# Patient Record
Sex: MALE | Race: WHITE | NOT HISPANIC OR LATINO | Employment: OTHER | URBAN - METROPOLITAN AREA
[De-identification: names, ages, dates, MRNs, and addresses within clinical notes are randomized per-mention and may not be internally consistent; named-entity substitution may affect disease eponyms.]

---

## 2017-01-17 ENCOUNTER — PATIENT MESSAGE (OUTPATIENT)
Dept: INTERNAL MEDICINE | Facility: CLINIC | Age: 77
End: 2017-01-17

## 2017-01-20 ENCOUNTER — OFFICE VISIT (OUTPATIENT)
Dept: OPHTHALMOLOGY | Facility: CLINIC | Age: 77
End: 2017-01-20
Attending: OPHTHALMOLOGY
Payer: MEDICARE

## 2017-01-20 DIAGNOSIS — H40.1231 LOW-TENSION GLAUCOMA, BILATERAL, MILD STAGE: Primary | ICD-10-CM

## 2017-01-20 DIAGNOSIS — H52.4 ASTIGMATISM WITH PRESBYOPIA, BILATERAL: ICD-10-CM

## 2017-01-20 DIAGNOSIS — H52.203 ASTIGMATISM WITH PRESBYOPIA, BILATERAL: ICD-10-CM

## 2017-01-20 DIAGNOSIS — H40.1231 LOW-TENSION GLAUCOMA, BILATERAL, MILD STAGE: ICD-10-CM

## 2017-01-20 DIAGNOSIS — H25.13 NUCLEAR SCLEROSIS, BILATERAL: ICD-10-CM

## 2017-01-20 DIAGNOSIS — H04.123 DRY EYE SYNDROME, BILATERAL: ICD-10-CM

## 2017-01-20 DIAGNOSIS — H47.392 OPTIC DISC HEMORRHAGE, LEFT: ICD-10-CM

## 2017-01-20 DIAGNOSIS — H02.403 PTOSIS, BILATERAL: ICD-10-CM

## 2017-01-20 PROCEDURE — 99999 PR PBB SHADOW E&M-EST. PATIENT-LVL III: CPT | Mod: PBBFAC,,, | Performed by: OPHTHALMOLOGY

## 2017-01-20 PROCEDURE — 92133 CPTRZD OPH DX IMG PST SGM ON: CPT | Mod: S$GLB,,, | Performed by: OPHTHALMOLOGY

## 2017-01-20 PROCEDURE — 92014 COMPRE OPH EXAM EST PT 1/>: CPT | Mod: S$GLB,,, | Performed by: OPHTHALMOLOGY

## 2017-01-20 PROCEDURE — 99499 UNLISTED E&M SERVICE: CPT | Mod: S$GLB,,, | Performed by: OPHTHALMOLOGY

## 2017-01-20 PROCEDURE — 92083 EXTENDED VISUAL FIELD XM: CPT | Mod: S$GLB,,, | Performed by: OPHTHALMOLOGY

## 2017-01-20 NOTE — PROGRESS NOTES
HPI     Glaucoma    Additional comments: HVF and OCT review today           Blurred Vision    Additional comments: Pt feels he is not seeing as well, even with new   glasses           Comments   DLS: 9/30/16    1. LTG  2. NS OU  3. Ptosis Sx  (Dr. Mendoza)  4. PIERCE  5. Disc Heme OS  6. Astigmatism/Presbyopia    MEDS:  AT's PRN OU       Last edited by Betsy Barker MA on 1/20/2017  9:16 AM. (History)            Assessment /Plan     For exam results, see Encounter Report.    Low-tension glaucoma, bilateral, mild stage    Nuclear sclerosis, bilateral    Optic disc hemorrhage, left    Dry eye syndrome, bilateral    Ptosis, bilateral    Astigmatism with presbyopia, bilateral      Pts wife is Carole Schwab - a glaucoma patient of Dr. Snyder    1.   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 Snyder)        Glaucoma meds    none        H/O adverse rxn to glaucoma drops    none        LASERS    none        GLAUCOMA SURGERIES    none        OTHER EYE SURGERIES    none        CDR    0.75/0.75        Tbase    10-19 / 12-17          Tmax    19/17            Ttarget    ?             HVF    12 test 2000 to 2017 - full od / full os        Gonio    +3 ou        CCT    587/583        OCT    7 test 2005 to 2017 - RNFL  - dec inf od// dec inf os        HRT    6 test 2006 to 2016 - MR -  Bord I/ T od // dec I os /// CDR 0.717 od // 0.664 os        Disc photos    2003, 2005 - slides // 2010, 2015 - + disc heme ST os  - OIS    - Ttoday    14/14  - Test done today HVF/OCT/DFE  optom      2.   NS ou - mild - monitor   Pt noting decreased vision at near- more hazy than last visit     3.   Ptosis / Dermatochalasis        S/P sx with Dr. Mendoza 4/5/2001    4.   PIERCE - AT's prn   (Needed steroid gtts from Dr. Urena for PIERCE) and AT's    5. Disc Heme OS   See photos 11/4/2015     6.  Astigmatism / presbyopia        Glasses - Florian - given RX today says the glasses are old - no real change    7.   Skin CA face - S/P sx    Plan  LTG - suspect vis mild disease   Nl HVF ou  OCT w/ inf thinning ou - first time 1/2017  -no disc heme today   -normal HVF ou today, dec inf ou on OCT    Cont close monitoring off gtts  Consider adding a PG in near future     F/U with HRT ou // gonio // AR/MR/BAT - 3-4 months - of HRT also with inf thinning begin gtts     I have seen and personally examined the patient.  I agree with the findings, assessment and plan of the resident and/or fellow.     Latonia Snyder MD

## 2017-02-04 DIAGNOSIS — R77.0 ABNORMAL ALBUMIN: Primary | ICD-10-CM

## 2017-02-07 ENCOUNTER — TELEPHONE (OUTPATIENT)
Dept: INTERNAL MEDICINE | Facility: CLINIC | Age: 77
End: 2017-02-07

## 2017-02-07 NOTE — TELEPHONE ENCOUNTER
----- Message from Marco Antonio Vaughan MD sent at 2/4/2017  8:42 PM CST -----  Please contact and arrange for a CMP. Orders are in.

## 2017-02-08 ENCOUNTER — LAB VISIT (OUTPATIENT)
Dept: LAB | Facility: HOSPITAL | Age: 77
End: 2017-02-08
Attending: INTERNAL MEDICINE
Payer: MEDICARE

## 2017-02-08 DIAGNOSIS — R77.0 ABNORMAL ALBUMIN: ICD-10-CM

## 2017-02-08 LAB
ALBUMIN SERPL BCP-MCNC: 3.6 G/DL
ALP SERPL-CCNC: 57 U/L
ALT SERPL W/O P-5'-P-CCNC: 13 U/L
ANION GAP SERPL CALC-SCNC: 10 MMOL/L
AST SERPL-CCNC: 17 U/L
BILIRUB SERPL-MCNC: 1 MG/DL
BUN SERPL-MCNC: 15 MG/DL
CALCIUM SERPL-MCNC: 9.1 MG/DL
CHLORIDE SERPL-SCNC: 104 MMOL/L
CO2 SERPL-SCNC: 27 MMOL/L
CREAT SERPL-MCNC: 1.3 MG/DL
EST. GFR  (AFRICAN AMERICAN): >60 ML/MIN/1.73 M^2
EST. GFR  (NON AFRICAN AMERICAN): 53 ML/MIN/1.73 M^2
GLUCOSE SERPL-MCNC: 128 MG/DL
POTASSIUM SERPL-SCNC: 3.9 MMOL/L
PROT SERPL-MCNC: 6.7 G/DL
SODIUM SERPL-SCNC: 141 MMOL/L

## 2017-02-08 PROCEDURE — 36415 COLL VENOUS BLD VENIPUNCTURE: CPT

## 2017-02-08 PROCEDURE — 80053 COMPREHEN METABOLIC PANEL: CPT

## 2017-02-09 ENCOUNTER — PATIENT MESSAGE (OUTPATIENT)
Dept: DERMATOLOGY | Facility: CLINIC | Age: 77
End: 2017-02-09

## 2017-02-16 ENCOUNTER — PATIENT MESSAGE (OUTPATIENT)
Dept: DERMATOLOGY | Facility: CLINIC | Age: 77
End: 2017-02-16

## 2017-02-20 ENCOUNTER — OFFICE VISIT (OUTPATIENT)
Dept: DERMATOLOGY | Facility: CLINIC | Age: 77
End: 2017-02-20
Payer: MEDICARE

## 2017-02-20 VITALS — BODY MASS INDEX: 25.09 KG/M2 | WEIGHT: 185 LBS

## 2017-02-20 DIAGNOSIS — Z85.828 PERSONAL HISTORY OF SKIN CANCER: ICD-10-CM

## 2017-02-20 DIAGNOSIS — L82.0 INFLAMED SEBORRHEIC KERATOSIS: Primary | ICD-10-CM

## 2017-02-20 DIAGNOSIS — L81.4 LENTIGO: ICD-10-CM

## 2017-02-20 DIAGNOSIS — L82.1 SK (SEBORRHEIC KERATOSIS): ICD-10-CM

## 2017-02-20 DIAGNOSIS — L90.5 SCAR: ICD-10-CM

## 2017-02-20 DIAGNOSIS — D18.00 ANGIOMA: ICD-10-CM

## 2017-02-20 DIAGNOSIS — D22.9 NEVUS: ICD-10-CM

## 2017-02-20 DIAGNOSIS — L30.9 DERMATITIS: ICD-10-CM

## 2017-02-20 PROCEDURE — 17110 DESTRUCTION B9 LES UP TO 14: CPT | Mod: S$GLB,,, | Performed by: DERMATOLOGY

## 2017-02-20 PROCEDURE — 1157F ADVNC CARE PLAN IN RCRD: CPT | Mod: S$GLB,,, | Performed by: DERMATOLOGY

## 2017-02-20 PROCEDURE — 1159F MED LIST DOCD IN RCRD: CPT | Mod: S$GLB,,, | Performed by: DERMATOLOGY

## 2017-02-20 PROCEDURE — 99499 UNLISTED E&M SERVICE: CPT | Mod: S$GLB,,, | Performed by: DERMATOLOGY

## 2017-02-20 PROCEDURE — 99999 PR PBB SHADOW E&M-EST. PATIENT-LVL II: CPT | Mod: PBBFAC,,, | Performed by: DERMATOLOGY

## 2017-02-20 PROCEDURE — 99213 OFFICE O/P EST LOW 20 MIN: CPT | Mod: 25,S$GLB,, | Performed by: DERMATOLOGY

## 2017-02-20 RX ORDER — BETAMETHASONE DIPROPIONATE 0.5 MG/G
CREAM TOPICAL
Qty: 45 G | Refills: 1 | Status: SHIPPED | OUTPATIENT
Start: 2017-02-20 | End: 2019-01-24 | Stop reason: SDUPTHER

## 2017-02-20 NOTE — PROGRESS NOTES
Subjective:       Patient ID:  Fred Schwab Jr. is a 76 y.o. male who presents for   Chief Complaint   Patient presents with    Spot     right arm     Rash     HPI Comments: This is a high risk patient here to check for the development of new lesions.  C/o lesion on right forearm. Itching.  Getting larger and more red.  Pt feels it is better in past 2 days.  No tx  C/o rash on right arm. Was itching.  Used diprolene and this helped.     Spot     Rash         Review of Systems   Constitutional: Negative for fever and chills.   Skin: Positive for itching and rash.   Hematologic/Lymphatic: Does not bruise/bleed easily.        Objective:    Physical Exam   Constitutional: He appears well-developed and well-nourished. No distress.   Neurological: He is alert and oriented to person, place, and time. He is not disoriented.   Psychiatric: He has a normal mood and affect.   Skin:   Areas Examined (abnormalities noted in diagram):   Scalp / Hair Palpated and Inspected  Head / Face Inspection Performed  Neck Inspection Performed  Chest / Axilla Inspection Performed  Abdomen Inspection Performed  Back Inspection Performed  RUE Inspected  LUE Inspection Performed  Nails and Digits Inspection Performed                       Diagram Legend     Erythematous scaling macule/papule c/w actinic keratosis       Vascular papule c/w angioma      Pigmented verrucoid papule/plaque c/w seborrheic keratosis      Yellow umbilicated papule c/w sebaceous hyperplasia      Irregularly shaped tan macule c/w lentigo     1-2 mm smooth white papules consistent with Milia      Movable subcutaneous cyst with punctum c/w epidermal inclusion cyst      Subcutaneous movable cyst c/w pilar cyst      Firm pink to brown papule c/w dermatofibroma      Pedunculated fleshy papule(s) c/w skin tag(s)      Evenly pigmented macule c/w junctional nevus     Mildly variegated pigmented, slightly irregular-bordered macule c/w mildly atypical nevus      Flesh colored  to evenly pigmented papule c/w intradermal nevus       Pink pearly papule/plaque c/w basal cell carcinoma      Erythematous hyperkeratotic cursted plaque c/w SCC      Surgical scar with no sign of skin cancer recurrence      Open and closed comedones      Inflammatory papules and pustules      Verrucoid papule consistent consistent with wart     Erythematous eczematous patches and plaques     Dystrophic onycholytic nail with subungual debris c/w onychomycosis     Umbilicated papule    Erythematous-base heme-crusted tan verrucoid plaque consistent with inflamed seborrheic keratosis     Erythematous Silvery Scaling Plaque c/w Psoriasis     See annotation      Assessment / Plan:        Inflamed seborrheic keratosis  Cryosurgery procedure note:    Verbal consent from the patient is obtained. Liquid nitrogen cryosurgery is applied to 1 lesions to produce a freeze injury. The patient is aware that blisters may form and is instructed on wound care with gentle cleansing and use of vaseline ointment to keep moist until healed. The patient is supplied a handout on cryosurgery and is instructed to call if lesions do not completely resolve.  F/u if does not resolve    SK (seborrheic keratosis)  These are benign inherited growths without a malignant potential. Reassurance given to patient. No treatment is necessary.     Angioma  These are benign vascular lesions that are inherited.  Treatment is not necessary.    Lentigo  This is a benign hyperpigmented sun induced lesion. Daily sun protection will reduce the number of new lesions. Treatment of these benign lesions are considered cosmetic.  The nature of sun-induced photo-aging and skin cancers is discussed.  Sun avoidance, protective clothing, and the use of 30-SPF sunscreens is advised. Observe closely for skin damage/changes, and call if such occurs.    Nevus  Discussed ABCDE's of nevi.  Monitor for new mole or moles that are becoming bigger, darker, irritated, or developing  irregular borders. Brochure provided.    Dermatitis  -     betamethasone dipropionate (DIPROLENE) 0.05 % cream; aaa qd prn rash. Not more than 2 weeks straight in same location. Avoid use on face and groin  Dispense: 45 g; Refill: 1    Personal history of skin cancer  Scar  Area(s) of previous NMSC evaluated with no signs of recurrence.    Upper body skin examination performed today including at least 6 points as noted in physical examination. No lesions suspicious for malignancy noted.           Return in about 1 year (around 2/20/2018).

## 2017-03-13 RX ORDER — PROPRANOLOL HYDROCHLORIDE 10 MG/1
TABLET ORAL
Qty: 60 TABLET | Refills: 7 | Status: SHIPPED | OUTPATIENT
Start: 2017-03-13 | End: 2017-11-23 | Stop reason: SDUPTHER

## 2017-03-13 RX ORDER — DOXAZOSIN 2 MG/1
TABLET ORAL
Qty: 30 TABLET | Refills: 6 | Status: SHIPPED | OUTPATIENT
Start: 2017-03-13 | End: 2017-10-18 | Stop reason: SDUPTHER

## 2017-04-25 RX ORDER — SILDENAFIL CITRATE 100 MG/1
TABLET, FILM COATED ORAL
Qty: 10 TABLET | Refills: 3 | Status: SHIPPED | OUTPATIENT
Start: 2017-04-25 | End: 2018-10-19 | Stop reason: SDUPTHER

## 2017-05-22 ENCOUNTER — OFFICE VISIT (OUTPATIENT)
Dept: OPHTHALMOLOGY | Facility: CLINIC | Age: 77
End: 2017-05-22
Payer: MEDICARE

## 2017-05-22 ENCOUNTER — CLINICAL SUPPORT (OUTPATIENT)
Dept: OPHTHALMOLOGY | Facility: CLINIC | Age: 77
End: 2017-05-22
Payer: MEDICARE

## 2017-05-22 DIAGNOSIS — H52.203 ASTIGMATISM WITH PRESBYOPIA, BILATERAL: ICD-10-CM

## 2017-05-22 DIAGNOSIS — H02.403 PTOSIS, BILATERAL: ICD-10-CM

## 2017-05-22 DIAGNOSIS — H40.1231 LOW-TENSION GLAUCOMA, BILATERAL, MILD STAGE: Primary | ICD-10-CM

## 2017-05-22 DIAGNOSIS — H40.1231 LOW-TENSION GLAUCOMA, BILATERAL, MILD STAGE: ICD-10-CM

## 2017-05-22 DIAGNOSIS — H52.4 ASTIGMATISM WITH PRESBYOPIA, BILATERAL: ICD-10-CM

## 2017-05-22 DIAGNOSIS — H04.123 DRY EYE SYNDROME, BILATERAL: ICD-10-CM

## 2017-05-22 DIAGNOSIS — H47.392 OPTIC DISC HEMORRHAGE, LEFT: ICD-10-CM

## 2017-05-22 DIAGNOSIS — H25.13 NUCLEAR SCLEROSIS, BILATERAL: ICD-10-CM

## 2017-05-22 PROCEDURE — 92012 INTRM OPH EXAM EST PATIENT: CPT | Mod: S$GLB,,, | Performed by: OPHTHALMOLOGY

## 2017-05-22 PROCEDURE — 92020 GONIOSCOPY: CPT | Mod: S$GLB,,, | Performed by: OPHTHALMOLOGY

## 2017-05-22 PROCEDURE — 92134 CPTRZ OPH DX IMG PST SGM RTA: CPT | Mod: S$GLB,,, | Performed by: OPHTHALMOLOGY

## 2017-05-22 PROCEDURE — 99999 PR PBB SHADOW E&M-EST. PATIENT-LVL III: CPT | Mod: PBBFAC,,, | Performed by: OPHTHALMOLOGY

## 2017-05-22 PROCEDURE — 99499 UNLISTED E&M SERVICE: CPT | Mod: S$GLB,,, | Performed by: OPHTHALMOLOGY

## 2017-05-22 NOTE — PROGRESS NOTES
HPI     Glaucoma    Additional comments: HRT review today           Comments   DLS: 1/20/17    1. LTG  2. NS OU  3. Ptosis Sx  (Dr. Mendoza)  4. PIERCE  5. Disc Heme OS  6. Astigmatism/Presbyopia    MEDS:  AT's PRN OU       Last edited by Betsy Barker MA on 5/22/2017  9:58 AM. (History)            Assessment /Plan     For exam results, see Encounter Report.    Low-tension glaucoma, bilateral, mild stage  -     [facility] (HRT) - OU - Both Eyes    Nuclear sclerosis, bilateral    Optic disc hemorrhage, left    Dry eye syndrome, bilateral    Ptosis, bilateral    Astigmatism with presbyopia, bilateral        Pts wife is Carole Schwab - a glaucoma patient of Dr. Snyder    1.   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 Snyder)        Glaucoma meds    none        H/O adverse rxn to glaucoma drops    none        LASERS    none        GLAUCOMA SURGERIES    none        OTHER EYE SURGERIES    none        CDR    0.75/0.75        Tbase    10-19 / 12-17          Tmax    19/17            Ttarget    ?             HVF    12 test 2000 to 2017 - full od / full os        Gonio    +3 ou        CCT    587/583        OCT    7 test 2005 to 2017 - RNFL  - dec inf od// dec inf os        HRT    7 test 2006 to 2017 - MR -  Dec. I, bord T od // [place] off os /// CDR 0.73 od // [place] off  os        Disc photos    2003, 2005 - slides // 2010, 2015 - + disc heme ST os  - OIS    - Ttoday    14/14  - Test done today HRT / gonio         2.   NS ou - mild - monitor   Pt noting decreased vision at near- more hazy than last visit     3.   Ptosis / Dermatochalasis        S/P sx with Dr. Mendoza 4/5/2001    4.   PIERCE - AT's prn   (Needed steroid gtts from Dr. Urena for PIERCE) and AT's    5. Disc Heme OS   See photos 11/4/2015     6.  Astigmatism / presbyopia       Glasses - Florian - given RX today says  the glasses are old - no real change    7.   Skin CA face - S/P sx    Plan  LTG - suspect vis mild disease   Nl HVF ou  OCT w/ inf thinning ou - first time 1/2017  -no disc heme today   -normal HVF ou today, dec inf ou on OCT    Cont close monitoring off gtts  Consider adding a PG in near future     F/U 4-6 with HVF / DFE / OCT - consider starting gtts - ON's very suspicious and ?? OCT changes - also H/O disc heme    I have seen and personally examined the patient.  I agree with the findings, assessment and plan of the resident and/or fellow.     Latonia Snyder MD

## 2017-10-18 RX ORDER — DOXAZOSIN 2 MG/1
TABLET ORAL
Qty: 30 TABLET | Refills: 4 | Status: SHIPPED | OUTPATIENT
Start: 2017-10-18 | End: 2018-03-09 | Stop reason: SDUPTHER

## 2017-11-17 ENCOUNTER — OFFICE VISIT (OUTPATIENT)
Dept: INTERNAL MEDICINE | Facility: CLINIC | Age: 77
End: 2017-11-17
Payer: MEDICARE

## 2017-11-17 VITALS
WEIGHT: 184.31 LBS | BODY MASS INDEX: 24.96 KG/M2 | DIASTOLIC BLOOD PRESSURE: 80 MMHG | SYSTOLIC BLOOD PRESSURE: 118 MMHG | HEIGHT: 72 IN

## 2017-11-17 DIAGNOSIS — N40.1 BPH ASSOCIATED WITH NOCTURIA: ICD-10-CM

## 2017-11-17 DIAGNOSIS — R35.1 BPH ASSOCIATED WITH NOCTURIA: ICD-10-CM

## 2017-11-17 DIAGNOSIS — Z00.00 ANNUAL PHYSICAL EXAM: Primary | ICD-10-CM

## 2017-11-17 DIAGNOSIS — Z12.5 SCREENING FOR PROSTATE CANCER: ICD-10-CM

## 2017-11-17 DIAGNOSIS — Z86.010 HX OF COLONIC POLYP: ICD-10-CM

## 2017-11-17 DIAGNOSIS — E04.1 RIGHT THYROID NODULE: ICD-10-CM

## 2017-11-17 LAB
BILIRUB UR QL STRIP: NEGATIVE
CLARITY UR REFRACT.AUTO: ABNORMAL
COLOR UR AUTO: ABNORMAL
GLUCOSE UR QL STRIP: NEGATIVE
HGB UR QL STRIP: NEGATIVE
KETONES UR QL STRIP: NEGATIVE
LEUKOCYTE ESTERASE UR QL STRIP: NEGATIVE
MICROSCOPIC COMMENT: NORMAL
NITRITE UR QL STRIP: NEGATIVE
PH UR STRIP: 5 [PH] (ref 5–8)
PROT UR QL STRIP: NEGATIVE
RBC #/AREA URNS AUTO: 1 /HPF (ref 0–4)
SP GR UR STRIP: 1.02 (ref 1–1.03)
URN SPEC COLLECT METH UR: ABNORMAL
UROBILINOGEN UR STRIP-ACNC: NEGATIVE EU/DL
WBC #/AREA URNS AUTO: 1 /HPF (ref 0–5)

## 2017-11-17 PROCEDURE — 99397 PER PM REEVAL EST PAT 65+ YR: CPT | Mod: S$GLB,,, | Performed by: INTERNAL MEDICINE

## 2017-11-17 PROCEDURE — 99499 UNLISTED E&M SERVICE: CPT | Mod: S$GLB,,, | Performed by: INTERNAL MEDICINE

## 2017-11-17 PROCEDURE — 99999 PR PBB SHADOW E&M-EST. PATIENT-LVL III: CPT | Mod: PBBFAC,,, | Performed by: INTERNAL MEDICINE

## 2017-11-17 PROCEDURE — 81001 URINALYSIS AUTO W/SCOPE: CPT

## 2017-11-17 NOTE — PROGRESS NOTES
CHIEF COMPLAINT:  Physical exam.    HISTORY OF PRESENT ILLNESS:  The patient is a 77-year-old gentleman who comes in   today for annual physical exam.  The patient has no new complaints.    REVIEW OF SYSTEMS:  Please see the patient entered review of systems.  In   addition, the patient reports he does exercise three times a week.  He does   weights as well as rides a bike.  He also plays ping pong at home.  He has a   three-po house.  He is always going up and down the stairs.  In regards to   his urinary frequency, this has not gotten any better or worse.  He still has   urgency.  He gets up at least once at night, but not every night.  The patient   gets his skin checked by Dermatology here at [facility], was last seen in February   with Dr. Cassidy.  The patient also reports neuropathy in his feet, which has not   changed any, it is no better, no worse.  In regards to the joint pain, his most   of the fingers are stiff in the morning.  He is taking glucosamine and   chondroitin sulfate.  The patient had a colonoscopy in 2015.  He did have colon   polyps, needs a repeat next year.    PHYSICAL EXAMINATION:  GENERAL APPEARANCE:  No acute distress.  HEENT:  Conjunctivae clear.  He does have bilateral cataracts.  TMs are clear.    The left is partially obscured by wax.  Nasal septum is midline without   discharge.  Oropharynx is without erythema.  NECK:  Trachea was midline without JVD.  The patient's thyroid on the right side   felt like it had a nodule.  PULMONARY:  Good inspiratory, expiratory breath sounds are heard.  Lungs are   clear to auscultation.  CARDIOVASCULAR:  S1, S2.  2+ carotid pulses without bruits.  EXTREMITIES:  With trace edema.  ABDOMEN:  Nontender, nondistended, without hepatosplenomegaly.  RECTAL:  A digital rectal exam was performed.  The rectum was normal.  Stool was   brown and heme negative.  Scrotum and penis were normal.  Prostate was walnut   in size.    ASSESSMENT:  1.  Physical  exam.  2.  Right thyroid nodule.  3.  Benign prostatic hypertrophy with nocturia.  4.  History of colon polyps.    PLAN:  We will put the patient in for CBC, CMP, PSA, TSH, UA, and ultrasound of   the thyroid.  He is to follow up pending results.      JDS/HN  dd: 11/17/2017 08:28:13 (CST)  td: 11/18/2017 06:41:46 (CST)  Doc ID   #[id number]  Job ID #[id number]    CC:     Answers for HPI/ROS submitted by the patient on 11/15/2017   activity change: No  unexpected weight change: No  neck pain: No  hearing loss: No  rhinorrhea: No  trouble swallowing: No  eye discharge: No  visual disturbance: No  chest tightness: No  wheezing: No  chest pain: No  palpitations: No  blood in stool: No  constipation: No  vomiting: No  diarrhea: No  polydipsia: No  polyuria: No  difficulty urinating: Yes  urgency: Yes  hematuria: No  joint swelling: No  arthralgias: Yes  headaches: No  weakness: No  confusion: No  dysphoric mood: No

## 2017-11-19 ENCOUNTER — PATIENT MESSAGE (OUTPATIENT)
Dept: INTERNAL MEDICINE | Facility: CLINIC | Age: 77
End: 2017-11-19

## 2017-11-19 DIAGNOSIS — R97.20 ELEVATED PSA: Primary | ICD-10-CM

## 2017-11-21 ENCOUNTER — HOSPITAL ENCOUNTER (OUTPATIENT)
Dept: RADIOLOGY | Facility: HOSPITAL | Age: 77
Discharge: HOME | End: 2017-11-21
Attending: INTERNAL MEDICINE
Payer: MEDICARE

## 2017-11-21 DIAGNOSIS — E04.1 RIGHT THYROID NODULE: ICD-10-CM

## 2017-11-21 PROCEDURE — 76536 US EXAM OF HEAD AND NECK: CPT | Mod: TC

## 2017-11-21 PROCEDURE — 76536 US EXAM OF HEAD AND NECK: CPT | Mod: 26,,, | Performed by: RADIOLOGY

## 2017-11-23 ENCOUNTER — PATIENT MESSAGE (OUTPATIENT)
Dept: INTERNAL MEDICINE | Facility: CLINIC | Age: 77
End: 2017-11-23

## 2017-11-23 DIAGNOSIS — E04.2 MULTIPLE THYROID NODULES: ICD-10-CM

## 2017-11-23 RX ORDER — PROPRANOLOL HYDROCHLORIDE 10 MG/1
TABLET ORAL
Qty: 60 TABLET | Refills: 3 | Status: SHIPPED | OUTPATIENT
Start: 2017-11-23 | End: 2018-03-26 | Stop reason: SDUPTHER

## 2018-03-10 RX ORDER — DOXAZOSIN 2 MG/1
TABLET ORAL
Qty: 90 TABLET | Refills: 3 | Status: SHIPPED | OUTPATIENT
Start: 2018-03-10 | End: 2019-02-28 | Stop reason: SDUPTHER

## 2018-03-26 RX ORDER — PROPRANOLOL HYDROCHLORIDE 10 MG/1
TABLET ORAL
Qty: 180 TABLET | Refills: 1 | Status: SHIPPED | OUTPATIENT
Start: 2018-03-26 | End: 2018-10-13 | Stop reason: SDUPTHER

## 2018-04-16 ENCOUNTER — OFFICE VISIT (OUTPATIENT)
Dept: DERMATOLOGY | Facility: CLINIC | Age: 78
End: 2018-04-16
Payer: MEDICARE

## 2018-04-16 DIAGNOSIS — L23.7 ALLERGIC CONTACT DERMATITIS DUE TO PLANTS, EXCEPT FOOD: Primary | ICD-10-CM

## 2018-04-16 DIAGNOSIS — B35.2 TINEA MANUS: ICD-10-CM

## 2018-04-16 PROCEDURE — 99999 PR PBB SHADOW E&M-EST. PATIENT-LVL I: CPT | Mod: PBBFAC,,, | Performed by: DERMATOLOGY

## 2018-04-16 PROCEDURE — 99213 OFFICE O/P EST LOW 20 MIN: CPT | Mod: S$GLB,,, | Performed by: DERMATOLOGY

## 2018-04-16 RX ORDER — KETOCONAZOLE 20 MG/G
CREAM TOPICAL
Qty: 60 G | Refills: 3 | Status: SHIPPED | OUTPATIENT
Start: 2018-04-16 | End: 2019-01-24

## 2018-04-16 RX ORDER — MOMETASONE FUROATE 1 MG/G
OINTMENT TOPICAL 2 TIMES DAILY
Qty: 60 G | Refills: 1 | Status: SHIPPED | OUTPATIENT
Start: 2018-04-16 | End: 2019-01-24

## 2018-04-16 NOTE — PROGRESS NOTES
Subjective:       Patient ID:  Fred Schwab Jr. is a 77 y.o. male who presents for   Chief Complaint   Patient presents with    Rash     Pt c/o itchy red rash on right legs x a few weeks . tx with Lamisil cream, Voltaren gel, oral benadryl, oatmeal bath and betamethasone cream. Resolved but then recurred a week later.     Also has patch on post right calf    Now has rash that started last Thursday on face, chin neck scalp. Very itchy. Also on fingers.  No new oral medications. Pt did minimal yard work last week, picking branches.       Rash         Review of Systems   Skin: Positive for itching and rash.        Objective:    Physical Exam   Skin:                          Diagram Legend     Erythematous scaling macule/papule c/w actinic keratosis       Vascular papule c/w angioma      Pigmented verrucoid papule/plaque c/w seborrheic keratosis      Yellow umbilicated papule c/w sebaceous hyperplasia      Irregularly shaped tan macule c/w lentigo     1-2 mm smooth white papules consistent with Milia      Movable subcutaneous cyst with punctum c/w epidermal inclusion cyst      Subcutaneous movable cyst c/w pilar cyst      Firm pink to brown papule c/w dermatofibroma      Pedunculated fleshy papule(s) c/w skin tag(s)      Evenly pigmented macule c/w junctional nevus     Mildly variegated pigmented, slightly irregular-bordered macule c/w mildly atypical nevus      Flesh colored to evenly pigmented papule c/w intradermal nevus       Pink pearly papule/plaque c/w basal cell carcinoma      Erythematous hyperkeratotic cursted plaque c/w SCC      Surgical scar with no sign of skin cancer recurrence      Open and closed comedones      Inflammatory papules and pustules      Verrucoid papule consistent consistent with wart     Erythematous eczematous patches and plaques     Dystrophic onycholytic nail with subungual debris c/w onychomycosis     Umbilicated papule    Erythematous-base heme-crusted tan verrucoid plaque consistent  with inflamed seborrheic keratosis     Erythematous Silvery Scaling Plaque c/w Psoriasis     See annotation      Assessment / Plan:        Allergic contact dermatitis due to plants, except food  -     mometasone (ELOCON) 0.1 % ointment; Apply topically 2 (two) times daily. aaa qd- bid for rash.  Dispense: 60 g; Refill: 1  Or topicort spray for scalp as well    Tinea manus  Discussed steroids can flare tinea on hand  Pt with nail disease on left hand as well   -     ketoconazole (NIZORAL) 2 % cream; AAA bid to left hand and feet x 3 weeks for fungus  Dispense: 60 g; Refill: 3             Follow-up if symptoms worsen or fail to improve.

## 2018-05-08 DIAGNOSIS — R41.3 MEMORY LOSS OR IMPAIRMENT: Primary | ICD-10-CM

## 2018-10-05 ENCOUNTER — TELEPHONE (OUTPATIENT)
Dept: INTERNAL MEDICINE | Facility: CLINIC | Age: 78
End: 2018-10-05

## 2018-10-05 NOTE — TELEPHONE ENCOUNTER
----- Message from Pina Lott sent at 10/5/2018  7:11 AM CDT -----  Contact: [employer]  Message from [facility], System Message sent at 10/4/2018  7:12 PM CDT -----    Appointment Request From: Fred Schwab Jr.    With Provider: Marco Antonio Vaughan MD [[facility] - Internal Medicine]    Preferred Date Range: 11/19/2018 - 11/30/2018    Preferred Times: Any time    Reason for visit: Existing Patient    Comments:  Mondays or Fridays preferred. Every year I am pushed back further. I had been coming in the spring. I've been Dr Vaughan's patient for 20 yrs.

## 2018-10-13 RX ORDER — PROPRANOLOL HYDROCHLORIDE 10 MG/1
TABLET ORAL
Qty: 180 TABLET | Refills: 1 | Status: SHIPPED | OUTPATIENT
Start: 2018-10-13 | End: 2019-04-07 | Stop reason: SDUPTHER

## 2018-10-19 RX ORDER — SILDENAFIL 100 MG/1
TABLET, FILM COATED ORAL
Qty: 10 TABLET | Refills: 3 | Status: SHIPPED | OUTPATIENT
Start: 2018-10-19 | End: 2020-01-03

## 2018-11-12 ENCOUNTER — TELEPHONE (OUTPATIENT)
Dept: ADMINISTRATIVE | Facility: HOSPITAL | Age: 78
End: 2018-11-12

## 2018-11-12 DIAGNOSIS — Z00.00 ANNUAL PHYSICAL EXAM: Primary | ICD-10-CM

## 2018-11-12 DIAGNOSIS — Z12.5 PROSTATE CANCER SCREENING: ICD-10-CM

## 2018-11-12 DIAGNOSIS — E78.5 HYPERLIPIDEMIA, UNSPECIFIED HYPERLIPIDEMIA TYPE: ICD-10-CM

## 2018-11-15 ENCOUNTER — TELEPHONE (OUTPATIENT)
Dept: INTERNAL MEDICINE | Facility: CLINIC | Age: 78
End: 2018-11-15

## 2018-11-15 NOTE — TELEPHONE ENCOUNTER
----- Message from Audrey Augustin sent at 11/15/2018  8:20 AM CST -----  Contact: Patient [phone]  Patient is returning a phone call.  Who left a message for the patient: Reyna ALFORD  Does patient know what this is regarding:  Not sure  Comments:     Please call and advise  Thank you

## 2018-11-16 ENCOUNTER — LAB VISIT (OUTPATIENT)
Dept: LAB | Facility: HOSPITAL | Age: 78
End: 2018-11-16
Attending: INTERNAL MEDICINE
Payer: MEDICARE

## 2018-11-16 DIAGNOSIS — Z12.5 PROSTATE CANCER SCREENING: ICD-10-CM

## 2018-11-16 DIAGNOSIS — E78.5 HYPERLIPIDEMIA, UNSPECIFIED HYPERLIPIDEMIA TYPE: ICD-10-CM

## 2018-11-16 DIAGNOSIS — Z00.00 ANNUAL PHYSICAL EXAM: ICD-10-CM

## 2018-11-16 LAB
ALBUMIN SERPL BCP-MCNC: 3.6 G/DL
ALP SERPL-CCNC: 66 U/L
ALT SERPL W/O P-5'-P-CCNC: 14 U/L
ANION GAP SERPL CALC-SCNC: 4 MMOL/L
AST SERPL-CCNC: 16 U/L
BASOPHILS # BLD AUTO: 0.02 K/UL
BASOPHILS NFR BLD: 0.3 %
BILIRUB SERPL-MCNC: 1.1 MG/DL
BUN SERPL-MCNC: 13 MG/DL
CALCIUM SERPL-MCNC: 9.1 MG/DL
CHLORIDE SERPL-SCNC: 105 MMOL/L
CHOLEST SERPL-MCNC: 206 MG/DL
CHOLEST/HDLC SERPL: 4.6 {RATIO}
CO2 SERPL-SCNC: 31 MMOL/L
COMPLEXED PSA SERPL-MCNC: 4.3 NG/ML
CREAT SERPL-MCNC: 1.2 MG/DL
DIFFERENTIAL METHOD: ABNORMAL
EOSINOPHIL # BLD AUTO: 0.2 K/UL
EOSINOPHIL NFR BLD: 3.1 %
ERYTHROCYTE [DISTWIDTH] IN BLOOD BY AUTOMATED COUNT: 13.7 %
EST. GFR  (AFRICAN AMERICAN): >60 ML/MIN/1.73 M^2
EST. GFR  (NON AFRICAN AMERICAN): 58 ML/MIN/1.73 M^2
GLUCOSE SERPL-MCNC: 99 MG/DL
HCT VFR BLD AUTO: 44.4 %
HDLC SERPL-MCNC: 45 MG/DL
HDLC SERPL: 21.8 %
HGB BLD-MCNC: 13.9 G/DL
LDLC SERPL CALC-MCNC: 146.4 MG/DL
LYMPHOCYTES # BLD AUTO: 1.7 K/UL
LYMPHOCYTES NFR BLD: 29.7 %
MCH RBC QN AUTO: 29 PG
MCHC RBC AUTO-ENTMCNC: 31.3 G/DL
MCV RBC AUTO: 93 FL
MONOCYTES # BLD AUTO: 0.5 K/UL
MONOCYTES NFR BLD: 9.4 %
NEUTROPHILS # BLD AUTO: 3.3 K/UL
NEUTROPHILS NFR BLD: 57.2 %
NONHDLC SERPL-MCNC: 161 MG/DL
PLATELET # BLD AUTO: 175 K/UL
PMV BLD AUTO: 9.9 FL
POTASSIUM SERPL-SCNC: 3.8 MMOL/L
PROT SERPL-MCNC: 6.9 G/DL
RBC # BLD AUTO: 4.8 M/UL
SODIUM SERPL-SCNC: 140 MMOL/L
TRIGL SERPL-MCNC: 73 MG/DL
WBC # BLD AUTO: 5.75 K/UL

## 2018-11-16 PROCEDURE — 80061 LIPID PANEL: CPT

## 2018-11-16 PROCEDURE — 80053 COMPREHEN METABOLIC PANEL: CPT

## 2018-11-16 PROCEDURE — 85025 COMPLETE CBC W/AUTO DIFF WBC: CPT

## 2018-11-16 PROCEDURE — 36415 COLL VENOUS BLD VENIPUNCTURE: CPT

## 2018-11-16 PROCEDURE — 84153 ASSAY OF PSA TOTAL: CPT

## 2018-11-19 ENCOUNTER — OFFICE VISIT (OUTPATIENT)
Dept: INTERNAL MEDICINE | Facility: CLINIC | Age: 78
End: 2018-11-19
Payer: MEDICARE

## 2018-11-19 VITALS
DIASTOLIC BLOOD PRESSURE: 80 MMHG | SYSTOLIC BLOOD PRESSURE: 116 MMHG | OXYGEN SATURATION: 98 % | BODY MASS INDEX: 24.93 KG/M2 | TEMPERATURE: 97 F | WEIGHT: 184.06 LBS | HEART RATE: 58 BPM | HEIGHT: 72 IN

## 2018-11-19 DIAGNOSIS — I65.29 CAROTID ATHEROSCLEROSIS, UNSPECIFIED LATERALITY: ICD-10-CM

## 2018-11-19 DIAGNOSIS — Z91.89 AT RISK FOR CORONARY ARTERY DISEASE: ICD-10-CM

## 2018-11-19 DIAGNOSIS — L98.9 SKIN LESION OF RIGHT LEG: ICD-10-CM

## 2018-11-19 DIAGNOSIS — E78.00 ELEVATED CHOLESTEROL: ICD-10-CM

## 2018-11-19 DIAGNOSIS — E04.2 MULTIPLE THYROID NODULES: ICD-10-CM

## 2018-11-19 DIAGNOSIS — Z00.00 ANNUAL PHYSICAL EXAM: Primary | ICD-10-CM

## 2018-11-19 DIAGNOSIS — Z13.6 ENCOUNTER FOR ABDOMINAL AORTIC ANEURYSM SCREENING: ICD-10-CM

## 2018-11-19 DIAGNOSIS — G60.9 IDIOPATHIC PERIPHERAL NEUROPATHY: ICD-10-CM

## 2018-11-19 DIAGNOSIS — Z01.00 ROUTINE EYE EXAM: ICD-10-CM

## 2018-11-19 PROCEDURE — 99999 PR PBB SHADOW E&M-EST. PATIENT-LVL V: CPT | Mod: PBBFAC,,, | Performed by: INTERNAL MEDICINE

## 2018-11-19 PROCEDURE — 3074F SYST BP LT 130 MM HG: CPT | Mod: CPTII,S$GLB,, | Performed by: INTERNAL MEDICINE

## 2018-11-19 PROCEDURE — 99214 OFFICE O/P EST MOD 30 MIN: CPT | Mod: S$GLB,,, | Performed by: INTERNAL MEDICINE

## 2018-11-19 PROCEDURE — 3079F DIAST BP 80-89 MM HG: CPT | Mod: CPTII,S$GLB,, | Performed by: INTERNAL MEDICINE

## 2018-11-19 NOTE — PROGRESS NOTES
CHIEF COMPLAINT:  Physical exam.    HISTORY OF PRESENT ILLNESS:  The patient is a 78-year-old gentleman who we see   for BPH with elevated PSA, hypertension, multiple thyroid nodules as well as   peripheral neuropathy, who comes in today for annual physical exam.  The patient   has no new complaints.  He does state since the last time we saw him, he   entered into [employer] for table tennis.  Evidently, he was the only   person who signed up; so, he won a gold metal by default.    REVIEW OF SYSTEMS:  Please see the patient's entered review of systems.  The   patient does report having hearing loss.  He did have his hearing checked.  He   found that hearing aids make it worse.  So, he does not use them.  The patient   reports he goes to the gym at least three times a week for 40 minutes where he   does weights and machines.  He calls playing table tennis his cardio exercise.    The patient reports that if he holds his urine for a long period of time when he   goes to the bathroom, then the stream will be slow, otherwise, it is normal.    He does report over the past two to three months, he has been having this   soreness underneath the rib cage.  It has gotten better over the past two weeks.    When he wakes up in the morning, he has a little soreness; then it goes away.    He states that if he goes to the gym and does stomach crunches or exercise that   involves twisting, this will aggravate the symptoms; again it is resolving.  He   does complain of a sore on his right ankle, which comes and goes.  He has seen   Dr. Cassidy for this in the past and was told that it would need surgery to have   it removed.  He does have a history of mild peripheral neuropathy, has seen Dr. Holt back in 2013.  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  No treatment was indicated   because it was so mild according to Dr. Holt's note.  He states that these   symptoms  have actually gotten better.    PHYSICAL EXAMINATION:  GENERAL APPEARANCE:  No acute distress.  HEENT:  Conjunctivae clear.  Pupils are equal and reactive.  He does have   bilateral cataracts.  TMs look clear.  There were partially obscured by wax.    Nasal septum is midline without discharge.  Oropharynx is without erythema.  NECK:  Trachea is midline without JVD.  I did not appreciate any thyroid nodules   on today's examination,  PULMONARY:  Good inspiratory and expiratory breath sounds are heard.  Lungs are   clear to auscultation.  CARDIOVASCULAR:  S1, S2.  2+ carotid pulses without bruits.  EXTREMITIES:  Without edema.  ABDOMEN:  Nontender, nondistended, without hepatosplenomegaly.  RECTAL:  A digital rectal exam was performed.  The rectum was normal.  Stool was   brown and heme negative.  Prostate was walnut in size.  The patient's feet were inspected, 2+ dorsal pedal pulses.  He had a normal   monofilament exam.  His toes do show nail fungus involving all toes.    ASSESSMENT:  1.  Physical exam.  2.  Multiple thyroid nodules.  3.  Peripheral neuropathy.  4.  Skin lesion on the right leg.  5.  Elevated cholesterol, on review of labs.  6.  History of mild homogenous plaque in the carotids.    PLAN:  We will put the patient in for a coronary calcium scale, a thyroid   ultrasound and ultrasound of the abdominal aorta.  We will refer the patient to   Dermatology as well as Ophthalmology.  The patient is to follow up with us   pending results.      JDS/HN  dd: 11/19/2018 10:31:13 (CST)  td: 11/20/2018 04:16:09 (CST)  Doc ID   #[id number]  Job ID #[id number]    CC:     Answers for HPI/ROS submitted by the patient on 11/16/2018   activity change: No  unexpected weight change: No  neck pain: No  hearing loss: Yes  rhinorrhea: Yes  trouble swallowing: No  eye discharge: No  visual disturbance: No  chest tightness: No  wheezing: No  chest pain: No  palpitations: No  blood in stool: No  constipation: No  vomiting:  No  diarrhea: No  polydipsia: No  polyuria: No  difficulty urinating: No  urgency: Yes  hematuria: No  joint swelling: No  arthralgias: Yes  headaches: No  weakness: No  confusion: No  dysphoric mood: No

## 2018-11-29 ENCOUNTER — HOSPITAL ENCOUNTER (OUTPATIENT)
Dept: RADIOLOGY | Facility: HOSPITAL | Age: 78
Discharge: HOME | End: 2018-11-29
Attending: INTERNAL MEDICINE
Payer: MEDICARE

## 2018-11-29 DIAGNOSIS — Z13.6 ENCOUNTER FOR ABDOMINAL AORTIC ANEURYSM SCREENING: ICD-10-CM

## 2018-11-29 DIAGNOSIS — E04.2 MULTIPLE THYROID NODULES: ICD-10-CM

## 2018-11-29 PROCEDURE — 76536 US EXAM OF HEAD AND NECK: CPT | Mod: 26,,, | Performed by: RADIOLOGY

## 2018-11-29 PROCEDURE — 76536 US EXAM OF HEAD AND NECK: CPT | Mod: TC

## 2018-11-29 PROCEDURE — 76775 US EXAM ABDO BACK WALL LIM: CPT | Mod: TC

## 2018-11-29 PROCEDURE — 76775 US EXAM ABDO BACK WALL LIM: CPT | Mod: 26,,, | Performed by: RADIOLOGY

## 2018-11-30 ENCOUNTER — TELEPHONE (OUTPATIENT)
Dept: INTERNAL MEDICINE | Facility: CLINIC | Age: 78
End: 2018-11-30

## 2018-11-30 ENCOUNTER — PATIENT MESSAGE (OUTPATIENT)
Dept: INTERNAL MEDICINE | Facility: CLINIC | Age: 78
End: 2018-11-30

## 2018-11-30 ENCOUNTER — HOSPITAL ENCOUNTER (OUTPATIENT)
Dept: RADIOLOGY | Facility: HOSPITAL | Age: 78
Discharge: HOME | End: 2018-11-30
Attending: INTERNAL MEDICINE
Payer: MEDICARE

## 2018-11-30 DIAGNOSIS — Z91.89 AT RISK FOR CORONARY ARTERY DISEASE: ICD-10-CM

## 2018-11-30 DIAGNOSIS — E78.00 ELEVATED CHOLESTEROL: ICD-10-CM

## 2018-11-30 PROCEDURE — 75571 CT HRT W/O DYE W/CA TEST: CPT | Mod: TC

## 2018-11-30 PROCEDURE — 75571 CT HRT W/O DYE W/CA TEST: CPT | Mod: 26,,, | Performed by: RADIOLOGY

## 2018-11-30 NOTE — TELEPHONE ENCOUNTER
Pt returned call and was informed. He would like to start on a cholesterol lowering medication. He will also follow a low cholesterol diet.

## 2018-11-30 NOTE — TELEPHONE ENCOUNTER
----- Message from Marco Antonio Vaughan MD sent at 11/29/2018  5:48 PM CST -----  Please call the patient regarding his abnormal result. His ultrasound showed no aneurysms. It did show atherosclerotic plaque The treatment for this is to get his total cholesterol under 200 and LDL under 100. Please see if he would like to start a cholesterol lowering mediation.

## 2018-12-01 ENCOUNTER — PATIENT MESSAGE (OUTPATIENT)
Dept: INTERNAL MEDICINE | Facility: CLINIC | Age: 78
End: 2018-12-01

## 2018-12-05 ENCOUNTER — TELEPHONE (OUTPATIENT)
Dept: INTERNAL MEDICINE | Facility: CLINIC | Age: 78
End: 2018-12-05

## 2018-12-05 NOTE — TELEPHONE ENCOUNTER
----- Message from Florinda Corbin sent at 12/5/2018  3:16 PM CST -----  Contact: self   Pt is returning a call he missed from Dr. Vaughan.     Pt can be reached at  [phone].    Thank you        .

## 2018-12-06 ENCOUNTER — PATIENT MESSAGE (OUTPATIENT)
Dept: INTERNAL MEDICINE | Facility: CLINIC | Age: 78
End: 2018-12-06

## 2018-12-07 RX ORDER — ROSUVASTATIN CALCIUM 10 MG/1
10 TABLET, COATED ORAL DAILY
Qty: 90 TABLET | Refills: 3 | Status: SHIPPED | OUTPATIENT
Start: 2018-12-07 | End: 2019-03-22 | Stop reason: ALTCHOICE

## 2018-12-10 ENCOUNTER — TELEPHONE (OUTPATIENT)
Dept: INTERNAL MEDICINE | Facility: CLINIC | Age: 78
End: 2018-12-10

## 2018-12-10 DIAGNOSIS — I25.10 CORONARY ARTERY DISEASE INVOLVING NATIVE CORONARY ARTERY OF NATIVE HEART WITHOUT ANGINA PECTORIS: Primary | ICD-10-CM

## 2018-12-10 DIAGNOSIS — R93.89 ABNORMAL CAT SCAN: ICD-10-CM

## 2018-12-10 DIAGNOSIS — E78.5 HYPERLIPIDEMIA, UNSPECIFIED HYPERLIPIDEMIA TYPE: ICD-10-CM

## 2018-12-10 NOTE — TELEPHONE ENCOUNTER
----- Message from Marco Antonio Vaughan MD sent at 12/10/2018  7:05 AM CST -----  Please contact and schedule a chest x-ray as well as a stress echo. Orders are in.

## 2018-12-20 ENCOUNTER — HOSPITAL ENCOUNTER (OUTPATIENT)
Dept: CARDIOLOGY | Facility: CLINIC | Age: 78
Discharge: HOME | End: 2018-12-20
Attending: INTERNAL MEDICINE
Payer: MEDICARE

## 2018-12-20 ENCOUNTER — HOSPITAL ENCOUNTER (OUTPATIENT)
Dept: RADIOLOGY | Facility: HOSPITAL | Age: 78
Discharge: HOME | End: 2018-12-20
Attending: INTERNAL MEDICINE
Payer: MEDICARE

## 2018-12-20 VITALS — BODY MASS INDEX: 24.38 KG/M2 | WEIGHT: 180 LBS | HEIGHT: 72 IN

## 2018-12-20 DIAGNOSIS — I25.10 CORONARY ARTERY DISEASE INVOLVING NATIVE CORONARY ARTERY OF NATIVE HEART WITHOUT ANGINA PECTORIS: ICD-10-CM

## 2018-12-20 DIAGNOSIS — R93.89 ABNORMAL CAT SCAN: ICD-10-CM

## 2018-12-20 DIAGNOSIS — E78.5 HYPERLIPIDEMIA, UNSPECIFIED HYPERLIPIDEMIA TYPE: ICD-10-CM

## 2018-12-20 LAB
ASCENDING AORTA: 3.42 CM
AV INDEX (PROSTH): 0.77
AV MEAN GRADIENT: 3.61 MMHG
AV PEAK GRADIENT: 6.66 MMHG
AV VALVE AREA: 2.73 CM2
BSA FOR ECHO PROCEDURE: 2.04 M2
CV ECHO LV RWT: 0.27 CM
CV STRESS BASE HR: 59
DIASTOLIC BLOOD PRESSURE: 66
DOP CALC AO PEAK VEL: 1.29 M/S
DOP CALC AO VTI: 27.82 CM
DOP CALC LVOT AREA: 3.56 CM2
DOP CALC LVOT DIAMETER: 2.13 CM
DOP CALC LVOT STROKE VOLUME: 76 CM3
DOP CALCLVOT PEAK VEL VTI: 21.34 CM
E WAVE DECELERATION TIME: 262.53 MSEC
E/A RATIO: 1.26
E/E' RATIO: 9.79
ECHO LV POSTERIOR WALL: 0.7 CM (ref 0.6–1.1)
FRACTIONAL SHORTENING: 35 % (ref 28–44)
INTERVENTRICULAR SEPTUM: 0.6 CM (ref 0.6–1.1)
IVRT: 0.09 MSEC
LA MAJOR: 5.92 CM
LA MINOR: 6.17 CM
LA WIDTH: 4.22 CM
LEFT ATRIUM SIZE: 3.45 CM
LEFT ATRIUM VOLUME INDEX: 36.7 ML/M2
LEFT ATRIUM VOLUME: 74.78 CM3
LEFT INTERNAL DIMENSION IN SYSTOLE: 3.33 CM (ref 2.1–4)
LEFT VENTRICLE DIASTOLIC VOLUME INDEX: 61.18 ML/M2
LEFT VENTRICLE DIASTOLIC VOLUME: 124.63 ML
LEFT VENTRICLE MASS INDEX: 53.4 G/M2
LEFT VENTRICLE SYSTOLIC VOLUME INDEX: 22.1 ML/M2
LEFT VENTRICLE SYSTOLIC VOLUME: 45.1 ML
LEFT VENTRICULAR INTERNAL DIMENSION IN DIASTOLE: 5.11 CM (ref 3.5–6)
LEFT VENTRICULAR MASS: 108.71 G
LV LATERAL E/E' RATIO: 8.45
LV SEPTAL E/E' RATIO: 11.63
MV PEAK A VEL: 0.74 M/S
MV PEAK E VEL: 0.93 M/S
OHS CV CPX 1 MINUTE RECOVERY HEART RATE: 100 BPM
OHS CV CPX 85 PERCENT MAX PREDICTED HEART RATE MALE: 122
OHS CV CPX ESTIMATED METS: 12
OHS CV CPX MAX PREDICTED HEART RATE: 143
OHS CV CPX PATIENT IS FEMALE: 0
OHS CV CPX PATIENT IS MALE: 1
OHS CV CPX PEAK DIASTOLIC BLOOD PRESSURE: 77 MMHG
OHS CV CPX PEAK HEAR RATE: 125
OHS CV CPX PEAK RATE PRESSURE PRODUCT: ABNORMAL
OHS CV CPX PEAK SYSTOLIC BLOOD PRESSURE: 159
OHS CV CPX PERCENT MAX PREDICTED HEART RATE ACHIEVED: 87
OHS CV CPX PERCENT TARGET HEART RATE ACHIEVED: 103.31
OHS CV CPX RATE PRESSURE PRODUCT PRESENTING: 7375
OHS CV CPX TARGET HEART RATE: 121
PISA TR MAX VEL: 2.57 M/S
PULM VEIN S/D RATIO: 1.48
PV PEAK D VEL: 0.44 M/S
PV PEAK S VEL: 0.65 M/S
RA MAJOR: 5.73 CM
RA WIDTH: 4.38 CM
RIGHT VENTRICULAR END-DIASTOLIC DIMENSION: 3.19 CM
RV TISSUE DOPPLER FREE WALL SYSTOLIC VELOCITY 1 (APICAL 4 CHAMBER VIEW): 14.04 M/S
SINUS: 3.39 CM
STJ: 2.91 CM
STRESS ECHO POST EXERCISE DUR MIN: 7 MIN
STRESS ECHO POST EXERCISE DUR SEC: 22
STRESS ST DEPRESSION: 0.5 MM
SYSTOLIC BLOOD PRESSURE: 125
TDI LATERAL: 0.11
TDI SEPTAL: 0.08
TDI: 0.1
TR MAX PG: 26.42 MMHG
TRICUSPID ANNULAR PLANE SYSTOLIC EXCURSION: 3.15 CM

## 2018-12-20 PROCEDURE — 71046 X-RAY EXAM CHEST 2 VIEWS: CPT | Mod: TC

## 2018-12-20 PROCEDURE — 93351 STRESS TTE COMPLETE: CPT | Mod: S$GLB,,, | Performed by: INTERNAL MEDICINE

## 2018-12-20 PROCEDURE — 71046 X-RAY EXAM CHEST 2 VIEWS: CPT | Mod: 26,,, | Performed by: RADIOLOGY

## 2018-12-26 ENCOUNTER — TELEPHONE (OUTPATIENT)
Dept: INTERNAL MEDICINE | Facility: CLINIC | Age: 78
End: 2018-12-26

## 2018-12-26 DIAGNOSIS — I71.21 THORACIC ASCENDING AORTIC ANEURYSM: Primary | ICD-10-CM

## 2018-12-26 NOTE — TELEPHONE ENCOUNTER
----- Message from Marco Antonio Vaughan MD sent at 12/26/2018  7:53 AM CST -----  Please call the patient regarding his abnormal result. The EKG portion of his stress test was not diagnostic. What is more important is the echo portion. This showed no wall motion abnormalities. I would like for him to see Vascular Cardiology for a second opinion concerning  his aorta. A referral is in.

## 2018-12-26 NOTE — TELEPHONE ENCOUNTER
Spoke with pt, informed of results. Pt verbalized understanding and had no questions. Transferred for scheduling.

## 2018-12-31 ENCOUNTER — OFFICE VISIT (OUTPATIENT)
Dept: CARDIOLOGY | Facility: CLINIC | Age: 78
End: 2018-12-31
Payer: MEDICARE

## 2018-12-31 ENCOUNTER — PATIENT MESSAGE (OUTPATIENT)
Dept: CARDIOLOGY | Facility: CLINIC | Age: 78
End: 2018-12-31

## 2018-12-31 VITALS
BODY MASS INDEX: 25.02 KG/M2 | SYSTOLIC BLOOD PRESSURE: 133 MMHG | DIASTOLIC BLOOD PRESSURE: 67 MMHG | HEART RATE: 61 BPM | HEIGHT: 72 IN | WEIGHT: 184.75 LBS

## 2018-12-31 DIAGNOSIS — R93.1 ELEVATED CORONARY ARTERY CALCIUM SCORE: ICD-10-CM

## 2018-12-31 DIAGNOSIS — I71.20 THORACIC AORTIC ANEURYSM WITHOUT RUPTURE: Primary | ICD-10-CM

## 2018-12-31 PROCEDURE — 3078F DIAST BP <80 MM HG: CPT | Mod: CPTII,S$GLB,, | Performed by: INTERNAL MEDICINE

## 2018-12-31 PROCEDURE — 99499 UNLISTED E&M SERVICE: CPT | Mod: S$GLB,,, | Performed by: INTERNAL MEDICINE

## 2018-12-31 PROCEDURE — 3075F SYST BP GE 130 - 139MM HG: CPT | Mod: CPTII,S$GLB,, | Performed by: INTERNAL MEDICINE

## 2018-12-31 PROCEDURE — 1101F PT FALLS ASSESS-DOCD LE1/YR: CPT | Mod: CPTII,S$GLB,, | Performed by: INTERNAL MEDICINE

## 2018-12-31 PROCEDURE — 99204 OFFICE O/P NEW MOD 45 MIN: CPT | Mod: S$GLB,,, | Performed by: INTERNAL MEDICINE

## 2018-12-31 PROCEDURE — 99999 PR PBB SHADOW E&M-EST. PATIENT-LVL IV: CPT | Mod: PBBFAC,,, | Performed by: INTERNAL MEDICINE

## 2018-12-31 RX ORDER — NAPROXEN SODIUM 220 MG/1
81 TABLET, FILM COATED ORAL DAILY
Refills: 0 | COMMUNITY
Start: 2018-12-31 | End: 2019-12-06

## 2018-12-31 NOTE — LETTER
December 31, 2018      Marco Antonio Vaughan MD  [address]  [place] [address]           [facility] - Cardiology  [address]  [place] [address]  Phone: [phone]          Patient: Fred Schwab Jr.   MR Number: [id number]   YOB: 1940   Date of Visit: 12/31/2018       Dear Dr. Marco Antonio Vaughan:    Thank you for referring Fred Schwab to me for evaluation. Attached you will find relevant portions of my assessment and plan of care.    If you have questions, please do not hesitate to call me. I look forward to following Fred Schwab along with you.    Sincerely,    Peter Garcia MD    Enclosure  CC:  No Recipients    If you would like to receive this communication electronically, please contact [email or web address] or [phone] to request more information on [facility] Link access.    For providers and/or their staff who would like to refer a patient to [facility], please contact us through our one-stop-shop provider referral line, [facility] , at [phone].    If you feel you have received this communication in error or would no longer like to receive these types of communications, please e-mail [email or web address]

## 2019-01-02 ENCOUNTER — TELEPHONE (OUTPATIENT)
Dept: CARDIOLOGY | Facility: CLINIC | Age: 79
End: 2019-01-02

## 2019-01-02 DIAGNOSIS — I71.20 THORACIC AORTIC ANEURYSM WITHOUT RUPTURE: Primary | ICD-10-CM

## 2019-01-02 RX ORDER — PREDNISONE 50 MG/1
TABLET ORAL
Qty: 3 TABLET | Refills: 1 | Status: SHIPPED | OUTPATIENT
Start: 2019-01-02 | End: 2019-07-05

## 2019-01-02 RX ORDER — DIPHENHYDRAMINE HCL 50 MG
50 CAPSULE ORAL ONCE
Qty: 1 CAPSULE | Refills: 1 | Status: SHIPPED | OUTPATIENT
Start: 2019-01-02 | End: 2019-01-02

## 2019-01-02 NOTE — TELEPHONE ENCOUNTER
Discussed with Mr. Schwab and informed him to take prednisone as well as Benadryl before CT scan next year, called prescription in to pharmacy

## 2019-01-04 ENCOUNTER — PATIENT MESSAGE (OUTPATIENT)
Dept: INTERNAL MEDICINE | Facility: CLINIC | Age: 79
End: 2019-01-04

## 2019-01-09 ENCOUNTER — PATIENT MESSAGE (OUTPATIENT)
Dept: INTERNAL MEDICINE | Facility: CLINIC | Age: 79
End: 2019-01-09

## 2019-01-24 ENCOUNTER — OFFICE VISIT (OUTPATIENT)
Dept: DERMATOLOGY | Facility: CLINIC | Age: 79
End: 2019-01-24
Payer: MEDICARE

## 2019-01-24 DIAGNOSIS — L90.5 SCAR: ICD-10-CM

## 2019-01-24 DIAGNOSIS — D22.9 NEVUS: ICD-10-CM

## 2019-01-24 DIAGNOSIS — D48.5 NEOPLASM OF UNCERTAIN BEHAVIOR OF SKIN: Primary | ICD-10-CM

## 2019-01-24 DIAGNOSIS — L82.1 SK (SEBORRHEIC KERATOSIS): ICD-10-CM

## 2019-01-24 DIAGNOSIS — Z85.828 PERSONAL HISTORY OF SKIN CANCER: ICD-10-CM

## 2019-01-24 DIAGNOSIS — L30.9 DERMATITIS: ICD-10-CM

## 2019-01-24 DIAGNOSIS — D18.00 ANGIOMA: ICD-10-CM

## 2019-01-24 PROCEDURE — 99999 PR PBB SHADOW E&M-EST. PATIENT-LVL III: CPT | Mod: PBBFAC,,, | Performed by: DERMATOLOGY

## 2019-01-24 PROCEDURE — 99214 OFFICE O/P EST MOD 30 MIN: CPT | Mod: 25,S$GLB,, | Performed by: DERMATOLOGY

## 2019-01-24 PROCEDURE — 1101F PR PT FALLS ASSESS DOC 0-1 FALLS W/OUT INJ PAST YR: ICD-10-PCS | Mod: CPTII,S$GLB,, | Performed by: DERMATOLOGY

## 2019-01-24 PROCEDURE — 99214 PR OFFICE/OUTPT VISIT, EST, LEVL IV, 30-39 MIN: ICD-10-PCS | Mod: 25,S$GLB,, | Performed by: DERMATOLOGY

## 2019-01-24 PROCEDURE — 11102 PR TANGENTIAL BIOPSY, SKIN, SINGLE LESION: ICD-10-PCS | Mod: S$GLB,,, | Performed by: DERMATOLOGY

## 2019-01-24 PROCEDURE — 11102 TANGNTL BX SKIN SINGLE LES: CPT | Mod: S$GLB,,, | Performed by: DERMATOLOGY

## 2019-01-24 PROCEDURE — 99999 PR PBB SHADOW E&M-EST. PATIENT-LVL III: ICD-10-PCS | Mod: PBBFAC,,, | Performed by: DERMATOLOGY

## 2019-01-24 PROCEDURE — 88305 TISSUE EXAM BY PATHOLOGIST: CPT | Performed by: PATHOLOGY

## 2019-01-24 PROCEDURE — 1101F PT FALLS ASSESS-DOCD LE1/YR: CPT | Mod: CPTII,S$GLB,, | Performed by: DERMATOLOGY

## 2019-01-24 PROCEDURE — 88305 TISSUE SPECIMEN TO PATHOLOGY, DERMATOLOGY: ICD-10-PCS | Mod: 26,,, | Performed by: PATHOLOGY

## 2019-01-24 RX ORDER — BETAMETHASONE DIPROPIONATE 0.5 MG/G
CREAM TOPICAL
Qty: 45 G | Refills: 1 | Status: SHIPPED | OUTPATIENT
Start: 2019-01-24 | End: 2023-03-13 | Stop reason: SDUPTHER

## 2019-01-24 NOTE — PROGRESS NOTES
Subjective:       Patient ID:  Fred Schwab Jr. is a 78 y.o. male who presents for   Chief Complaint   Patient presents with    Skin Check     TBSE    Lesion     right lower leg and left cheek    Rash     bilateral arms     Patient presents today for a TBSE.  Reports lesion to right lower leg with a duration of 5 years, area has become tender to touch, no treatment to area.  Lesion to left cheek with a duration of 2 weeks with no symptoms or treatment.  Has a history of a rash to bilateral arms that comes and goes that itches, he is treating it with voltaren gel.      Lesion     Rash         Review of Systems   Constitutional: Negative for fever, chills, weight loss, weight gain, fatigue, night sweats and malaise.   Skin: Positive for itching and rash. Negative for daily sunscreen use, recent sunburn and wears hat.   Hematologic/Lymphatic: Does not bruise/bleed easily.        Objective:    Physical Exam   Constitutional: He appears well-developed and well-nourished. No distress.   Neurological: He is alert and oriented to person, place, and time. He is not disoriented.   Psychiatric: He has a normal mood and affect.   Skin:   Areas Examined (abnormalities noted in diagram):   Scalp / Hair Palpated and Inspected  Head / Face Inspection Performed  Neck Inspection Performed  Chest / Axilla Inspection Performed  Abdomen Inspection Performed  Back Inspection Performed  RUE Inspected  LUE Inspection Performed  Nails and Digits Inspection Performed                       Diagram Legend     Erythematous scaling macule/papule c/w actinic keratosis       Vascular papule c/w angioma      Pigmented verrucoid papule/plaque c/w seborrheic keratosis      Yellow umbilicated papule c/w sebaceous hyperplasia      Irregularly shaped tan macule c/w lentigo     1-2 mm smooth white papules consistent with Milia      Movable subcutaneous cyst with punctum c/w epidermal inclusion cyst      Subcutaneous movable cyst c/w pilar cyst       Firm pink to brown papule c/w dermatofibroma      Pedunculated fleshy papule(s) c/w skin tag(s)      Evenly pigmented macule c/w junctional nevus     Mildly variegated pigmented, slightly irregular-bordered macule c/w mildly atypical nevus      Flesh colored to evenly pigmented papule c/w intradermal nevus       Pink pearly papule/plaque c/w basal cell carcinoma      Erythematous hyperkeratotic cursted plaque c/w SCC      Surgical scar with no sign of skin cancer recurrence      Open and closed comedones      Inflammatory papules and pustules      Verrucoid papule consistent consistent with wart     Erythematous eczematous patches and plaques     Dystrophic onycholytic nail with subungual debris c/w onychomycosis     Umbilicated papule    Erythematous-base heme-crusted tan verrucoid plaque consistent with inflamed seborrheic keratosis     Erythematous Silvery Scaling Plaque c/w Psoriasis     See annotation          Assessment / Plan:      Pathology Orders:     Normal Orders This Visit    Tissue Specimen To Pathology, Dermatology     Questions:    Directional Terms:  Other(comment)    Clinical information:  r/o dermatofibroma v other    Specific Site:  right lower lateral shin        Neoplasm of uncertain behavior of skin  Shave biopsy procedure note:    Shave biopsy performed after verbal consent including risk of infection, scar, recurrence, need for additional treatment of site. Area prepped with alcohol, anesthetized with approximately 1.0cc of 1% lidocaine with epinephrine. Lesional tissue shaved with razor blade. Hemostasis achieved with application of aluminum chloride followed by hyfrecation. No complications. Dressing applied. Wound care explained.      -     Tissue Specimen To Pathology, Dermatology    Angioma  These are benign vascular lesions that are inherited.  Treatment is not necessary.    Nevus  Discussed ABCDE's of nevi.  Monitor for new mole or moles that are becoming bigger, darker, irritated, or  developing irregular borders. Brochure provided.    SK (seborrheic keratosis)  These are benign inherited growths without a malignant potential. Reassurance given to patient. No treatment is necessary.     Dermatitis  Discussed with patient good skin care regimen including avoiding fragranced products and very hot showers.  Recommended dove sensitive skin bar soap or cerave hydrating cleanser or bar.  Recommend Cerave cream  For moisturization daily -2x daily.     -     betamethasone dipropionate (DIPROLENE) 0.05 % cream; aaa qd prn rash. Not more than 2 weeks straight in same location. Avoid use on face and groin  Dispense: 45 g; Refill: 1    Personal history of skin cancer  Scar    Pt with history of non melanoma skin cancer  Total body skin examination performed today including at least 12 points as noted in physical examination. No suspicious lesions noted.           Follow-up for prn bx report.

## 2019-01-24 NOTE — LETTER
January 24, 2019      Marco Antonio Vaughan MD  [address]  [place] [address]           [place] [facility]  [address] [facility]  [place] [address]  Phone: [phone]  Fax: [phone]          Patient: Fred Schwab Jr.   MR Number: [id number]   YOB: 1940   Date of Visit: 1/24/2019       Dear Dr. Marco Antonio Vaughan:    Thank you for referring Fred Schwab to me for evaluation. Attached you will find relevant portions of my assessment and plan of care.    If you have questions, please do not hesitate to call me. I look forward to following Fred Schwab along with you.    Sincerely,    Nieves Cassidy MD    Enclosure  CC:  No Recipients    If you would like to receive this communication electronically, please contact [email or web address] or [phone] to request more information on [facility] access.    For providers and/or their staff who would like to refer a patient to [facility], please contact us through our one-stop-shop provider referral line, [facility], at [phone].    If you feel you have received this communication in error or would no longer like to receive these types of communications, please e-mail [email or web address]

## 2019-01-31 ENCOUNTER — TELEPHONE (OUTPATIENT)
Dept: DERMATOLOGY | Facility: CLINIC | Age: 79
End: 2019-01-31

## 2019-01-31 NOTE — TELEPHONE ENCOUNTER
----- Message from Jocelyne Mao LPN sent at 1/31/2019 12:36 PM CST -----  Contact: pt at [phone] call after 0330  Call after [phone]  ----- Message -----  From: Jody Lagunas  Sent: 1/31/2019  12:27 PM  To: Ange [facility]OLLEY Staff    Patient Returning Call from [facility]    Who Left Message for Patient:Dr. Cassidy  Communication Preference:call  Additional Information:Call after 0330.  Missed call pramod

## 2019-02-04 ENCOUNTER — PATIENT MESSAGE (OUTPATIENT)
Dept: DERMATOLOGY | Facility: CLINIC | Age: 79
End: 2019-02-04

## 2019-02-04 ENCOUNTER — TELEPHONE (OUTPATIENT)
Dept: DERMATOLOGY | Facility: CLINIC | Age: 79
End: 2019-02-04

## 2019-02-04 NOTE — TELEPHONE ENCOUNTER
----- Message from Maty Fitzgerald sent at 2/4/2019  8:41 AM CST -----  Contact: pt   GABBY - pt Needs Advice    Reason for call: pt is calling to speak with the nurse pt had a procedure done a couple of weeks ago pt had a growth removed pt said its still bothering him and pt said its red all around pt is asking if he should be putting something different on his site.         Communication Preference: can you please call pt at [phone]    Additional Information: none    VALERY

## 2019-02-28 RX ORDER — DOXAZOSIN 2 MG/1
2 TABLET ORAL NIGHTLY
Qty: 90 TABLET | Refills: 3 | Status: SHIPPED | OUTPATIENT
Start: 2019-02-28 | End: 2020-04-11

## 2019-02-28 NOTE — TELEPHONE ENCOUNTER
"----- Message from Sasha Wilcox sent at 2/28/2019  1:07 PM CST -----  Contact: Osei/[phone]/ [phone]  RX request - refill or new RX.  Is this a refill or new RX:  New RX  RX name and strength:doxazosin (CARDURA) 2 MG tablet   Directions: TAKE 1 TABLET BY MOUTH AT BEDTIME  Is this a 30 day or 90 day RX:    Local pharmacy or mail order pharmacy: Local pharmacy   Pharmacy name and phone # (DON'T enter "on file" or "in chart"): [facility] [address] [phone] (Phone)  [phone] (Fax)  Comments:          "

## 2019-03-20 ENCOUNTER — PATIENT MESSAGE (OUTPATIENT)
Dept: INTERNAL MEDICINE | Facility: CLINIC | Age: 79
End: 2019-03-20

## 2019-03-22 RX ORDER — PRAVASTATIN SODIUM 10 MG/1
10 TABLET ORAL DAILY
Qty: 30 TABLET | Refills: 3 | Status: SHIPPED | OUTPATIENT
Start: 2019-03-22 | End: 2019-05-13 | Stop reason: SDUPTHER

## 2019-04-08 ENCOUNTER — PES CALL (OUTPATIENT)
Dept: ADMINISTRATIVE | Facility: CLINIC | Age: 79
End: 2019-04-08

## 2019-04-08 RX ORDER — PROPRANOLOL HYDROCHLORIDE 10 MG/1
TABLET ORAL
Qty: 90 TABLET | Refills: 0 | Status: SHIPPED | OUTPATIENT
Start: 2019-04-08 | End: 2019-05-13 | Stop reason: SDUPTHER

## 2019-05-13 ENCOUNTER — PATIENT MESSAGE (OUTPATIENT)
Dept: INTERNAL MEDICINE | Facility: CLINIC | Age: 79
End: 2019-05-13

## 2019-05-13 RX ORDER — PRAVASTATIN SODIUM 10 MG/1
10 TABLET ORAL DAILY
Qty: 90 TABLET | Refills: 3 | Status: SHIPPED | OUTPATIENT
Start: 2019-05-13 | End: 2020-01-06

## 2019-05-13 RX ORDER — PROPRANOLOL HYDROCHLORIDE 10 MG/1
10 TABLET ORAL 2 TIMES DAILY
Qty: 90 TABLET | Refills: 3 | Status: SHIPPED | OUTPATIENT
Start: 2019-05-13 | End: 2019-12-02 | Stop reason: SDUPTHER

## 2019-07-05 ENCOUNTER — TELEPHONE (OUTPATIENT)
Dept: OTOLARYNGOLOGY | Facility: CLINIC | Age: 79
End: 2019-07-05

## 2019-07-05 ENCOUNTER — OFFICE VISIT (OUTPATIENT)
Dept: INTERNAL MEDICINE | Facility: CLINIC | Age: 79
End: 2019-07-05
Payer: MEDICARE

## 2019-07-05 ENCOUNTER — TELEPHONE (OUTPATIENT)
Dept: INTERNAL MEDICINE | Facility: CLINIC | Age: 79
End: 2019-07-05

## 2019-07-05 VITALS
TEMPERATURE: 98 F | BODY MASS INDEX: 24.85 KG/M2 | SYSTOLIC BLOOD PRESSURE: 110 MMHG | DIASTOLIC BLOOD PRESSURE: 60 MMHG | WEIGHT: 183.44 LBS | HEIGHT: 72 IN | HEART RATE: 58 BPM | OXYGEN SATURATION: 93 %

## 2019-07-05 DIAGNOSIS — H61.23 BILATERAL IMPACTED CERUMEN: Primary | ICD-10-CM

## 2019-07-05 PROCEDURE — 3078F PR MOST RECENT DIASTOLIC BLOOD PRESSURE < 80 MM HG: ICD-10-PCS | Mod: CPTII,S$GLB,, | Performed by: INTERNAL MEDICINE

## 2019-07-05 PROCEDURE — 99213 PR OFFICE/OUTPT VISIT, EST, LEVL III, 20-29 MIN: ICD-10-PCS | Mod: S$GLB,,, | Performed by: INTERNAL MEDICINE

## 2019-07-05 PROCEDURE — 3074F PR MOST RECENT SYSTOLIC BLOOD PRESSURE < 130 MM HG: ICD-10-PCS | Mod: CPTII,S$GLB,, | Performed by: INTERNAL MEDICINE

## 2019-07-05 PROCEDURE — 1101F PT FALLS ASSESS-DOCD LE1/YR: CPT | Mod: CPTII,S$GLB,, | Performed by: INTERNAL MEDICINE

## 2019-07-05 PROCEDURE — 99999 PR PBB SHADOW E&M-EST. PATIENT-LVL III: ICD-10-PCS | Mod: PBBFAC,,, | Performed by: INTERNAL MEDICINE

## 2019-07-05 PROCEDURE — 3074F SYST BP LT 130 MM HG: CPT | Mod: CPTII,S$GLB,, | Performed by: INTERNAL MEDICINE

## 2019-07-05 PROCEDURE — 99999 PR PBB SHADOW E&M-EST. PATIENT-LVL III: CPT | Mod: PBBFAC,,, | Performed by: INTERNAL MEDICINE

## 2019-07-05 PROCEDURE — 1101F PR PT FALLS ASSESS DOC 0-1 FALLS W/OUT INJ PAST YR: ICD-10-PCS | Mod: CPTII,S$GLB,, | Performed by: INTERNAL MEDICINE

## 2019-07-05 PROCEDURE — 99213 OFFICE O/P EST LOW 20 MIN: CPT | Mod: S$GLB,,, | Performed by: INTERNAL MEDICINE

## 2019-07-05 PROCEDURE — 3078F DIAST BP <80 MM HG: CPT | Mod: CPTII,S$GLB,, | Performed by: INTERNAL MEDICINE

## 2019-07-05 NOTE — PROGRESS NOTES
Subjective:       Patient ID: Fred Schwab Jr. is a 78 y.o. male.    Chief Complaint: Ear Fullness (right ear)    78 year old man complains of right ear discomfort.  Has history of wax build up.  No fever or associated sore throat    Review of Systems   Constitutional: Negative for activity change, appetite change and fever.   HENT: Negative for congestion, postnasal drip and sore throat.    Respiratory: Negative for cough, shortness of breath and wheezing.    Cardiovascular: Negative for chest pain and palpitations.   Gastrointestinal: Negative for abdominal pain, blood in stool, constipation, diarrhea, nausea and vomiting.   Genitourinary: Negative for decreased urine volume, difficulty urinating, flank pain and frequency.   Musculoskeletal: Negative for arthralgias.   Neurological: Negative for dizziness, weakness and headaches.       Objective:      Physical Exam   Constitutional: He is oriented to person, place, and time. He appears well-developed and well-nourished. No distress.   HENT:   Head: Normocephalic and atraumatic.   Right Ear: External ear normal.   Left Ear: External ear normal.   Ears:    Eyes: Pupils are equal, round, and reactive to light. Conjunctivae and EOM are normal.   Neck: Normal range of motion. Neck supple. No thyromegaly present.   Cardiovascular: Normal rate and regular rhythm.   Pulmonary/Chest: Effort normal and breath sounds normal.   Abdominal: Soft. Bowel sounds are normal. He exhibits no mass. There is no tenderness. There is no rebound and no guarding.   Musculoskeletal: Normal range of motion.   Lymphadenopathy:     He has no cervical adenopathy.   Neurological: He is alert and oriented to person, place, and time. He has normal reflexes. He displays normal reflexes. No cranial nerve deficit. He exhibits normal muscle tone. Coordination normal.   Skin: Skin is warm and dry.       Assessment:       1. Bilateral impacted cerumen        Plan:   Tavares was seen today for ear  fullness.    Diagnoses and all orders for this visit:    Bilateral impacted cerumen    Neither ear cleared with irrigation.  Will ask patient to use Debrox all weekend and return Monday for lavage

## 2019-07-24 ENCOUNTER — TELEPHONE (OUTPATIENT)
Dept: DERMATOLOGY | Facility: CLINIC | Age: 79
End: 2019-07-24

## 2019-07-24 NOTE — TELEPHONE ENCOUNTER
Called patient to schedule an appointment with Dermatology, per message. No answer. Message was left asking patient to call the office back for an appointment.    RD      ----- Message from Jody Lagunas sent at 7/24/2019  4:21 PM CDT -----  Contact: pt at [phone]  Needs Advice    Reason for call:  Pt has had a rash on both legs for a month and itches.  Wants to be seen asap.  Used betamethasone cream didn't help.  Tried lamisil  Too but didn't help.  Offered [facility] urgent care clinic but only wants to see Derm asap.  Spoke to Ange nurse last week but she is not here this week.    Pt will  See anyone.  Communication Preference:call asap    Additional Information:

## 2019-07-25 ENCOUNTER — TELEPHONE (OUTPATIENT)
Dept: DERMATOLOGY | Facility: CLINIC | Age: 79
End: 2019-07-25

## 2019-07-25 NOTE — TELEPHONE ENCOUNTER
----- Message from Alisha Nathan sent at 7/25/2019  8:33 AM CDT -----  Contact: pt  Patient Returning Call from [facility]    Who Left Message for Patient:Pajaylene    Communication Preference:[phone]    Additional Information:Returning Call

## 2019-07-31 ENCOUNTER — PATIENT MESSAGE (OUTPATIENT)
Dept: CARDIOLOGY | Facility: CLINIC | Age: 79
End: 2019-07-31

## 2019-12-04 RX ORDER — PROPRANOLOL HYDROCHLORIDE 10 MG/1
TABLET ORAL
Qty: 180 TABLET | Refills: 2 | Status: SHIPPED | OUTPATIENT
Start: 2019-12-04 | End: 2020-09-04

## 2019-12-04 NOTE — PROGRESS NOTES
Refill Authorization Note    Mr.Schwab is requesting a refill authorization.    Brief assessment and rationale for refill: APPROVE: prr  Name and strength of medication: PROPRANOLOL HCL 10 MG TABLET                                      Comments:     Requested Prescriptions   Signed Prescriptions Disp Refills    propranolol (INDERAL) 10 MG tablet 180 tablet 2     Sig: TAKE ONE TABLET BY MOUTH TWICE DAILY       Cardiovascular:  Beta Blockers Passed - 12/2/2019  7:56 AM        Passed - Patient is at least 18 years old        Passed - Last BP in normal range within 360 days     BP Readings from Last 3 Encounters:   07/05/19 110/60   12/31/18 133/67   11/19/18 116/80              Passed - Last Heart Rate in normal range within 360 days     Pulse Readings from Last 3 Encounters:   07/05/19 58   12/31/18 61   11/19/18 58             Passed - Office visit in past 12 months or future 90 days     Recent Outpatient Visits            5 months ago Bilateral impacted cerumen    [facility] Alycia Richardson MD    10 months ago Neoplasm of uncertain behavior of skin    [place] - D[facility]ology Nieves Cassidy MD    11 months ago Thoracic aortic aneurysm without rupture    [facility] Peter Garcia MD    1 year ago Annual physical exam    [facility] Marco Antonio Vaughan MD    1 year ago Allergic contact dermatitis due to plants, except food    [place] - D[facility]atology Nieves Cassidy MD          Future Appointments              In 1 month MD [facility], [facility]

## 2019-12-05 ENCOUNTER — PATIENT OUTREACH (OUTPATIENT)
Dept: ADMINISTRATIVE | Facility: OTHER | Age: 79
End: 2019-12-05

## 2019-12-06 ENCOUNTER — OFFICE VISIT (OUTPATIENT)
Dept: OTOLARYNGOLOGY | Facility: CLINIC | Age: 79
End: 2019-12-06
Payer: MEDICARE

## 2019-12-06 VITALS
SYSTOLIC BLOOD PRESSURE: 107 MMHG | HEIGHT: 72 IN | WEIGHT: 186.69 LBS | DIASTOLIC BLOOD PRESSURE: 65 MMHG | HEART RATE: 60 BPM | TEMPERATURE: 98 F | BODY MASS INDEX: 25.29 KG/M2

## 2019-12-06 DIAGNOSIS — H61.23 BILATERAL IMPACTED CERUMEN: Primary | ICD-10-CM

## 2019-12-06 DIAGNOSIS — J30.9 ALLERGIC RHINITIS, UNSPECIFIED SEASONALITY, UNSPECIFIED TRIGGER: ICD-10-CM

## 2019-12-06 DIAGNOSIS — J34.2 NASAL SEPTAL DEVIATION: ICD-10-CM

## 2019-12-06 DIAGNOSIS — H93.293 ABNORMAL AUDITORY PERCEPTION OF BOTH EARS: ICD-10-CM

## 2019-12-06 PROCEDURE — 69210 REMOVE IMPACTED EAR WAX UNI: CPT | Mod: S$GLB,,, | Performed by: SPECIALIST

## 2019-12-06 PROCEDURE — 69210 EAR CERUMEN REMOVAL: ICD-10-PCS | Mod: S$GLB,,, | Performed by: SPECIALIST

## 2019-12-06 PROCEDURE — 1159F PR MEDICATION LIST DOCUMENTED IN MEDICAL RECORD: ICD-10-PCS | Mod: S$GLB,,, | Performed by: SPECIALIST

## 2019-12-06 PROCEDURE — 99214 PR OFFICE/OUTPT VISIT, EST, LEVL IV, 30-39 MIN: ICD-10-PCS | Mod: 25,S$GLB,, | Performed by: SPECIALIST

## 2019-12-06 PROCEDURE — 3078F PR MOST RECENT DIASTOLIC BLOOD PRESSURE < 80 MM HG: ICD-10-PCS | Mod: CPTII,S$GLB,, | Performed by: SPECIALIST

## 2019-12-06 PROCEDURE — 99214 OFFICE O/P EST MOD 30 MIN: CPT | Mod: 25,S$GLB,, | Performed by: SPECIALIST

## 2019-12-06 PROCEDURE — 1101F PR PT FALLS ASSESS DOC 0-1 FALLS W/OUT INJ PAST YR: ICD-10-PCS | Mod: CPTII,S$GLB,, | Performed by: SPECIALIST

## 2019-12-06 PROCEDURE — 3074F PR MOST RECENT SYSTOLIC BLOOD PRESSURE < 130 MM HG: ICD-10-PCS | Mod: CPTII,S$GLB,, | Performed by: SPECIALIST

## 2019-12-06 PROCEDURE — 1126F AMNT PAIN NOTED NONE PRSNT: CPT | Mod: S$GLB,,, | Performed by: SPECIALIST

## 2019-12-06 PROCEDURE — 1101F PT FALLS ASSESS-DOCD LE1/YR: CPT | Mod: CPTII,S$GLB,, | Performed by: SPECIALIST

## 2019-12-06 PROCEDURE — 1126F PR PAIN SEVERITY QUANTIFIED, NO PAIN PRESENT: ICD-10-PCS | Mod: S$GLB,,, | Performed by: SPECIALIST

## 2019-12-06 PROCEDURE — 3074F SYST BP LT 130 MM HG: CPT | Mod: CPTII,S$GLB,, | Performed by: SPECIALIST

## 2019-12-06 PROCEDURE — 1159F MED LIST DOCD IN RCRD: CPT | Mod: S$GLB,,, | Performed by: SPECIALIST

## 2019-12-06 PROCEDURE — 3078F DIAST BP <80 MM HG: CPT | Mod: CPTII,S$GLB,, | Performed by: SPECIALIST

## 2019-12-06 NOTE — PROGRESS NOTES
Subjective:       Patient ID: Fred Schwab Jr. is a 79 y.o. male.    Chief Complaint: ear wax build up in both ears\ problem with left ear as well      The patient is referred here for evaluation of blockage in both ears.  His primary care physician noted wax.  He does have some nasal congestion and postnasal drip as well.  Nasal secretions are clear.  He does not have fever.    Review of Systems   Constitutional: Positive for fatigue. Negative for activity change, appetite change, chills, fever and unexpected weight change.   HENT: Positive for congestion, hearing loss, postnasal drip, rhinorrhea and sinus pressure. Negative for ear discharge, ear pain, facial swelling, mouth sores, sinus pain, sneezing, sore throat, tinnitus, trouble swallowing and voice change.    Eyes: Negative for photophobia, pain, discharge, redness, itching and visual disturbance.   Respiratory: Positive for cough. Negative for apnea, choking, shortness of breath and wheezing.    Cardiovascular: Negative for chest pain and palpitations.   Gastrointestinal: Negative for abdominal distention, abdominal pain, nausea and vomiting.   Musculoskeletal: Positive for arthralgias. Negative for myalgias, neck pain and neck stiffness.   Skin: Negative.  Negative for color change, pallor and rash.   Allergic/Immunologic: Positive for environmental allergies. Negative for food allergies and immunocompromised state.   Neurological: Positive for headaches. Negative for dizziness, facial asymmetry, speech difficulty, weakness, light-headedness and numbness.   Hematological: Negative for adenopathy. Does not bruise/bleed easily.   Psychiatric/Behavioral: Negative for agitation, confusion, decreased concentration and sleep disturbance.       Objective:      Physical Exam   Constitutional: He is oriented to person, place, and time. He appears well-developed and well-nourished. He is cooperative.   HENT:   Head: Normocephalic.   Right Ear: External ear and ear  canal normal. No drainage (  Cerumen blocks ear canal). Tympanic membrane is retracted.   Left Ear: External ear and ear canal normal. No drainage ( cerumen blocks ear canal). Tympanic membrane is retracted.   Nose: Mucosal edema (cyanotic, boggy inferior turbinates bilaterally), rhinorrhea (clear mucus bilaterally) and septal deviation ( to the left) present.   Mouth/Throat: Uvula is midline, oropharynx is clear and moist and mucous membranes are normal. No oral lesions.   Eyes: Pupils are equal, round, and reactive to light. EOM and lids are normal. Right eye exhibits no discharge and no exudate. Left eye exhibits no discharge and no exudate. Right conjunctiva is injected. Left conjunctiva is injected.   Neck: Trachea normal and normal range of motion. No muscular tenderness present. No tracheal deviation present. No thyroid mass and no thyromegaly present.   Cardiovascular: Normal rate, regular rhythm, normal heart sounds and normal pulses.   Pulmonary/Chest: Effort normal and breath sounds normal. No stridor. He has no decreased breath sounds. He has no wheezes. He has no rhonchi. He has no rales.   Abdominal: Soft. Bowel sounds are normal. There is no tenderness.   Musculoskeletal: Normal range of motion.   Lymphadenopathy:        Head (right side): No submental, no submandibular, no preauricular, no posterior auricular and no occipital adenopathy present.        Head (left side): No submental, no submandibular, no preauricular, no posterior auricular and no occipital adenopathy present.     He has no cervical adenopathy.   Neurological: He is alert and oriented to person, place, and time. He has normal strength. No cranial nerve deficit or sensory deficit. Gait normal.   Skin: Skin is warm and dry. No petechiae and no rash noted. No cyanosis. Nails show no clubbing.   Psychiatric: He has a normal mood and affect. His speech is normal and behavior is normal. Judgment and thought content normal. Cognition and  memory are normal.       Procedure-wax was removed from both ear canals using wire loop.  The patient tolerated procedure well was discharged post procedure    Assessment:       1. Bilateral impacted cerumen    2. Abnormal auditory perception of both ears    3. Allergic rhinitis, unspecified seasonality, unspecified trigger    4. Nasal septal deviation        Plan:       I will have the patient use Ranjan Ranjan Baby oil in both ears 2 nights a week before going to bed followed by placing a cotton ball in the ear. I will recheck him in 6 months, or sooner on an as-needed basis.  With regard to his allergies he should supplement with a nasal steroid, such as Flonase, when the Claritin is not   controlling symptoms.

## 2019-12-06 NOTE — PROCEDURES
"Ear Cerumen Removal  Date/Time: 12/6/2019 3:00 PM  Performed by: PARK Zambrano MD  Authorized by: PARK Zambrano MD     Time out: Immediately prior to procedure a "time out" was called to verify the correct patient, procedure, equipment, support staff and site/side marked as required.    Consent Done?:  Yes (Verbal)    Local anesthetic:  None  Location details:  Both ears  Procedure type comment:  Wire loop  Cerumen  Removal Results:  Cerumen completely removed  Patient tolerance:  Patient tolerated the procedure well with no immediate complications      "

## 2019-12-16 ENCOUNTER — PATIENT OUTREACH (OUTPATIENT)
Dept: ADMINISTRATIVE | Facility: HOSPITAL | Age: 79
End: 2019-12-16

## 2020-01-03 ENCOUNTER — OFFICE VISIT (OUTPATIENT)
Dept: INTERNAL MEDICINE | Facility: CLINIC | Age: 80
End: 2020-01-03
Payer: MEDICARE

## 2020-01-03 ENCOUNTER — IMMUNIZATION (OUTPATIENT)
Dept: PHARMACY | Facility: CLINIC | Age: 80
End: 2020-01-03
Payer: MEDICARE

## 2020-01-03 ENCOUNTER — LAB VISIT (OUTPATIENT)
Dept: LAB | Facility: HOSPITAL | Age: 80
End: 2020-01-03
Attending: INTERNAL MEDICINE
Payer: MEDICARE

## 2020-01-03 VITALS
HEART RATE: 57 BPM | DIASTOLIC BLOOD PRESSURE: 74 MMHG | WEIGHT: 183.63 LBS | SYSTOLIC BLOOD PRESSURE: 126 MMHG | HEIGHT: 72 IN | OXYGEN SATURATION: 99 % | BODY MASS INDEX: 24.87 KG/M2 | TEMPERATURE: 98 F

## 2020-01-03 DIAGNOSIS — Z12.11 ENCOUNTER FOR SCREENING COLONOSCOPY: ICD-10-CM

## 2020-01-03 DIAGNOSIS — Z12.5 PROSTATE CANCER SCREENING: ICD-10-CM

## 2020-01-03 DIAGNOSIS — E78.5 HYPERLIPIDEMIA, UNSPECIFIED HYPERLIPIDEMIA TYPE: ICD-10-CM

## 2020-01-03 DIAGNOSIS — Z00.00 ANNUAL PHYSICAL EXAM: ICD-10-CM

## 2020-01-03 DIAGNOSIS — Z00.00 ANNUAL PHYSICAL EXAM: Primary | ICD-10-CM

## 2020-01-03 DIAGNOSIS — K63.5 POLYP OF COLON, UNSPECIFIED PART OF COLON, UNSPECIFIED TYPE: ICD-10-CM

## 2020-01-03 DIAGNOSIS — E04.2 MULTIPLE THYROID NODULES: ICD-10-CM

## 2020-01-03 LAB
ALBUMIN SERPL BCP-MCNC: 3.9 G/DL (ref 3.5–5.2)
ALP SERPL-CCNC: 74 U/L (ref 55–135)
ALT SERPL W/O P-5'-P-CCNC: 15 U/L (ref 10–44)
ANION GAP SERPL CALC-SCNC: 8 MMOL/L (ref 8–16)
AST SERPL-CCNC: 19 U/L (ref 10–40)
BACTERIA #/AREA URNS AUTO: NORMAL /HPF
BASOPHILS # BLD AUTO: 0.04 K/UL (ref 0–0.2)
BASOPHILS NFR BLD: 0.6 % (ref 0–1.9)
BILIRUB SERPL-MCNC: 0.7 MG/DL (ref 0.1–1)
BILIRUB UR QL STRIP: NEGATIVE
BUN SERPL-MCNC: 13 MG/DL (ref 8–23)
CALCIUM SERPL-MCNC: 9.4 MG/DL (ref 8.7–10.5)
CHLORIDE SERPL-SCNC: 103 MMOL/L (ref 95–110)
CHOLEST SERPL-MCNC: 191 MG/DL (ref 120–199)
CHOLEST/HDLC SERPL: 3.6 {RATIO} (ref 2–5)
CLARITY UR REFRACT.AUTO: ABNORMAL
CO2 SERPL-SCNC: 30 MMOL/L (ref 23–29)
COLOR UR AUTO: ABNORMAL
COMPLEXED PSA SERPL-MCNC: 4.9 NG/ML (ref 0–4)
CREAT SERPL-MCNC: 1.3 MG/DL (ref 0.5–1.4)
DIFFERENTIAL METHOD: ABNORMAL
EOSINOPHIL # BLD AUTO: 0.1 K/UL (ref 0–0.5)
EOSINOPHIL NFR BLD: 2.1 % (ref 0–8)
ERYTHROCYTE [DISTWIDTH] IN BLOOD BY AUTOMATED COUNT: 13.6 % (ref 11.5–14.5)
EST. GFR  (AFRICAN AMERICAN): >60 ML/MIN/1.73 M^2
EST. GFR  (NON AFRICAN AMERICAN): 52.3 ML/MIN/1.73 M^2
GLUCOSE SERPL-MCNC: 95 MG/DL (ref 70–110)
GLUCOSE UR QL STRIP: NEGATIVE
HCT VFR BLD AUTO: 47.9 % (ref 40–54)
HDLC SERPL-MCNC: 53 MG/DL (ref 40–75)
HDLC SERPL: 27.7 % (ref 20–50)
HGB BLD-MCNC: 14.7 G/DL (ref 14–18)
HGB UR QL STRIP: NEGATIVE
KETONES UR QL STRIP: NEGATIVE
LDLC SERPL CALC-MCNC: 121 MG/DL (ref 63–159)
LEUKOCYTE ESTERASE UR QL STRIP: NEGATIVE
LYMPHOCYTES # BLD AUTO: 2.2 K/UL (ref 1–4.8)
LYMPHOCYTES NFR BLD: 34.2 % (ref 18–48)
MCH RBC QN AUTO: 29.5 PG (ref 27–31)
MCHC RBC AUTO-ENTMCNC: 30.7 G/DL (ref 32–36)
MCV RBC AUTO: 96 FL (ref 82–98)
MICROSCOPIC COMMENT: NORMAL
MONOCYTES # BLD AUTO: 0.5 K/UL (ref 0.3–1)
MONOCYTES NFR BLD: 8.1 % (ref 4–15)
NEUTROPHILS # BLD AUTO: 3.6 K/UL (ref 1.8–7.7)
NEUTROPHILS NFR BLD: 54.5 % (ref 38–73)
NITRITE UR QL STRIP: NEGATIVE
NONHDLC SERPL-MCNC: 138 MG/DL
NRBC BLD-RTO: 0 /100 WBC
PH UR STRIP: 5 [PH] (ref 5–8)
PLATELET # BLD AUTO: 172 K/UL (ref 150–350)
PMV BLD AUTO: 10.3 FL (ref 9.2–12.9)
POTASSIUM SERPL-SCNC: 4 MMOL/L (ref 3.5–5.1)
PROT SERPL-MCNC: 7.5 G/DL (ref 6–8.4)
PROT UR QL STRIP: NEGATIVE
RBC # BLD AUTO: 4.99 M/UL (ref 4.6–6.2)
RBC #/AREA URNS AUTO: 0 /HPF (ref 0–4)
SODIUM SERPL-SCNC: 141 MMOL/L (ref 136–145)
SP GR UR STRIP: 1.02 (ref 1–1.03)
T4 SERPL-MCNC: 6.3 UG/DL (ref 4.5–11.5)
TRIGL SERPL-MCNC: 85 MG/DL (ref 30–150)
TSH SERPL DL<=0.005 MIU/L-ACNC: 2.98 UIU/ML (ref 0.4–4)
URN SPEC COLLECT METH UR: ABNORMAL
WBC # BLD AUTO: 6.52 K/UL (ref 3.9–12.7)
WBC #/AREA URNS AUTO: 0 /HPF (ref 0–5)

## 2020-01-03 PROCEDURE — 3078F PR MOST RECENT DIASTOLIC BLOOD PRESSURE < 80 MM HG: ICD-10-PCS | Mod: CPTII,S$GLB,, | Performed by: INTERNAL MEDICINE

## 2020-01-03 PROCEDURE — 99999 PR PBB SHADOW E&M-EST. PATIENT-LVL V: ICD-10-PCS | Mod: PBBFAC,,, | Performed by: INTERNAL MEDICINE

## 2020-01-03 PROCEDURE — 84443 ASSAY THYROID STIM HORMONE: CPT

## 2020-01-03 PROCEDURE — 3074F PR MOST RECENT SYSTOLIC BLOOD PRESSURE < 130 MM HG: ICD-10-PCS | Mod: CPTII,S$GLB,, | Performed by: INTERNAL MEDICINE

## 2020-01-03 PROCEDURE — 84436 ASSAY OF TOTAL THYROXINE: CPT

## 2020-01-03 PROCEDURE — 99397 PER PM REEVAL EST PAT 65+ YR: CPT | Mod: S$GLB,,, | Performed by: INTERNAL MEDICINE

## 2020-01-03 PROCEDURE — 3078F DIAST BP <80 MM HG: CPT | Mod: CPTII,S$GLB,, | Performed by: INTERNAL MEDICINE

## 2020-01-03 PROCEDURE — 85025 COMPLETE CBC W/AUTO DIFF WBC: CPT

## 2020-01-03 PROCEDURE — 36415 COLL VENOUS BLD VENIPUNCTURE: CPT

## 2020-01-03 PROCEDURE — 3074F SYST BP LT 130 MM HG: CPT | Mod: CPTII,S$GLB,, | Performed by: INTERNAL MEDICINE

## 2020-01-03 PROCEDURE — 80053 COMPREHEN METABOLIC PANEL: CPT

## 2020-01-03 PROCEDURE — 84153 ASSAY OF PSA TOTAL: CPT

## 2020-01-03 PROCEDURE — 99397 PR PREVENTIVE VISIT,EST,65 & OVER: ICD-10-PCS | Mod: S$GLB,,, | Performed by: INTERNAL MEDICINE

## 2020-01-03 PROCEDURE — 99999 PR PBB SHADOW E&M-EST. PATIENT-LVL V: CPT | Mod: PBBFAC,,, | Performed by: INTERNAL MEDICINE

## 2020-01-03 PROCEDURE — 81001 URINALYSIS AUTO W/SCOPE: CPT

## 2020-01-03 PROCEDURE — 80061 LIPID PANEL: CPT

## 2020-01-03 RX ORDER — SILDENAFIL 100 MG/1
TABLET, FILM COATED ORAL
Qty: 30 TABLET | Refills: 11 | Status: SHIPPED | OUTPATIENT
Start: 2020-01-03

## 2020-01-03 NOTE — PROGRESS NOTES
CC:  Annual exam.    HPI:  The patient is a 71-year-old gentleman who we see for colon polyps, hyperlipidemia, BPH and elevated PSA presents today for annual checkup.    ROS:  Please see patient and review of [employer] for HPI/ROS submitted by the patient on 1/2/2020   activity change: No  unexpected weight change: No  neck pain: No  hearing loss: Yes  rhinorrhea: No  trouble swallowing: No  eye discharge: No  visual disturbance: No  chest tightness: No  wheezing: No  chest pain: No  palpitations: No  blood in stool: No  constipation: No  vomiting: No  diarrhea: No  polydipsia: No  polyuria: No  difficulty urinating: Yes  urgency: Yes  hematuria: No  joint swelling: No  arthralgias: Yes  headaches: No  weakness: No  confusion: No  dysphoric mood: No  In addition, the patient reports he is getting hearing aids.  He does go to the gym 3 times a week.  He does report that his urinary stream sometimes slow.  He does get up sometimes at night to urinate but not every night.    Physical exam:  General appearance:  No acute distress.  HEENT:  Conjunctiva is clear.  Patient has bilateral cataracts.  TMs are clear.  Nasal septum is midline without discharge. Oropharynx is without erythema.  Trachea is midline. Did not appreciate any thyromegaly today.  Pulmonary:  Good inspiratory, expiratory breath sounds are heard.  Lungs are clear to auscultation.  Cardiovascular:  S1-S2, rhythm is normal.  2+ carotid pulses without bruits.  Extremities without edema.  GI:  Nontender, nondistended without hepatosplenomegaly.  :  A digital rectal exam was performed.  The patient did have several resolving hemorrhoids.  The stool was brown and heme-negative.  Prostate was little bit larger no discrete nodules are felt.  Penis and testes with normal.  Lymphatics:  No cervical, axillary or inguinal adenopathy.  Comments:  The patient was enquiring about tetanus as well as pneumonia vaccine.  The patient did have a Prevnar 13 without  Pneumovax.    Assessment:  1.  Annual exam  2.  History of thyroid nodules  3.  Hyperlipidemia  4.  Elevated PSA  5.  Colon polyps.    Plan:  1.  We will refer the patient for colonoscopy  2.  Will schedule a CBC, CMP, lipid panel, PSA and UA  3.  Will put the patient for tetanus shot as well as a Pneumovax.  It was also discussed with have  the shingles vaccine but not today.

## 2020-01-03 NOTE — PROGRESS NOTES
Refill Authorization Note    Mr.Schwab is requesting a refill authorization.    Brief assessment and rationale for refill: APPROVE: prr                                         Comments:     Requested Prescriptions   Signed Prescriptions Disp Refills    sildenafil (VIAGRA) 100 MG tablet 30 tablet 11     Sig: TAKE ONE TABLET BY MOUTH AS DIRECTED       Urology: Erectile Dysfunction Agents Passed - 1/3/2020  1:16 PM        Passed - Patient is at least 18 years old        Passed - Nitrates are not on the active medication list        Passed - Last BP in normal range within 360 days     BP Readings from Last 3 Encounters:   01/03/20 126/74   12/06/19 107/65   07/05/19 110/60              Passed - Office visit in past 12 months or future 90 days     Recent Outpatient Visits            Today Annual physical exam    [facility] Marco Antonio Vaughan MD    4 weeks ago Bilateral impacted cerumen    [facility] [place] [address] Kiran Zambrano MD    6 months ago Bilateral impacted cerumen    [facility] Alycia Richardson MD    11 months ago Neoplasm of uncertain behavior of skin    [place] - Dermatology Nieves Cassidy MD    1 year ago Thoracic aortic aneurysm without rupture    [facility] Peter Garcia MD          Future Appointments              In 3 days [facility]I-US[facility]

## 2020-01-04 ENCOUNTER — PATIENT MESSAGE (OUTPATIENT)
Dept: INTERNAL MEDICINE | Facility: CLINIC | Age: 80
End: 2020-01-04

## 2020-01-06 ENCOUNTER — HOSPITAL ENCOUNTER (OUTPATIENT)
Dept: RADIOLOGY | Facility: HOSPITAL | Age: 80
Discharge: HOME | End: 2020-01-06
Attending: INTERNAL MEDICINE
Payer: MEDICARE

## 2020-01-06 DIAGNOSIS — E04.2 MULTIPLE THYROID NODULES: ICD-10-CM

## 2020-01-06 DIAGNOSIS — E78.5 HYPERLIPIDEMIA, UNSPECIFIED HYPERLIPIDEMIA TYPE: Primary | ICD-10-CM

## 2020-01-06 PROCEDURE — 76536 US EXAM OF HEAD AND NECK: CPT | Mod: 26,,, | Performed by: INTERNAL MEDICINE

## 2020-01-06 PROCEDURE — 76536 US SOFT TISSUE HEAD NECK THYROID: ICD-10-PCS | Mod: 26,,, | Performed by: INTERNAL MEDICINE

## 2020-01-06 PROCEDURE — 76536 US EXAM OF HEAD AND NECK: CPT | Mod: TC

## 2020-01-06 RX ORDER — PRAVASTATIN SODIUM 20 MG/1
20 TABLET ORAL DAILY
Qty: 90 TABLET | Refills: 3 | Status: SHIPPED | OUTPATIENT
Start: 2020-01-06 | End: 2021-05-31

## 2020-01-07 ENCOUNTER — TELEPHONE (OUTPATIENT)
Dept: INTERNAL MEDICINE | Facility: CLINIC | Age: 80
End: 2020-01-07

## 2020-01-07 NOTE — TELEPHONE ENCOUNTER
----- Message from Marco Antonio Vaughan MD sent at 1/6/2020  7:05 PM CST -----  Please call the patient regarding his abnormal result. His PSA for his prostate was a little elevated; but, stable. His LDL cholesterol was 121. I want to get it under 100. I increased his Pravastatin from 10 to 20 mg. I will want to repeat a LFT and lipid in 3 months. Orders are in.

## 2020-01-08 DIAGNOSIS — Z12.11 SPECIAL SCREENING FOR MALIGNANT NEOPLASMS, COLON: Primary | ICD-10-CM

## 2020-01-08 RX ORDER — POLYETHYLENE GLYCOL 3350, SODIUM SULFATE ANHYDROUS, SODIUM BICARBONATE, SODIUM CHLORIDE, POTASSIUM CHLORIDE 236; 22.74; 6.74; 5.86; 2.97 G/4L; G/4L; G/4L; G/4L; G/4L
4 POWDER, FOR SOLUTION ORAL ONCE
Qty: 4000 ML | Refills: 0 | Status: SHIPPED | OUTPATIENT
Start: 2020-01-08 | End: 2020-01-08

## 2020-01-16 ENCOUNTER — PATIENT MESSAGE (OUTPATIENT)
Dept: INTERNAL MEDICINE | Facility: CLINIC | Age: 80
End: 2020-01-16

## 2020-02-10 ENCOUNTER — ANESTHESIA (OUTPATIENT)
Dept: ENDOSCOPY | Facility: HOSPITAL | Age: 80
End: 2020-02-10
Payer: MEDICARE

## 2020-02-10 ENCOUNTER — HOSPITAL ENCOUNTER (OUTPATIENT)
Facility: HOSPITAL | Age: 80
Discharge: HOME | End: 2020-02-10
Attending: COLON & RECTAL SURGERY | Admitting: INTERNAL MEDICINE
Payer: MEDICARE

## 2020-02-10 ENCOUNTER — ANESTHESIA EVENT (OUTPATIENT)
Dept: ENDOSCOPY | Facility: HOSPITAL | Age: 80
End: 2020-02-10
Payer: MEDICARE

## 2020-02-10 VITALS
DIASTOLIC BLOOD PRESSURE: 72 MMHG | WEIGHT: 182 LBS | RESPIRATION RATE: 16 BRPM | OXYGEN SATURATION: 97 % | HEART RATE: 65 BPM | BODY MASS INDEX: 24.65 KG/M2 | SYSTOLIC BLOOD PRESSURE: 114 MMHG | TEMPERATURE: 99 F | HEIGHT: 72 IN

## 2020-02-10 DIAGNOSIS — K63.5 COLON POLYP: ICD-10-CM

## 2020-02-10 PROCEDURE — 37000008 HC ANESTHESIA 1ST 15 MINUTES: Performed by: INTERNAL MEDICINE

## 2020-02-10 PROCEDURE — G0105 COLORECTAL SCRN; HI RISK IND: HCPCS | Performed by: INTERNAL MEDICINE

## 2020-02-10 PROCEDURE — E9220 PRA ENDO ANESTHESIA: HCPCS | Mod: ,,, | Performed by: REGISTERED NURSE

## 2020-02-10 PROCEDURE — 63600175 PHARM REV CODE 636 W HCPCS: Performed by: REGISTERED NURSE

## 2020-02-10 PROCEDURE — G0105 COLORECTAL SCRN; HI RISK IND: ICD-10-PCS | Mod: GC,,, | Performed by: INTERNAL MEDICINE

## 2020-02-10 PROCEDURE — 63600175 PHARM REV CODE 636 W HCPCS: Performed by: COLON & RECTAL SURGERY

## 2020-02-10 PROCEDURE — G0105 COLORECTAL SCRN; HI RISK IND: HCPCS | Mod: GC,,, | Performed by: INTERNAL MEDICINE

## 2020-02-10 PROCEDURE — E9220 PRA ENDO ANESTHESIA: ICD-10-PCS | Mod: ,,, | Performed by: REGISTERED NURSE

## 2020-02-10 PROCEDURE — 37000009 HC ANESTHESIA EA ADD 15 MINS: Performed by: INTERNAL MEDICINE

## 2020-02-10 RX ORDER — PROPOFOL 10 MG/ML
VIAL (ML) INTRAVENOUS CONTINUOUS PRN
Status: DISCONTINUED | OUTPATIENT
Start: 2020-02-10 | End: 2020-02-10

## 2020-02-10 RX ORDER — PROPOFOL 10 MG/ML
VIAL (ML) INTRAVENOUS
Status: DISCONTINUED | OUTPATIENT
Start: 2020-02-10 | End: 2020-02-10

## 2020-02-10 RX ORDER — SODIUM CHLORIDE 9 MG/ML
INJECTION, SOLUTION INTRAVENOUS CONTINUOUS
Status: DISCONTINUED | OUTPATIENT
Start: 2020-02-10 | End: 2020-02-10 | Stop reason: HOSPADM

## 2020-02-10 RX ORDER — LIDOCAINE HCL/PF 100 MG/5ML
SYRINGE (ML) INTRAVENOUS
Status: DISCONTINUED | OUTPATIENT
Start: 2020-02-10 | End: 2020-02-10

## 2020-02-10 RX ADMIN — Medication 100 MG: at 09:02

## 2020-02-10 RX ADMIN — PROPOFOL 175 MCG/KG/MIN: 10 INJECTION, EMULSION INTRAVENOUS at 09:02

## 2020-02-10 RX ADMIN — SODIUM CHLORIDE: 0.9 INJECTION, SOLUTION INTRAVENOUS at 09:02

## 2020-02-10 RX ADMIN — PROPOFOL 60 MG: 10 INJECTION, EMULSION INTRAVENOUS at 09:02

## 2020-02-10 NOTE — ANESTHESIA POSTPROCEDURE EVALUATION
Anesthesia Post Evaluation    Patient: Fred Schwab Jr.    Procedure(s) Performed: Procedure(s) (LRB):  COLONOSCOPY (N/A)    Final Anesthesia Type: general    Patient location during evaluation: GI PACU  Patient participation: Yes- Able to Participate  Level of consciousness: awake and alert  Post-procedure vital signs: reviewed and stable  Pain management: adequate  Airway patency: patent    PONV status at discharge: No PONV  Anesthetic complications: no      Cardiovascular status: blood pressure returned to baseline  Respiratory status: unassisted  Hydration status: euvolemic  Follow-up not needed.          Vitals Value Taken Time   /72 2/10/2020 10:37 AM   Temp 37 °C (98.6 °F) 2/10/2020 10:06 AM   Pulse 65 2/10/2020 10:37 AM   Resp 16 2/10/2020 10:37 AM   SpO2 97 % 2/10/2020 10:37 AM         Event Time     Out of Recovery 10:38:47          Pain/Paulino Score: Paulino Score: 9 (2/10/2020 10:38 AM)

## 2020-02-10 NOTE — H&P
Short Stay Endoscopy History and Physical    PCP - Marco Antonio Vaughan MD    Procedure - Colonoscopy  ASA - per anesthesia  Mallampati - per anesthesia  History of Anesthesia problems - no  Family history Anesthesia problems - no   Plan of anesthesia - General    HPI:  This is a 79 y.o. male here for evaluation of :personal history of colon polyps    Last colonoscopy 2015 with 3mm polyp at 80cm.   Denies complaints on review. No overt signs of bleeding. No NSAIDs or AC. Denies family history of CRC.    ROS:  Constitutional: No fevers, chills, No weight loss  CV: No chest pain  Pulm: No cough, No shortness of breath  GI: see HPI  Derm: No rash    Medical History:  has a past medical history of Actinic keratosis, Allergy, Arthritis, Basal cell carcinoma, BPH (benign prostatic hyperplasia), Cataract, Colon polyp, Dermatochalasis, Elevated PSA, Headache(784.0), HEARING LOSS, Hypertension, Low tension glaucoma, Multiple thyroid nodules (11-27-17), Neuropathy, Otitis media, Squamous cell carcinoma (12/10/16), and Urinary tract infection.    Surgical History:  has a past surgical history that includes Colonoscopy; MOHS Surgery; Blepharoptosis repair; Colonoscopy (N/A, 10/26/2015); Colon surgery; and Prostate biopsy.    Family History: family history includes Cancer in his father; Diabetes in his mother and sister; Glaucoma in his mother; Heart disease in his father; Hypertension in his mother; Stroke in his father.. Otherwise no colon cancer, inflammatory bowel disease, or GI malignancies.    Social History:  reports that he has never smoked. He has never used smokeless tobacco. He reports that he drinks alcohol. He reports that he does not use drugs.    Review of patient's allergies indicates:   Allergen Reactions    Clindamycin Rash    Doxycycline hyclate Rash    Ciprofloxacin Other (See Comments)     Tendon tear.    Penicillins     Iodine and iodide containing products Rash     Got red but no itching        Medications:   Medications Prior to Admission   Medication Sig Dispense Refill Last Dose    ascorbic acid (VITAMIN C) 500 MG tablet Take by mouth. 1 Tablet Oral Every day   2/9/2020 at Unknown time    betamethasone dipropionate (DIPROLENE) 0.05 % cream aaa qd prn rash. Not more than 2 weeks straight in same location. Avoid use on face and groin 45 g 1 2/9/2020 at Unknown time    calcium carbonate (OS-DIAMOND) 600 mg (1,500 mg) Tab Take by mouth. 1 Tablet Oral Every day   2/9/2020 at Unknown time    co-enzyme Q-10 30 mg capsule Take 30 mg by mouth 3 (three) times daily.   2/9/2020 at Unknown time    doxazosin (CARDURA) 2 MG tablet Take 1 tablet (2 mg total) by mouth every evening. 90 tablet 3 2/9/2020 at Unknown time    loratadine (CLARITIN) 10 mg tablet Take 10 mg by mouth daily as needed. Every day   2/9/2020 at Unknown time    OMEGA-3 FATTY ACIDS/FISH OIL (OMEGA 3 FISH OIL ORAL) Take by mouth. 1 Capsule Oral Every day   2/9/2020 at Unknown time    pravastatin (PRAVACHOL) 20 MG tablet Take 1 tablet (20 mg total) by mouth once daily. 90 tablet 3 2/9/2020 at Unknown time    propranolol (INDERAL) 10 MG tablet TAKE ONE TABLET BY MOUTH TWICE DAILY 180 tablet 2 2/9/2020 at Unknown time    sildenafil (VIAGRA) 100 MG tablet TAKE ONE TABLET BY MOUTH AS DIRECTED 30 tablet 11 Past Week at Unknown time    vit A palm,D3 in cod liver oil (COD LIVER OIL PLUS THOMAS AND D3) 1,250 unit-130 unit-530 mg Cap    2/9/2020 at Unknown time    FLUZONE HIGH-DOSE 2019-20, PF, 180 mcg/0.5 mL Syrg ADM 0.5ML IM UTD  0 Unknown at Unknown time         Physical Exam:    Vital Signs:   Vitals:    02/10/20 0923   BP: (!) 151/74   Pulse: (!) 59   Resp: 14   Temp: 98 °F (36.7 °C)       General Appearance: Well appearing in no acute distress  Eyes:    No scleral icterus  ENT: Neck supple, Lips, mucosa, and tongue normal; teeth and gums normal  Lungs: CTA bilaterally  Heart:  S1, S2 normal, no murmurs heard  Abdomen: Soft, non tender, non  distended with positive bowel sounds. No hepatosplenomegaly, ascites, or mass.  Extremities: 2+ pulses, no clubbing, cyanosis or edema  Skin: No rash      Labs:  Lab Results   Component Value Date    WBC 6.52 01/03/2020    HGB 14.7 01/03/2020    HCT 47.9 01/03/2020     01/03/2020    CHOL 191 01/03/2020    TRIG 85 01/03/2020    HDL 53 01/03/2020    ALT 15 01/03/2020    AST 19 01/03/2020     01/03/2020    K 4.0 01/03/2020     01/03/2020    CREATININE 1.3 01/03/2020    BUN 13 01/03/2020    CO2 30 (H) 01/03/2020    TSH 2.976 01/03/2020    PSA 4.9 (H) 01/03/2020       I have explained the risks and benefits of endoscopy procedures to the patient including but not limited to bleeding, perforation, infection, and death.  The patient was asked if they understand and allowed to ask any further questions to their satisfaction.    Kiran Muro MD

## 2020-02-10 NOTE — DISCHARGE INSTRUCTIONS
Colonoscopy     A camera attached to a flexible tube with a viewing lens is used to take video pictures.     Colonoscopy is a test to view the inside of your lower digestive tract (colon and rectum). Sometimes it can show the last part of the small intestine (ileum). During the test, small pieces of tissue may be removed for testing. This is called a biopsy. Small growths, such as polyps, may also be removed.   Why is colonoscopy done?  The test is done to help look for colon cancer. And it can help find the source of abdominal pain, bleeding, and changes in bowel habits. It may be needed once a year, depending on factors such as your:  · Age  · Health history  · Family health history  · Symptoms  · Results from any prior colonoscopy  Risks and possible complications  These include:  · Bleeding               · A puncture or tear in the colon   · Risks of anesthesia  · A cancer lesion not being seen  Getting ready   To prepare for the test:  · Talk with your healthcare provider about the risks of the test (see below). Also ask your healthcare provider about alternatives to the test.  · Tell your healthcare provider about any medicines you take. Also tell him or her about any health conditions you may have.  · Make sure your rectum and colon are empty for the test. Follow the diet and bowel prep instructions exactly. If you dont, the test may need to be rescheduled.  · Plan for a friend or family member to drive you home after the test.     Colonoscopy provides an inside view of the entire colon.     You may discuss the results with your doctor right away or at a future visit.  During the test   The test is usually done in the hospital on an outpatient basis. This means you go home the same day. The procedure takes about 30 minutes. During that time:  · You are given relaxing (sedating) medicine through an IV line. You may be drowsy, or fully asleep.  · The healthcare provider will first give you a physical exam to  check for anal and rectal problems.  · Then the anus is lubricated and the scope inserted.  · If you are awake, you may have a feeling similar to needing to have a bowel movement. You may also feel pressure as air is pumped into the colon. Its OK to pass gas during the procedure.  · Biopsy, polyp removal, or other treatments may be done during the test.  After the test   You may have gas right after the test. It can help to try to pass it to help prevent later bloating. Your healthcare provider may discuss the results with you right away. Or you may need to schedule a follow-up visit to talk about the results. After the test, you can go back to your normal eating and other activities. You may be tired from the sedation and need to rest for a few hours.  Date Last Reviewed: 11/1/2016 © 2000-2017 [employer]. [address] [place] [address], [place] [address]. All rights reserved. This information is not intended as a substitute for professional medical care. Always follow your healthcare professional's instructions.

## 2020-02-10 NOTE — TRANSFER OF CARE
Anesthesia Transfer of Care Note    Patient: Fred Schwab Jr.    Procedure(s) Performed: Procedure(s) (LRB):  COLONOSCOPY (N/A)    Patient location: GI    Anesthesia Type: general    Transport from OR: Transported from OR on room air with adequate spontaneous ventilation    Post pain: adequate analgesia    Post assessment: tolerated procedure well and no apparent anesthetic complications    Post vital signs: stable    Level of consciousness: sedated    Nausea/Vomiting: no nausea/vomiting    Complications: none    Transfer of care protocol was followed      Last vitals:   Visit Vitals  BP (!) 151/74 (BP Location: Left arm, Patient Position: Lying)   Pulse (!) 59   Temp 36.7 °C (98 °F) (Temporal)   Resp 14   Ht 6' (1.829 m)   Wt 82.6 kg (182 lb)   SpO2 96%   BMI 24.68 kg/m²

## 2020-02-10 NOTE — ANESTHESIA PREPROCEDURE EVALUATION
02/10/2020  Fred Schwab Jr. is a 79 y.o., male.    Patient Active Problem List   Diagnosis    Low tension glaucoma, mild stage - Both Eyes    Nuclear sclerosis - Both Eyes    Ptosis - Both Eyes    Dermatochalasia - Both Eyes    Dry eye syndrome - Both Eyes    Astigmatism with presbyopia - Both Eyes    Skin cancer of face    KCS (keratoconjunctivitis sicca) - Both Eyes    Low-tension glaucoma, bilateral    Osteoarthritis, hand    Hand pain    Rheumatoid factor positive    Osteoporosis prophylaxis    Elevated PSA    Multiple thyroid nodules    Elevated coronary artery calcium score    Allergic rhinitis    Nasal septal deviation     Past Surgical History:   Procedure Laterality Date    BELPHAROPTOSIS REPAIR      COLON SURGERY      COLONOSCOPY      COLONOSCOPY N/A 10/26/2015    Procedure: COLONOSCOPY;  Surgeon: Diony Kennedy MD;  Location: [facility] ([address] [place] [address]);  Service: Endoscopy;  Laterality: N/A;    MOHS Surgery      PROSTATE BIOPSY         Past Medical History:   Diagnosis Date    Actinic keratosis     Allergy     Arthritis     Basal cell carcinoma     left nasofacial sulcus    BPH (benign prostatic hyperplasia)     Cataract     Colon polyp     benign    Dermatochalasis     Elevated PSA     Headache(784.0)     HEARING LOSS     Hypertension     Low tension glaucoma     Multiple thyroid nodules 11-27-17    Need repeat thyroid ultrasound in November 2018.    Neuropathy     Otitis media     Squamous cell carcinoma 12/10/16    left upper neck- in situ    Urinary tract infection          Anesthesia Evaluation    I have reviewed the Patient Summary Reports.     I have reviewed the Medications.     Review of Systems      Physical Exam  General:  Well nourished    Airway/Jaw/Neck:  Airway Findings: Mouth Opening: Normal Tongue: Normal  General Airway Assessment: Adult   Mallampati: II  TM Distance: Normal, at least 6 cm      Dental:  Dental Findings: In tact             Anesthesia Plan  Type of Anesthesia, risks & benefits discussed:  Anesthesia Type:  general  Patient's Preference:   Intra-op Monitoring Plan: standard ASA monitors  Intra-op Monitoring Plan Comments:   Post Op Pain Control Plan:   Post Op Pain Control Plan Comments:   Induction:   IV  Beta Blocker:  Patient is on a Beta-Blocker and has received one dose within the past 24 hours (No further documentation required).       Informed Consent: Patient understands risks and agrees with Anesthesia plan.  Questions answered. Anesthesia consent signed with patient.  ASA Score: 2     Day of Surgery Review of History & Physical:    H&P update referred to the provider.         Ready For Surgery From Anesthesia Perspective.

## 2020-02-10 NOTE — [employer] PATIENT INSTRUCTIONS
Discharge Summary/Instructions after an Endoscopic Procedure  Patient Name: Fred Schwab  Patient MRN: [id number]  Patient YOB: 1940  Monday, February 10, 2020  Gunnar Avalos MD  RESTRICTIONS:  During your procedure today, you received medications for sedation.  These   medications may affect your judgment, balance and coordination.  Therefore,   for 24 hours, you have the following restrictions:   - DO NOT drive a car, operate machinery, make legal/financial decisions,   sign important papers or drink alcohol.    ACTIVITY:  Today: no heavy lifting, straining or running due to procedural   sedation/anesthesia.  The following day: return to full activity including work.  DIET:  Eat and drink normally unless instructed otherwise.     TREATMENT FOR COMMON SIDE EFFECTS:  - Mild abdominal pain, nausea, belching, bloating or excessive gas:  rest,   eat lightly and use a heating pad.  - Sore Throat: treat with throat lozenges and/or gargle with warm salt   water.  - Because air was used during the procedure, expelling large amounts of air   from your rectum or belching is normal.  - If a bowel prep was taken, you may not have a bowel movement for 1-3 days.    This is normal.  SYMPTOMS TO WATCH FOR AND REPORT TO YOUR PHYSICIAN:  1. Abdominal pain or bloating, other than gas cramps.  2. Chest pain.  3. Back pain.  4. Signs of infection such as: chills or fever occurring within 24 hours   after the procedure.  5. Rectal bleeding, which would show as bright red, maroon, or black stools.   (A tablespoon of blood from the rectum is not serious, especially if   hemorrhoids are present.)  6. Vomiting.  7. Weakness or dizziness.  GO DIRECTLY TO THE NEAREST EMERGENCY ROOM IF YOU HAVE ANY OF THE FOLLOWING:      Difficulty breathing              Chills and/or fever over 101 F   Persistent vomiting and/or vomiting blood   Severe abdominal pain   Severe chest pain   Black, tarry stools   Bleeding- more than one  tablespoon   Any other symptom or condition that you feel may need urgent attention  Your doctor recommends these additional instructions:  If any biopsies were taken, your doctors clinic will contact you in 1 to 2   weeks with any results.  - Discharge patient to home.   - Resume previous diet.   - Continue present medications.   - Patient has a contact number available for emergencies.  The signs and   symptoms of potential delayed complications were discussed with the   patient.  Return to normal activities tomorrow.  Written discharge   instructions were provided to the patient.   - No repeat colonoscopy due to age.   For questions, problems or results please call your physician - Gunnar Avalos MD at Work:  [phone].  [facility], EMERGENCY ROOM PHONE NUMBER: [phone]  IF A COMPLICATION OR EMERGENCY SITUATION ARISES AND YOU ARE UNABLE TO REACH   YOUR PHYSICIAN - GO DIRECTLY TO THE EMERGENCY ROOM.  Gunnar Avalos MD  2/10/2020 10:04:28 AM  This report has been verified and signed electronically.  [employer]

## 2020-02-17 ENCOUNTER — TELEPHONE (OUTPATIENT)
Dept: ENDOSCOPY | Facility: HOSPITAL | Age: 80
End: 2020-02-17

## 2020-03-02 ENCOUNTER — OFFICE VISIT (OUTPATIENT)
Dept: DERMATOLOGY | Facility: CLINIC | Age: 80
End: 2020-03-02
Payer: MEDICARE

## 2020-03-02 ENCOUNTER — PATIENT OUTREACH (OUTPATIENT)
Dept: ADMINISTRATIVE | Facility: OTHER | Age: 80
End: 2020-03-02

## 2020-03-02 DIAGNOSIS — L90.5 SCAR: Primary | ICD-10-CM

## 2020-03-02 DIAGNOSIS — L82.0 INFLAMED SEBORRHEIC KERATOSIS: ICD-10-CM

## 2020-03-02 DIAGNOSIS — L81.4 LENTIGO: ICD-10-CM

## 2020-03-02 DIAGNOSIS — D22.9 NEVUS: ICD-10-CM

## 2020-03-02 DIAGNOSIS — D18.01 CHERRY ANGIOMA: ICD-10-CM

## 2020-03-02 DIAGNOSIS — L57.0 AK (ACTINIC KERATOSIS): ICD-10-CM

## 2020-03-02 DIAGNOSIS — Z85.828 PERSONAL HISTORY OF SKIN CANCER: ICD-10-CM

## 2020-03-02 DIAGNOSIS — L82.1 SK (SEBORRHEIC KERATOSIS): ICD-10-CM

## 2020-03-02 DIAGNOSIS — D23.9 DERMATOFIBROMA: ICD-10-CM

## 2020-03-02 PROCEDURE — 17110 PR DESTRUCTION BENIGN LESIONS UP TO 14: ICD-10-PCS | Mod: S$GLB,,, | Performed by: DERMATOLOGY

## 2020-03-02 PROCEDURE — 99214 PR OFFICE/OUTPT VISIT, EST, LEVL IV, 30-39 MIN: ICD-10-PCS | Mod: 25,S$GLB,, | Performed by: DERMATOLOGY

## 2020-03-02 PROCEDURE — 17000 PR DESTRUCTION(LASER SURGERY,CRYOSURGERY,CHEMOSURGERY),PREMALIGNANT LESIONS,FIRST LESION: ICD-10-PCS | Mod: 59,S$GLB,, | Performed by: DERMATOLOGY

## 2020-03-02 PROCEDURE — 99999 PR PBB SHADOW E&M-EST. PATIENT-LVL II: ICD-10-PCS | Mod: PBBFAC,,, | Performed by: DERMATOLOGY

## 2020-03-02 PROCEDURE — 17003 DESTRUCT PREMALG LES 2-14: CPT | Mod: 59,S$GLB,, | Performed by: DERMATOLOGY

## 2020-03-02 PROCEDURE — 99214 OFFICE O/P EST MOD 30 MIN: CPT | Mod: 25,S$GLB,, | Performed by: DERMATOLOGY

## 2020-03-02 PROCEDURE — 1159F PR MEDICATION LIST DOCUMENTED IN MEDICAL RECORD: ICD-10-PCS | Mod: S$GLB,,, | Performed by: DERMATOLOGY

## 2020-03-02 PROCEDURE — 1101F PT FALLS ASSESS-DOCD LE1/YR: CPT | Mod: CPTII,S$GLB,, | Performed by: DERMATOLOGY

## 2020-03-02 PROCEDURE — 17110 DESTRUCTION B9 LES UP TO 14: CPT | Mod: S$GLB,,, | Performed by: DERMATOLOGY

## 2020-03-02 PROCEDURE — 1159F MED LIST DOCD IN RCRD: CPT | Mod: S$GLB,,, | Performed by: DERMATOLOGY

## 2020-03-02 PROCEDURE — 17003 DESTRUCTION, PREMALIGNANT LESIONS; SECOND THROUGH 14 LESIONS: ICD-10-PCS | Mod: 59,S$GLB,, | Performed by: DERMATOLOGY

## 2020-03-02 PROCEDURE — 1101F PR PT FALLS ASSESS DOC 0-1 FALLS W/OUT INJ PAST YR: ICD-10-PCS | Mod: CPTII,S$GLB,, | Performed by: DERMATOLOGY

## 2020-03-02 PROCEDURE — 99999 PR PBB SHADOW E&M-EST. PATIENT-LVL II: CPT | Mod: PBBFAC,,, | Performed by: DERMATOLOGY

## 2020-03-02 PROCEDURE — 17000 DESTRUCT PREMALG LESION: CPT | Mod: 59,S$GLB,, | Performed by: DERMATOLOGY

## 2020-03-02 NOTE — PROGRESS NOTES
Subjective:       Patient ID:  Fred Schwab Jr. is a 79 y.o. male who presents for   Chief Complaint   Patient presents with    Lesion    Skin Check     Pt here today for a TBSE. Pt c/o red colored lesion on right cheek x a few months. No bleeding, pain or prev tx.   This is a high risk patient here to check for the development of new lesions.  Also has intermittent rashes. Uses betamethasone which helps.  Uses dove sensitive skin soap in shower.  Moisturizes with cerave cream.       Review of Systems   Constitutional: Negative for fever and chills.   Skin: Positive for itching, rash, dry skin and activity-related sunscreen use. Negative for daily sunscreen use, tendency to form keloidal scars and recent sunburn.   Hematologic/Lymphatic: Does not bruise/bleed easily.        Objective:    Physical Exam   Constitutional: He appears well-developed and well-nourished. No distress.   Neurological: He is alert and oriented to person, place, and time. He is not disoriented.   Psychiatric: He has a normal mood and affect.   Skin:   Areas Examined (abnormalities noted in diagram):   Scalp / Hair Palpated and Inspected  Head / Face Inspection Performed  Neck Inspection Performed  Chest / Axilla Inspection Performed  Abdomen Inspection Performed  Back Inspection Performed  RUE Inspected  LUE Inspection Performed  Nails and Digits Inspection Performed                       Diagram Legend     Erythematous scaling macule/papule c/w actinic keratosis       Vascular papule c/w angioma      Pigmented verrucoid papule/plaque c/w seborrheic keratosis      Yellow umbilicated papule c/w sebaceous hyperplasia      Irregularly shaped tan macule c/w lentigo     1-2 mm smooth white papules consistent with Milia      Movable subcutaneous cyst with punctum c/w epidermal inclusion cyst      Subcutaneous movable cyst c/w pilar cyst      Firm pink to brown papule c/w dermatofibroma      Pedunculated fleshy papule(s) c/w skin tag(s)       Evenly pigmented macule c/w junctional nevus     Mildly variegated pigmented, slightly irregular-bordered macule c/w mildly atypical nevus      Flesh colored to evenly pigmented papule c/w intradermal nevus       Pink pearly papule/plaque c/w basal cell carcinoma      Erythematous hyperkeratotic cursted plaque c/w SCC      Surgical scar with no sign of skin cancer recurrence      Open and closed comedones      Inflammatory papules and pustules      Verrucoid papule consistent consistent with wart     Erythematous eczematous patches and plaques     Dystrophic onycholytic nail with subungual debris c/w onychomycosis     Umbilicated papule    Erythematous-base heme-crusted tan verrucoid plaque consistent with inflamed seborrheic keratosis     Erythematous Silvery Scaling Plaque c/w Psoriasis     See annotation      Assessment / Plan:        Scar  Personal history of skin cancer  Pt with history of non melanoma skin cancer  Total body skin examination performed today including at least 12 points as noted in physical examination. No suspicious lesions noted.    Inflamed seborrheic keratosis  Cryosurgery procedure note:    Verbal consent from the patient is obtained including, but not limited to, risk of hypopigmentation/hyperpigmentation, scar, recurrence of lesion. Liquid nitrogen cryosurgery is applied to 1 lesions to produce a freeze injury. The patient is aware that blisters may form and is instructed on wound care with gentle cleansing and use of vaseline ointment to keep moist until healed. The patient is supplied a handout on cryosurgery and is instructed to call if lesions do not completely resolve.    AK (actinic keratosis)  Cryosurgery Procedure Note    Verbal consent from the patient is obtained including, but not limited to, risk of hypopigmentation/hyperpigmentation, scar, recurrence of lesion. The patient is aware of the precancerous quality and need for treatment of these lesions. Liquid nitrogen  cryosurgery is applied to the 4 actinic keratoses, as detailed in the physical exam, to produce a freeze injury. The patient is aware that blisters may form and is instructed on wound care with gentle cleansing and use of vaseline ointment to keep moist until healed. The patient is supplied a handout on cryosurgery and is instructed to call if lesions do not completely resolve.    Lentigo  This is a benign hyperpigmented sun induced lesion. Daily sun protection will reduce the number of new lesions. Treatment of these benign lesions are considered cosmetic.  The nature of sun-induced photo-aging and skin cancers is discussed.  Sun avoidance, protective clothing, and the use of 30-SPF sunscreens is advised. Observe closely for skin damage/changes, and call if such occurs.    SK (seborrheic keratosis)  These are benign inherited growths without a malignant potential. Reassurance given to patient. No treatment is necessary.     Nevus  Discussed ABCDE's of nevi.  Monitor for new mole or moles that are becoming bigger, darker, irritated, or developing irregular borders. Brochure provided.    Cherry angioma  These are benign vascular lesions that are inherited.  Treatment is not necessary.    Dermatofibroma  Reassurance given to patient. No treatment is necessary.   Treatment of benign, asymptomatic lesions may be considered cosmetic.             Follow up in about 1 year (around 3/2/2021).

## 2020-03-26 ENCOUNTER — PATIENT MESSAGE (OUTPATIENT)
Dept: OTOLARYNGOLOGY | Facility: CLINIC | Age: 80
End: 2020-03-26

## 2020-03-26 RX ORDER — FLUOCINOLONE ACETONIDE 0.11 MG/ML
OIL TOPICAL 2 TIMES DAILY
Qty: 1 BOTTLE | Refills: 2 | Status: SHIPPED | OUTPATIENT
Start: 2020-03-26 | End: 2021-05-31

## 2020-03-27 ENCOUNTER — PATIENT MESSAGE (OUTPATIENT)
Dept: INTERNAL MEDICINE | Facility: CLINIC | Age: 80
End: 2020-03-27

## 2020-03-31 ENCOUNTER — PATIENT MESSAGE (OUTPATIENT)
Dept: OTOLARYNGOLOGY | Facility: CLINIC | Age: 80
End: 2020-03-31

## 2020-03-31 RX ORDER — FLUOCINOLONE ACETONIDE 0.11 MG/ML
4 OIL AURICULAR (OTIC) 2 TIMES DAILY
Qty: 20 ML | Refills: 5 | Status: SHIPPED | OUTPATIENT
Start: 2020-03-31

## 2020-04-10 NOTE — PROGRESS NOTES
Refill Routing Note     Medication(s) are not appropriate for processing by [facility]:    Medication Outside of Protocol    Appointments  past 12m or future 3m with PCP    Date Provider   Last Visit   1/3/2020 Marco Antonio Vaughan MD   Next Visit   Visit date not found Marco Antonio Vaughan MD           Automatic Epic Protocol Generated Data:    Requested Prescriptions   Pending Prescriptions Disp Refills    doxazosin (CARDURA) 2 MG tablet [Pharmacy Med Name: doxazosin 2 mg tablet] 90 tablet 3     Sig: TAKE ONE TABLET BY MOUTH EVERY EVENING       Cardiovascular:  Alpha Blockers Passed - 4/7/2020  7:50 AM        Passed - Patient is at least 18 years old        Passed - Last BP in normal range within 360 days.     BP Readings from Last 3 Encounters:   02/10/20 114/72   01/03/20 126/74   12/06/19 107/65              Passed - Office visit in past 12 months or future 90 days.     Recent Outpatient Visits            1 month ago Scar    [place] - Dermatology Nieves Cassidy MD    3 months ago Annual physical exam    [facility] Marco Antonio Vaughan MD    4 months ago Bilateral impacted cerumen    [facility] [address] Kiran Zambrano MD    9 months ago Bilateral impacted cerumen    [facility] Alycia Richardson MD    1 year ago Neoplasm of uncertain behavior of skin    [place] - Dermatology Nieves Cassidy MD                       Note composed:3:48 PM 04/10/2020

## 2020-04-11 ENCOUNTER — PATIENT MESSAGE (OUTPATIENT)
Dept: INTERNAL MEDICINE | Facility: CLINIC | Age: 80
End: 2020-04-11

## 2020-04-11 RX ORDER — DOXAZOSIN 2 MG/1
TABLET ORAL
Qty: 90 TABLET | Refills: 3 | Status: SHIPPED | OUTPATIENT
Start: 2020-04-11 | End: 2021-05-19 | Stop reason: SDUPTHER

## 2020-04-19 ENCOUNTER — PATIENT MESSAGE (OUTPATIENT)
Dept: INTERNAL MEDICINE | Facility: CLINIC | Age: 80
End: 2020-04-19

## 2020-04-20 ENCOUNTER — PATIENT MESSAGE (OUTPATIENT)
Dept: INTERNAL MEDICINE | Facility: CLINIC | Age: 80
End: 2020-04-20

## 2020-04-21 ENCOUNTER — PATIENT MESSAGE (OUTPATIENT)
Dept: INTERNAL MEDICINE | Facility: CLINIC | Age: 80
End: 2020-04-21

## 2020-04-22 ENCOUNTER — OFFICE VISIT (OUTPATIENT)
Dept: INTERNAL MEDICINE | Facility: CLINIC | Age: 80
End: 2020-04-22
Payer: MEDICARE

## 2020-04-22 ENCOUNTER — LAB VISIT (OUTPATIENT)
Dept: LAB | Facility: HOSPITAL | Age: 80
End: 2020-04-22
Attending: INTERNAL MEDICINE
Payer: MEDICARE

## 2020-04-22 DIAGNOSIS — R10.33 PERIUMBILICAL ABDOMINAL PAIN: Primary | ICD-10-CM

## 2020-04-22 DIAGNOSIS — I10 HYPERTENSION, UNSPECIFIED TYPE: ICD-10-CM

## 2020-04-22 DIAGNOSIS — R10.33 PERIUMBILICAL ABDOMINAL PAIN: ICD-10-CM

## 2020-04-22 LAB
BACTERIA #/AREA URNS AUTO: NORMAL /HPF
BILIRUB UR QL STRIP: NEGATIVE
CLARITY UR REFRACT.AUTO: ABNORMAL
COLOR UR AUTO: ABNORMAL
GLUCOSE UR QL STRIP: NEGATIVE
HGB UR QL STRIP: NEGATIVE
KETONES UR QL STRIP: NEGATIVE
LEUKOCYTE ESTERASE UR QL STRIP: NEGATIVE
MICROSCOPIC COMMENT: NORMAL
NITRITE UR QL STRIP: NEGATIVE
PH UR STRIP: 5 [PH] (ref 5–8)
PROT UR QL STRIP: NEGATIVE
RBC #/AREA URNS AUTO: 0 /HPF (ref 0–4)
SP GR UR STRIP: 1.02 (ref 1–1.03)
SQUAMOUS #/AREA URNS AUTO: 0 /HPF
URN SPEC COLLECT METH UR: ABNORMAL
WBC #/AREA URNS AUTO: 0 /HPF (ref 0–5)

## 2020-04-22 PROCEDURE — 99442 PR PHYSICIAN TELEPHONE EVALUATION 11-20 MIN: CPT | Mod: 95,,, | Performed by: INTERNAL MEDICINE

## 2020-04-22 PROCEDURE — 99442 PR PHYSICIAN TELEPHONE EVALUATION 11-20 MIN: ICD-10-PCS | Mod: 95,,, | Performed by: INTERNAL MEDICINE

## 2020-04-22 PROCEDURE — 81001 URINALYSIS AUTO W/SCOPE: CPT

## 2020-04-22 NOTE — PROGRESS NOTES
Established Patient - Audio Only Telehealth Visit     The patient location is: Home  The chief complaint leading to consultation is: Periumbilical abd pain.  Visit type: Virtual visit with audio only (telephone)     The reason for the audio only service rather than synchronous audio and video virtual visit was related to technical difficulties or patient preference/necessity.     Each patient to whom I provide medical services by telemedicine is:  (1) informed of the relationship between the physician and patient and the respective role of any other health care provider with respect to management of the patient; and (2) notified that they may decline to receive medical services by telemedicine and may withdraw from such care at any time. Patient verbally consented to receive this service via voice-only telephone call.       HPI:  The patient is a 79-year-old male with multinodular thyroid, hypertension, hyperlipidemia, elevated PSA, and BPH who presents today by audio visit with complaints of abdominal discomfort for the past 4 days.  He reports having nausea, bloating and gas which is fairly constant.  Will also have episodes of pain at the level of the umbilicus which he describes a dull ache.  This can happen multiple times throughout the day.  This pain can last a few hours.  He reports having a similar episode 3-4 weeks ago which lasted 24 hr.  He had an episode 1 week ago which lasted 12 hr.  Patient had a sister who  from pancreatic cancer approximately 2 years ago.    Physical exam:  No exam was performed since the patient was contacted by phone.     Assessment:  1.  Periumbilical abdominal pain  2.  Nausea and bloating  3.  Hypertension  4.  Hyperlipidemia    Plan:  1.  Will schedule a CBC, CMP, UA, H pylori antibody, amylase and lipase  2.  Further workup will depend on test results.                       This service was not originating from a related E/M service provided within the previous 7 days  nor will  to an E/M service or procedure within the next 24 hours or my soonest available appointment.  Prevailing standard of care was able to be met in this audio-only visit.

## 2020-04-23 DIAGNOSIS — R10.33 PERIUMBILICAL ABDOMINAL PAIN: Primary | ICD-10-CM

## 2020-04-24 ENCOUNTER — TELEPHONE (OUTPATIENT)
Dept: INTERNAL MEDICINE | Facility: CLINIC | Age: 80
End: 2020-04-24

## 2020-04-24 ENCOUNTER — HOSPITAL ENCOUNTER (OUTPATIENT)
Dept: RADIOLOGY | Facility: HOSPITAL | Age: 80
Discharge: HOME | End: 2020-04-24
Attending: INTERNAL MEDICINE
Payer: MEDICARE

## 2020-04-24 DIAGNOSIS — R14.1 GAS PAIN: ICD-10-CM

## 2020-04-24 DIAGNOSIS — R10.33 PERIUMBILICAL ABDOMINAL PAIN: Primary | ICD-10-CM

## 2020-04-24 DIAGNOSIS — R14.0 BLOATING: ICD-10-CM

## 2020-04-24 DIAGNOSIS — R10.33 PERIUMBILICAL ABDOMINAL PAIN: ICD-10-CM

## 2020-04-24 DIAGNOSIS — R11.0 NAUSEA: ICD-10-CM

## 2020-04-24 PROCEDURE — 76700 US ABDOMEN COMPLETE: ICD-10-PCS | Mod: 26,,, | Performed by: RADIOLOGY

## 2020-04-24 PROCEDURE — 76700 US EXAM ABDOM COMPLETE: CPT | Mod: TC

## 2020-04-24 PROCEDURE — 76700 US EXAM ABDOM COMPLETE: CPT | Mod: 26,,, | Performed by: RADIOLOGY

## 2020-04-24 NOTE — TELEPHONE ENCOUNTER
----- Message from Marco Antonio Vaughan MD sent at 4/24/2020  3:48 PM CDT -----  Please contact patient. His ultrasound was normal. It did show some atherosclerotic plaque. This is why he is on Pravastatin. He had a benign kidney cyst. If he is still having problems, I would be happy to refer him to a Gastroenterologist.

## 2020-04-24 NOTE — TELEPHONE ENCOUNTER
Spoke with pt, discussed your message.  Pt is still nauseated but the pain has stopped. Has a lot of gas and uncomfortable, bad feeling in lower part of bad, whole lower abdomen, right and left side.     He would like a gi consult please.     Is there anything he can have for nausea?  Confirmed pharmacy, [facility]

## 2020-04-27 ENCOUNTER — PATIENT MESSAGE (OUTPATIENT)
Dept: GASTROENTEROLOGY | Facility: CLINIC | Age: 80
End: 2020-04-27

## 2020-04-27 ENCOUNTER — TELEPHONE (OUTPATIENT)
Dept: GASTROENTEROLOGY | Facility: CLINIC | Age: 80
End: 2020-04-27

## 2020-04-27 ENCOUNTER — OFFICE VISIT (OUTPATIENT)
Dept: GASTROENTEROLOGY | Facility: CLINIC | Age: 80
End: 2020-04-27
Payer: MEDICARE

## 2020-04-27 VITALS — BODY MASS INDEX: 24.38 KG/M2 | HEIGHT: 72 IN | WEIGHT: 180 LBS

## 2020-04-27 DIAGNOSIS — R14.0 BLOATING: ICD-10-CM

## 2020-04-27 DIAGNOSIS — R10.33 PERIUMBILICAL ABDOMINAL PAIN: ICD-10-CM

## 2020-04-27 DIAGNOSIS — R14.1 GAS PAIN: ICD-10-CM

## 2020-04-27 DIAGNOSIS — R11.0 NAUSEA: ICD-10-CM

## 2020-04-27 PROCEDURE — 99442 PR PHYSICIAN TELEPHONE EVALUATION 11-20 MIN: ICD-10-PCS | Mod: 95,,, | Performed by: INTERNAL MEDICINE

## 2020-04-27 PROCEDURE — 99442 PR PHYSICIAN TELEPHONE EVALUATION 11-20 MIN: CPT | Mod: 95,,, | Performed by: INTERNAL MEDICINE

## 2020-04-27 NOTE — TELEPHONE ENCOUNTER
----- Message from Fide Piedra sent at 4/27/2020  9:41 AM CDT -----  lindsay Vaughn returned your call. Call back [phone]

## 2020-04-27 NOTE — LETTER
April 27, 2020      Marco Antonio Vaughan MD  [address]  [place] [address]           [facility] - Gastroenter[facility]  [address]  [place] [address]  Phone: [phone]  Fax: [phone]          Patient: Fred Schwab Jr.   MR Number: [id number]   YOB: 1940   Date of Visit: 4/27/2020       Dear Dr. Marco Antonio Vaughan:    Thank you for referring Fred Schwab to me for evaluation. Attached you will find relevant portions of my assessment and plan of care.    If you have questions, please do not hesitate to call me. I look forward to following Fred Schwab along with you.    Sincerely,    Gunnar Avalos MD    Enclosure  CC:  No Recipients    If you would like to receive this communication electronically, please contact [email or web address] or [phone] to request more information on [employer] access.    For providers and/or their staff who would like to refer a patient to [facility], please contact us through our one-stop-shop provider referral line, [facility], at [phone].    If you feel you have received this communication in error or would no longer like to receive these types of communications, please e-mail [email or web address]

## 2020-04-27 NOTE — PROGRESS NOTES
Established Patient - Audio Only Telehealth Visit     The patient location is: home  The chief complaint leading to consultation is: abdominal pain  Visit type: Virtual visit with audio only (telephone) 20 minutes     The reason for the audio only service rather than synchronous audio and video virtual visit was related to technical difficulties or patient preference/necessity.     Each patient to whom I provide medical services by telemedicine is:  (1) informed of the relationship between the physician and patient and the respective role of any other health care provider with respect to management of the patient; and (2) notified that they may decline to receive medical services by telemedicine and may withdraw from such care at any time. Patient verbally consented to receive this service via voice-only telephone call.       Reason for visit:  Stomach Problems    HPI:  Mr.Schwab is a 78 y/o male who had recently undergone screening colonoscopy and was unremarkable has noted abdominal pain below the umbilicus starting in February.    Per patient description:   End of February: 24 hours of upset and nausea from 9PM to 9PM. Next day, stomach was okay. About April 10: Full day of same symptoms with pain. By late night, stomach was okay.     April 19 - 21: Mostly continuous upset, nausea, bloating and gas with some pain in lower abdomen. Consulted with Dr. Vaughan via phone.   April 21 - 24: Same symptoms continued, except not with pain. (6 full days)     April 25: Upon waking, stomach was okay. Remained good all day. (7th day)     April 26: Upon waking, stomach upset was back. Upset and slight nausea with bloated feeling continued all day. (8th day)     He details the review abdominal discomfort in his lower abdomen has a mild dull ache.  There is no referral to the epigastrium or the upper abdomen.  There is no associated weight loss, change in appetite, vomiting or change in bowel pattern.  He denies any fevers or  chills.  No blood in the stool.  No recent changes in medication.  Denies any NSAID use.  No urinary symptoms.  Recent labs and ultrasound imaging reviewed.  Patient was concerned about pancreatic cancer given his sister's diagnosis around his age.     Assessment and plan:  Will monitor her symptoms for another week since his symptoms are improving.  If not resolved or with worsening symptoms will consider CT imaging of the abdomen and pelvis with p.o. and IV contrast.  Given concern for iodine allergy/adverse events will have to prep with corticosteroid and antihistamine.                        This service was not originating from a related E/M service provided within the previous 7 days nor will  to an E/M service or procedure within the next 24 hours or my soonest available appointment.  Prevailing standard of care was able to be met in this audio-only visit.

## 2020-05-01 ENCOUNTER — PATIENT MESSAGE (OUTPATIENT)
Dept: GASTROENTEROLOGY | Facility: CLINIC | Age: 80
End: 2020-05-01

## 2020-05-01 DIAGNOSIS — R19.8 CHANGE IN BOWEL MOVEMENT: Primary | ICD-10-CM

## 2020-05-01 NOTE — TELEPHONE ENCOUNTER
Patient having loose bm with excessive foul smelling flatus. Will schedule stool test to r/o infectious etiology.

## 2020-05-04 ENCOUNTER — TELEPHONE (OUTPATIENT)
Dept: GASTROENTEROLOGY | Facility: CLINIC | Age: 80
End: 2020-05-04

## 2020-05-04 ENCOUNTER — LAB VISIT (OUTPATIENT)
Dept: LAB | Facility: HOSPITAL | Age: 80
End: 2020-05-04
Attending: INTERNAL MEDICINE
Payer: MEDICARE

## 2020-05-04 DIAGNOSIS — R19.8 CHANGE IN BOWEL MOVEMENT: ICD-10-CM

## 2020-05-04 LAB — WBC #/AREA STL HPF: NORMAL /[HPF]

## 2020-05-04 PROCEDURE — 87209 SMEAR COMPLEX STAIN: CPT

## 2020-05-04 PROCEDURE — 87427 SHIGA-LIKE TOXIN AG IA: CPT | Mod: 59

## 2020-05-04 PROCEDURE — 87045 FECES CULTURE AEROBIC BACT: CPT

## 2020-05-04 PROCEDURE — 89055 LEUKOCYTE ASSESSMENT FECAL: CPT

## 2020-05-04 PROCEDURE — 87046 STOOL CULTR AEROBIC BACT EA: CPT | Mod: 59

## 2020-05-04 PROCEDURE — 87329 GIARDIA AG IA: CPT

## 2020-05-04 NOTE — TELEPHONE ENCOUNTER
Ma spoke with pt   Pt states hes not having diarrhea pt was informed to take out c diff order and turn in other stool studies  Another kit placed at the  for pt to have if he gets diarrhea   Pt verbalized understanding

## 2020-05-04 NOTE — TELEPHONE ENCOUNTER
----- Message from Riana Diallo sent at 5/4/2020 12:25 PM CDT -----  Contact: pt# [phone]  Pt states that he has questions regarding kit that he picked up today. Please call

## 2020-05-05 ENCOUNTER — PATIENT MESSAGE (OUTPATIENT)
Dept: GASTROENTEROLOGY | Facility: CLINIC | Age: 80
End: 2020-05-05

## 2020-05-05 LAB
CRYPTOSP AG STL QL IA: NEGATIVE
E COLI SXT1 STL QL IA: NEGATIVE
E COLI SXT2 STL QL IA: NEGATIVE
G LAMBLIA AG STL QL IA: NEGATIVE

## 2020-05-07 ENCOUNTER — PATIENT MESSAGE (OUTPATIENT)
Dept: GASTROENTEROLOGY | Facility: CLINIC | Age: 80
End: 2020-05-07

## 2020-05-07 ENCOUNTER — PATIENT MESSAGE (OUTPATIENT)
Dept: INTERNAL MEDICINE | Facility: CLINIC | Age: 80
End: 2020-05-07

## 2020-05-07 LAB — BACTERIA STL CULT: NORMAL

## 2020-05-08 LAB — O+P STL MICRO: NORMAL

## 2020-05-13 ENCOUNTER — PATIENT MESSAGE (OUTPATIENT)
Dept: INTERNAL MEDICINE | Facility: CLINIC | Age: 80
End: 2020-05-13

## 2020-05-13 ENCOUNTER — PATIENT MESSAGE (OUTPATIENT)
Dept: GASTROENTEROLOGY | Facility: CLINIC | Age: 80
End: 2020-05-13

## 2020-05-18 ENCOUNTER — PATIENT MESSAGE (OUTPATIENT)
Dept: INTERNAL MEDICINE | Facility: CLINIC | Age: 80
End: 2020-05-18

## 2020-05-18 ENCOUNTER — PATIENT MESSAGE (OUTPATIENT)
Dept: GASTROENTEROLOGY | Facility: CLINIC | Age: 80
End: 2020-05-18

## 2020-05-18 DIAGNOSIS — R10.10 UPPER ABDOMINAL PAIN: Primary | ICD-10-CM

## 2020-05-19 ENCOUNTER — TELEPHONE (OUTPATIENT)
Dept: ENDOSCOPY | Facility: HOSPITAL | Age: 80
End: 2020-05-19

## 2020-05-19 ENCOUNTER — PATIENT MESSAGE (OUTPATIENT)
Dept: INTERNAL MEDICINE | Facility: CLINIC | Age: 80
End: 2020-05-19

## 2020-05-19 DIAGNOSIS — R10.10 UPPER ABDOMINAL PAIN: Primary | ICD-10-CM

## 2020-05-27 ENCOUNTER — PATIENT MESSAGE (OUTPATIENT)
Dept: INTERNAL MEDICINE | Facility: CLINIC | Age: 80
End: 2020-05-27

## 2020-05-27 ENCOUNTER — PATIENT MESSAGE (OUTPATIENT)
Dept: ENDOSCOPY | Facility: HOSPITAL | Age: 80
End: 2020-05-27

## 2020-05-28 ENCOUNTER — LAB VISIT (OUTPATIENT)
Dept: INTERNAL MEDICINE | Facility: CLINIC | Age: 80
End: 2020-05-28
Payer: MEDICARE

## 2020-05-28 ENCOUNTER — TELEPHONE (OUTPATIENT)
Dept: GASTROENTEROLOGY | Facility: CLINIC | Age: 80
End: 2020-05-28

## 2020-05-28 DIAGNOSIS — R10.10 UPPER ABDOMINAL PAIN: ICD-10-CM

## 2020-05-28 LAB — SARS-COV-2 RNA RESP QL NAA+PROBE: NOT DETECTED

## 2020-05-28 PROCEDURE — U0003 INFECTIOUS AGENT DETECTION BY NUCLEIC ACID (DNA OR RNA); SEVERE ACUTE RESPIRATORY SYNDROME CORONAVIRUS 2 (SARS-COV-2) (CORONAVIRUS DISEASE [COVID-19]), AMPLIFIED PROBE TECHNIQUE, MAKING USE OF HIGH THROUGHPUT TECHNOLOGIES AS DESCRIBED BY CMS-2020-01-R: HCPCS

## 2020-05-28 NOTE — TELEPHONE ENCOUNTER
----- Message from Porter Crawford sent at 5/28/2020 12:37 PM CDT -----  Contact: SELF  Pt ask do he need someone w/ him on tomorrow due to having a procedure done ask for a call      Contact info  [phone]

## 2020-05-29 ENCOUNTER — ANESTHESIA EVENT (OUTPATIENT)
Dept: ENDOSCOPY | Facility: HOSPITAL | Age: 80
End: 2020-05-29
Payer: MEDICARE

## 2020-05-29 ENCOUNTER — ANESTHESIA (OUTPATIENT)
Dept: ENDOSCOPY | Facility: HOSPITAL | Age: 80
End: 2020-05-29
Payer: MEDICARE

## 2020-05-29 PROBLEM — R10.10 UPPER ABDOMINAL PAIN: Status: ACTIVE | Noted: 2020-05-29

## 2020-05-29 PROCEDURE — E9220 PRA ENDO ANESTHESIA: ICD-10-PCS | Mod: ,,, | Performed by: NURSE ANESTHETIST, CERTIFIED REGISTERED

## 2020-05-29 PROCEDURE — 25000003 PHARM REV CODE 250: Performed by: NURSE ANESTHETIST, CERTIFIED REGISTERED

## 2020-05-29 PROCEDURE — E9220 PRA ENDO ANESTHESIA: HCPCS | Mod: ,,, | Performed by: NURSE ANESTHETIST, CERTIFIED REGISTERED

## 2020-05-29 PROCEDURE — 63600175 PHARM REV CODE 636 W HCPCS: Performed by: NURSE ANESTHETIST, CERTIFIED REGISTERED

## 2020-05-29 RX ORDER — LIDOCAINE HYDROCHLORIDE 20 MG/ML
INJECTION INTRAVENOUS
Status: DISCONTINUED | OUTPATIENT
Start: 2020-05-29 | End: 2020-05-29

## 2020-05-29 RX ORDER — GLYCOPYRROLATE 0.2 MG/ML
INJECTION INTRAMUSCULAR; INTRAVENOUS
Status: DISCONTINUED | OUTPATIENT
Start: 2020-05-29 | End: 2020-05-29

## 2020-05-29 RX ORDER — PROPOFOL 10 MG/ML
VIAL (ML) INTRAVENOUS
Status: DISCONTINUED | OUTPATIENT
Start: 2020-05-29 | End: 2020-05-29

## 2020-05-29 RX ORDER — PROPOFOL 10 MG/ML
VIAL (ML) INTRAVENOUS CONTINUOUS PRN
Status: DISCONTINUED | OUTPATIENT
Start: 2020-05-29 | End: 2020-05-29

## 2020-05-29 RX ADMIN — GLYCOPYRROLATE 0.2 MG: 0.2 INJECTION, SOLUTION INTRAMUSCULAR; INTRAVENOUS at 09:05

## 2020-05-29 RX ADMIN — PROPOFOL 200 MCG/KG/MIN: 10 INJECTION, EMULSION INTRAVENOUS at 09:05

## 2020-05-29 RX ADMIN — LIDOCAINE HYDROCHLORIDE 100 MG: 20 INJECTION, SOLUTION INTRAVENOUS at 09:05

## 2020-05-29 RX ADMIN — PROPOFOL 50 MG: 10 INJECTION, EMULSION INTRAVENOUS at 09:05

## 2020-05-29 NOTE — ANESTHESIA POSTPROCEDURE EVALUATION
Anesthesia Post Evaluation    Patient: Fred Schwab Jr.    Procedure(s) Performed: Procedure(s) (LRB):  EGD (ESOPHAGOGASTRODUODENOSCOPY) (N/A)    Final Anesthesia Type: general    Patient location during evaluation: PACU  Patient participation: Yes- Able to Participate  Level of consciousness: awake and alert and oriented  Post-procedure vital signs: reviewed and stable  Pain management: adequate  Airway patency: patent    PONV status at discharge: No PONV  Anesthetic complications: no      Cardiovascular status: blood pressure returned to baseline, hemodynamically stable and stable  Respiratory status: unassisted, room air and spontaneous ventilation  Hydration status: euvolemic  Follow-up not needed.          Vitals Value Taken Time   /72 5/29/2020 10:03 AM   Temp 36.6 °C (97.9 °F) 5/29/2020  9:43 AM   Pulse 68 5/29/2020 10:03 AM   Resp 18 5/29/2020 10:03 AM   SpO2 95 % 5/29/2020 10:03 AM         Event Time     Out of Recovery 10:16:49          Pain/Paulino Score: Paulino Score: 9 (5/29/2020 10:03 AM)

## 2020-05-29 NOTE — ANESTHESIA PREPROCEDURE EVALUATION
05/29/2020  Fred Schwab Jr. is a 79 y.o., male.  Past Medical History:   Diagnosis Date    Actinic keratosis     Allergy     Arthritis     Basal cell carcinoma     left nasofacial sulcus    BPH (benign prostatic hyperplasia)     Cataract     Colon polyp     benign    Dermatochalasis     Elevated PSA     Headache(784.0)     HEARING LOSS     Hypertension     Low tension glaucoma     Multiple thyroid nodules 11-27-17    Need repeat thyroid ultrasound in November 2018.    Neuropathy     Otitis media     Squamous cell carcinoma 12/10/16    left upper neck- in situ    Urinary tract infection      Past Surgical History:   Procedure Laterality Date    BELPHAROPTOSIS REPAIR      COLON SURGERY      COLONOSCOPY      COLONOSCOPY N/A 10/26/2015    Procedure: COLONOSCOPY;  Surgeon: Diony Kennedy MD;  Location: [facility] ([address] [place] [address]);  Service: Endoscopy;  Laterality: N/A;    COLONOSCOPY N/A 2/10/2020    Procedure: COLONOSCOPY;  Surgeon: Gunnar Avalos MD;  Location: [address]);  Service: Endoscopy;  Laterality: N/A;    MOHS Surgery      PROSTATE BIOPSY             Anesthesia Evaluation    I have reviewed the Patient Summary Reports.    I have reviewed the Nursing Notes. I have reviewed the NPO Status.   I have reviewed the Medications.     Review of Systems  Anesthesia Hx:  No problems with previous Anesthesia  History of prior surgery of interest to airway management or planning:   Social:  Non-Smoker    Hematology/Oncology:         -- Cancer in past history:   Cardiovascular:   Exercise tolerance: good Hypertension CAD   12/20/18: EF 60%   Renal/:   BPH    Musculoskeletal:   Arthritis     Neurological:   Headaches    Endocrine:  Endocrine Normal    Dermatological:  Skin Normal    Psych:  Psychiatric Normal           Physical Exam  General:  Well nourished     Airway/Jaw/Neck:  Airway Findings: Mouth Opening: Normal Tongue: Normal  Mallampati: II  TM Distance: Normal, at least 6 cm  Jaw/Neck Findings:     Neck ROM: Normal ROM      Dental:  Dental Findings: In tact   Chest/Lungs:  Chest/Lungs Findings: Clear to auscultation, Normal Respiratory Rate     Heart/Vascular:  Heart Findings: Rate: Normal  Rhythm: Regular Rhythm  Sounds: Normal     Abdomen:  Abdomen Findings:  Normal       Mental Status:  Mental Status Findings:  Cooperative, Alert and Oriented         Anesthesia Plan  Type of Anesthesia, risks & benefits discussed:  Anesthesia Type:  general  Patient's Preference:   Intra-op Monitoring Plan: standard ASA monitors  Intra-op Monitoring Plan Comments:   Post Op Pain Control Plan:   Post Op Pain Control Plan Comments:   Induction:   IV  Beta Blocker:  Patient is on a Beta-Blocker and has received one dose within the past 24 hours (No further documentation required).       Informed Consent: Patient understands risks and agrees with Anesthesia plan.  Questions answered. Anesthesia consent signed with patient.  ASA Score: 2     Day of Surgery Review of History & Physical: I have interviewed and examined the patient. I have reviewed the patient's H&P dated:  There are no significant changes.          Ready For Surgery From Anesthesia Perspective.

## 2020-05-29 NOTE — TRANSFER OF CARE
Anesthesia Transfer of Care Note    Patient: Fred Schwab Jr.    Procedure(s) Performed: Procedure(s) (LRB):  EGD (ESOPHAGOGASTRODUODENOSCOPY) (N/A)    Patient location: GI    Anesthesia Type: general    Transport from OR: Transported from OR on room air with adequate spontaneous ventilation    Post pain: adequate analgesia    Post assessment: no apparent anesthetic complications and tolerated procedure well    Post vital signs: stable    Level of consciousness: responds to stimulation    Nausea/Vomiting: no nausea/vomiting    Complications: none    Transfer of care protocol was followed      Last vitals:   Visit Vitals  BP (!) 154/85 (BP Location: Left arm, Patient Position: Lying)   Pulse (!) 54   Temp 36.7 °C (98.1 °F) (Temporal)   Resp 17   Ht 6' (1.829 m)   Wt 81.6 kg (180 lb)   SpO2 97%   BMI 24.41 kg/m²

## 2020-06-02 ENCOUNTER — TELEPHONE (OUTPATIENT)
Dept: GASTROENTEROLOGY | Facility: CLINIC | Age: 80
End: 2020-06-02

## 2020-06-02 NOTE — TELEPHONE ENCOUNTER
Ma called pt to release test results   No answer message left on pt voicemail   Requesting a call back    Results sent to pt portal also

## 2020-06-02 NOTE — TELEPHONE ENCOUNTER
----- Message from Darinel Voss sent at 6/2/2020  2:47 PM CDT -----  Contact: pt: [phone]  Pt is returning a call to the clinic       Please contact pt: [phone]

## 2020-06-02 NOTE — TELEPHONE ENCOUNTER
Spoke with patient , results given as written by Dr. Avalos  Pt verbalizes understadning and appreciates the call.  Thanks MA

## 2020-06-02 NOTE — TELEPHONE ENCOUNTER
----- Message from Gunnar Avalos MD sent at 6/2/2020  2:10 PM CDT -----  Please notify patient, the stomach biopsies did not reveal any H.pylori.

## 2020-06-05 ENCOUNTER — PATIENT OUTREACH (OUTPATIENT)
Dept: ADMINISTRATIVE | Facility: OTHER | Age: 80
End: 2020-06-05

## 2020-06-05 NOTE — PROGRESS NOTES
LINKS immunization registry triggered  Care Everywhere updated  Health Maintenance updated  Chart reviewed for overdue Proactive [facility] Encounters health maintenance testing   Please follow all post op instructions and follow up appointment time from your physician's office included in your discharge packet.  .   Keep the affected extremity elevated above  level of the heart.  Use your ice pack as instructed, do not use continuously.  Use your crutches and or sling as directed  Follow your physicians instructions as previously directed.  Rest today  No pushing,pulling,tugging,heavy lifting, or strenuous activity   No major decision making,driving,or drinking alcoholic beverages for 24 hours due to the sedation you received  Always use good hand hygiene/washing technique  No driving on pain medication.    To assist you in voiding:  Drink plenty of fluids  Listen to running water while attempting to void.    If you are unable to urinate and you have an uncomfortable urge to void or it has been   6 hours since you were discharged, return to the Emergency Room.

## 2020-06-08 ENCOUNTER — TELEPHONE (OUTPATIENT)
Dept: GASTROENTEROLOGY | Facility: CLINIC | Age: 80
End: 2020-06-08

## 2020-06-08 NOTE — TELEPHONE ENCOUNTER
MA informed pt per Dr. Avalos message below    Pt verbalize understanding and thank MA   Pt is already scheduled to be seen on 6/9/20 by Dr. Avalos     ----- Message from Gunnar Avalos MD sent at 6/8/2020  8:57 AM CDT -----  Please notify patient, the biopsies reveal Lactase deficiency (diagnosis of lactose intolerance). Take Lactaid chewable tablets (follow directions on the bottle) with meals to help digest dairy products.    Follow up in 2-3 weeks (virtual visit).

## 2020-06-09 ENCOUNTER — OFFICE VISIT (OUTPATIENT)
Dept: GASTROENTEROLOGY | Facility: CLINIC | Age: 80
End: 2020-06-09
Payer: MEDICARE

## 2020-06-09 VITALS
SYSTOLIC BLOOD PRESSURE: 124 MMHG | DIASTOLIC BLOOD PRESSURE: 77 MMHG | BODY MASS INDEX: 24.91 KG/M2 | WEIGHT: 183.88 LBS | HEART RATE: 56 BPM | HEIGHT: 72 IN

## 2020-06-09 DIAGNOSIS — R10.33 PERIUMBILICAL ABDOMINAL PAIN: Primary | ICD-10-CM

## 2020-06-09 PROCEDURE — 99999 PR PBB SHADOW E&M-EST. PATIENT-LVL IV: CPT | Mod: PBBFAC,,, | Performed by: INTERNAL MEDICINE

## 2020-06-09 PROCEDURE — 99214 OFFICE O/P EST MOD 30 MIN: CPT | Mod: S$GLB,,, | Performed by: INTERNAL MEDICINE

## 2020-06-09 PROCEDURE — 1159F MED LIST DOCD IN RCRD: CPT | Mod: S$GLB,,, | Performed by: INTERNAL MEDICINE

## 2020-06-09 PROCEDURE — 3078F DIAST BP <80 MM HG: CPT | Mod: CPTII,S$GLB,, | Performed by: INTERNAL MEDICINE

## 2020-06-09 PROCEDURE — 1125F PR PAIN SEVERITY QUANTIFIED, PAIN PRESENT: ICD-10-PCS | Mod: S$GLB,,, | Performed by: INTERNAL MEDICINE

## 2020-06-09 PROCEDURE — 3074F SYST BP LT 130 MM HG: CPT | Mod: CPTII,S$GLB,, | Performed by: INTERNAL MEDICINE

## 2020-06-09 PROCEDURE — 1101F PT FALLS ASSESS-DOCD LE1/YR: CPT | Mod: CPTII,S$GLB,, | Performed by: INTERNAL MEDICINE

## 2020-06-09 PROCEDURE — 99999 PR PBB SHADOW E&M-EST. PATIENT-LVL IV: ICD-10-PCS | Mod: PBBFAC,,, | Performed by: INTERNAL MEDICINE

## 2020-06-09 PROCEDURE — 1159F PR MEDICATION LIST DOCUMENTED IN MEDICAL RECORD: ICD-10-PCS | Mod: S$GLB,,, | Performed by: INTERNAL MEDICINE

## 2020-06-09 PROCEDURE — 3074F PR MOST RECENT SYSTOLIC BLOOD PRESSURE < 130 MM HG: ICD-10-PCS | Mod: CPTII,S$GLB,, | Performed by: INTERNAL MEDICINE

## 2020-06-09 PROCEDURE — 3078F PR MOST RECENT DIASTOLIC BLOOD PRESSURE < 80 MM HG: ICD-10-PCS | Mod: CPTII,S$GLB,, | Performed by: INTERNAL MEDICINE

## 2020-06-09 PROCEDURE — 1101F PR PT FALLS ASSESS DOC 0-1 FALLS W/OUT INJ PAST YR: ICD-10-PCS | Mod: CPTII,S$GLB,, | Performed by: INTERNAL MEDICINE

## 2020-06-09 PROCEDURE — 1125F AMNT PAIN NOTED PAIN PRSNT: CPT | Mod: S$GLB,,, | Performed by: INTERNAL MEDICINE

## 2020-06-09 PROCEDURE — 99214 PR OFFICE/OUTPT VISIT, EST, LEVL IV, 30-39 MIN: ICD-10-PCS | Mod: S$GLB,,, | Performed by: INTERNAL MEDICINE

## 2020-06-09 NOTE — PROGRESS NOTES
Reason for visit:  Abdominal discomfort    HPI:  Mr.Schwab is a 80 y/o male who had recently undergone screening colonoscopy and was unremarkable has noted abdominal pain below the umbilicus starting in February.     Per patient description:   End of February: 24 hours of upset and nausea from 9PM to 9PM. Next day, stomach was okay. About April 10: Full day of same symptoms with pain. By late night, stomach was okay.     April 19 - 21: Mostly continuous upset, nausea, bloating and gas with some pain in lower abdomen. Consulted with Dr. Vaughan via phone.   April 21 - 24: Same symptoms continued, except not with pain. (6 full days)     April 25: Upon waking, stomach was okay. Remained good all day. (7th day)     April 26: Upon waking, stomach upset was back. Upset and slight nausea with bloated feeling continued all day. (8th day)      He details the review abdominal discomfort in his lower abdomen has a mild dull ache.  There is no referral to the epigastrium or the upper abdomen.  There is no associated weight loss, change in appetite, vomiting or change in bowel pattern.  He denies any fevers or chills.  No blood in the stool.  No recent changes in medication.  Denies any NSAID use.  No urinary symptoms.  Recent labs and ultrasound imaging reviewed.  Patient was concerned about pancreatic cancer given his sister's diagnosis around his age.    Recent endoscopy with biopsies were discussed.  Report suggests lactase deficiency. Discussed lactaid chewable tablets with meals.  We also discussed proceeding with CT imaging of the abdomen and pelvis with p.o. and IV contrast with steroid/antihistamine prep.  Patient would like to hold off on that now.  He will think about it if his symptoms do not improve on Lactaid and will message me if he would like to pursue imaging studies.    Past medical, surgical, social family history reviewed in epic    Medication allergies reviewed in epic    Review of systems:  Constitutional:   No fever, no chills, no weight loss, appetite is normal  Eyes:  No visual changes or red eyes  ENT:  No odynophagia or hoarseness of voice  Cardiovascular:  No angina or palpitation  Respiratory:  No shortness breath or wheezing  Genitourinary:  No dysuria or frequency  Musculoskeletal:  No myalgias or arthralgias  Skin:  No pruritus eczema  Neurologic:  No headaches or seizures  Psychiatric:  No anxiety or depression    Physical exam:  Vitals see epic, awake, alert, oriented x3 in no acute distress  Neck:  Supple, no carotid bruit, no cervical adenopathy  Abdomen:  Flat, soft, nontender, nondistended, no mass palpable, no hepatosplenomegaly detectable, bowel sounds are normal, no abdominal bruits heard  Eyes:  No conjunctival injection or icterus  Cardiovascular:  S1, S2 normal, no murmurs or gallops  Respiratory:  Bilateral air entry equal with no rhonchi crackles  Skin:  No palmar erythema or spider angiomata  Neurologic:  No asterixis or tremors  Psychiatric:  Affect appropriate  Lower extremities:  No pedal edema    Recent endoscopy with biopsies reveal lactase deficiency.  No evidence of celiac sprue or H pylori.    Impression:  Periumbilical abdominal pain-most likely secondary to lactose intolerance.     Recommendation:  Trial of Lactaid with meals.  If no improvement will consider CT imaging with steroid/antihistamine prep.

## 2020-06-11 ENCOUNTER — NURSE TRIAGE (OUTPATIENT)
Dept: ADMINISTRATIVE | Facility: CLINIC | Age: 80
End: 2020-06-11

## 2020-06-11 NOTE — TELEPHONE ENCOUNTER
Pt contacted through Post Procedural Symptom Tracking. Denies any cough, fever, or difficulty breathing since procedure.  Pt instructed to call [facility] or contact provider with any changes of condition or concerns. dy 13      Reason for Disposition   Information only question and nurse able to answer    Additional Information   Negative: Nursing judgment   Negative: Nursing judgment   Negative: Nursing judgment   Negative: Nursing judgment    Protocols used: NO PROTOCOL AVAILABLE - INFORMATION ONLY-A-[place]

## 2020-06-12 ENCOUNTER — NURSE TRIAGE (OUTPATIENT)
Dept: ADMINISTRATIVE | Facility: CLINIC | Age: 80
End: 2020-06-12

## 2020-06-12 NOTE — TELEPHONE ENCOUNTER
Patient on [facility]'s post procedure call back system tracker   Patient contacted yesterday and denied Covid symptoms   No contact required

## 2020-06-12 NOTE — TELEPHONE ENCOUNTER
Reason for Disposition   Caller has already spoken with another triager and has no further questions.    Additional Information   Negative: Caller is angry or rude (e.g., hangs up, verbally abusive, yelling)   Negative: Caller hangs up    Protocols used: NO CONTACT OR DUPLICATE CONTACT CALL-A-AH

## 2020-06-18 ENCOUNTER — PATIENT MESSAGE (OUTPATIENT)
Dept: INTERNAL MEDICINE | Facility: CLINIC | Age: 80
End: 2020-06-18

## 2020-07-10 ENCOUNTER — PATIENT MESSAGE (OUTPATIENT)
Dept: INTERNAL MEDICINE | Facility: CLINIC | Age: 80
End: 2020-07-10

## 2020-07-11 ENCOUNTER — PATIENT MESSAGE (OUTPATIENT)
Dept: INTERNAL MEDICINE | Facility: CLINIC | Age: 80
End: 2020-07-11

## 2020-08-01 DIAGNOSIS — R11.0 NAUSEA: Primary | ICD-10-CM

## 2020-08-01 DIAGNOSIS — R14.0 BLOATING: ICD-10-CM

## 2020-08-01 DIAGNOSIS — R14.1 GAS PAIN: ICD-10-CM

## 2020-08-11 ENCOUNTER — HOSPITAL ENCOUNTER (EMERGENCY)
Facility: HOSPITAL | Age: 80
Discharge: HOME | End: 2020-08-11
Attending: EMERGENCY MEDICINE
Payer: MEDICARE

## 2020-08-11 VITALS
TEMPERATURE: 97 F | OXYGEN SATURATION: 95 % | HEART RATE: 62 BPM | SYSTOLIC BLOOD PRESSURE: 122 MMHG | DIASTOLIC BLOOD PRESSURE: 82 MMHG | RESPIRATION RATE: 15 BRPM

## 2020-08-11 DIAGNOSIS — R42 DIZZINESS: ICD-10-CM

## 2020-08-11 DIAGNOSIS — I95.1 ORTHOSTATIC HYPOTENSION: Primary | ICD-10-CM

## 2020-08-11 LAB
ALBUMIN SERPL BCP-MCNC: 3.3 G/DL (ref 3.5–5.2)
ALP SERPL-CCNC: 64 U/L (ref 55–135)
ALT SERPL W/O P-5'-P-CCNC: 12 U/L (ref 10–44)
ANION GAP SERPL CALC-SCNC: 6 MMOL/L (ref 8–16)
AST SERPL-CCNC: 15 U/L (ref 10–40)
BASOPHILS # BLD AUTO: 0.05 K/UL (ref 0–0.2)
BASOPHILS NFR BLD: 0.8 % (ref 0–1.9)
BILIRUB SERPL-MCNC: 0.7 MG/DL (ref 0.1–1)
BNP SERPL-MCNC: 49 PG/ML (ref 0–99)
BUN SERPL-MCNC: 14 MG/DL (ref 8–23)
CALCIUM SERPL-MCNC: 8.6 MG/DL (ref 8.7–10.5)
CHLORIDE SERPL-SCNC: 106 MMOL/L (ref 95–110)
CO2 SERPL-SCNC: 26 MMOL/L (ref 23–29)
CREAT SERPL-MCNC: 1.1 MG/DL (ref 0.5–1.4)
DIFFERENTIAL METHOD: ABNORMAL
EOSINOPHIL # BLD AUTO: 0.4 K/UL (ref 0–0.5)
EOSINOPHIL NFR BLD: 7 % (ref 0–8)
ERYTHROCYTE [DISTWIDTH] IN BLOOD BY AUTOMATED COUNT: 13.3 % (ref 11.5–14.5)
EST. GFR  (AFRICAN AMERICAN): >60 ML/MIN/1.73 M^2
EST. GFR  (NON AFRICAN AMERICAN): >60 ML/MIN/1.73 M^2
GLUCOSE SERPL-MCNC: 200 MG/DL (ref 70–110)
HCT VFR BLD AUTO: 42.2 % (ref 40–54)
HGB BLD-MCNC: 13.3 G/DL (ref 14–18)
IMM GRANULOCYTES # BLD AUTO: 0.03 K/UL (ref 0–0.04)
IMM GRANULOCYTES NFR BLD AUTO: 0.5 % (ref 0–0.5)
LYMPHOCYTES # BLD AUTO: 1.8 K/UL (ref 1–4.8)
LYMPHOCYTES NFR BLD: 30.4 % (ref 18–48)
MCH RBC QN AUTO: 29.9 PG (ref 27–31)
MCHC RBC AUTO-ENTMCNC: 31.5 G/DL (ref 32–36)
MCV RBC AUTO: 95 FL (ref 82–98)
MONOCYTES # BLD AUTO: 0.3 K/UL (ref 0.3–1)
MONOCYTES NFR BLD: 5.7 % (ref 4–15)
NEUTROPHILS # BLD AUTO: 3.3 K/UL (ref 1.8–7.7)
NEUTROPHILS NFR BLD: 55.6 % (ref 38–73)
NRBC BLD-RTO: 0 /100 WBC
PLATELET # BLD AUTO: 158 K/UL (ref 150–350)
PMV BLD AUTO: 10.8 FL (ref 9.2–12.9)
POTASSIUM SERPL-SCNC: 3.8 MMOL/L (ref 3.5–5.1)
PROT SERPL-MCNC: 6.5 G/DL (ref 6–8.4)
RBC # BLD AUTO: 4.45 M/UL (ref 4.6–6.2)
SODIUM SERPL-SCNC: 138 MMOL/L (ref 136–145)
TROPONIN I SERPL DL<=0.01 NG/ML-MCNC: 0.02 NG/ML (ref 0–0.03)
WBC # BLD AUTO: 6.01 K/UL (ref 3.9–12.7)

## 2020-08-11 PROCEDURE — 25000003 PHARM REV CODE 250: Performed by: STUDENT IN AN ORGANIZED HEALTH CARE EDUCATION/TRAINING PROGRAM

## 2020-08-11 PROCEDURE — 93010 ELECTROCARDIOGRAM REPORT: CPT | Mod: ,,, | Performed by: INTERNAL MEDICINE

## 2020-08-11 PROCEDURE — 25000003 PHARM REV CODE 250: Performed by: EMERGENCY MEDICINE

## 2020-08-11 PROCEDURE — 99285 EMERGENCY DEPT VISIT HI MDM: CPT | Mod: ,,, | Performed by: EMERGENCY MEDICINE

## 2020-08-11 PROCEDURE — 36000 PLACE NEEDLE IN VEIN: CPT

## 2020-08-11 PROCEDURE — 80053 COMPREHEN METABOLIC PANEL: CPT

## 2020-08-11 PROCEDURE — 99285 EMERGENCY DEPT VISIT HI MDM: CPT | Mod: 25

## 2020-08-11 PROCEDURE — 85025 COMPLETE CBC W/AUTO DIFF WBC: CPT

## 2020-08-11 PROCEDURE — 93010 EKG 12-LEAD: ICD-10-PCS | Mod: ,,, | Performed by: INTERNAL MEDICINE

## 2020-08-11 PROCEDURE — 83880 ASSAY OF NATRIURETIC PEPTIDE: CPT

## 2020-08-11 PROCEDURE — 99285 PR EMERGENCY DEPT VISIT,LEVEL V: ICD-10-PCS | Mod: ,,, | Performed by: EMERGENCY MEDICINE

## 2020-08-11 PROCEDURE — 93005 ELECTROCARDIOGRAM TRACING: CPT

## 2020-08-11 PROCEDURE — 84484 ASSAY OF TROPONIN QUANT: CPT

## 2020-08-11 RX ADMIN — SODIUM CHLORIDE 500 ML: 0.9 INJECTION, SOLUTION INTRAVENOUS at 12:08

## 2020-08-11 NOTE — ED PROVIDER NOTES
Encounter Date: 8/11/2020       History     Chief Complaint   Patient presents with    Dizziness     Pt states he took a Tazanidine approx 1hr ago for back spasms.  States he has been dizzy and weak since taking medication.      HPI   Patient is a 79-year-old male past medical history of hypertension, osteoarthritis, who presents to the ED complaining of dizziness for the past 20-30 minutes.  Patient states he was suffering from back pain and took an old prescription of tinazidine, 4 mg.  He states approximately 30 minutes later he became dizzy, diaphoretic, weak, and had unsteady gait.  Patient states that secondary to his symptoms he subsequently told his wife to take him to the ED for evaluation.  Patient endorsed visual disturbances at that time however states that this has now resolved. Patient denied having chest pain, shortness of breath, headache, back pain, abdominal pain, nausea, vomiting.  Review of patient's allergies indicates:   Allergen Reactions    Clindamycin Rash    Doxycycline hyclate Rash    Ciprofloxacin Other (See Comments)     Tendon tear.    Penicillins     Iodine and iodide containing products Rash     Got red but no itching     Past Medical History:   Diagnosis Date    Actinic keratosis     Allergy     Arthritis     Basal cell carcinoma     left nasofacial sulcus    BPH (benign prostatic hyperplasia)     Cataract     Colon polyp     benign    Dermatochalasis     Elevated PSA     Headache(784.0)     HEARING LOSS     Hypertension     Low tension glaucoma     Multiple thyroid nodules 11-27-17    Need repeat thyroid ultrasound in November 2018.    Neuropathy     Otitis media     Squamous cell carcinoma 12/10/16    left upper neck- in situ    Urinary tract infection      Past Surgical History:   Procedure Laterality Date    BELPHAROPTOSIS REPAIR      COLON SURGERY      COLONOSCOPY      COLONOSCOPY N/A 10/26/2015    Procedure: COLONOSCOPY;  Surgeon: Diony Kennedy,  MD;  Location: [facility] ([address] [place] [address]);  Service: Endoscopy;  Laterality: N/A;    COLONOSCOPY N/A 2/10/2020    Procedure: COLONOSCOPY;  Surgeon: Gunnar Avalos MD;  [facility]  Service: Endoscopy;  Laterality: N/A;    ESOPHAGOGASTRODUODENOSCOPY N/A 5/29/2020    Procedure: EGD (ESOPHAGOGASTRODUODENOSCOPY);  Surgeon: Gunnar Avalos MD;  Location: [address]);  Service: Endoscopy;  Laterality: N/A;  COVID screening scheduled on 5/28/20 at Primary care-[facility]  pt updated on drop off location and no visitor policy-rb    MOHS Surgery      PROSTATE BIOPSY       Family History   Problem Relation Age of Onset    Cancer Father         prostate    Heart disease Father     Stroke Father     Hypertension Mother     Diabetes Mother     Glaucoma Mother     Diabetes Sister     Pancreatic cancer Sister 75    Skin cancer Neg Hx     Melanoma Neg Hx     Amblyopia Neg Hx     Blindness Neg Hx     Cataracts Neg Hx     Macular degeneration Neg Hx     Retinal detachment Neg Hx     Strabismus Neg Hx      Social History     Tobacco Use    Smoking status: Never Smoker    Smokeless tobacco: Never Used   Substance Use Topics    Alcohol use: Yes     Frequency: 2-4 times a month     Drinks per session: 1 or 2     Binge frequency: Never     Comment: every once in a while    Drug use: No     Review of Systems   Constitutional: Negative for fever.   HENT: Negative for sore throat.    Respiratory: Negative for shortness of breath.    Cardiovascular: Negative for chest pain.   Gastrointestinal: Negative for nausea.   Genitourinary: Negative for dysuria.   Musculoskeletal: Negative for back pain.   Skin: Negative for rash.   Neurological: Positive for dizziness and weakness.   Hematological: Does not bruise/bleed easily.       Physical Exam     Initial Vitals [08/11/20 1213]   BP Pulse Resp Temp SpO2   (!) 75/46 (!) 51 18 97.3 °F (36.3 °C) 96 %      MAP       --         Physical Exam    Constitutional:  He appears well-developed and well-nourished. He is not diaphoretic. He is active.  Non-toxic appearance. He does not appear ill. No distress.   HENT:   Head: Normocephalic and atraumatic.   Eyes: Conjunctivae are normal. Pupils are equal, round, and reactive to light. Right conjunctiva is not injected. Left conjunctiva is not injected.   Neck: Trachea normal, normal range of motion and full passive range of motion without pain. Neck supple. No neck rigidity.   Cardiovascular: Regular rhythm, S1 normal, S2 normal, intact distal pulses and normal pulses.   No appreciable murmur, bradycardic   Pulmonary/Chest: Breath sounds normal. No respiratory distress. He has no wheezes.   Abdominal: Soft. Normal appearance and bowel sounds are normal. There is no abdominal tenderness.   Musculoskeletal: Normal range of motion. No tenderness or edema.      Right shoulder: He exhibits normal range of motion and no tenderness.   Neurological: He is alert and oriented to person, place, and time.   Skin: Skin is warm and dry. Capillary refill takes less than 2 seconds.         ED Course   Procedures  Labs Reviewed   CBC W/ AUTO DIFFERENTIAL - Abnormal; Notable for the following components:       Result Value    RBC 4.45 (*)     Hemoglobin 13.3 (*)     Mean Corpuscular Hemoglobin Conc 31.5 (*)     All other components within normal limits   COMPREHENSIVE METABOLIC PANEL - Abnormal; Notable for the following components:    Glucose 200 (*)     Calcium 8.6 (*)     Albumin 3.3 (*)     Anion Gap 6 (*)     All other components within normal limits   TROPONIN I   B-TYPE NATRIURETIC PEPTIDE     EKG Readings: (Independently Interpreted)   Initial Reading: No STEMI. Heart Rate: 43. Axis: Normal.   43 beats per minute, sinus bradycardia, flattened T-waves in leads II, III, AVF,     ECG Results          EKG 12-lead (In process)  Result time 08/11/20 13:20:25    In process by Interface, Lab In [facility] (08/11/20 13:20:25)                 Narrative:     Test Reason : R42,    Vent. Rate : 043 BPM     Atrial Rate : 043 BPM     P-R Int : 202 ms          QRS Dur : 090 ms      QT Int : 494 ms       P-R-T Axes : 045 -08 -33 degrees     QTc Int : 417 ms    Marked sinus bradycardia  Septal infarct ,age undetermined  Abnormal ECG  When compared with ECG of 20-DEC-2018 10:27,  Previous ECG has undetermined rhythm, needs review  Septal infarct is now Present    Referred By: [email or web address]   SELF           Confirmed By:                             Imaging Results          CT Head Without Contrast (Final result)  Result time 08/11/20 13:47:09    Final result by Boy Perez MD (08/11/20 13:47:09)                 Impression:      1. No definite acute intracranial findings.  2. Age indeterminate, but likely chronic focus of ischemia in the right corona radiata and internal capsule.      Electronically signed by: Boy Perez  Date:    08/11/2020  Time:    13:47             Narrative:    EXAMINATION:  CT HEAD WITHOUT CONTRAST    CLINICAL HISTORY:  dizziness;    TECHNIQUE:  Low dose axial images were obtained through the head.  Coronal and sagittal reformations were also performed. Contrast was not administered.    COMPARISON:  None.    FINDINGS:  No evidence of acute intracranial hemorrhage, mass effect, or midline shift.  No findings of acute transcortical infarct by noncontrast CT.  Age-indeterminate focus of hypoattenuation in the right corona radiata and internal capsule may represent sequela of prior ischemia.  Additional ill-defined areas of white matter hypoattenuation may represent sequela of chronic small vessel ischemic disease.    No evidence of hydrocephalus.  Basal cisterns are patent.  Atherosclerotic vascular calcifications are noted.  The extracranial soft tissues are unremarkable.  No aggressive osseous lesion.  Relatively minor paranasal sinus mucosal thickening is seen.                                 Medical Decision Making:   Initial Assessment:    Patient is a 79-year-old male past medical history of hypertension, osteoarthritis, who presents to the ED complaining of dizziness for the past 20-30 minutes.  Vitals on arrival noted to be bradycardic heart rate in 40s.  Physical exam noted normal cardiopulmonary tones on auscultation.  No lower extremity edema.  Neurovascularly intact, no focal neurological deficits.  Positive orthostatics.  Differential diagnosis includes but is not limited to medication reaction, orthostatic hypotension, ACS, CVA, electrolyte derangements, BPPV.  At this time will obtain basic labs in addition to cardiac markers, EKG, CT head, administer IV fluids.  Due to patient recently taking muscle relaxant suspect symptoms likely secondary to medication.  Will evaluate other causes and continue to monitor.  Upon return of labs, imaging final disposition to follow.    Polo Castellanos M.D.  [facility]  PGY-4                Attending Attestation:   Physician Attestation Statement for Resident:  As the supervising MD   Physician Attestation Statement: I have personally seen and examined this patient.   I agree with the above history. -: 79-year-old male presents with an episode of dizziness and presyncope and diaphoresis that occurred after taking an old prescription of tizanidine.  Patient took 4 mg tizanidine from 2015.  Incident occurred half an hour after taking it.  He denied any preceding chest pain, back pain, headache.   As the supervising MD I agree with the above PE.   -: Patient's vitals are strongly orthostatic.  Patient nontoxic.   As the supervising MD I agree with the above treatment, course, plan, and disposition.   -: Hypotension secondary to tizantidine use.  We administered IV fluids with improvement in blood pressure.  I doubt any aortic dissection, aneurysm, rupture, or other pathology given lack of chest pain, shortness of breath, abdominal pain, flank pain.  Doubt stroke given that incident occurred with  concurrent hypotension.  Doubt shock.  Patient is well-appearing.  Will administer IV fluids and reassess.    Reassessment:  After IV hydration, patient's blood pressure normalized.  Patient is able to ambulate without difficulty and reports feeling better.  I feel this hypotension was likely secondary to medication induced.  Patient encouraged to stop using tizanidine.  Provided with return precautions.                    ED Course as of Aug 11 1658   Tue Aug 11, 2020   1532 Patient labs returned noted negative troponin, BNP within normal limits.  CT head negative for any acute intracranial abnormality.  Patient orthostatic positive.  He was given 1 L of IV fluids.  Patient ambulated without difficulty in the room, steady gait.  With patient labs, CT head findings, orthostatic findings patient's symptoms likely secondary to medication, dehydration.  Suspect no acute findings at this time.  Patient given instructions to hydrate appropriately at home.  Patient given strict return precautions worsen to return to ED for evaluation.  Patient encouraged to follow-up his primary care physician is 2-3 days.  Patient currently medically stable for discharge at this time and he is amendable to plan as stated above.    [KIMBERLY]      ED Course User Index  [KIMBERLY] oPlo Castellanos MD                Clinical Impression:       ICD-10-CM ICD-9-CM   1. Orthostatic hypotension  I95.1 458.0   2. Dizziness  R42 780.4             ED Disposition Condition    Discharge Stable        ED Prescriptions     None        Follow-up Information     Follow up With Specialties Details Why Contact Info    Marco Antonio Vaughan MD Internal Medicine  For follow up [address]  [place] [address]  [phone]      [facility] Emergency Medicine  If symptoms worsen [address] [facility] [id number] [phone]                                     Erwin Thomas MD  08/11/20 [id number]

## 2020-08-11 NOTE — ED NOTES
Fred Schwab Jr., a 79 y.o. male presents to the ED w/ complaint of taking tizanidine 4mg about 1 hour ago for back spasms. Pt states med is over 4 years old. Pt reports he felt dizzy, lightheaded with weakness, and started sweating afterwards. Pt reports blurred vision.  Pt denies CP, SOB, NVD, fevers, chills.    Triage note:  Chief Complaint   Patient presents with    Dizziness     Pt states he took a Tazanidine approx 1hr ago for back spasms.  States he has been dizzy and weak since taking medication.      Review of patient's allergies indicates:   Allergen Reactions    Clindamycin Rash    Doxycycline hyclate Rash    Ciprofloxacin Other (See Comments)     Tendon tear.    Penicillins     Iodine and iodide containing products Rash     Got red but no itching     Past Medical History:   Diagnosis Date    Actinic keratosis     Allergy     Arthritis     Basal cell carcinoma     left nasofacial sulcus    BPH (benign prostatic hyperplasia)     Cataract     Colon polyp     benign    Dermatochalasis     Elevated PSA     Headache(784.0)     HEARING LOSS     Hypertension     Low tension glaucoma     Multiple thyroid nodules 11-27-17    Need repeat thyroid ultrasound in November 2018.    Neuropathy     Otitis media     Squamous cell carcinoma 12/10/16    left upper neck- in situ    Urinary tract infection      Adult Physical Assessment  LOC: Fred Schwab Jr., 79 y.o. male verified via two identifiers.  The patient is awake, alert, oriented and speaking appropriately at this time. Dizziness and vision changes.  APPEARANCE: Patient resting comfortably and appears to be in no acute distress at this time. Patient is clean and well groomed, patient's clothing is properly fastened.  SKIN:The skin is warm and dry, color consistent with ethnicity, patient has normal skin turgor and moist mucus membranes, skin intact, no breakdown or brusing noted.  MUSCULOSKELETAL: Patient moving all extremities well, no obvious  swelling or deformities noted.  RESPIRATORY: Airway is open and patent, respirations are spontaneous, patient has a normal effort and rate, no accessory muscle use noted.  CARDIAC: Patient has a bradycardic rate and rhythm, no periphreal edema noted in any extremity, capillary refill < 3 seconds in all extremities  ABDOMEN: Soft and non tender to palpation, no abdominal distention noted. Bowel sounds present in all four quadrants.  NEUROLOGIC: Eyes open spontaneously, behavior appropriate to situation, follows commands, facial expression symmetrical, bilateral hand grasp equal and even, purposeful motor response noted, normal sensation in all extremities when touched with a finger.

## 2020-08-12 ENCOUNTER — TELEPHONE (OUTPATIENT)
Dept: INTERNAL MEDICINE | Facility: CLINIC | Age: 80
End: 2020-08-12

## 2020-08-12 NOTE — TELEPHONE ENCOUNTER
----- Message from Cynthianelli Prince sent at 8/12/2020 11:39 AM CDT -----  Contact: Pt [phone]  Patient is requesting a call about back pain. He had taken too many muscle relaxer and dropped his blood pressure, so he had to go to the ER on yesterday.    Please call and advise.    Thank You

## 2020-08-17 ENCOUNTER — PATIENT OUTREACH (OUTPATIENT)
Dept: ADMINISTRATIVE | Facility: OTHER | Age: 80
End: 2020-08-17

## 2020-08-17 NOTE — PROGRESS NOTES
LINKS immunization registry updated  Care Everywhere updated  Health Maintenance updated  Chart reviewed for overdue [facility]active [facility] health maintenance testing (CRS, Breast Ca, Diabetic Eye Exam)   Orders entered:N/A

## 2020-08-18 ENCOUNTER — OFFICE VISIT (OUTPATIENT)
Dept: GASTROENTEROLOGY | Facility: CLINIC | Age: 80
End: 2020-08-18
Payer: MEDICARE

## 2020-08-18 DIAGNOSIS — R14.0 ABDOMINAL DISTENSION: Primary | ICD-10-CM

## 2020-08-18 PROCEDURE — 1159F PR MEDICATION LIST DOCUMENTED IN MEDICAL RECORD: ICD-10-PCS | Mod: 95,,, | Performed by: INTERNAL MEDICINE

## 2020-08-18 PROCEDURE — 1101F PT FALLS ASSESS-DOCD LE1/YR: CPT | Mod: CPTII,95,, | Performed by: INTERNAL MEDICINE

## 2020-08-18 PROCEDURE — 99215 OFFICE O/P EST HI 40 MIN: CPT | Mod: 95,,, | Performed by: INTERNAL MEDICINE

## 2020-08-18 PROCEDURE — 1101F PR PT FALLS ASSESS DOC 0-1 FALLS W/OUT INJ PAST YR: ICD-10-PCS | Mod: CPTII,95,, | Performed by: INTERNAL MEDICINE

## 2020-08-18 PROCEDURE — 1159F MED LIST DOCD IN RCRD: CPT | Mod: 95,,, | Performed by: INTERNAL MEDICINE

## 2020-08-18 PROCEDURE — 99215 PR OFFICE/OUTPT VISIT, EST, LEVL V, 40-54 MIN: ICD-10-PCS | Mod: 95,,, | Performed by: INTERNAL MEDICINE

## 2020-08-18 NOTE — PROGRESS NOTES
The patient location is: home  The chief complaint leading to consultation is: 2nd opinion    Visit type: audiovisual    Face to Face time with patient: 21 min  45 minutes of total time spent on the encounter, which includes face to face time and non-face to face time preparing to see the patient (eg, review of tests), Obtaining and/or reviewing separately obtained history, Documenting clinical information in the electronic or other health record, Independently interpreting results (not separately reported) and communicating results to the patient/family/caregiver, or Care coordination (not separately reported).         Each patient to whom he or she provides medical services by telemedicine is:  (1) informed of the relationship between the physician and patient and the respective role of any other health care provider with respect to management of the patient; and (2) notified that he or she may decline to receive medical services by telemedicine and may withdraw from such care at any time.    Notes:   This patient has requested a 2nd opinion.  He has previously seen Dr. Avalos.  I was able to review a large my medical records, all the tests have been done, and the 2 prior appointments with Dr. Avalos.    His main complaint is that of abdominal distention.  This is an intermittent variable symptom.  Some days he is completely fine without any symptoms at all but then it at the end of the day he may have a gas buildup and then abdominal distention.  This is associated with bloating and nausea.  There is no severe pain.  His bowel movements are irregular.  He may go 1-2 days without a bowel movement and then have a loose stool.  Over the last 6 months his weight is generally stable although he may have lost 1 or 2 lb.  His appetite is good.    He does lactose free milk he has been trying Lactaid.    Reviewing his labs his hemoglobin is slightly lower than has been at 13.3 of note his albumin is slightly low at  3.3.    Abdominal ultrasound was normal including visualized portions of the pancreas.    The patient has some concerns about pancreatic cancer but he also has concerns about doing a CT scan with contrast because he is allergic to contrast.    Assessment  1.  Abdominal  distention.  I recommended that we look into SIBO and exocrine pancreatic insufficiency.  Will get stool test and breath test.  I do not know how I can image the pancreas any better without using contrast unless we were to go to endoscopic ultrasound which would be more invasive.  I do not think I have enough evidence to recommend that period however if the fecal elastase or stool fat is abnormal I made discussed the case with advanced endoscopy to see if they think that justifies and EUS in this patient who is allergic to contrast.  Also even if the lab tests are normal I may consider trial of Creon with all meals to see if that helps him with these symptoms.  I do not think we have exhausted all of our over-the-counter measures either like activated charcoal.    45 min appointment greater than half the time face-to-face counseling.

## 2020-08-20 ENCOUNTER — TELEPHONE (OUTPATIENT)
Dept: GASTROENTEROLOGY | Facility: CLINIC | Age: 80
End: 2020-08-20

## 2020-08-20 NOTE — TELEPHONE ENCOUNTER
Patient aware stool kit has been mailed to him and the breath test request has been faxed to [facility].  They will be in contact with him to make arrangements for the kit to be delivered.                               He does not need a virtual visit scheduled at this time.   Yesika

## 2020-08-20 NOTE — TELEPHONE ENCOUNTER
----- Message from Ashley Alvares MA sent at 8/20/2020  2:30 PM CDT -----  Contact: [phone]  Pt states that he is supposed to do 2 test before his virtual visit next week but not sure where to get the test kits.  Also , his V V is not on the schedule.     Please advise [phone]

## 2020-08-24 ENCOUNTER — PATIENT MESSAGE (OUTPATIENT)
Dept: GASTROENTEROLOGY | Facility: CLINIC | Age: 80
End: 2020-08-24

## 2020-08-25 ENCOUNTER — LAB VISIT (OUTPATIENT)
Dept: LAB | Facility: HOSPITAL | Age: 80
End: 2020-08-25
Attending: INTERNAL MEDICINE
Payer: MEDICARE

## 2020-08-25 DIAGNOSIS — R14.0 ABDOMINAL DISTENSION: ICD-10-CM

## 2020-08-25 PROCEDURE — 89125 SPECIMEN FAT STAIN: CPT

## 2020-08-25 PROCEDURE — 82656 EL-1 FECAL QUAL/SEMIQ: CPT

## 2020-08-28 LAB — FAT STL SUDAN IV STN: NORMAL

## 2020-08-31 LAB — ELASTASE 1, FECAL: 388 MCG/G

## 2020-09-01 ENCOUNTER — PATIENT MESSAGE (OUTPATIENT)
Dept: GASTROENTEROLOGY | Facility: CLINIC | Age: 80
End: 2020-09-01

## 2020-09-03 ENCOUNTER — OFFICE VISIT (OUTPATIENT)
Dept: DERMATOLOGY | Facility: CLINIC | Age: 80
End: 2020-09-03
Payer: MEDICARE

## 2020-09-03 DIAGNOSIS — I10 HYPERTENSION, UNSPECIFIED TYPE: Primary | ICD-10-CM

## 2020-09-03 DIAGNOSIS — L82.1 SEBORRHEIC KERATOSES: ICD-10-CM

## 2020-09-03 DIAGNOSIS — L21.9 SEBORRHEIC DERMATITIS, UNSPECIFIED: ICD-10-CM

## 2020-09-03 DIAGNOSIS — L30.9 HAND ECZEMA: Primary | ICD-10-CM

## 2020-09-03 PROCEDURE — 99213 PR OFFICE/OUTPT VISIT, EST, LEVL III, 20-29 MIN: ICD-10-PCS | Mod: 95,,, | Performed by: DERMATOLOGY

## 2020-09-03 PROCEDURE — 1159F MED LIST DOCD IN RCRD: CPT | Mod: 95,,, | Performed by: DERMATOLOGY

## 2020-09-03 PROCEDURE — 99213 OFFICE O/P EST LOW 20 MIN: CPT | Mod: 95,,, | Performed by: DERMATOLOGY

## 2020-09-03 PROCEDURE — 1159F PR MEDICATION LIST DOCUMENTED IN MEDICAL RECORD: ICD-10-PCS | Mod: 95,,, | Performed by: DERMATOLOGY

## 2020-09-03 PROCEDURE — 1101F PR PT FALLS ASSESS DOC 0-1 FALLS W/OUT INJ PAST YR: ICD-10-PCS | Mod: CPTII,95,, | Performed by: DERMATOLOGY

## 2020-09-03 PROCEDURE — 1101F PT FALLS ASSESS-DOCD LE1/YR: CPT | Mod: CPTII,95,, | Performed by: DERMATOLOGY

## 2020-09-03 RX ORDER — ACETIC ACID 20.65 MG/ML
SOLUTION AURICULAR (OTIC)
Qty: 4 ML | Refills: 3 | Status: SHIPPED | OUTPATIENT
Start: 2020-09-03 | End: 2021-10-14

## 2020-09-03 NOTE — TELEPHONE ENCOUNTER
No new care gaps identified.  Powered by [employer]. Reference number: [id number]. 9/03/2020 7:32:59 AM CDT

## 2020-09-03 NOTE — PROGRESS NOTES
Subjective:       Patient ID:  Fred Schwab Jr. is a 79 y.o. male who presents for No chief complaint on file.    The patient location is: [place]  The chief complaint leading to consultation is: rash on hands    Visit type: audiovisual    Face to Face time with patient: 9 minutes  10 minutes of total time spent on the encounter, which includes face to face time and non-face to face time preparing to see the patient (eg, review of tests), Obtaining and/or reviewing separately obtained history, Documenting clinical information in the electronic or other health record, Independently interpreting results (not separately reported) and communicating results to the patient/family/caregiver, or Care coordination (not separately reported).         Each patient to whom he or she provides medical services by telemedicine is:  (1) informed of the relationship between the physician and patient and the respective role of any other health care provider with respect to management of the patient; and (2) notified that he or she may decline to receive medical services by telemedicine and may withdraw from such care at any time.    Notes: c/o rash on L hand for weeks.  Used lamisil, cerave, betamethasone. Nothing is helping. Gets a little better then flares again. Does not have this on right hand. Uses dove soap in the shower  C/o lesion on back. Wife feels it changed colors  C/o itching in the ears- comes and goes. Uses dermasmooth oil and does not help      Review of Systems   Skin: Positive for itching and rash.   Allergic/Immunologic: Negative for environmental allergies.        Objective:    Physical Exam   Constitutional: He appears well-developed and well-nourished. No distress.   Neurological: He is alert and oriented to person, place, and time. He is not disoriented.   Psychiatric: He has a normal mood and affect.   Skin:   Areas Examined (abnormalities noted in diagram):   Back Inspection Performed  IVAN CRAWLEY  Inspection Performed                  Diagram Legend     Erythematous scaling macule/papule c/w actinic keratosis       Vascular papule c/w angioma      Pigmented verrucoid papule/plaque c/w seborrheic keratosis      Yellow umbilicated papule c/w sebaceous hyperplasia      Irregularly shaped tan macule c/w lentigo     1-2 mm smooth white papules consistent with Milia      Movable subcutaneous cyst with punctum c/w epidermal inclusion cyst      Subcutaneous movable cyst c/w pilar cyst      Firm pink to brown papule c/w dermatofibroma      Pedunculated fleshy papule(s) c/w skin tag(s)      Evenly pigmented macule c/w junctional nevus     Mildly variegated pigmented, slightly irregular-bordered macule c/w mildly atypical nevus      Flesh colored to evenly pigmented papule c/w intradermal nevus       Pink pearly papule/plaque c/w basal cell carcinoma      Erythematous hyperkeratotic cursted plaque c/w SCC      Surgical scar with no sign of skin cancer recurrence      Open and closed comedones      Inflammatory papules and pustules      Verrucoid papule consistent consistent with wart     Erythematous eczematous patches and plaques     Dystrophic onycholytic nail with subungual debris c/w onychomycosis     Umbilicated papule    Erythematous-base heme-crusted tan verrucoid plaque consistent with inflamed seborrheic keratosis     Erythematous Silvery Scaling Plaque c/w Psoriasis     See annotation      Assessment / Plan:        Hand eczema  Discussed with patient the importance of frequent hand moisturization q hour and q hand washing.  Recommended Neutrogena Original Norwegian Hand cream or Cerave Cream. Apply prescribed topical steroid- betamethasone followed by vaseline and white cotton glove or warm damp towel qhs prn severe flare.      Pt educated that overuse of steroids can lead to skin thinning/atrophy, hypopigmentation, striae.    Seborrheic keratoses  These are benign inherited growths without a malignant  potential. Reassurance given to patient. No treatment is necessary.     Seborrheic dermatitis, unspecified  -     acetic acid (VOSOL) 2 % otic solution; Use 4 drops to ears bid prn itching  Dispense: 4 mL; Refill: 3    Use otc anti- dandruff shampoo         Follow up if symptoms worsen or fail to improve.

## 2020-09-03 NOTE — PATIENT INSTRUCTIONS
Discussed with patient the importance of frequent hand moisturization q hour and q hand washing.  Recommended Neutrogena Original Norwegian Hand cream or Cerave Cream or aveeno eczema balm. Apply prescribed topical steroid/betamethasone cream followed by vaseline and white cotton glove or warm damp towel qhs prn severe flare.    Pt educated that overuse of steroids can lead to skin thinning/atrophy, hypopigmentation, striae.      On back - sebhorreic keratosis; These are benign inherited growths without a malignant potential. Reassurance given to patient. No treatment is necessary.     Ears- I rx drops and use medicated dandruff shampoo like head and shoulders

## 2020-09-04 RX ORDER — PROPRANOLOL HYDROCHLORIDE 10 MG/1
TABLET ORAL
Qty: 180 TABLET | Refills: 2 | Status: SHIPPED | OUTPATIENT
Start: 2020-09-04 | End: 2021-06-05

## 2020-09-16 ENCOUNTER — TELEPHONE (OUTPATIENT)
Dept: GASTROENTEROLOGY | Facility: CLINIC | Age: 80
End: 2020-09-16

## 2020-09-16 NOTE — TELEPHONE ENCOUNTER
----- Message from Marco Antonio Haider MD sent at 9/16/2020 11:33 AM CDT -----  If he is feeling better, I would not do it now  ----- Message -----  From: Jasmine Cifuentes MA  Sent: 9/16/2020  10:16 AM CDT  To: Marco Antonio Haider MD    Spoke with patient.  Stated he received the Lactulose Breath test kit in the mail but has not done the test yet.  Has been a little over 3 weeks and is feeling much better.  Intestines and stomach are fine.  He would like to know if he has to do the test?   Ebony  ----- Message -----  From: Cecy Clark  Sent: 9/16/2020   9:30 AM CDT  To: Vitaly CRAIG Staff    Pt is returning a miss call from ebony and would like for ebony to give him a call back

## 2021-01-07 ENCOUNTER — OFFICE VISIT (OUTPATIENT)
Dept: INTERNAL MEDICINE | Facility: CLINIC | Age: 81
End: 2021-01-07
Payer: MEDICARE

## 2021-01-07 ENCOUNTER — LAB VISIT (OUTPATIENT)
Dept: LAB | Facility: HOSPITAL | Age: 81
End: 2021-01-07
Attending: INTERNAL MEDICINE
Payer: MEDICARE

## 2021-01-07 VITALS
SYSTOLIC BLOOD PRESSURE: 134 MMHG | BODY MASS INDEX: 25.08 KG/M2 | HEART RATE: 57 BPM | RESPIRATION RATE: 20 BRPM | TEMPERATURE: 97 F | WEIGHT: 185.19 LBS | DIASTOLIC BLOOD PRESSURE: 78 MMHG | OXYGEN SATURATION: 98 % | HEIGHT: 72 IN

## 2021-01-07 DIAGNOSIS — Z12.5 PROSTATE CANCER SCREENING: ICD-10-CM

## 2021-01-07 DIAGNOSIS — R25.1 TREMORS OF NERVOUS SYSTEM: ICD-10-CM

## 2021-01-07 DIAGNOSIS — Z12.11 COLON CANCER SCREENING: ICD-10-CM

## 2021-01-07 DIAGNOSIS — I10 HYPERTENSION, UNSPECIFIED TYPE: ICD-10-CM

## 2021-01-07 DIAGNOSIS — E78.5 HYPERLIPIDEMIA, UNSPECIFIED HYPERLIPIDEMIA TYPE: ICD-10-CM

## 2021-01-07 DIAGNOSIS — G57.93 NEUROPATHY OF BOTH FEET: ICD-10-CM

## 2021-01-07 DIAGNOSIS — Z00.00 ANNUAL PHYSICAL EXAM: ICD-10-CM

## 2021-01-07 DIAGNOSIS — R97.20 RISING PSA LEVEL: Primary | ICD-10-CM

## 2021-01-07 DIAGNOSIS — Z00.00 ANNUAL PHYSICAL EXAM: Primary | ICD-10-CM

## 2021-01-07 DIAGNOSIS — E04.2 MULTIPLE THYROID NODULES: ICD-10-CM

## 2021-01-07 DIAGNOSIS — K63.5 POLYP OF COLON, UNSPECIFIED PART OF COLON, UNSPECIFIED TYPE: ICD-10-CM

## 2021-01-07 LAB
ALBUMIN SERPL BCP-MCNC: 3.6 G/DL (ref 3.5–5.2)
ALP SERPL-CCNC: 74 U/L (ref 55–135)
ALT SERPL W/O P-5'-P-CCNC: 14 U/L (ref 10–44)
ANION GAP SERPL CALC-SCNC: 10 MMOL/L (ref 8–16)
AST SERPL-CCNC: 16 U/L (ref 10–40)
BACTERIA #/AREA URNS AUTO: ABNORMAL /HPF
BASOPHILS # BLD AUTO: 0.06 K/UL (ref 0–0.2)
BASOPHILS NFR BLD: 0.9 % (ref 0–1.9)
BILIRUB SERPL-MCNC: 1 MG/DL (ref 0.1–1)
BILIRUB UR QL STRIP: NEGATIVE
BUN SERPL-MCNC: 12 MG/DL (ref 8–23)
CALCIUM SERPL-MCNC: 9 MG/DL (ref 8.7–10.5)
CAOX CRY UR QL COMP ASSIST: ABNORMAL
CHLORIDE SERPL-SCNC: 104 MMOL/L (ref 95–110)
CHOLEST SERPL-MCNC: 205 MG/DL (ref 120–199)
CHOLEST/HDLC SERPL: 4 {RATIO} (ref 2–5)
CLARITY UR REFRACT.AUTO: ABNORMAL
CO2 SERPL-SCNC: 28 MMOL/L (ref 23–29)
COLOR UR AUTO: ABNORMAL
COMPLEXED PSA SERPL-MCNC: 5.6 NG/ML (ref 0–4)
CREAT SERPL-MCNC: 1.2 MG/DL (ref 0.5–1.4)
DIFFERENTIAL METHOD: ABNORMAL
EOSINOPHIL # BLD AUTO: 0.3 K/UL (ref 0–0.5)
EOSINOPHIL NFR BLD: 4.1 % (ref 0–8)
ERYTHROCYTE [DISTWIDTH] IN BLOOD BY AUTOMATED COUNT: 13.4 % (ref 11.5–14.5)
EST. GFR  (AFRICAN AMERICAN): >60 ML/MIN/1.73 M^2
EST. GFR  (NON AFRICAN AMERICAN): 57.2 ML/MIN/1.73 M^2
GLUCOSE SERPL-MCNC: 101 MG/DL (ref 70–110)
GLUCOSE UR QL STRIP: NEGATIVE
HCT VFR BLD AUTO: 45.2 % (ref 40–54)
HDLC SERPL-MCNC: 51 MG/DL (ref 40–75)
HDLC SERPL: 24.9 % (ref 20–50)
HGB BLD-MCNC: 14.2 G/DL (ref 14–18)
HGB UR QL STRIP: NEGATIVE
IMM GRANULOCYTES # BLD AUTO: 0.03 K/UL (ref 0–0.04)
IMM GRANULOCYTES NFR BLD AUTO: 0.5 % (ref 0–0.5)
KETONES UR QL STRIP: NEGATIVE
LDLC SERPL CALC-MCNC: 133.8 MG/DL (ref 63–159)
LEUKOCYTE ESTERASE UR QL STRIP: NEGATIVE
LYMPHOCYTES # BLD AUTO: 2.2 K/UL (ref 1–4.8)
LYMPHOCYTES NFR BLD: 33.1 % (ref 18–48)
MCH RBC QN AUTO: 29.5 PG (ref 27–31)
MCHC RBC AUTO-ENTMCNC: 31.4 G/DL (ref 32–36)
MCV RBC AUTO: 94 FL (ref 82–98)
MICROSCOPIC COMMENT: ABNORMAL
MONOCYTES # BLD AUTO: 0.5 K/UL (ref 0.3–1)
MONOCYTES NFR BLD: 8 % (ref 4–15)
NEUTROPHILS # BLD AUTO: 3.5 K/UL (ref 1.8–7.7)
NEUTROPHILS NFR BLD: 53.4 % (ref 38–73)
NITRITE UR QL STRIP: NEGATIVE
NONHDLC SERPL-MCNC: 154 MG/DL
NRBC BLD-RTO: 0 /100 WBC
PH UR STRIP: 5 [PH] (ref 5–8)
PLATELET # BLD AUTO: 177 K/UL (ref 150–350)
PMV BLD AUTO: 10.8 FL (ref 9.2–12.9)
POTASSIUM SERPL-SCNC: 3.8 MMOL/L (ref 3.5–5.1)
PROT SERPL-MCNC: 7.3 G/DL (ref 6–8.4)
PROT UR QL STRIP: NEGATIVE
RBC # BLD AUTO: 4.82 M/UL (ref 4.6–6.2)
RBC #/AREA URNS AUTO: 1 /HPF (ref 0–4)
SODIUM SERPL-SCNC: 142 MMOL/L (ref 136–145)
SP GR UR STRIP: 1.02 (ref 1–1.03)
TRIGL SERPL-MCNC: 101 MG/DL (ref 30–150)
TSH SERPL DL<=0.005 MIU/L-ACNC: 3.14 UIU/ML (ref 0.4–4)
URN SPEC COLLECT METH UR: ABNORMAL
WBC # BLD AUTO: 6.52 K/UL (ref 3.9–12.7)
WBC #/AREA URNS AUTO: 1 /HPF (ref 0–5)

## 2021-01-07 PROCEDURE — 3288F PR FALLS RISK ASSESSMENT DOCUMENTED: ICD-10-PCS | Mod: CPTII,S$GLB,, | Performed by: INTERNAL MEDICINE

## 2021-01-07 PROCEDURE — 3288F FALL RISK ASSESSMENT DOCD: CPT | Mod: CPTII,S$GLB,, | Performed by: INTERNAL MEDICINE

## 2021-01-07 PROCEDURE — 1101F PT FALLS ASSESS-DOCD LE1/YR: CPT | Mod: CPTII,S$GLB,, | Performed by: INTERNAL MEDICINE

## 2021-01-07 PROCEDURE — 80061 LIPID PANEL: CPT

## 2021-01-07 PROCEDURE — 84153 ASSAY OF PSA TOTAL: CPT

## 2021-01-07 PROCEDURE — 99999 PR PBB SHADOW E&M-EST. PATIENT-LVL V: CPT | Mod: PBBFAC,,, | Performed by: INTERNAL MEDICINE

## 2021-01-07 PROCEDURE — 3075F SYST BP GE 130 - 139MM HG: CPT | Mod: CPTII,S$GLB,, | Performed by: INTERNAL MEDICINE

## 2021-01-07 PROCEDURE — 80053 COMPREHEN METABOLIC PANEL: CPT

## 2021-01-07 PROCEDURE — 3078F DIAST BP <80 MM HG: CPT | Mod: CPTII,S$GLB,, | Performed by: INTERNAL MEDICINE

## 2021-01-07 PROCEDURE — 99999 PR PBB SHADOW E&M-EST. PATIENT-LVL V: ICD-10-PCS | Mod: PBBFAC,,, | Performed by: INTERNAL MEDICINE

## 2021-01-07 PROCEDURE — 1126F PR PAIN SEVERITY QUANTIFIED, NO PAIN PRESENT: ICD-10-PCS | Mod: S$GLB,,, | Performed by: INTERNAL MEDICINE

## 2021-01-07 PROCEDURE — 36415 COLL VENOUS BLD VENIPUNCTURE: CPT

## 2021-01-07 PROCEDURE — 99214 PR OFFICE/OUTPT VISIT, EST, LEVL IV, 30-39 MIN: ICD-10-PCS | Mod: S$GLB,,, | Performed by: INTERNAL MEDICINE

## 2021-01-07 PROCEDURE — 1126F AMNT PAIN NOTED NONE PRSNT: CPT | Mod: S$GLB,,, | Performed by: INTERNAL MEDICINE

## 2021-01-07 PROCEDURE — 3078F PR MOST RECENT DIASTOLIC BLOOD PRESSURE < 80 MM HG: ICD-10-PCS | Mod: CPTII,S$GLB,, | Performed by: INTERNAL MEDICINE

## 2021-01-07 PROCEDURE — 84443 ASSAY THYROID STIM HORMONE: CPT

## 2021-01-07 PROCEDURE — 82274 ASSAY TEST FOR BLOOD FECAL: CPT

## 2021-01-07 PROCEDURE — 1101F PR PT FALLS ASSESS DOC 0-1 FALLS W/OUT INJ PAST YR: ICD-10-PCS | Mod: CPTII,S$GLB,, | Performed by: INTERNAL MEDICINE

## 2021-01-07 PROCEDURE — 81001 URINALYSIS AUTO W/SCOPE: CPT

## 2021-01-07 PROCEDURE — 3075F PR MOST RECENT SYSTOLIC BLOOD PRESS GE 130-139MM HG: ICD-10-PCS | Mod: CPTII,S$GLB,, | Performed by: INTERNAL MEDICINE

## 2021-01-07 PROCEDURE — 99214 OFFICE O/P EST MOD 30 MIN: CPT | Mod: S$GLB,,, | Performed by: INTERNAL MEDICINE

## 2021-01-07 PROCEDURE — 85025 COMPLETE CBC W/AUTO DIFF WBC: CPT

## 2021-01-07 RX ORDER — PRENATAL VIT 91/IRON/FOLIC/DHA 28-975-200
COMBINATION PACKAGE (EA) ORAL NIGHTLY
COMMUNITY
Start: 1970-01-01

## 2021-01-08 ENCOUNTER — TELEPHONE (OUTPATIENT)
Dept: INTERNAL MEDICINE | Facility: CLINIC | Age: 81
End: 2021-01-08

## 2021-01-08 ENCOUNTER — PATIENT MESSAGE (OUTPATIENT)
Dept: INTERNAL MEDICINE | Facility: CLINIC | Age: 81
End: 2021-01-08

## 2021-01-08 ENCOUNTER — IMMUNIZATION (OUTPATIENT)
Dept: INTERNAL MEDICINE | Facility: CLINIC | Age: 81
End: 2021-01-08
Payer: MEDICARE

## 2021-01-08 DIAGNOSIS — Z23 NEED FOR VACCINATION: ICD-10-CM

## 2021-01-08 PROCEDURE — 91300 COVID-19, MRNA, LNP-S, PF, 30 MCG/0.3 ML DOSE VACCINE: CPT | Mod: PBBFAC | Performed by: INTERNAL MEDICINE

## 2021-01-11 ENCOUNTER — HOSPITAL ENCOUNTER (OUTPATIENT)
Dept: RADIOLOGY | Facility: HOSPITAL | Age: 81
Discharge: HOME | End: 2021-01-11
Attending: INTERNAL MEDICINE
Payer: MEDICARE

## 2021-01-11 DIAGNOSIS — E04.2 MULTIPLE THYROID NODULES: ICD-10-CM

## 2021-01-11 PROCEDURE — 76536 US EXAM OF HEAD AND NECK: CPT | Mod: TC

## 2021-01-11 PROCEDURE — 76536 US EXAM OF HEAD AND NECK: CPT | Mod: 26,,, | Performed by: RADIOLOGY

## 2021-01-11 PROCEDURE — 76536 US SOFT TISSUE HEAD NECK THYROID: ICD-10-PCS | Mod: 26,,, | Performed by: RADIOLOGY

## 2021-01-12 LAB — HEMOCCULT STL QL IA: NEGATIVE

## 2021-01-13 ENCOUNTER — PATIENT MESSAGE (OUTPATIENT)
Dept: INTERNAL MEDICINE | Facility: CLINIC | Age: 81
End: 2021-01-13

## 2021-01-30 ENCOUNTER — IMMUNIZATION (OUTPATIENT)
Dept: INTERNAL MEDICINE | Facility: CLINIC | Age: 81
End: 2021-01-30
Payer: MEDICARE

## 2021-01-30 DIAGNOSIS — Z23 NEED FOR VACCINATION: Primary | ICD-10-CM

## 2021-01-30 PROCEDURE — 0002A PR IMMUNIZ ADMIN, SARS-COV-2 COVID-19 VACC, 30MCG/0.3ML, 2ND DOSE: CPT | Mod: PBBFAC | Performed by: INTERNAL MEDICINE

## 2021-01-30 PROCEDURE — 91300 PR SARS-COV- 2 COVID-19 VACCINE, NO PRSV, 30MCG/0.3ML, IM: CPT | Mod: PBBFAC | Performed by: INTERNAL MEDICINE

## 2021-01-30 RX ADMIN — RNA INGREDIENT BNT-162B2 0.3 ML: 0.23 INJECTION, SUSPENSION INTRAMUSCULAR at 08:01

## 2021-02-08 ENCOUNTER — TELEPHONE (OUTPATIENT)
Dept: INTERNAL MEDICINE | Facility: CLINIC | Age: 81
End: 2021-02-08

## 2021-02-09 ENCOUNTER — TELEPHONE (OUTPATIENT)
Dept: INTERNAL MEDICINE | Facility: CLINIC | Age: 81
End: 2021-02-09

## 2021-03-02 ENCOUNTER — PATIENT OUTREACH (OUTPATIENT)
Dept: ADMINISTRATIVE | Facility: OTHER | Age: 81
End: 2021-03-02

## 2021-03-04 ENCOUNTER — OFFICE VISIT (OUTPATIENT)
Dept: DERMATOLOGY | Facility: CLINIC | Age: 81
End: 2021-03-04
Payer: MEDICARE

## 2021-03-04 DIAGNOSIS — L82.1 SK (SEBORRHEIC KERATOSIS): ICD-10-CM

## 2021-03-04 DIAGNOSIS — B35.3 TINEA PEDIS OF BOTH FEET: Primary | ICD-10-CM

## 2021-03-04 DIAGNOSIS — L57.0 AK (ACTINIC KERATOSIS): ICD-10-CM

## 2021-03-04 DIAGNOSIS — D23.9 DERMATOFIBROMA: ICD-10-CM

## 2021-03-04 DIAGNOSIS — Z85.828 PERSONAL HISTORY OF SKIN CANCER: ICD-10-CM

## 2021-03-04 DIAGNOSIS — D22.9 NEVUS: ICD-10-CM

## 2021-03-04 DIAGNOSIS — L30.9 DERMATITIS: ICD-10-CM

## 2021-03-04 DIAGNOSIS — D18.01 CHERRY ANGIOMA: ICD-10-CM

## 2021-03-04 DIAGNOSIS — L81.4 LENTIGO: ICD-10-CM

## 2021-03-04 PROCEDURE — 99999 PR PBB SHADOW E&M-EST. PATIENT-LVL III: CPT | Mod: PBBFAC,,, | Performed by: DERMATOLOGY

## 2021-03-04 PROCEDURE — 1159F PR MEDICATION LIST DOCUMENTED IN MEDICAL RECORD: ICD-10-PCS | Mod: S$GLB,,, | Performed by: DERMATOLOGY

## 2021-03-04 PROCEDURE — 1159F MED LIST DOCD IN RCRD: CPT | Mod: S$GLB,,, | Performed by: DERMATOLOGY

## 2021-03-04 PROCEDURE — 17000 PR DESTRUCTION(LASER SURGERY,CRYOSURGERY,CHEMOSURGERY),PREMALIGNANT LESIONS,FIRST LESION: ICD-10-PCS | Mod: S$GLB,,, | Performed by: DERMATOLOGY

## 2021-03-04 PROCEDURE — 17000 DESTRUCT PREMALG LESION: CPT | Mod: S$GLB,,, | Performed by: DERMATOLOGY

## 2021-03-04 PROCEDURE — 1101F PR PT FALLS ASSESS DOC 0-1 FALLS W/OUT INJ PAST YR: ICD-10-PCS | Mod: CPTII,S$GLB,, | Performed by: DERMATOLOGY

## 2021-03-04 PROCEDURE — 17003 DESTRUCTION, PREMALIGNANT LESIONS; SECOND THROUGH 14 LESIONS: ICD-10-PCS | Mod: S$GLB,,, | Performed by: DERMATOLOGY

## 2021-03-04 PROCEDURE — 99214 PR OFFICE/OUTPT VISIT, EST, LEVL IV, 30-39 MIN: ICD-10-PCS | Mod: 25,S$GLB,, | Performed by: DERMATOLOGY

## 2021-03-04 PROCEDURE — 17003 DESTRUCT PREMALG LES 2-14: CPT | Mod: S$GLB,,, | Performed by: DERMATOLOGY

## 2021-03-04 PROCEDURE — 3288F PR FALLS RISK ASSESSMENT DOCUMENTED: ICD-10-PCS | Mod: CPTII,S$GLB,, | Performed by: DERMATOLOGY

## 2021-03-04 PROCEDURE — 1101F PT FALLS ASSESS-DOCD LE1/YR: CPT | Mod: CPTII,S$GLB,, | Performed by: DERMATOLOGY

## 2021-03-04 PROCEDURE — 1126F AMNT PAIN NOTED NONE PRSNT: CPT | Mod: S$GLB,,, | Performed by: DERMATOLOGY

## 2021-03-04 PROCEDURE — 99999 PR PBB SHADOW E&M-EST. PATIENT-LVL III: ICD-10-PCS | Mod: PBBFAC,,, | Performed by: DERMATOLOGY

## 2021-03-04 PROCEDURE — 3288F FALL RISK ASSESSMENT DOCD: CPT | Mod: CPTII,S$GLB,, | Performed by: DERMATOLOGY

## 2021-03-04 PROCEDURE — 99214 OFFICE O/P EST MOD 30 MIN: CPT | Mod: 25,S$GLB,, | Performed by: DERMATOLOGY

## 2021-03-04 PROCEDURE — 1126F PR PAIN SEVERITY QUANTIFIED, NO PAIN PRESENT: ICD-10-PCS | Mod: S$GLB,,, | Performed by: DERMATOLOGY

## 2021-03-04 RX ORDER — AMMONIUM LACTATE 12 G/100G
LOTION TOPICAL
Qty: 396 G | Refills: 6 | Status: SHIPPED | OUTPATIENT
Start: 2021-03-04 | End: 2021-10-14

## 2021-03-04 RX ORDER — KETOCONAZOLE 20 MG/G
CREAM TOPICAL
Qty: 60 G | Refills: 3 | Status: SHIPPED | OUTPATIENT
Start: 2021-03-04 | End: 2021-10-14

## 2021-03-18 ENCOUNTER — PATIENT MESSAGE (OUTPATIENT)
Dept: DERMATOLOGY | Facility: CLINIC | Age: 81
End: 2021-03-18

## 2021-04-15 ENCOUNTER — TELEPHONE (OUTPATIENT)
Dept: INTERNAL MEDICINE | Facility: CLINIC | Age: 81
End: 2021-04-15
Payer: MEDICARE

## 2021-04-15 NOTE — TELEPHONE ENCOUNTER
----- Message from Myesha Baird sent at 4/15/2021  1:43 PM CDT -----  Contact: self [phone]  Pt states Dr Vaughan advised in January 2021 to have his PSA and A1C lab work done in 6 months. Pt scheduled labs for 7/7/2021. Pt is requesting a call from the office about the lab orders.

## 2021-04-25 ENCOUNTER — PATIENT MESSAGE (OUTPATIENT)
Dept: INTERNAL MEDICINE | Facility: CLINIC | Age: 81
End: 2021-04-25

## 2021-05-16 DIAGNOSIS — R73.09 ELEVATED GLUCOSE: Primary | ICD-10-CM

## 2021-05-17 ENCOUNTER — PATIENT MESSAGE (OUTPATIENT)
Dept: INTERNAL MEDICINE | Facility: CLINIC | Age: 81
End: 2021-05-17

## 2021-05-19 RX ORDER — DOXAZOSIN 2 MG/1
2 TABLET ORAL NIGHTLY
Qty: 90 TABLET | Refills: 2 | Status: SHIPPED | OUTPATIENT
Start: 2021-05-19 | End: 2022-02-22

## 2021-05-19 RX ORDER — DOXAZOSIN 2 MG/1
TABLET ORAL
Qty: 90 TABLET | Refills: 3 | OUTPATIENT
Start: 2021-05-19

## 2021-05-26 ENCOUNTER — PATIENT OUTREACH (OUTPATIENT)
Dept: ADMINISTRATIVE | Facility: OTHER | Age: 81
End: 2021-05-26

## 2021-05-31 ENCOUNTER — OFFICE VISIT (OUTPATIENT)
Dept: OPHTHALMOLOGY | Facility: CLINIC | Age: 81
End: 2021-05-31
Payer: MEDICARE

## 2021-05-31 ENCOUNTER — CLINICAL SUPPORT (OUTPATIENT)
Dept: OPHTHALMOLOGY | Facility: CLINIC | Age: 81
End: 2021-05-31
Payer: MEDICARE

## 2021-05-31 DIAGNOSIS — H02.403 PTOSIS OF BOTH EYELIDS: ICD-10-CM

## 2021-05-31 DIAGNOSIS — H25.13 NUCLEAR SCLEROTIC CATARACT OF BOTH EYES: ICD-10-CM

## 2021-05-31 DIAGNOSIS — H04.123 DRY EYE SYNDROME OF BOTH EYES: ICD-10-CM

## 2021-05-31 DIAGNOSIS — H40.1231 LOW-TENSION GLAUCOMA OF BOTH EYES, MILD STAGE: Primary | ICD-10-CM

## 2021-05-31 PROCEDURE — 3288F FALL RISK ASSESSMENT DOCD: CPT | Mod: CPTII,S$GLB,, | Performed by: OPHTHALMOLOGY

## 2021-05-31 PROCEDURE — 92083 HUMPHREY VISUAL FIELD - OU - BOTH EYES: ICD-10-PCS | Mod: S$GLB,,, | Performed by: OPHTHALMOLOGY

## 2021-05-31 PROCEDURE — 99999 PR PBB SHADOW E&M-EST. PATIENT-LVL III: CPT | Mod: PBBFAC,,, | Performed by: OPHTHALMOLOGY

## 2021-05-31 PROCEDURE — 1126F PR PAIN SEVERITY QUANTIFIED, NO PAIN PRESENT: ICD-10-PCS | Mod: S$GLB,,, | Performed by: OPHTHALMOLOGY

## 2021-05-31 PROCEDURE — 99499 UNLISTED E&M SERVICE: CPT | Mod: S$GLB,,, | Performed by: OPHTHALMOLOGY

## 2021-05-31 PROCEDURE — 99999 PR PBB SHADOW E&M-EST. PATIENT-LVL III: ICD-10-PCS | Mod: PBBFAC,,, | Performed by: OPHTHALMOLOGY

## 2021-05-31 PROCEDURE — 92004 PR EYE EXAM, NEW PATIENT,COMPREHESV: ICD-10-PCS | Mod: S$GLB,,, | Performed by: OPHTHALMOLOGY

## 2021-05-31 PROCEDURE — 99499 RISK ADDL DX/OHS AUDIT: ICD-10-PCS | Mod: S$GLB,,, | Performed by: OPHTHALMOLOGY

## 2021-05-31 PROCEDURE — 92004 COMPRE OPH EXAM NEW PT 1/>: CPT | Mod: S$GLB,,, | Performed by: OPHTHALMOLOGY

## 2021-05-31 PROCEDURE — 1101F PR PT FALLS ASSESS DOC 0-1 FALLS W/OUT INJ PAST YR: ICD-10-PCS | Mod: CPTII,S$GLB,, | Performed by: OPHTHALMOLOGY

## 2021-05-31 PROCEDURE — 3288F PR FALLS RISK ASSESSMENT DOCUMENTED: ICD-10-PCS | Mod: CPTII,S$GLB,, | Performed by: OPHTHALMOLOGY

## 2021-05-31 PROCEDURE — 92133 POSTERIOR SEGMENT OCT OPTIC NERVE(OCULAR COHERENCE TOMOGRAPHY) - OU - BOTH EYES: ICD-10-PCS | Mod: S$GLB,,, | Performed by: OPHTHALMOLOGY

## 2021-05-31 PROCEDURE — 1126F AMNT PAIN NOTED NONE PRSNT: CPT | Mod: S$GLB,,, | Performed by: OPHTHALMOLOGY

## 2021-05-31 PROCEDURE — 92133 CPTRZD OPH DX IMG PST SGM ON: CPT | Mod: S$GLB,,, | Performed by: OPHTHALMOLOGY

## 2021-05-31 PROCEDURE — 92083 EXTENDED VISUAL FIELD XM: CPT | Mod: S$GLB,,, | Performed by: OPHTHALMOLOGY

## 2021-05-31 PROCEDURE — 1101F PT FALLS ASSESS-DOCD LE1/YR: CPT | Mod: CPTII,S$GLB,, | Performed by: OPHTHALMOLOGY

## 2021-05-31 RX ORDER — LATANOPROST 50 UG/ML
1 SOLUTION/ DROPS OPHTHALMIC EVERY MORNING
Qty: 7.5 ML | Refills: 3 | Status: SHIPPED | OUTPATIENT
Start: 2021-05-31 | End: 2021-08-06

## 2021-06-02 DIAGNOSIS — I10 HYPERTENSION, UNSPECIFIED TYPE: ICD-10-CM

## 2021-06-04 ENCOUNTER — TELEPHONE (OUTPATIENT)
Dept: INTERNAL MEDICINE | Facility: CLINIC | Age: 81
End: 2021-06-04

## 2021-06-05 RX ORDER — PROPRANOLOL HYDROCHLORIDE 10 MG/1
TABLET ORAL
Qty: 180 TABLET | Refills: 2 | Status: SHIPPED | OUTPATIENT
Start: 2021-06-05 | End: 2022-02-23 | Stop reason: SDUPTHER

## 2021-06-07 ENCOUNTER — PATIENT MESSAGE (OUTPATIENT)
Dept: DERMATOLOGY | Facility: CLINIC | Age: 81
End: 2021-06-07

## 2021-06-16 ENCOUNTER — OFFICE VISIT (OUTPATIENT)
Dept: DERMATOLOGY | Facility: CLINIC | Age: 81
End: 2021-06-16
Payer: MEDICARE

## 2021-06-16 DIAGNOSIS — L57.0 AK (ACTINIC KERATOSIS): ICD-10-CM

## 2021-06-16 DIAGNOSIS — L82.1 SK (SEBORRHEIC KERATOSIS): ICD-10-CM

## 2021-06-16 DIAGNOSIS — L73.9 FOLLICULITIS: Primary | ICD-10-CM

## 2021-06-16 PROCEDURE — 17000 PR DESTRUCTION(LASER SURGERY,CRYOSURGERY,CHEMOSURGERY),PREMALIGNANT LESIONS,FIRST LESION: ICD-10-PCS | Mod: S$GLB,,, | Performed by: DERMATOLOGY

## 2021-06-16 PROCEDURE — 1126F PR PAIN SEVERITY QUANTIFIED, NO PAIN PRESENT: ICD-10-PCS | Mod: S$GLB,,, | Performed by: DERMATOLOGY

## 2021-06-16 PROCEDURE — 1101F PR PT FALLS ASSESS DOC 0-1 FALLS W/OUT INJ PAST YR: ICD-10-PCS | Mod: CPTII,S$GLB,, | Performed by: DERMATOLOGY

## 2021-06-16 PROCEDURE — 99213 PR OFFICE/OUTPT VISIT, EST, LEVL III, 20-29 MIN: ICD-10-PCS | Mod: 25,S$GLB,, | Performed by: DERMATOLOGY

## 2021-06-16 PROCEDURE — 99999 PR PBB SHADOW E&M-EST. PATIENT-LVL III: ICD-10-PCS | Mod: PBBFAC,,, | Performed by: DERMATOLOGY

## 2021-06-16 PROCEDURE — 3288F PR FALLS RISK ASSESSMENT DOCUMENTED: ICD-10-PCS | Mod: CPTII,S$GLB,, | Performed by: DERMATOLOGY

## 2021-06-16 PROCEDURE — 3288F FALL RISK ASSESSMENT DOCD: CPT | Mod: CPTII,S$GLB,, | Performed by: DERMATOLOGY

## 2021-06-16 PROCEDURE — 17003 DESTRUCT PREMALG LES 2-14: CPT | Mod: S$GLB,,, | Performed by: DERMATOLOGY

## 2021-06-16 PROCEDURE — 99213 OFFICE O/P EST LOW 20 MIN: CPT | Mod: 25,S$GLB,, | Performed by: DERMATOLOGY

## 2021-06-16 PROCEDURE — 1101F PT FALLS ASSESS-DOCD LE1/YR: CPT | Mod: CPTII,S$GLB,, | Performed by: DERMATOLOGY

## 2021-06-16 PROCEDURE — 1159F MED LIST DOCD IN RCRD: CPT | Mod: S$GLB,,, | Performed by: DERMATOLOGY

## 2021-06-16 PROCEDURE — 99999 PR PBB SHADOW E&M-EST. PATIENT-LVL III: CPT | Mod: PBBFAC,,, | Performed by: DERMATOLOGY

## 2021-06-16 PROCEDURE — 1126F AMNT PAIN NOTED NONE PRSNT: CPT | Mod: S$GLB,,, | Performed by: DERMATOLOGY

## 2021-06-16 PROCEDURE — 1159F PR MEDICATION LIST DOCUMENTED IN MEDICAL RECORD: ICD-10-PCS | Mod: S$GLB,,, | Performed by: DERMATOLOGY

## 2021-06-16 PROCEDURE — 17003 DESTRUCTION, PREMALIGNANT LESIONS; SECOND THROUGH 14 LESIONS: ICD-10-PCS | Mod: S$GLB,,, | Performed by: DERMATOLOGY

## 2021-06-16 PROCEDURE — 17000 DESTRUCT PREMALG LESION: CPT | Mod: S$GLB,,, | Performed by: DERMATOLOGY

## 2021-07-06 ENCOUNTER — PATIENT MESSAGE (OUTPATIENT)
Dept: DERMATOLOGY | Facility: CLINIC | Age: 81
End: 2021-07-06

## 2021-07-07 ENCOUNTER — LAB VISIT (OUTPATIENT)
Dept: LAB | Facility: HOSPITAL | Age: 81
End: 2021-07-07
Attending: INTERNAL MEDICINE
Payer: MEDICARE

## 2021-07-07 DIAGNOSIS — R97.20 RISING PSA LEVEL: ICD-10-CM

## 2021-07-07 DIAGNOSIS — R73.09 ELEVATED GLUCOSE: ICD-10-CM

## 2021-07-07 LAB
COMPLEXED PSA SERPL-MCNC: 5.8 NG/ML (ref 0–4)
ESTIMATED AVG GLUCOSE: 111 MG/DL (ref 68–131)
HBA1C MFR BLD: 5.5 % (ref 4–5.6)

## 2021-07-07 PROCEDURE — 36415 COLL VENOUS BLD VENIPUNCTURE: CPT | Performed by: INTERNAL MEDICINE

## 2021-07-07 PROCEDURE — 83036 HEMOGLOBIN GLYCOSYLATED A1C: CPT | Performed by: INTERNAL MEDICINE

## 2021-07-07 PROCEDURE — 84153 ASSAY OF PSA TOTAL: CPT | Performed by: INTERNAL MEDICINE

## 2021-07-08 DIAGNOSIS — R97.20 RISING PSA LEVEL: Primary | ICD-10-CM

## 2021-08-05 ENCOUNTER — PATIENT OUTREACH (OUTPATIENT)
Dept: ADMINISTRATIVE | Facility: OTHER | Age: 81
End: 2021-08-05

## 2021-08-06 ENCOUNTER — OFFICE VISIT (OUTPATIENT)
Dept: OPHTHALMOLOGY | Facility: CLINIC | Age: 81
End: 2021-08-06
Payer: MEDICARE

## 2021-08-06 DIAGNOSIS — H04.123 DRY EYE SYNDROME OF BOTH EYES: ICD-10-CM

## 2021-08-06 DIAGNOSIS — H25.13 NUCLEAR SCLEROTIC CATARACT OF BOTH EYES: ICD-10-CM

## 2021-08-06 DIAGNOSIS — H40.1231 LOW-TENSION GLAUCOMA OF BOTH EYES, MILD STAGE: Primary | ICD-10-CM

## 2021-08-06 DIAGNOSIS — H02.403 PTOSIS OF BOTH EYELIDS: ICD-10-CM

## 2021-08-06 PROCEDURE — 1159F MED LIST DOCD IN RCRD: CPT | Mod: CPTII,S$GLB,, | Performed by: OPHTHALMOLOGY

## 2021-08-06 PROCEDURE — 1159F PR MEDICATION LIST DOCUMENTED IN MEDICAL RECORD: ICD-10-PCS | Mod: CPTII,S$GLB,, | Performed by: OPHTHALMOLOGY

## 2021-08-06 PROCEDURE — 3288F PR FALLS RISK ASSESSMENT DOCUMENTED: ICD-10-PCS | Mod: CPTII,S$GLB,, | Performed by: OPHTHALMOLOGY

## 2021-08-06 PROCEDURE — 1101F PR PT FALLS ASSESS DOC 0-1 FALLS W/OUT INJ PAST YR: ICD-10-PCS | Mod: CPTII,S$GLB,, | Performed by: OPHTHALMOLOGY

## 2021-08-06 PROCEDURE — 1126F AMNT PAIN NOTED NONE PRSNT: CPT | Mod: CPTII,S$GLB,, | Performed by: OPHTHALMOLOGY

## 2021-08-06 PROCEDURE — 92012 INTRM OPH EXAM EST PATIENT: CPT | Mod: S$GLB,,, | Performed by: OPHTHALMOLOGY

## 2021-08-06 PROCEDURE — 92012 PR EYE EXAM, EST PATIENT,INTERMED: ICD-10-PCS | Mod: S$GLB,,, | Performed by: OPHTHALMOLOGY

## 2021-08-06 PROCEDURE — 1160F RVW MEDS BY RX/DR IN RCRD: CPT | Mod: CPTII,S$GLB,, | Performed by: OPHTHALMOLOGY

## 2021-08-06 PROCEDURE — 99999 PR PBB SHADOW E&M-EST. PATIENT-LVL III: CPT | Mod: PBBFAC,,, | Performed by: OPHTHALMOLOGY

## 2021-08-06 PROCEDURE — 1126F PR PAIN SEVERITY QUANTIFIED, NO PAIN PRESENT: ICD-10-PCS | Mod: CPTII,S$GLB,, | Performed by: OPHTHALMOLOGY

## 2021-08-06 PROCEDURE — 1160F PR REVIEW ALL MEDS BY PRESCRIBER/CLIN PHARMACIST DOCUMENTED: ICD-10-PCS | Mod: CPTII,S$GLB,, | Performed by: OPHTHALMOLOGY

## 2021-08-06 PROCEDURE — 99999 PR PBB SHADOW E&M-EST. PATIENT-LVL III: ICD-10-PCS | Mod: PBBFAC,,, | Performed by: OPHTHALMOLOGY

## 2021-08-06 PROCEDURE — 3288F FALL RISK ASSESSMENT DOCD: CPT | Mod: CPTII,S$GLB,, | Performed by: OPHTHALMOLOGY

## 2021-08-06 PROCEDURE — 1101F PT FALLS ASSESS-DOCD LE1/YR: CPT | Mod: CPTII,S$GLB,, | Performed by: OPHTHALMOLOGY

## 2021-08-06 RX ORDER — BIMATOPROST 0.1 MG/ML
1 SOLUTION/ DROPS OPHTHALMIC DAILY
Qty: 2.5 ML | Refills: 12 | Status: SHIPPED | OUTPATIENT
Start: 2021-08-06 | End: 2021-09-21

## 2021-08-06 RX ORDER — CALCIUM CARB/VITAMIN D3/VIT K1 500-500-40
1 TABLET,CHEWABLE ORAL DAILY
COMMUNITY
Start: 2021-01-15

## 2021-08-25 ENCOUNTER — PATIENT MESSAGE (OUTPATIENT)
Dept: INTERNAL MEDICINE | Facility: CLINIC | Age: 81
End: 2021-08-25

## 2021-09-18 ENCOUNTER — PATIENT MESSAGE (OUTPATIENT)
Dept: INTERNAL MEDICINE | Facility: CLINIC | Age: 81
End: 2021-09-18

## 2021-09-20 ENCOUNTER — OFFICE VISIT (OUTPATIENT)
Dept: DERMATOLOGY | Facility: CLINIC | Age: 81
End: 2021-09-20
Payer: MEDICARE

## 2021-09-20 ENCOUNTER — TELEPHONE (OUTPATIENT)
Dept: UROLOGY | Facility: CLINIC | Age: 81
End: 2021-09-20

## 2021-09-20 DIAGNOSIS — L30.4 INTERTRIGO: ICD-10-CM

## 2021-09-20 DIAGNOSIS — B35.3 TINEA PEDIS OF BOTH FEET: Primary | ICD-10-CM

## 2021-09-20 DIAGNOSIS — D22.9 NEVUS: ICD-10-CM

## 2021-09-20 DIAGNOSIS — Z85.828 PERSONAL HISTORY OF SKIN CANCER: ICD-10-CM

## 2021-09-20 DIAGNOSIS — L90.5 SCAR: ICD-10-CM

## 2021-09-20 DIAGNOSIS — D18.01 CHERRY ANGIOMA: ICD-10-CM

## 2021-09-20 DIAGNOSIS — L82.1 SK (SEBORRHEIC KERATOSIS): ICD-10-CM

## 2021-09-20 DIAGNOSIS — L81.4 LENTIGO: ICD-10-CM

## 2021-09-20 PROCEDURE — 99214 PR OFFICE/OUTPT VISIT, EST, LEVL IV, 30-39 MIN: ICD-10-PCS | Mod: S$GLB,,, | Performed by: DERMATOLOGY

## 2021-09-20 PROCEDURE — 99999 PR PBB SHADOW E&M-EST. PATIENT-LVL III: ICD-10-PCS | Mod: PBBFAC,,, | Performed by: DERMATOLOGY

## 2021-09-20 PROCEDURE — 3288F FALL RISK ASSESSMENT DOCD: CPT | Mod: CPTII,S$GLB,, | Performed by: DERMATOLOGY

## 2021-09-20 PROCEDURE — 1160F PR REVIEW ALL MEDS BY PRESCRIBER/CLIN PHARMACIST DOCUMENTED: ICD-10-PCS | Mod: CPTII,S$GLB,, | Performed by: DERMATOLOGY

## 2021-09-20 PROCEDURE — 1126F AMNT PAIN NOTED NONE PRSNT: CPT | Mod: CPTII,S$GLB,, | Performed by: DERMATOLOGY

## 2021-09-20 PROCEDURE — 99999 PR PBB SHADOW E&M-EST. PATIENT-LVL III: CPT | Mod: PBBFAC,,, | Performed by: DERMATOLOGY

## 2021-09-20 PROCEDURE — 1159F MED LIST DOCD IN RCRD: CPT | Mod: CPTII,S$GLB,, | Performed by: DERMATOLOGY

## 2021-09-20 PROCEDURE — 1159F PR MEDICATION LIST DOCUMENTED IN MEDICAL RECORD: ICD-10-PCS | Mod: CPTII,S$GLB,, | Performed by: DERMATOLOGY

## 2021-09-20 PROCEDURE — 1101F PR PT FALLS ASSESS DOC 0-1 FALLS W/OUT INJ PAST YR: ICD-10-PCS | Mod: CPTII,S$GLB,, | Performed by: DERMATOLOGY

## 2021-09-20 PROCEDURE — 1101F PT FALLS ASSESS-DOCD LE1/YR: CPT | Mod: CPTII,S$GLB,, | Performed by: DERMATOLOGY

## 2021-09-20 PROCEDURE — 1126F PR PAIN SEVERITY QUANTIFIED, NO PAIN PRESENT: ICD-10-PCS | Mod: CPTII,S$GLB,, | Performed by: DERMATOLOGY

## 2021-09-20 PROCEDURE — 99214 OFFICE O/P EST MOD 30 MIN: CPT | Mod: S$GLB,,, | Performed by: DERMATOLOGY

## 2021-09-20 PROCEDURE — 1160F RVW MEDS BY RX/DR IN RCRD: CPT | Mod: CPTII,S$GLB,, | Performed by: DERMATOLOGY

## 2021-09-20 PROCEDURE — 3288F PR FALLS RISK ASSESSMENT DOCUMENTED: ICD-10-PCS | Mod: CPTII,S$GLB,, | Performed by: DERMATOLOGY

## 2021-09-20 RX ORDER — NAFTIFINE HYDROCHLORIDE 1 MG/G
CREAM TOPICAL
Qty: 60 G | Refills: 2 | Status: SHIPPED | OUTPATIENT
Start: 2021-09-20 | End: 2022-02-10

## 2021-09-21 ENCOUNTER — PATIENT MESSAGE (OUTPATIENT)
Dept: INTERNAL MEDICINE | Facility: CLINIC | Age: 81
End: 2021-09-21

## 2021-09-21 ENCOUNTER — PATIENT MESSAGE (OUTPATIENT)
Dept: OPHTHALMOLOGY | Facility: CLINIC | Age: 81
End: 2021-09-21

## 2021-10-14 ENCOUNTER — OFFICE VISIT (OUTPATIENT)
Dept: OPHTHALMOLOGY | Facility: CLINIC | Age: 81
End: 2021-10-14
Payer: MEDICARE

## 2021-10-14 DIAGNOSIS — H02.403 PTOSIS OF BOTH EYELIDS: ICD-10-CM

## 2021-10-14 DIAGNOSIS — H25.13 NUCLEAR SCLEROTIC CATARACT OF BOTH EYES: ICD-10-CM

## 2021-10-14 DIAGNOSIS — H40.1231 LOW-TENSION GLAUCOMA OF BOTH EYES, MILD STAGE: Primary | ICD-10-CM

## 2021-10-14 DIAGNOSIS — H04.123 DRY EYE SYNDROME OF BOTH EYES: ICD-10-CM

## 2021-10-14 PROCEDURE — 1101F PR PT FALLS ASSESS DOC 0-1 FALLS W/OUT INJ PAST YR: ICD-10-PCS | Mod: CPTII,S$GLB,, | Performed by: OPHTHALMOLOGY

## 2021-10-14 PROCEDURE — 1160F PR REVIEW ALL MEDS BY PRESCRIBER/CLIN PHARMACIST DOCUMENTED: ICD-10-PCS | Mod: CPTII,S$GLB,, | Performed by: OPHTHALMOLOGY

## 2021-10-14 PROCEDURE — 92012 PR EYE EXAM, EST PATIENT,INTERMED: ICD-10-PCS | Mod: S$GLB,,, | Performed by: OPHTHALMOLOGY

## 2021-10-14 PROCEDURE — 1159F MED LIST DOCD IN RCRD: CPT | Mod: CPTII,S$GLB,, | Performed by: OPHTHALMOLOGY

## 2021-10-14 PROCEDURE — 99999 PR PBB SHADOW E&M-EST. PATIENT-LVL III: ICD-10-PCS | Mod: PBBFAC,,, | Performed by: OPHTHALMOLOGY

## 2021-10-14 PROCEDURE — 99499 UNLISTED E&M SERVICE: CPT | Mod: S$GLB,,, | Performed by: OPHTHALMOLOGY

## 2021-10-14 PROCEDURE — 1126F PR PAIN SEVERITY QUANTIFIED, NO PAIN PRESENT: ICD-10-PCS | Mod: CPTII,S$GLB,, | Performed by: OPHTHALMOLOGY

## 2021-10-14 PROCEDURE — 99999 PR PBB SHADOW E&M-EST. PATIENT-LVL III: CPT | Mod: PBBFAC,,, | Performed by: OPHTHALMOLOGY

## 2021-10-14 PROCEDURE — 3288F FALL RISK ASSESSMENT DOCD: CPT | Mod: CPTII,S$GLB,, | Performed by: OPHTHALMOLOGY

## 2021-10-14 PROCEDURE — 99499 RISK ADDL DX/OHS AUDIT: ICD-10-PCS | Mod: S$GLB,,, | Performed by: OPHTHALMOLOGY

## 2021-10-14 PROCEDURE — 3288F PR FALLS RISK ASSESSMENT DOCUMENTED: ICD-10-PCS | Mod: CPTII,S$GLB,, | Performed by: OPHTHALMOLOGY

## 2021-10-14 PROCEDURE — 1101F PT FALLS ASSESS-DOCD LE1/YR: CPT | Mod: CPTII,S$GLB,, | Performed by: OPHTHALMOLOGY

## 2021-10-14 PROCEDURE — 1160F RVW MEDS BY RX/DR IN RCRD: CPT | Mod: CPTII,S$GLB,, | Performed by: OPHTHALMOLOGY

## 2021-10-14 PROCEDURE — 92012 INTRM OPH EXAM EST PATIENT: CPT | Mod: S$GLB,,, | Performed by: OPHTHALMOLOGY

## 2021-10-14 PROCEDURE — 1159F PR MEDICATION LIST DOCUMENTED IN MEDICAL RECORD: ICD-10-PCS | Mod: CPTII,S$GLB,, | Performed by: OPHTHALMOLOGY

## 2021-10-14 PROCEDURE — 1126F AMNT PAIN NOTED NONE PRSNT: CPT | Mod: CPTII,S$GLB,, | Performed by: OPHTHALMOLOGY

## 2021-10-14 RX ORDER — DORZOLAMIDE HYDROCHLORIDE AND TIMOLOL MALEATE 20; 5 MG/ML; MG/ML
1 SOLUTION/ DROPS OPHTHALMIC 2 TIMES DAILY
Qty: 30 ML | Refills: 3 | Status: SHIPPED | OUTPATIENT
Start: 2021-10-14 | End: 2022-02-10

## 2021-10-15 ENCOUNTER — IMMUNIZATION (OUTPATIENT)
Dept: INTERNAL MEDICINE | Facility: CLINIC | Age: 81
End: 2021-10-15
Payer: MEDICARE

## 2021-10-15 DIAGNOSIS — Z23 NEED FOR VACCINATION: Primary | ICD-10-CM

## 2021-10-15 PROCEDURE — 91300 COVID-19, MRNA, LNP-S, PF, 30 MCG/0.3 ML DOSE VACCINE: CPT | Mod: PBBFAC | Performed by: INTERNAL MEDICINE

## 2021-10-15 PROCEDURE — 0003A COVID-19, MRNA, LNP-S, PF, 30 MCG/0.3 ML DOSE VACCINE: CPT | Mod: CV19,PBBFAC | Performed by: INTERNAL MEDICINE

## 2021-10-18 DIAGNOSIS — R97.20 ELEVATED PSA: Primary | ICD-10-CM

## 2021-10-22 ENCOUNTER — LAB VISIT (OUTPATIENT)
Dept: LAB | Facility: HOSPITAL | Age: 81
End: 2021-10-22
Attending: UROLOGY
Payer: MEDICARE

## 2021-10-22 DIAGNOSIS — R97.20 ELEVATED PSA: ICD-10-CM

## 2021-10-22 LAB — COMPLEXED PSA SERPL-MCNC: 5.5 NG/ML (ref 0–4)

## 2021-10-22 PROCEDURE — 36415 COLL VENOUS BLD VENIPUNCTURE: CPT | Performed by: UROLOGY

## 2021-10-22 PROCEDURE — 84153 ASSAY OF PSA TOTAL: CPT | Performed by: UROLOGY

## 2021-10-26 ENCOUNTER — OFFICE VISIT (OUTPATIENT)
Dept: UROLOGY | Facility: CLINIC | Age: 81
End: 2021-10-26
Payer: MEDICARE

## 2021-10-26 VITALS
BODY MASS INDEX: 23.89 KG/M2 | DIASTOLIC BLOOD PRESSURE: 87 MMHG | WEIGHT: 176.38 LBS | SYSTOLIC BLOOD PRESSURE: 143 MMHG | HEIGHT: 72 IN | HEART RATE: 53 BPM

## 2021-10-26 DIAGNOSIS — R97.20 RISING PSA LEVEL: ICD-10-CM

## 2021-10-26 DIAGNOSIS — R97.20 ELEVATED PSA: Primary | ICD-10-CM

## 2021-10-26 DIAGNOSIS — N40.1 BENIGN LOCALIZED PROSTATIC HYPERPLASIA WITH LOWER URINARY TRACT SYMPTOMS (LUTS): ICD-10-CM

## 2021-10-26 PROCEDURE — 1101F PT FALLS ASSESS-DOCD LE1/YR: CPT | Mod: CPTII,S$GLB,, | Performed by: UROLOGY

## 2021-10-26 PROCEDURE — 99213 PR OFFICE/OUTPT VISIT, EST, LEVL III, 20-29 MIN: ICD-10-PCS | Mod: S$GLB,,, | Performed by: UROLOGY

## 2021-10-26 PROCEDURE — 3077F PR MOST RECENT SYSTOLIC BLOOD PRESSURE >= 140 MM HG: ICD-10-PCS | Mod: CPTII,S$GLB,, | Performed by: UROLOGY

## 2021-10-26 PROCEDURE — 1101F PR PT FALLS ASSESS DOC 0-1 FALLS W/OUT INJ PAST YR: ICD-10-PCS | Mod: CPTII,S$GLB,, | Performed by: UROLOGY

## 2021-10-26 PROCEDURE — 1126F PR PAIN SEVERITY QUANTIFIED, NO PAIN PRESENT: ICD-10-PCS | Mod: CPTII,S$GLB,, | Performed by: UROLOGY

## 2021-10-26 PROCEDURE — 3079F PR MOST RECENT DIASTOLIC BLOOD PRESSURE 80-89 MM HG: ICD-10-PCS | Mod: CPTII,S$GLB,, | Performed by: UROLOGY

## 2021-10-26 PROCEDURE — 1159F PR MEDICATION LIST DOCUMENTED IN MEDICAL RECORD: ICD-10-PCS | Mod: CPTII,S$GLB,, | Performed by: UROLOGY

## 2021-10-26 PROCEDURE — 99999 PR PBB SHADOW E&M-EST. PATIENT-LVL III: ICD-10-PCS | Mod: PBBFAC,,, | Performed by: UROLOGY

## 2021-10-26 PROCEDURE — 99999 PR PBB SHADOW E&M-EST. PATIENT-LVL III: CPT | Mod: PBBFAC,,, | Performed by: UROLOGY

## 2021-10-26 PROCEDURE — 3079F DIAST BP 80-89 MM HG: CPT | Mod: CPTII,S$GLB,, | Performed by: UROLOGY

## 2021-10-26 PROCEDURE — 3288F FALL RISK ASSESSMENT DOCD: CPT | Mod: CPTII,S$GLB,, | Performed by: UROLOGY

## 2021-10-26 PROCEDURE — 3288F PR FALLS RISK ASSESSMENT DOCUMENTED: ICD-10-PCS | Mod: CPTII,S$GLB,, | Performed by: UROLOGY

## 2021-10-26 PROCEDURE — 1159F MED LIST DOCD IN RCRD: CPT | Mod: CPTII,S$GLB,, | Performed by: UROLOGY

## 2021-10-26 PROCEDURE — 1126F AMNT PAIN NOTED NONE PRSNT: CPT | Mod: CPTII,S$GLB,, | Performed by: UROLOGY

## 2021-10-26 PROCEDURE — 99213 OFFICE O/P EST LOW 20 MIN: CPT | Mod: S$GLB,,, | Performed by: UROLOGY

## 2021-10-26 PROCEDURE — 3077F SYST BP >= 140 MM HG: CPT | Mod: CPTII,S$GLB,, | Performed by: UROLOGY

## 2021-11-15 ENCOUNTER — PATIENT MESSAGE (OUTPATIENT)
Dept: INTERNAL MEDICINE | Facility: CLINIC | Age: 81
End: 2021-11-15
Payer: MEDICARE

## 2021-11-19 ENCOUNTER — OFFICE VISIT (OUTPATIENT)
Dept: INTERNAL MEDICINE | Facility: CLINIC | Age: 81
End: 2021-11-19
Payer: MEDICARE

## 2021-11-19 ENCOUNTER — HOSPITAL ENCOUNTER (OUTPATIENT)
Dept: RADIOLOGY | Facility: HOSPITAL | Age: 81
Discharge: HOME | End: 2021-11-19
Attending: INTERNAL MEDICINE
Payer: MEDICARE

## 2021-11-19 VITALS
SYSTOLIC BLOOD PRESSURE: 126 MMHG | DIASTOLIC BLOOD PRESSURE: 76 MMHG | WEIGHT: 181 LBS | BODY MASS INDEX: 24.52 KG/M2 | OXYGEN SATURATION: 96 % | HEART RATE: 54 BPM | HEIGHT: 72 IN | RESPIRATION RATE: 18 BRPM

## 2021-11-19 DIAGNOSIS — M54.2 NECK PAIN, CHRONIC: ICD-10-CM

## 2021-11-19 DIAGNOSIS — M54.9 BILATERAL BACK PAIN, UNSPECIFIED BACK LOCATION, UNSPECIFIED CHRONICITY: ICD-10-CM

## 2021-11-19 DIAGNOSIS — G89.29 NECK PAIN, CHRONIC: ICD-10-CM

## 2021-11-19 DIAGNOSIS — M54.2 NECK PAIN, CHRONIC: Primary | ICD-10-CM

## 2021-11-19 DIAGNOSIS — G89.29 NECK PAIN, CHRONIC: Primary | ICD-10-CM

## 2021-11-19 PROCEDURE — 3078F DIAST BP <80 MM HG: CPT | Mod: CPTII,S$GLB,, | Performed by: INTERNAL MEDICINE

## 2021-11-19 PROCEDURE — 72040 X-RAY EXAM NECK SPINE 2-3 VW: CPT | Mod: TC

## 2021-11-19 PROCEDURE — 3074F PR MOST RECENT SYSTOLIC BLOOD PRESSURE < 130 MM HG: ICD-10-PCS | Mod: CPTII,S$GLB,, | Performed by: INTERNAL MEDICINE

## 2021-11-19 PROCEDURE — 99213 PR OFFICE/OUTPT VISIT, EST, LEVL III, 20-29 MIN: ICD-10-PCS | Mod: S$GLB,,, | Performed by: INTERNAL MEDICINE

## 2021-11-19 PROCEDURE — 1101F PT FALLS ASSESS-DOCD LE1/YR: CPT | Mod: CPTII,S$GLB,, | Performed by: INTERNAL MEDICINE

## 2021-11-19 PROCEDURE — 99999 PR PBB SHADOW E&M-EST. PATIENT-LVL V: CPT | Mod: PBBFAC,,, | Performed by: INTERNAL MEDICINE

## 2021-11-19 PROCEDURE — 1101F PR PT FALLS ASSESS DOC 0-1 FALLS W/OUT INJ PAST YR: ICD-10-PCS | Mod: CPTII,S$GLB,, | Performed by: INTERNAL MEDICINE

## 2021-11-19 PROCEDURE — 3074F SYST BP LT 130 MM HG: CPT | Mod: CPTII,S$GLB,, | Performed by: INTERNAL MEDICINE

## 2021-11-19 PROCEDURE — 3288F FALL RISK ASSESSMENT DOCD: CPT | Mod: CPTII,S$GLB,, | Performed by: INTERNAL MEDICINE

## 2021-11-19 PROCEDURE — 72100 X-RAY EXAM L-S SPINE 2/3 VWS: CPT | Mod: TC

## 2021-11-19 PROCEDURE — 72040 X-RAY EXAM NECK SPINE 2-3 VW: CPT | Mod: 26,,, | Performed by: RADIOLOGY

## 2021-11-19 PROCEDURE — 3288F PR FALLS RISK ASSESSMENT DOCUMENTED: ICD-10-PCS | Mod: CPTII,S$GLB,, | Performed by: INTERNAL MEDICINE

## 2021-11-19 PROCEDURE — 99213 OFFICE O/P EST LOW 20 MIN: CPT | Mod: S$GLB,,, | Performed by: INTERNAL MEDICINE

## 2021-11-19 PROCEDURE — 3078F PR MOST RECENT DIASTOLIC BLOOD PRESSURE < 80 MM HG: ICD-10-PCS | Mod: CPTII,S$GLB,, | Performed by: INTERNAL MEDICINE

## 2021-11-19 PROCEDURE — 72100 X-RAY EXAM L-S SPINE 2/3 VWS: CPT | Mod: 26,,, | Performed by: RADIOLOGY

## 2021-11-19 PROCEDURE — 99999 PR PBB SHADOW E&M-EST. PATIENT-LVL V: ICD-10-PCS | Mod: PBBFAC,,, | Performed by: INTERNAL MEDICINE

## 2021-11-19 PROCEDURE — 72040 XR CERVICAL SPINE AP LATERAL: ICD-10-PCS | Mod: 26,,, | Performed by: RADIOLOGY

## 2021-11-19 PROCEDURE — 1159F PR MEDICATION LIST DOCUMENTED IN MEDICAL RECORD: ICD-10-PCS | Mod: CPTII,S$GLB,, | Performed by: INTERNAL MEDICINE

## 2021-11-19 PROCEDURE — 1125F AMNT PAIN NOTED PAIN PRSNT: CPT | Mod: CPTII,S$GLB,, | Performed by: INTERNAL MEDICINE

## 2021-11-19 PROCEDURE — 72100 XR LUMBAR SPINE AP AND LATERAL: ICD-10-PCS | Mod: 26,,, | Performed by: RADIOLOGY

## 2021-11-19 PROCEDURE — 1159F MED LIST DOCD IN RCRD: CPT | Mod: CPTII,S$GLB,, | Performed by: INTERNAL MEDICINE

## 2021-11-19 PROCEDURE — 1125F PR PAIN SEVERITY QUANTIFIED, PAIN PRESENT: ICD-10-PCS | Mod: CPTII,S$GLB,, | Performed by: INTERNAL MEDICINE

## 2021-11-20 DIAGNOSIS — R29.898 DECREASED RANGE OF MOTION OF NECK: Primary | ICD-10-CM

## 2021-11-20 DIAGNOSIS — M54.9 BILATERAL BACK PAIN, UNSPECIFIED BACK LOCATION, UNSPECIFIED CHRONICITY: ICD-10-CM

## 2021-11-23 ENCOUNTER — PATIENT OUTREACH (OUTPATIENT)
Dept: ADMINISTRATIVE | Facility: HOSPITAL | Age: 81
End: 2021-11-23
Payer: MEDICARE

## 2021-12-09 ENCOUNTER — CLINICAL SUPPORT (OUTPATIENT)
Dept: REHABILITATION | Facility: OTHER | Age: 81
End: 2021-12-09
Payer: MEDICARE

## 2021-12-09 DIAGNOSIS — M54.9 BILATERAL BACK PAIN, UNSPECIFIED BACK LOCATION, UNSPECIFIED CHRONICITY: ICD-10-CM

## 2021-12-09 DIAGNOSIS — R29.898 DECREASED RANGE OF MOTION OF NECK: ICD-10-CM

## 2021-12-09 DIAGNOSIS — R29.3 POOR POSTURE: ICD-10-CM

## 2021-12-09 PROCEDURE — 97161 PT EVAL LOW COMPLEX 20 MIN: CPT | Mod: PN

## 2021-12-09 PROCEDURE — 97110 THERAPEUTIC EXERCISES: CPT | Mod: PN

## 2021-12-21 ENCOUNTER — CLINICAL SUPPORT (OUTPATIENT)
Dept: REHABILITATION | Facility: OTHER | Age: 81
End: 2021-12-21
Payer: MEDICARE

## 2021-12-21 ENCOUNTER — PATIENT MESSAGE (OUTPATIENT)
Dept: OPHTHALMOLOGY | Facility: CLINIC | Age: 81
End: 2021-12-21
Payer: MEDICARE

## 2021-12-21 ENCOUNTER — TELEPHONE (OUTPATIENT)
Dept: OPHTHALMOLOGY | Facility: CLINIC | Age: 81
End: 2021-12-21
Payer: MEDICARE

## 2021-12-21 DIAGNOSIS — R29.898 DECREASED RANGE OF MOTION OF NECK: ICD-10-CM

## 2021-12-21 DIAGNOSIS — R29.3 POOR POSTURE: ICD-10-CM

## 2021-12-21 PROCEDURE — 97110 THERAPEUTIC EXERCISES: CPT | Mod: PN

## 2021-12-23 ENCOUNTER — CLINICAL SUPPORT (OUTPATIENT)
Dept: REHABILITATION | Facility: OTHER | Age: 81
End: 2021-12-23
Payer: MEDICARE

## 2021-12-23 DIAGNOSIS — R29.3 POOR POSTURE: ICD-10-CM

## 2021-12-23 DIAGNOSIS — R29.898 DECREASED RANGE OF MOTION OF NECK: ICD-10-CM

## 2021-12-23 PROCEDURE — 97140 MANUAL THERAPY 1/> REGIONS: CPT | Mod: PN | Performed by: PHYSICAL THERAPIST

## 2021-12-23 PROCEDURE — 97110 THERAPEUTIC EXERCISES: CPT | Mod: PN | Performed by: PHYSICAL THERAPIST

## 2021-12-27 ENCOUNTER — OFFICE VISIT (OUTPATIENT)
Dept: OPHTHALMOLOGY | Facility: CLINIC | Age: 81
End: 2021-12-27
Payer: MEDICARE

## 2021-12-27 ENCOUNTER — PATIENT MESSAGE (OUTPATIENT)
Dept: OPHTHALMOLOGY | Facility: CLINIC | Age: 81
End: 2021-12-27

## 2021-12-27 DIAGNOSIS — H04.123 DRY EYE SYNDROME OF BOTH EYES: ICD-10-CM

## 2021-12-27 DIAGNOSIS — H02.403 PTOSIS OF BOTH EYELIDS: ICD-10-CM

## 2021-12-27 DIAGNOSIS — H40.1231 LOW-TENSION GLAUCOMA OF BOTH EYES, MILD STAGE: Primary | ICD-10-CM

## 2021-12-27 DIAGNOSIS — H25.13 NUCLEAR SCLEROTIC CATARACT OF BOTH EYES: ICD-10-CM

## 2021-12-27 PROCEDURE — 1160F RVW MEDS BY RX/DR IN RCRD: CPT | Mod: CPTII,S$GLB,, | Performed by: OPHTHALMOLOGY

## 2021-12-27 PROCEDURE — 1101F PR PT FALLS ASSESS DOC 0-1 FALLS W/OUT INJ PAST YR: ICD-10-PCS | Mod: CPTII,S$GLB,, | Performed by: OPHTHALMOLOGY

## 2021-12-27 PROCEDURE — 92020 GONIOSCOPY: CPT | Mod: S$GLB,,, | Performed by: OPHTHALMOLOGY

## 2021-12-27 PROCEDURE — 92020 PR SPECIAL EYE EVAL,GONIOSCOPY: ICD-10-PCS | Mod: S$GLB,,, | Performed by: OPHTHALMOLOGY

## 2021-12-27 PROCEDURE — 1126F AMNT PAIN NOTED NONE PRSNT: CPT | Mod: CPTII,S$GLB,, | Performed by: OPHTHALMOLOGY

## 2021-12-27 PROCEDURE — 3288F PR FALLS RISK ASSESSMENT DOCUMENTED: ICD-10-PCS | Mod: CPTII,S$GLB,, | Performed by: OPHTHALMOLOGY

## 2021-12-27 PROCEDURE — 3288F FALL RISK ASSESSMENT DOCD: CPT | Mod: CPTII,S$GLB,, | Performed by: OPHTHALMOLOGY

## 2021-12-27 PROCEDURE — 1159F PR MEDICATION LIST DOCUMENTED IN MEDICAL RECORD: ICD-10-PCS | Mod: CPTII,S$GLB,, | Performed by: OPHTHALMOLOGY

## 2021-12-27 PROCEDURE — 92012 PR EYE EXAM, EST PATIENT,INTERMED: ICD-10-PCS | Mod: S$GLB,,, | Performed by: OPHTHALMOLOGY

## 2021-12-27 PROCEDURE — 1159F MED LIST DOCD IN RCRD: CPT | Mod: CPTII,S$GLB,, | Performed by: OPHTHALMOLOGY

## 2021-12-27 PROCEDURE — 99999 PR PBB SHADOW E&M-EST. PATIENT-LVL III: ICD-10-PCS | Mod: PBBFAC,,, | Performed by: OPHTHALMOLOGY

## 2021-12-27 PROCEDURE — 1160F PR REVIEW ALL MEDS BY PRESCRIBER/CLIN PHARMACIST DOCUMENTED: ICD-10-PCS | Mod: CPTII,S$GLB,, | Performed by: OPHTHALMOLOGY

## 2021-12-27 PROCEDURE — 1101F PT FALLS ASSESS-DOCD LE1/YR: CPT | Mod: CPTII,S$GLB,, | Performed by: OPHTHALMOLOGY

## 2021-12-27 PROCEDURE — 92012 INTRM OPH EXAM EST PATIENT: CPT | Mod: S$GLB,,, | Performed by: OPHTHALMOLOGY

## 2021-12-27 PROCEDURE — 99999 PR PBB SHADOW E&M-EST. PATIENT-LVL III: CPT | Mod: PBBFAC,,, | Performed by: OPHTHALMOLOGY

## 2021-12-27 PROCEDURE — 1126F PR PAIN SEVERITY QUANTIFIED, NO PAIN PRESENT: ICD-10-PCS | Mod: CPTII,S$GLB,, | Performed by: OPHTHALMOLOGY

## 2022-01-04 NOTE — PROGRESS NOTES
Physical Therapy Treatment Note     Name: Fred Schwab Jr.  Clinic Number: [id number]    Therapy Diagnosis:   Encounter Diagnoses   Name Primary?    Decreased range of motion of neck     Poor posture      Physician: Marco Antonio Vaughan MD    Visit Date: 1/5/2022    Physician Orders: PT Eval and Treat   Medical Diagnosis: Decreased range of motion of neck (R29.898); Bilateral back pain, unspecified back location, unspecified chronicity (M54.9)   Evaluation Date: 12/9/2021  Authorization Period Expiration: 12/31/2021  Plan of Care Certification Period: 12/9/2021 - 03/09/2022  Visit #/Visits authorized: 3/ 6 (3 visits total)  Re-assessment due 1/9/2022  FOTO: 12/9/2021 - next visit  PT/PTA visit: 0/6    Time In: 1:04 pm  Time Out: 1:50 pm  Total Billable Time: 45 minutes    Precautions: Standard    Subjective     Pt reports: he is a little disappointed as his neck isn't seeming to get any better. His neck is still just as stiff.  He does exercises at least 1-2x/day but continues to have stiffness. Sometimes it feels better after the exercises but it is usually for a short time. Pain is usually both sides of the neck and indicates upper trap area.  Stiffness was relieved after last session with soft tissue work but eventually came back.  Continues to have difficulty looking side to side bilaterally and looking up.     He was compliant with home exercise program.  Response to previous treatment: no adverse effects from IE  Functional change: none    Pain: 0/10  Location: bilateral neck      Objective     Tavares received therapeutic exercises to develop strength, endurance, ROM, flexibility and posture for 25 minutes including:    Supine Cervical retraction and chin tuck 20x 5 second holds  Cervical retraction and chin tuck with rotation 20x 5 second holds each side   Supine chin tuck and cervical flexion 20x 5 second holds  SNAG rotation with 10 second holds at end range x10 each direction  No money with OTB x30  Seated  "horizontal ABD with OTB x10 each with cueing for chin tuck and posture      Not performed today:  Posture exercises, performed over towel roll   Pec stretch x3 minutes   Bear hugs x10, 3#   Swimmers x10 each, 3#   Horizontal abd with OTB x10   SA punch with 5# weights at approx 90 and 120 degrees shoulder flexion x10 each   D2 flexion with OTB x10 each   Cervical isometrics 5" hold  Supine cervical retractions 30x 5 second holds  Upper trap stretch 3x 30 seconds- reviewed for hep  Levator scap stretch 3x 30 seconds- reviewed for hep  Scap retractions 30x 5 second holds- reviewed for hep    Tavares received the following manual therapy techniques: Joint mobilizations, Manual traction, Myofacial release and Soft tissue Mobilization were applied to the: B neck and cervical spine for 20 minutes, including:    STM cervical paraspinals and UT  Manual distraction   Suboccipital release  Grade III up glides R mid c-spine    Not performed today:  First rib mob R  Grade III downglides R mid cervical      Home Exercises Provided and Patient Education Provided     Education provided:   - continuing with prior HEP, can perform multiple times a day if needed  - differences between isometric exercises and supine cervical retractions    Written Home Exercises Provided: Patient instructed to cont prior HEP.  Exercises were reviewed and Tavares was able to demonstrate them prior to the end of the session.  Tavares demonstrated good  understanding of the education provided.     See EMR under Patient Instructions for exercises provided prior visit.    Assessment     Continues to have stiffness in neck and upper trap area that patient notes improves with manual interventions but returns shortly after.  Emphasized importance of proper posture and head/neck position to allow more motion in neck region.  Patient has difficulty performing chin tuck and cervical retraction requiring cueing and intermittent tactile assist.     Tavares is progressing well " towards his goals.   Pt prognosis is Fair.     Pt will continue to benefit from skilled outpatient physical therapy to address the deficits listed in the problem list box on initial evaluation, provide pt/family education and to maximize pt's level of independence in the home and community environment.     Pt's spiritual, cultural and educational needs considered and pt agreeable to plan of care and goals.     Anticipated barriers to physical therapy: chronicity of condition    Goals:     Short Term Goals: 4 weeks  1.Report decreased in pain at worse less than  <   / =  4  /10  to increase tolerance for functional activities. On going  2. Pt to improve range of motion of cervical spine by 15% to allow for improved functional mobility to allow for improvement in IADLs.  On going  3. Increased  UE MMT 1/2  grade to increase tolerance for ADL and work activities. On going  4.  Pt to tolerate HEP to improve ROM and independence with ADL's. On going  5. Increased MMT  for lower traps/middle traps/rhomboids to > or = 4-/5 to increase tolerance for ADL and improve posture. On going     Long Term Goals: 8 weeks  1.Report decreased in pain at worse less than  <   / =  2  /10  to increase tolerance for functional activities   2. Pt will demonstrate full LUE and periscapular strength without the provocation of pain for ease with household chores  3. Pt will demonstrate appropriate upright posture without external cueing for ease with work related activities.   4.Increased UE MMT  1 grade  to increase tolerance for ADL and work activities.  5.  Pt will report 38% on FOTO neck survey score for neck pain disability to demonstrate decrease in disability and improvement in neck pain.  6. Pt to be Independent with HEP to improve ROM and independence with ADL's  7. Increased MMT  for lower traps/middle traps/rhomboids to > or = 4/5 to increase tolerance for ADL and improve posture.    Plan     Continue with established PT POC and  progress per pt tolerance.      Lexis Mackenzie, PT

## 2022-01-05 ENCOUNTER — CLINICAL SUPPORT (OUTPATIENT)
Dept: REHABILITATION | Facility: OTHER | Age: 82
End: 2022-01-05
Payer: MEDICARE

## 2022-01-05 DIAGNOSIS — R29.3 POOR POSTURE: ICD-10-CM

## 2022-01-05 DIAGNOSIS — R29.898 DECREASED RANGE OF MOTION OF NECK: ICD-10-CM

## 2022-01-05 PROCEDURE — 97110 THERAPEUTIC EXERCISES: CPT | Mod: PN

## 2022-01-05 PROCEDURE — 97140 MANUAL THERAPY 1/> REGIONS: CPT | Mod: PN

## 2022-01-18 ENCOUNTER — CLINICAL SUPPORT (OUTPATIENT)
Dept: REHABILITATION | Facility: OTHER | Age: 82
End: 2022-01-18
Payer: MEDICARE

## 2022-01-18 DIAGNOSIS — R29.898 DECREASED RANGE OF MOTION OF NECK: Primary | ICD-10-CM

## 2022-01-18 DIAGNOSIS — R29.3 POOR POSTURE: ICD-10-CM

## 2022-01-18 PROCEDURE — 97110 THERAPEUTIC EXERCISES: CPT | Mod: PN

## 2022-01-18 PROCEDURE — 97140 MANUAL THERAPY 1/> REGIONS: CPT | Mod: PN

## 2022-01-18 NOTE — PROGRESS NOTES
Physical Therapy Treatment Note     Name: Fred Schwab Jr.  Clinic Number: [id number]    Therapy Diagnosis:   Encounter Diagnoses   Name Primary?    Decreased range of motion of neck Yes    Poor posture      Physician: Marco Antonio Vaughan MD    Visit Date: 1/18/2022    Physician Orders: PT Eval and Treat   Medical Diagnosis: Decreased range of motion of neck (R29.898); Bilateral back pain, unspecified back location, unspecified chronicity (M54.9)   Evaluation Date: 12/9/2021  Authorization Period Expiration: 12/31/2021  Plan of Care Certification Period: 12/9/2021 - 03/09/2022  Visit #/Visits authorized: 4/ 6 (5 visits total)  Re-assessment due 1/9/2022  FOTO: 1/18/22  PT/PTA visit: 0/6    Time In: 11:15 am  Time Out: 12:00 pm  Total Billable Time: 45 minutes    Precautions: Standard    Subjective     Pt reports: he is a little disappointed as his neck isn't seeming to get any better. His neck is still just as stiff.     He was compliant with home exercise program.  Response to previous treatment: no improvement in symptoms  Functional change: none    Pain: 0/10  Location: bilateral neck      Objective   Reassessment 1/18/2022  Starting at 10 deg of flexion  Cervical Range of Motion:     Degrees Pain   Flexion 45 Y      Extension 20 Y   Right Side Bending 15 Y      Left Side Bending 20 Y      Right Rotation 20 Y   Left Rotation 20 Y      Shoulder Range of Motion: WFL  Upper Extremity Strength  (R) UE   (L) UE     Shoulder flexion: 4+/5 Shoulder flexion: 4+/5   Shoulder Abduction: 4+/5 Shoulder abduction: 4+/5   Shoulder ER 4/5 Shoulder ER 4/5   Shoulder IR 4/5 Shoulder IR 4/5   Elbow flexion: 4/5 Elbow flexion: 4/5   Elbow extension: 4/5 Elbow extension: 4/5   Wrist flexion: 5/5 Wrist flexion: 5/5   Wrist extension: 5/5 Wrist extension: 5/5    4/5 : 5/5            Tavares received therapeutic exercises to develop strength, endurance, ROM, flexibility and posture for 30 minutes including:  Reassessment  Supine  "Cervical retraction  20x 5 second holds  Cervical retraction and chin tuck with rotation 20x 5 second holds each side   SNAG rotation with 10 second holds at end range x10 each direction  No money with OTB x30  Seated horizontal ABD with OTB x10 each with cueing for chin tuck and posture      Not performed today:  Posture exercises, performed over towel roll   Pec stretch x3 minutes   Bear hugs x10, 3#   Swimmers x10 each, 3#   Horizontal abd with OTB x10   SA punch with 5# weights at approx 90 and 120 degrees shoulder flexion x10 each   D2 flexion with OTB x10 each   Cervical isometrics 5" hold  Supine cervical retractions 30x 5 second holds  Upper trap stretch 3x 30 seconds- reviewed for hep  Levator scap stretch 3x 30 seconds- reviewed for hep  Scap retractions 30x 5 second holds- reviewed for hep    Tavares received the following manual therapy techniques: Joint mobilizations, Manual traction, Myofacial release and Soft tissue Mobilization were applied to the: B neck and cervical spine for 15 minutes, including:    STM cervical paraspinals and UT  Manual distraction   Suboccipital release  Grade III up glides R mid c-spine  Thoracic PA mobilizations grade 3    Not performed today:  First rib mob R  Grade III downglides R mid cervical      Home Exercises Provided and Patient Education Provided     Education provided:   - continuing with prior HEP, can perform multiple times a day if needed  - differences between isometric exercises and supine cervical retractions    Written Home Exercises Provided: Patient instructed to cont prior HEP.  Exercises were reviewed and Tavares was able to demonstrate them prior to the end of the session.  Tavares demonstrated good  understanding of the education provided.     See EMR under Patient Instructions for exercises provided prior visit.    Assessment   Tavares was reassessed today, He demos slight improvement in ROM of Cervical spine, and slight improvement in FOTO scores. He cont to " reports significant stiffness in neck and upper trap area, with no perceived improvement from evaluation.  He states he completes exercises daily with slight relief that returns shortly after. Pt with significant forward head posture and upper crossed syndrome. Pt educated extensively on the affects of motion/pain with forward head posture. Pt advised to make more PT appointments follow his next visit, he reports he will decide at next appointment.     Tavares is progressing well towards his goals.   Pt prognosis is Fair.     Pt will continue to benefit from skilled outpatient physical therapy to address the deficits listed in the problem list box on initial evaluation, provide pt/family education and to maximize pt's level of independence in the home and community environment.     Pt's spiritual, cultural and educational needs considered and pt agreeable to plan of care and goals.     Anticipated barriers to physical therapy: chronicity of condition    Goals:     Short Term Goals: 4 weeks  1.Report decreased in pain at worse less than  <   / =  4  /10  to increase tolerance for functional activities. On going  2. Pt to improve range of motion of cervical spine by 15% to allow for improved functional mobility to allow for improvement in IADLs.  On going  3. Increased  UE MMT 1/2  grade to increase tolerance for ADL and work activities. On going  4.  Pt to tolerate HEP to improve ROM and independence with ADL's. On going  5. Increased MMT  for lower traps/middle traps/rhomboids to > or = 4-/5 to increase tolerance for ADL and improve posture. On going     Long Term Goals: 8 weeks  1.Report decreased in pain at worse less than  <   / =  2  /10  to increase tolerance for functional activities   2. Pt will demonstrate full LUE and periscapular strength without the provocation of pain for ease with household chores  3. Pt will demonstrate appropriate upright posture without external cueing for ease with work related  activities.   4.Increased UE MMT  1 grade  to increase tolerance for ADL and work activities.  5.  Pt will report 38% on FOTO neck survey score for neck pain disability to demonstrate decrease in disability and improvement in neck pain.  6. Pt to be Independent with HEP to improve ROM and independence with ADL's  7. Increased MMT  for lower traps/middle traps/rhomboids to > or = 4/5 to increase tolerance for ADL and improve posture.    Plan     Continue with established PT POC and progress per pt tolerance.      Agus Calle, PT

## 2022-01-26 NOTE — PROGRESS NOTES
HPI     DLS: 12/27/2021    Pt here for SLT OD;  Pt states no eye pain or discomfort.     Meds;   AT's PRN OU     1. LTG   2. NS OU   3. Ptosis Sx  (Dr. Mendoza)   4. PIERCE   5. Disc Heme OS   6. Astigmatism and Presbyopia     Last edited by Emma Ambrosio on 1/27/2022  9:59 AM. (History)              Assessment /Plan     For exam results, see Encounter Report.    Low-tension glaucoma of both eyes, mild stage    Dry eye syndrome of both eyes    Nuclear sclerotic cataract of both eyes    Ptosis of both eyelids            Last seen 5/2017 -- now it is 5/2021 (4yrs)   Has been seeing his optometrist   Sent back in for OCT - RNFL changes - his optom told him to start gtts - so came back in     Pts wife is Carole Schwab - a glaucoma patient of Dr. Snyder    1.   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 Snyder)        Glaucoma meds   None presently -  Tried xalatan and lumigan - mason ok - but no change in IOP // intol to cosopt         H/O adverse rxn to glaucoma drops    cosopt - caused stomach issues - improved off it         LASERS    none        GLAUCOMA SURGERIES    none        OTHER EYE SURGERIES    none        CDR    0.85/0.8        Tbase    10-20 / 12-20          Tmax    19/17    (20 at outside optom per pt)         Ttarget    ?             HVF    12 test 2000 to 2017 - full od / full os   1 test 2021 to 2021 (4yr gap) - hint early SAD od // full os         Gonio    +3 ou        CCT    [id number]        OCT    8 test 2005 to 2021 - RNFL  - dec G/TS  od// dec TI, bord G/N os (+prog ou)         HRT    7 test 2006 to 2017 - MR -  Dec. I, anthony T od // [place] off os /// CDR 0.73 od // [place] off  os        Disc photos    2003, 2005 - slides // 2010, 2015 - + disc heme ST os  - OIS    - Ttoday    22/20   - reports good compliance with latanoprost / lumigan - no apparent response - IOP  baileyer   - Test done today SLT OD     2.   NS ou - mild - monitor   Pt noting decreased vision at near- more hazy than last visit     3. ERM os - see OCT - 5/31/2021 - mild     4.   Ptosis / Dermatochalasis        S/P sx with Dr. Mendoza 4/5/2001    5.   PIERCE - AT's prn   (Needed steroid gtts from Dr. Urena for PIERCE) and AT's    6. Disc Heme OS   See photos 11/4/2015     7.  Astigmatism / presbyopia       Glasses - Florian - given RX today says the glasses are old - no real change    8.   Skin CA face - S/P sx    Plan  LTG - suspect vs mild disease   OCT w/ inf thinning ou - first time 1/2017 - some prog from 2017 to 2021   -H/O  disc heme today (+H/O disc heme in past)   -HVF - hint SAD od // full os (?prog od)     No apparent effect with latanoprost  Or lumigan - but tolerated ok - used q am    cosopt bid ou -  Intolerant - had to stop GI / stomach issues        IOP still at baseline  (LTG)  of 18-20 consider SLT's  - rec SLT's ou - OD then OS     SLT OD 1/27/2022 - steroid taper - over 12 days - written instructions given   SLT OS - pending     I have seen and personally examined the patient.  I agree with the findings, assessment and plan of the resident and/or fellow.     Latonia Snyder MD

## 2022-01-27 ENCOUNTER — OFFICE VISIT (OUTPATIENT)
Dept: OPHTHALMOLOGY | Facility: CLINIC | Age: 82
End: 2022-01-27
Payer: MEDICARE

## 2022-01-27 DIAGNOSIS — H02.403 PTOSIS OF BOTH EYELIDS: ICD-10-CM

## 2022-01-27 DIAGNOSIS — H40.1231 LOW-TENSION GLAUCOMA OF BOTH EYES, MILD STAGE: Primary | ICD-10-CM

## 2022-01-27 DIAGNOSIS — H25.13 NUCLEAR SCLEROTIC CATARACT OF BOTH EYES: ICD-10-CM

## 2022-01-27 DIAGNOSIS — H04.123 DRY EYE SYNDROME OF BOTH EYES: ICD-10-CM

## 2022-01-27 PROCEDURE — 1160F PR REVIEW ALL MEDS BY PRESCRIBER/CLIN PHARMACIST DOCUMENTED: ICD-10-PCS | Mod: CPTII,S$GLB,, | Performed by: OPHTHALMOLOGY

## 2022-01-27 PROCEDURE — 1159F PR MEDICATION LIST DOCUMENTED IN MEDICAL RECORD: ICD-10-PCS | Mod: CPTII,S$GLB,, | Performed by: OPHTHALMOLOGY

## 2022-01-27 PROCEDURE — 99999 PR PBB SHADOW E&M-EST. PATIENT-LVL III: ICD-10-PCS | Mod: PBBFAC,,, | Performed by: OPHTHALMOLOGY

## 2022-01-27 PROCEDURE — 1126F AMNT PAIN NOTED NONE PRSNT: CPT | Mod: CPTII,S$GLB,, | Performed by: OPHTHALMOLOGY

## 2022-01-27 PROCEDURE — 99499 UNLISTED E&M SERVICE: CPT | Mod: S$GLB,,, | Performed by: OPHTHALMOLOGY

## 2022-01-27 PROCEDURE — 99499 NO LOS: ICD-10-PCS | Mod: S$GLB,,, | Performed by: OPHTHALMOLOGY

## 2022-01-27 PROCEDURE — 99999 PR PBB SHADOW E&M-EST. PATIENT-LVL III: CPT | Mod: PBBFAC,,, | Performed by: OPHTHALMOLOGY

## 2022-01-27 PROCEDURE — 3288F FALL RISK ASSESSMENT DOCD: CPT | Mod: CPTII,S$GLB,, | Performed by: OPHTHALMOLOGY

## 2022-01-27 PROCEDURE — 3288F PR FALLS RISK ASSESSMENT DOCUMENTED: ICD-10-PCS | Mod: CPTII,S$GLB,, | Performed by: OPHTHALMOLOGY

## 2022-01-27 PROCEDURE — 1159F MED LIST DOCD IN RCRD: CPT | Mod: CPTII,S$GLB,, | Performed by: OPHTHALMOLOGY

## 2022-01-27 PROCEDURE — 1101F PR PT FALLS ASSESS DOC 0-1 FALLS W/OUT INJ PAST YR: ICD-10-PCS | Mod: CPTII,S$GLB,, | Performed by: OPHTHALMOLOGY

## 2022-01-27 PROCEDURE — 1160F RVW MEDS BY RX/DR IN RCRD: CPT | Mod: CPTII,S$GLB,, | Performed by: OPHTHALMOLOGY

## 2022-01-27 PROCEDURE — 65855 TRABECULOPLASTY LASER SURG: CPT | Mod: RT,S$GLB,, | Performed by: OPHTHALMOLOGY

## 2022-01-27 PROCEDURE — 1126F PR PAIN SEVERITY QUANTIFIED, NO PAIN PRESENT: ICD-10-PCS | Mod: CPTII,S$GLB,, | Performed by: OPHTHALMOLOGY

## 2022-01-27 PROCEDURE — 1101F PT FALLS ASSESS-DOCD LE1/YR: CPT | Mod: CPTII,S$GLB,, | Performed by: OPHTHALMOLOGY

## 2022-01-27 PROCEDURE — 65855 ARGON TRABECULOPLASTY - OD - RIGHT EYE: ICD-10-PCS | Mod: RT,S$GLB,, | Performed by: OPHTHALMOLOGY

## 2022-02-09 NOTE — PROGRESS NOTES
HPI     Laser Treatment     Comments: Slt os              Comments     SLT OS TODAY   -S/p SLT OD 1/27/22    1. LTG  2. NS OU  3. Ptosis Sx  (Dr. Mendoza)  4. PIERCE  5. Disc Heme OS  6. Astigmatism and Presbyopia    MEDS:  AT's PRN OU          Last edited by Betsy Barker MA on 2/10/2022  9:55 AM. (History)            Assessment /Plan     For exam results, see Encounter Report.    Low-tension glaucoma of both eyes, mild stage    Dry eye syndrome of both eyes    Nuclear sclerotic cataract of both eyes    Ptosis of both eyelids          Lost to F/U 5/2017 to  5/2021 (4yrs)   Has been seeing his optometrist   Sent back in for OCT - RNFL changes - his optom told him to start gtts - so came back in     Pts wife is Carole Schwab - a glaucoma patient of Dr. Snyder    1.   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 Snyder)        Glaucoma meds   None presently -  Tried xalatan and lumigan - mason ok - but no change in IOP // intol to cosopt         H/O adverse rxn to glaucoma drops    cosopt - caused stomach issues - improved off it         LASERS    SLT OD - 1/27/2022 good resp 22--> 16 // SLT os - 2/10/2022        GLAUCOMA SURGERIES    none        OTHER EYE SURGERIES    none        CDR    0.85/0.8        Tbase    10-20 / 12-20          Tmax    19/17    (20 at outside optom per pt)         Ttarget    ?             HVF    12 test 2000 to 2017 - full od / full os   1 test 2021 to 2021 (4yr gap) - hint early SAD od // full os         Gonio    +3 ou        CCT    [id number]        OCT    8 test 2005 to 2021 - RNFL  - dec G/TS  od// dec TI, anthony G/N os (+prog ou)         HRT    7 test 2006 to 2017 - MR -  Dec. Ianthony T od // [place] off os /// CDR 0.73 od // [place] off  os        Disc photos    2003, 2005 - slides // 2010, 2015 - + disc heme ST os  - OIS    - Ttoday    16/20    Good initial resp  to slt od - 22--> 16   - Test done today SLT OS // F/U SLT OD  1/27/2022     2.   NS ou - mild - monitor   Pt noting decreased vision at near- more hazy than last visit     3. ERM os - see OCT - 5/31/2021 - mild     4.   Ptosis / Dermatochalasis        S/P sx with Dr. Mendoza 4/5/2001    5.   PIERCE - AT's prn   (Needed steroid gtts from Dr. Urena for PIERCE) and AT's    6. Disc Heme OS   See photos 11/4/2015     7.  Astigmatism / presbyopia       Glasses - Florian - given RX today says the glasses are old - no real change    8.   Skin CA face - S/P sx    Plan  LTG - suspect vs mild disease   OCT w/ inf thinning ou - first time 1/2017 - some prog from 2017 to 2021   -H/O  disc heme today (+H/O disc heme in past)   -HVF - hint SAD od // full os (?prog od)     No apparent effect with latanoprost  Or lumigan - but tolerated ok - used q am    cosopt bid ou -  Intolerant - had to stop GI / stomach issues        IOP still at baseline  (LTG)  of 18-20 consider SLT's  - rec SLT's ou - OD then OS     SLT OD 1/27/2022 - good initial reap 22--> 16   SLT OS - pending steroid taper - over 12 days - written instructions given     F/U 1 month for IOP check post slt ou     I have seen and personally examined the patient.  I agree with the findings, assessment and plan of the resident and/or fellow.     Latonia Snyder MD

## 2022-02-10 ENCOUNTER — OFFICE VISIT (OUTPATIENT)
Dept: OPHTHALMOLOGY | Facility: CLINIC | Age: 82
End: 2022-02-10
Payer: MEDICARE

## 2022-02-10 DIAGNOSIS — H04.123 DRY EYE SYNDROME OF BOTH EYES: ICD-10-CM

## 2022-02-10 DIAGNOSIS — H40.1231 LOW-TENSION GLAUCOMA OF BOTH EYES, MILD STAGE: Primary | ICD-10-CM

## 2022-02-10 DIAGNOSIS — H02.403 PTOSIS OF BOTH EYELIDS: ICD-10-CM

## 2022-02-10 DIAGNOSIS — H25.13 NUCLEAR SCLEROTIC CATARACT OF BOTH EYES: ICD-10-CM

## 2022-02-10 PROCEDURE — 99499 UNLISTED E&M SERVICE: CPT | Mod: S$GLB,,, | Performed by: OPHTHALMOLOGY

## 2022-02-10 PROCEDURE — 1126F AMNT PAIN NOTED NONE PRSNT: CPT | Mod: CPTII,S$GLB,, | Performed by: OPHTHALMOLOGY

## 2022-02-10 PROCEDURE — 1126F PR PAIN SEVERITY QUANTIFIED, NO PAIN PRESENT: ICD-10-PCS | Mod: CPTII,S$GLB,, | Performed by: OPHTHALMOLOGY

## 2022-02-10 PROCEDURE — 1160F RVW MEDS BY RX/DR IN RCRD: CPT | Mod: CPTII,S$GLB,, | Performed by: OPHTHALMOLOGY

## 2022-02-10 PROCEDURE — 1159F PR MEDICATION LIST DOCUMENTED IN MEDICAL RECORD: ICD-10-PCS | Mod: CPTII,S$GLB,, | Performed by: OPHTHALMOLOGY

## 2022-02-10 PROCEDURE — 1101F PT FALLS ASSESS-DOCD LE1/YR: CPT | Mod: CPTII,S$GLB,, | Performed by: OPHTHALMOLOGY

## 2022-02-10 PROCEDURE — 3288F FALL RISK ASSESSMENT DOCD: CPT | Mod: CPTII,S$GLB,, | Performed by: OPHTHALMOLOGY

## 2022-02-10 PROCEDURE — 1159F MED LIST DOCD IN RCRD: CPT | Mod: CPTII,S$GLB,, | Performed by: OPHTHALMOLOGY

## 2022-02-10 PROCEDURE — 3288F PR FALLS RISK ASSESSMENT DOCUMENTED: ICD-10-PCS | Mod: CPTII,S$GLB,, | Performed by: OPHTHALMOLOGY

## 2022-02-10 PROCEDURE — 99499 NO LOS: ICD-10-PCS | Mod: S$GLB,,, | Performed by: OPHTHALMOLOGY

## 2022-02-10 PROCEDURE — 99999 PR PBB SHADOW E&M-EST. PATIENT-LVL III: ICD-10-PCS | Mod: PBBFAC,,, | Performed by: OPHTHALMOLOGY

## 2022-02-10 PROCEDURE — 1101F PR PT FALLS ASSESS DOC 0-1 FALLS W/OUT INJ PAST YR: ICD-10-PCS | Mod: CPTII,S$GLB,, | Performed by: OPHTHALMOLOGY

## 2022-02-10 PROCEDURE — 65855 TRABECULOPLASTY LASER SURG: CPT | Mod: LT,S$GLB,, | Performed by: OPHTHALMOLOGY

## 2022-02-10 PROCEDURE — 99999 PR PBB SHADOW E&M-EST. PATIENT-LVL III: CPT | Mod: PBBFAC,,, | Performed by: OPHTHALMOLOGY

## 2022-02-10 PROCEDURE — 1160F PR REVIEW ALL MEDS BY PRESCRIBER/CLIN PHARMACIST DOCUMENTED: ICD-10-PCS | Mod: CPTII,S$GLB,, | Performed by: OPHTHALMOLOGY

## 2022-02-10 PROCEDURE — 65855 ARGON TRABECULOPLASTY: ICD-10-PCS | Mod: LT,S$GLB,, | Performed by: OPHTHALMOLOGY

## 2022-02-15 ENCOUNTER — PES CALL (OUTPATIENT)
Dept: ADMINISTRATIVE | Facility: CLINIC | Age: 82
End: 2022-02-15
Payer: MEDICARE

## 2022-02-23 DIAGNOSIS — I10 HYPERTENSION, UNSPECIFIED TYPE: ICD-10-CM

## 2022-02-23 NOTE — TELEPHONE ENCOUNTER
No new care gaps identified.  Powered by [employer]. Reference number: [id number].   2/23/2022 12:11:24 AM CST   Repeat troponin sent to lab      Ebony Oliveira RN  09/16/20 [id number]

## 2022-02-23 NOTE — TELEPHONE ENCOUNTER
No new care gaps identified.  Powered by [employer]. Reference number: [id number].   2/23/2022 7:54:59 AM CST

## 2022-02-24 RX ORDER — PROPRANOLOL HYDROCHLORIDE 10 MG/1
TABLET ORAL
Qty: 180 TABLET | Refills: 0 | OUTPATIENT
Start: 2022-02-24

## 2022-02-24 RX ORDER — PROPRANOLOL HYDROCHLORIDE 10 MG/1
10 TABLET ORAL 2 TIMES DAILY
Qty: 180 TABLET | Refills: 3 | Status: SHIPPED | OUTPATIENT
Start: 2022-02-24 | End: 2023-02-20

## 2022-03-04 ENCOUNTER — OFFICE VISIT (OUTPATIENT)
Dept: URGENT CARE | Facility: CLINIC | Age: 82
End: 2022-03-04
Payer: MEDICARE

## 2022-03-04 ENCOUNTER — TELEPHONE (OUTPATIENT)
Dept: INTERNAL MEDICINE | Facility: CLINIC | Age: 82
End: 2022-03-04
Payer: MEDICARE

## 2022-03-04 VITALS
BODY MASS INDEX: 24.52 KG/M2 | OXYGEN SATURATION: 100 % | HEIGHT: 72 IN | TEMPERATURE: 97 F | DIASTOLIC BLOOD PRESSURE: 101 MMHG | WEIGHT: 181 LBS | RESPIRATION RATE: 17 BRPM | HEART RATE: 59 BPM | SYSTOLIC BLOOD PRESSURE: 172 MMHG

## 2022-03-04 DIAGNOSIS — M62.830 BACK SPASM: ICD-10-CM

## 2022-03-04 DIAGNOSIS — S39.012A BACK STRAIN, INITIAL ENCOUNTER: Primary | ICD-10-CM

## 2022-03-04 PROCEDURE — 99203 OFFICE O/P NEW LOW 30 MIN: CPT | Mod: 25,S$GLB,, | Performed by: NURSE PRACTITIONER

## 2022-03-04 PROCEDURE — 96372 PR INJECTION,THERAP/PROPH/DIAG2ST, IM OR SUBCUT: ICD-10-PCS | Mod: S$GLB,,, | Performed by: NURSE PRACTITIONER

## 2022-03-04 PROCEDURE — 3080F DIAST BP >= 90 MM HG: CPT | Mod: CPTII,S$GLB,, | Performed by: NURSE PRACTITIONER

## 2022-03-04 PROCEDURE — 99203 PR OFFICE/OUTPT VISIT, NEW, LEVL III, 30-44 MIN: ICD-10-PCS | Mod: 25,S$GLB,, | Performed by: NURSE PRACTITIONER

## 2022-03-04 PROCEDURE — 3077F SYST BP >= 140 MM HG: CPT | Mod: CPTII,S$GLB,, | Performed by: NURSE PRACTITIONER

## 2022-03-04 PROCEDURE — 3080F PR MOST RECENT DIASTOLIC BLOOD PRESSURE >= 90 MM HG: ICD-10-PCS | Mod: CPTII,S$GLB,, | Performed by: NURSE PRACTITIONER

## 2022-03-04 PROCEDURE — 1159F PR MEDICATION LIST DOCUMENTED IN MEDICAL RECORD: ICD-10-PCS | Mod: CPTII,S$GLB,, | Performed by: NURSE PRACTITIONER

## 2022-03-04 PROCEDURE — 1160F PR REVIEW ALL MEDS BY PRESCRIBER/CLIN PHARMACIST DOCUMENTED: ICD-10-PCS | Mod: CPTII,S$GLB,, | Performed by: NURSE PRACTITIONER

## 2022-03-04 PROCEDURE — 3077F PR MOST RECENT SYSTOLIC BLOOD PRESSURE >= 140 MM HG: ICD-10-PCS | Mod: CPTII,S$GLB,, | Performed by: NURSE PRACTITIONER

## 2022-03-04 PROCEDURE — 1159F MED LIST DOCD IN RCRD: CPT | Mod: CPTII,S$GLB,, | Performed by: NURSE PRACTITIONER

## 2022-03-04 PROCEDURE — 96372 THER/PROPH/DIAG INJ SC/IM: CPT | Mod: S$GLB,,, | Performed by: NURSE PRACTITIONER

## 2022-03-04 PROCEDURE — 1160F RVW MEDS BY RX/DR IN RCRD: CPT | Mod: CPTII,S$GLB,, | Performed by: NURSE PRACTITIONER

## 2022-03-04 RX ORDER — CYCLOBENZAPRINE HCL 5 MG
5 TABLET ORAL 3 TIMES DAILY PRN
Qty: 15 TABLET | Refills: 0 | Status: SHIPPED | OUTPATIENT
Start: 2022-03-04 | End: 2022-03-11 | Stop reason: SDUPTHER

## 2022-03-04 RX ORDER — KETOROLAC TROMETHAMINE 30 MG/ML
30 INJECTION, SOLUTION INTRAMUSCULAR; INTRAVENOUS
Status: COMPLETED | OUTPATIENT
Start: 2022-03-04 | End: 2022-03-04

## 2022-03-04 RX ORDER — NAPROXEN 500 MG/1
500 TABLET ORAL 2 TIMES DAILY WITH MEALS
Qty: 20 TABLET | Refills: 0 | Status: SHIPPED | OUTPATIENT
Start: 2022-03-05 | End: 2022-03-15

## 2022-03-04 RX ADMIN — KETOROLAC TROMETHAMINE 30 MG: 30 INJECTION, SOLUTION INTRAMUSCULAR; INTRAVENOUS at 01:03

## 2022-03-04 NOTE — PATIENT INSTRUCTIONS
You received an injection of a powerful NSAID today (Toradol).  It's effects will last up to 24 hours. Please do not take another NSAID (ie aspirin, ibuprofen, Aleve, Advil or Motrin) until this tomorrow morning.  If you continue to have pain, you may take Tylenol (acetaminophen) if you are not allergic to this medication.

## 2022-03-04 NOTE — TELEPHONE ENCOUNTER
[place]. He [place] hilking in the woods yesterday and may have pulled a muscle in his back. He has take 2 Aleve to no avail. They stopped at a [employer] to get Salonpas patch. This is also not working. Wife said that he has tried a muscle relaxant in the past.

## 2022-03-04 NOTE — TELEPHONE ENCOUNTER
----- Message from Layla Turner sent at 3/4/2022  8:51 AM CST -----  Contact: Wife ( [place]) [phone]  Patient would like to get medical advice.  Symptoms (please be specific):  muscle spasms   How long have you had these symptoms: x 3days   Would you like a call back, or a response through your [facility] portal?:  call  Pharmacy name and phone # (copy from chart):    [facility] - [address] [address]  Phone: [phone] [phone] Fax: [phone]      Comments:

## 2022-03-04 NOTE — PROGRESS NOTES
"Subjective:       Patient ID: Fred Schwab Jr. is a 81 y.o. male.    Vitals:  height is 6' 0.01" (1.829 m) and weight is 82.1 kg (181 lb). His oral temperature is 97.1 °F (36.2 °C). His blood pressure is 172/101 (abnormal) and his pulse is 59 (abnormal). His respiration is 17 and oxygen saturation is 100%.     Chief Complaint: Spasms    Patient complains of severe muscle spasms on his back that started a few days ago. Patient also states that has not been sleeping well due to the spasms.    Provider note begins below:    Patient comes to the clinic with complaint of low back pain and spasms x4 days.  Patient states he feels like this occurred after driving on vacation and hiking.  He has had back spasms in the past.  He denies fever or chills.  No nausea, vomiting or diarrhea.  No headache or muscle weakness.  No falls.  No numbness or paresthesias.  No saddle anesthesia or incontinence of bowel or bladder.  No urinary frequency/urgency, dysuria or hematuria.  No history pyelonephritis or nephrolithiasis.    Patient has taken Aleve and aspirin with minimal to moderate affect.  Also using Salonpas with minimal relief.    Patient had allergic reaction to tizanidine January of 2021. Product made him extremely somnolent and hypotensive.  Suggested  Epson salt soaks and will attempt alternate muscle relaxer.    Spasms  This is a new problem. The current episode started in the past 7 days. The problem has been gradually worsening. Associated symptoms include myalgias. Pertinent negatives include no chills, coughing, fever, nausea, numbness, rash or vomiting. Nothing aggravates the symptoms. Treatments tried: Aleeve and Aspirin. The treatment provided mild relief.       Constitution: Negative for chills and fever.   Respiratory: Negative for cough and shortness of breath.    Gastrointestinal: Negative for nausea, vomiting and diarrhea.   Genitourinary: Negative for dysuria, frequency, hematuria and history of kidney stones. "   Musculoskeletal: Positive for pain, back pain, muscle cramps and muscle ache. Negative for trauma and history of spine disorder.   Skin: Negative for color change and rash.   Neurological: Negative for numbness and tingling.       Objective:      Physical Exam   Constitutional: He is oriented to person, place, and time. He appears well-developed. He is cooperative. No distress.   HENT:   Head: Normocephalic and atraumatic.   Nose: Nose normal.   Mouth/Throat: Mucous membranes are normal.   Eyes: Conjunctivae and lids are normal.   Neck: Trachea normal and phonation normal. Neck supple.   Cardiovascular: Normal rate, regular rhythm, normal heart sounds and normal pulses.   Pulmonary/Chest: Effort normal and breath sounds normal.   Abdominal: Normal appearance.   Musculoskeletal:         General: No deformity.      Cervical back: He exhibits spasm. He exhibits no bony tenderness.      Thoracic back: He exhibits spasm. He exhibits no bony tenderness.      Lumbar back: He exhibits tenderness and spasm. He exhibits no bony tenderness.   Neurological: He is alert and oriented to person, place, and time. He has normal strength and normal reflexes. No sensory deficit.   Skin: Skin is warm, dry, intact and not diaphoretic.   Psychiatric: His speech is normal and behavior is normal. Judgment and thought content normal.   Nursing note and vitals reviewed.        Assessment:       1. Back strain, initial encounter    2. Back spasm          Plan:         Back strain, initial encounter  -     ketorolac injection 30 mg  -     naproxen (NAPROSYN) 500 MG tablet; Take 1 tablet (500 mg total) by mouth 2 (two) times daily with meals. for 10 days  Dispense: 20 tablet; Refill: 0    Back spasm  -     cyclobenzaprine (FLEXERIL) 5 MG tablet; Take 1 tablet (5 mg total) by mouth 3 (three) times daily as needed for Muscle spasms.  Dispense: 15 tablet; Refill: 0

## 2022-03-06 ENCOUNTER — TELEPHONE (OUTPATIENT)
Dept: URGENT CARE | Facility: CLINIC | Age: 82
End: 2022-03-06
Payer: MEDICARE

## 2022-03-06 NOTE — TELEPHONE ENCOUNTER
Called patient in regards to care 03/04/2022. Patient verbalizes still expeiencing pain, will visit chiropractor.  Patient denies any further questions or concerns.

## 2022-03-07 ENCOUNTER — HOSPITAL ENCOUNTER (OUTPATIENT)
Dept: RADIOLOGY | Facility: HOSPITAL | Age: 82
Discharge: HOME | End: 2022-03-07
Attending: STUDENT IN AN ORGANIZED HEALTH CARE EDUCATION/TRAINING PROGRAM
Payer: MEDICARE

## 2022-03-07 ENCOUNTER — OFFICE VISIT (OUTPATIENT)
Dept: INTERNAL MEDICINE | Facility: CLINIC | Age: 82
End: 2022-03-07
Payer: MEDICARE

## 2022-03-07 ENCOUNTER — PATIENT MESSAGE (OUTPATIENT)
Dept: INTERNAL MEDICINE | Facility: CLINIC | Age: 82
End: 2022-03-07

## 2022-03-07 ENCOUNTER — TELEPHONE (OUTPATIENT)
Dept: INTERNAL MEDICINE | Facility: CLINIC | Age: 82
End: 2022-03-07

## 2022-03-07 VITALS
HEIGHT: 72 IN | SYSTOLIC BLOOD PRESSURE: 130 MMHG | OXYGEN SATURATION: 98 % | WEIGHT: 186.5 LBS | HEART RATE: 80 BPM | BODY MASS INDEX: 25.26 KG/M2 | DIASTOLIC BLOOD PRESSURE: 80 MMHG

## 2022-03-07 DIAGNOSIS — M54.41 ACUTE RIGHT-SIDED LOW BACK PAIN WITH RIGHT-SIDED SCIATICA: ICD-10-CM

## 2022-03-07 DIAGNOSIS — M54.30 SCIATICA, UNSPECIFIED LATERALITY: Primary | ICD-10-CM

## 2022-03-07 PROCEDURE — 72100 X-RAY EXAM L-S SPINE 2/3 VWS: CPT | Mod: 26,,, | Performed by: RADIOLOGY

## 2022-03-07 PROCEDURE — 73502 XR HIP WITH PELVIS WHEN PERFORMED, 2 OR 3  VIEWS RIGHT: ICD-10-PCS | Mod: 26,RT,, | Performed by: RADIOLOGY

## 2022-03-07 PROCEDURE — 73502 X-RAY EXAM HIP UNI 2-3 VIEWS: CPT | Mod: TC,RT

## 2022-03-07 PROCEDURE — 99213 OFFICE O/P EST LOW 20 MIN: CPT | Mod: GC,S$GLB,, | Performed by: STUDENT IN AN ORGANIZED HEALTH CARE EDUCATION/TRAINING PROGRAM

## 2022-03-07 PROCEDURE — 72100 X-RAY EXAM L-S SPINE 2/3 VWS: CPT | Mod: TC

## 2022-03-07 PROCEDURE — 99999 PR PBB SHADOW E&M-EST. PATIENT-LVL IV: ICD-10-PCS | Mod: PBBFAC,GC,, | Performed by: STUDENT IN AN ORGANIZED HEALTH CARE EDUCATION/TRAINING PROGRAM

## 2022-03-07 PROCEDURE — 72100 XR LUMBAR SPINE 2 OR 3 VIEWS: ICD-10-PCS | Mod: 26,,, | Performed by: RADIOLOGY

## 2022-03-07 PROCEDURE — 99213 PR OFFICE/OUTPT VISIT, EST, LEVL III, 20-29 MIN: ICD-10-PCS | Mod: GC,S$GLB,, | Performed by: STUDENT IN AN ORGANIZED HEALTH CARE EDUCATION/TRAINING PROGRAM

## 2022-03-07 PROCEDURE — 73502 X-RAY EXAM HIP UNI 2-3 VIEWS: CPT | Mod: 26,RT,, | Performed by: RADIOLOGY

## 2022-03-07 PROCEDURE — 99999 PR PBB SHADOW E&M-EST. PATIENT-LVL IV: CPT | Mod: PBBFAC,GC,, | Performed by: STUDENT IN AN ORGANIZED HEALTH CARE EDUCATION/TRAINING PROGRAM

## 2022-03-07 RX ORDER — GABAPENTIN 100 MG/1
100 CAPSULE ORAL 3 TIMES DAILY
Qty: 90 CAPSULE | Refills: 0 | Status: SHIPPED | OUTPATIENT
Start: 2022-03-07 | End: 2022-03-11

## 2022-03-07 NOTE — PROGRESS NOTES
Clinic Note  3/7/2022      Subjective:       Patient ID:  Tavares is a 81 y.o. male being seen for an established care visit.    Chief Complaint: Back Pain (Radiating to the right side of the hip and groin)    HPI   Patient is an 81 m w/ hx of low tension glaucoma, multinodular goiter, hyperlipidemia, peripheral neuropathy, chronic neck stiffness and tremors who presents today for an urgent car visit for right sided back and leg pain. Patient states that he was seen at urgent care on 3/4/22 for muscle spasms. ( Can review below information for documentation from that encounter). He states that he was prescribed ketorolac injection 30mg, naproxen 500mg BID x10 days, Flexiril 5mg TID prn. He does state that after he received his ketorolac injection at the urgent care clinic he started feeling an aching and intermittently sharp pain in his right back , buttock and right groin area. Over the past few days he has had worsening of this pain. At worse he describes it as an electrical/shooting pain radiating from his right lower back to his buttock and right groin. He denies fever or chills/Muscle weakness/falls/numbness or paraesthesias that were new/saddle anesthesia/bowel or bladder incontinence/urinary symptoms. He states that aggrevating factors include laying flat and ntwisting. Alleviating factors include ice which minimally helped with symptoms. He does note that his muscle spasms have improved.    Per Urgent care visit note on 3/4/22:  Of note, patient was seen on 3/4/22 for muscular spasms after going on a vacation and hike.  Patient has taken Aleve and aspirin with minimal to moderate affect.  Also using Salonpas with minimal relief.     Of note, patient had allergic reaction to tizanidine January of 2021. Product made him extremely somnolent and hypotensive        Review of Systems   Constitutional: Negative for chills, fever and malaise/fatigue.   Respiratory: Negative.    Cardiovascular: Negative.     Gastrointestinal: Negative.    Genitourinary: Negative.    Musculoskeletal: Positive for back pain and joint pain. Negative for myalgias.   Skin: Negative for rash.   Neurological: Positive for tingling. Negative for dizziness and weakness.   Psychiatric/Behavioral: Negative.        Medication List with Changes/Refills   New Medications    GABAPENTIN (NEURONTIN) 100 MG CAPSULE    Take 1 capsule (100 mg total) by mouth 3 (three) times daily.   Current Medications    ASCORBIC ACID, VITAMIN C, (VITAMIN C) 500 MG TABLET    Take by mouth. 1 Tablet Oral Every day    BETAMETHASONE DIPROPIONATE (DIPROLENE) 0.05 % CREAM    aaa qd prn rash. Not more than 2 weeks straight in same location. Avoid use on face and groin    CALCIUM CARBONATE (OS-DIAMOND) 600 MG (1,500 MG) TAB    Take by mouth. 1 Tablet Oral Every day    CHOLECALCIFEROL, VITAMIN D3, 10 MCG (400 UNIT) CAP    Take 1 capsule by mouth once daily.    CO-ENZYME Q-10 30 MG CAPSULE    Take 30 mg by mouth 3 (three) times daily.    CYCLOBENZAPRINE (FLEXERIL) 5 MG TABLET    Take 1 tablet (5 mg total) by mouth 3 (three) times daily as needed for Muscle spasms.    DOXAZOSIN (CARDURA) 2 MG TABLET    TAKE ONE TABLET BY MOUTH EVERY EVENING    FLUOCINOLONE ACETONIDE OIL 0.01 % DROP    Place 4 drops in ear(s) 2 (two) times daily.    LORATADINE (CLARITIN) 10 MG TABLET    Take 10 mg by mouth daily as needed. Every day    NAPROXEN (NAPROSYN) 500 MG TABLET    Take 1 tablet (500 mg total) by mouth 2 (two) times daily with meals. for 10 days    OMEGA-3 FATTY ACIDS/FISH OIL (OMEGA 3 FISH OIL ORAL)    Take by mouth. 1 Capsule Oral Every day    PROPRANOLOL (INDERAL) 10 MG TABLET    Take 1 tablet (10 mg total) by mouth 2 (two) times daily.    SILDENAFIL (VIAGRA) 100 MG TABLET    TAKE ONE TABLET BY MOUTH AS DIRECTED    TERBINAFINE HCL (LAMISIL) 1 % CREAM        VIT A PALM,D3 IN COD LIVER OIL 1,250 UNIT-130 UNIT-530 MG CAP    Take 1 capsule by mouth once daily.        Patient Active Problem List    Diagnosis    Low tension glaucoma, mild stage - Both Eyes    Nuclear sclerosis - Both Eyes    Ptosis - Both Eyes    Dermatochalasia - Both Eyes    Dry eye syndrome - Both Eyes    Astigmatism with presbyopia - Both Eyes    Skin cancer of face    KCS (keratoconjunctivitis sicca) - Both Eyes    Low-tension glaucoma, bilateral    Osteoarthritis, hand    Hand pain    Rheumatoid factor positive    Osteoporosis prophylaxis    Elevated PSA    Multiple thyroid nodules    Elevated coronary artery calcium score    Allergic rhinitis    Nasal septal deviation    Personal history of skin cancer    Upper abdominal pain    Benign localized prostatic hyperplasia with lower urinary tract symptoms (LUTS)    Decreased range of motion of neck    Poor posture           Objective:      /80 (BP Location: Left arm, Patient Position: Sitting, BP Method: Large (Manual))   Pulse 80   Ht 6' (1.829 m)   Wt 84.6 kg (186 lb 8.2 oz)   SpO2 98%   BMI 25.30 kg/m²   Estimated body mass index is 25.3 kg/m² as calculated from the following:    Height as of this encounter: 6' (1.829 m).    Weight as of this encounter: 84.6 kg (186 lb 8.2 oz).  Physical Exam  Constitutional:       General: He is not in acute distress.     Appearance: Normal appearance. He is not ill-appearing or diaphoretic.   HENT:      Head: Normocephalic and atraumatic.   Eyes:      Extraocular Movements: Extraocular movements intact.      Conjunctiva/sclera: Conjunctivae normal.   Cardiovascular:      Rate and Rhythm: Normal rate.   Pulmonary:      Effort: Pulmonary effort is normal.   Abdominal:      General: Abdomen is flat. There is no distension.   Musculoskeletal:      Lumbar back: Tenderness (paravertebral tenderness lumbar spine) present. No swelling, spasms or bony tenderness. Decreased range of motion.      Comments: Flexion to 90 degrees   Extension limited to 10 degrees      Positive straight leg raise, positive femoral nerve stretch  5/5  strength throughout lower extremities     Neurological:      General: No focal deficit present.      Mental Status: He is alert and oriented to person, place, and time.      Motor: No weakness.      Gait: Gait is intact.           Assessment and Plan:         Problem List Items Addressed This Visit    None     Visit Diagnoses     Sciatica, unspecified laterality    -  Primary    Relevant Orders    Ambulatory referral/consult to Physical/Occupational Therapy      Acute right-sided low back pain with right-sided sciatica        Relevant Orders    Patient developing acute on chronic back pain after ketorolac injection at urgent care. Patient reporting electrical / nerve-like pain on his right side with movement and laying flat. DDx. Include but are not limited to compression fracture, lumbar radiculopathy, sciatica, femoral nerve inflammation. No skin changes or swelling around the area.      X-Ray Lumbar Spine 2 Or 3 Views    X-Ray Hip 2 or 3 views Right (with Pelvis when performed)  Ambulatory referral/consult to Physical/Occupational Therapy  Gabapentin 100mg TID prn  Alternate Ibuprofen and Tylenol throughout the day, every 6-8 hours  Ice area    - If symptoms are worsening or do not mildly improve in the next 3-5 days please let us know and we will refer you to Orthopedics          Follow Up:   Follow up in about 1 week (around 3/14/2022) for Follow up back pain.        Shelbie Martell

## 2022-03-07 NOTE — PATIENT INSTRUCTIONS
- Referral to Physical Therapy, [facility] through [facility] ( if convenient location)  - Gabapentin 100mg three times daily as needed for nerve pain  - Alternate Ibuprofen and Tylenol throughout the day, every 6-8 hours  - Ice area  - Please read through attachments provided  - Xray studies today  - If symptoms are worsening or do not mildly improve in the next 3-5 days please let us know and we will refer you to Orthopedics    - Follow up appointment in 1 week        Seek immediate medical attention if:  You are unable to walk or cannot control your bowels or bladder.  You develop a fever of 100.4°F (38°C) or higher, chills, or night sweats  Your legs are numb, weak, or tingly.  Your pain is getting worse, even with medicines and rest.  You feel weak and lightheaded.  You develop any of the following:  Belly pain.  Throwing up.  Pain with urination or need to urinate more often.  Vaginal pain or discharge.  Rash.  You have new or worsening symptoms.

## 2022-03-08 NOTE — PROGRESS NOTES
I have reviewed the notes, assessments, and/or procedures performed this visit, and I concur with the documentation for Mr. Fred Schwab Jr.    Beena Ramos MD  Hospital Medicine Assistant Staff  [facility]

## 2022-03-08 NOTE — TELEPHONE ENCOUNTER
----- Message from Cynthia Ortiz RN sent at 3/7/2022  5:33 PM CST -----  Contact: Self     ----- Message -----  From: Franchesca Sylvester  Sent: 3/7/2022   3:58 PM CST  To: [facility]    Pt is requesting a call regarding his side effects from new rx. Please advise

## 2022-03-08 NOTE — TELEPHONE ENCOUNTER
Called patient, he experienced irritation of his stomach after taking gabapentin. States the pain has resolved. Patient to stop the gabapentin. Will continue to monitor, patient to call back in a few days to let me know how his symptoms are doing.    At this time back spasms have improved and patient does have prn muscle relaxers as needed.    Encouraged him to call or message with further questions. He expresses his understanding and gratitude.    Shelbie Martell MD  Internal Medicine, PGY-3  [facility]

## 2022-03-08 NOTE — TELEPHONE ENCOUNTER
The insert in the medication that was prescribed  stated [facility]    having belly pain  He  Was having belly   Pain and burning in the bottom of heis stomach   Pain has subsided now  Was told to hold off medication until he hears from

## 2022-03-11 ENCOUNTER — OFFICE VISIT (OUTPATIENT)
Dept: INTERNAL MEDICINE | Facility: CLINIC | Age: 82
End: 2022-03-11
Payer: MEDICARE

## 2022-03-11 ENCOUNTER — TELEPHONE (OUTPATIENT)
Dept: INTERNAL MEDICINE | Facility: CLINIC | Age: 82
End: 2022-03-11
Payer: MEDICARE

## 2022-03-11 VITALS
SYSTOLIC BLOOD PRESSURE: 132 MMHG | BODY MASS INDEX: 24.79 KG/M2 | HEIGHT: 72 IN | WEIGHT: 183 LBS | DIASTOLIC BLOOD PRESSURE: 84 MMHG | OXYGEN SATURATION: 98 % | HEART RATE: 56 BPM

## 2022-03-11 DIAGNOSIS — M54.41 ACUTE RIGHT-SIDED LOW BACK PAIN WITH RIGHT-SIDED SCIATICA: Primary | ICD-10-CM

## 2022-03-11 DIAGNOSIS — M62.830 BACK SPASM: ICD-10-CM

## 2022-03-11 DIAGNOSIS — M62.830 MUSCLE SPASM OF BACK: ICD-10-CM

## 2022-03-11 DIAGNOSIS — M79.2 NEUROPATHIC PAIN, LEG, RIGHT: ICD-10-CM

## 2022-03-11 PROCEDURE — 3079F DIAST BP 80-89 MM HG: CPT | Mod: CPTII,S$GLB,, | Performed by: INTERNAL MEDICINE

## 2022-03-11 PROCEDURE — 3288F FALL RISK ASSESSMENT DOCD: CPT | Mod: CPTII,S$GLB,, | Performed by: INTERNAL MEDICINE

## 2022-03-11 PROCEDURE — 1101F PR PT FALLS ASSESS DOC 0-1 FALLS W/OUT INJ PAST YR: ICD-10-PCS | Mod: CPTII,S$GLB,, | Performed by: INTERNAL MEDICINE

## 2022-03-11 PROCEDURE — 3075F PR MOST RECENT SYSTOLIC BLOOD PRESS GE 130-139MM HG: ICD-10-PCS | Mod: CPTII,S$GLB,, | Performed by: INTERNAL MEDICINE

## 2022-03-11 PROCEDURE — 1160F PR REVIEW ALL MEDS BY PRESCRIBER/CLIN PHARMACIST DOCUMENTED: ICD-10-PCS | Mod: CPTII,S$GLB,, | Performed by: INTERNAL MEDICINE

## 2022-03-11 PROCEDURE — 3288F PR FALLS RISK ASSESSMENT DOCUMENTED: ICD-10-PCS | Mod: CPTII,S$GLB,, | Performed by: INTERNAL MEDICINE

## 2022-03-11 PROCEDURE — 3079F PR MOST RECENT DIASTOLIC BLOOD PRESSURE 80-89 MM HG: ICD-10-PCS | Mod: CPTII,S$GLB,, | Performed by: INTERNAL MEDICINE

## 2022-03-11 PROCEDURE — 1160F RVW MEDS BY RX/DR IN RCRD: CPT | Mod: CPTII,S$GLB,, | Performed by: INTERNAL MEDICINE

## 2022-03-11 PROCEDURE — 1101F PT FALLS ASSESS-DOCD LE1/YR: CPT | Mod: CPTII,S$GLB,, | Performed by: INTERNAL MEDICINE

## 2022-03-11 PROCEDURE — 1125F PR PAIN SEVERITY QUANTIFIED, PAIN PRESENT: ICD-10-PCS | Mod: CPTII,S$GLB,, | Performed by: INTERNAL MEDICINE

## 2022-03-11 PROCEDURE — 3075F SYST BP GE 130 - 139MM HG: CPT | Mod: CPTII,S$GLB,, | Performed by: INTERNAL MEDICINE

## 2022-03-11 PROCEDURE — 1159F MED LIST DOCD IN RCRD: CPT | Mod: CPTII,S$GLB,, | Performed by: INTERNAL MEDICINE

## 2022-03-11 PROCEDURE — 99999 PR PBB SHADOW E&M-EST. PATIENT-LVL IV: ICD-10-PCS | Mod: PBBFAC,,, | Performed by: INTERNAL MEDICINE

## 2022-03-11 PROCEDURE — 99214 OFFICE O/P EST MOD 30 MIN: CPT | Mod: S$GLB,,, | Performed by: INTERNAL MEDICINE

## 2022-03-11 PROCEDURE — 99214 PR OFFICE/OUTPT VISIT, EST, LEVL IV, 30-39 MIN: ICD-10-PCS | Mod: S$GLB,,, | Performed by: INTERNAL MEDICINE

## 2022-03-11 PROCEDURE — 99999 PR PBB SHADOW E&M-EST. PATIENT-LVL IV: CPT | Mod: PBBFAC,,, | Performed by: INTERNAL MEDICINE

## 2022-03-11 PROCEDURE — 1125F AMNT PAIN NOTED PAIN PRSNT: CPT | Mod: CPTII,S$GLB,, | Performed by: INTERNAL MEDICINE

## 2022-03-11 PROCEDURE — 1159F PR MEDICATION LIST DOCUMENTED IN MEDICAL RECORD: ICD-10-PCS | Mod: CPTII,S$GLB,, | Performed by: INTERNAL MEDICINE

## 2022-03-11 RX ORDER — CYCLOBENZAPRINE HCL 5 MG
5 TABLET ORAL 3 TIMES DAILY PRN
Qty: 20 TABLET | Refills: 0 | Status: SHIPPED | OUTPATIENT
Start: 2022-03-11 | End: 2022-03-24 | Stop reason: SDUPTHER

## 2022-03-11 RX ORDER — PREDNISONE 20 MG/1
TABLET ORAL
Qty: 12 TABLET | Refills: 0 | Status: SHIPPED | OUTPATIENT
Start: 2022-03-11 | End: 2022-03-25

## 2022-03-11 NOTE — TELEPHONE ENCOUNTER
----- Message from Diogenes Avalos sent at 3/11/2022  7:45 AM CST -----  Caller is requesting an earlier appointment then we can schedule.  Caller is requesting a message be sent to the provider.  If this is for urgent care symptoms, did you offer other providers at this location, providers at other locations, or [facility]? (yes, no, n/a):  yes   If this is for the patients physical, did you offer to schedule next available and put on wait list, or to see NP or PA for their physical?  (yes, no, n/a):  yes  When is the next available appointment with their provider:  04/22  Reason for the appointment:  Urgent back pain and swollen feet   Patient preference of timeframe to be scheduled:  Today   Would the patient like a call back, or a response through their [facility] portal?: Call back   Comments:

## 2022-03-11 NOTE — PROGRESS NOTES
Subjective:       Patient ID: Fred Schwab Jr. is a 81 y.o. male.    Chief Complaint: Back Pain (Muscle spasm in the lower back. Patient having a hard time sleeping due to the pain. Feet are also swollen and ankles. )    HPI:  Patient new to me, here for urgent.  He is having right-sided low back pain.  He has 1 visit care and 1 to resident clinic.  Initially he was given a Toradol shot is concerned that who member gave the shot hit a nerve because he immediately felt a burning pain radiating from where the shot was around the pelvis to the groin.  He says he was told it could sometimes burn in should feel better but he has continued with that intermittent symptom.  No redness.  Muscle relaxer heat and other treatments have helped a little but the pain persists.  This has now been 2 weeks.  Gabapentin cause side effects so he stopped that.  He did think the muscle relaxer helped but he has not been taking that because he take it long-term.  No symptoms of urination or bowel movements.  It is difficult to get comfortable because of the pain.  No rash noted.    Review of Systems   Gastrointestinal: Negative for abdominal pain.   Genitourinary: Negative for frequency and hematuria.   Musculoskeletal: Positive for back pain (Right lower lumbar toward the buttocks and toward the groin and upper quite high).   Integumentary:  Negative for rash.         Objective:      Physical Exam  HENT:      Head: Normocephalic and atraumatic.   Cardiovascular:      Pulses: Normal pulses.      Heart sounds: Normal heart sounds.   Pulmonary:      Effort: Pulmonary effort is normal.      Breath sounds: Normal breath sounds.   Abdominal:      General: There is no distension.      Palpations: There is no mass.   Musculoskeletal:         General: Tenderness (Mild muscular tenderness to palpation at the mid lumbar region to the right of midline) present.      Comments: No obvious hematoma, rash, can site marked.  Negative straight leg raise  and no tenderness with internally rotating the right leg to cross the hip   Skin:     Findings: No erythema or rash.   Neurological:      General: No focal deficit present.      Mental Status: He is oriented to person, place, and time.      Motor: No weakness.      Gait: Gait normal.         Assessment:       Problem List Items Addressed This Visit    None     Visit Diagnoses     Acute right-sided low back pain with right-sided sciatica    -  Primary    Muscle spasm of back        Neuropathic pain, leg, right        in the L1, L2 dermatome    Back spasm        Relevant Medications    cyclobenzaprine (FLEXERIL) 5 MG tablet          Plan:       Tavares was seen today for back pain.    Diagnoses and all orders for this visit:    Acute right-sided low back pain with right-sided sciatica    Muscle spasm of back    Neuropathic pain, leg, right  Comments:  in the L1, L2 dermatome    Back spasm  -     cyclobenzaprine (FLEXERIL) 5 MG tablet; Take 1 tablet (5 mg total) by mouth 3 (three) times daily as needed for Muscle spasms.    Other orders  -     predniSONE (DELTASONE) 20 MG tablet; Take all dose with food. Take 3 tabs by mouth once daily for 2 days. Then take 2 tabs daily by mouth for 2 days. Then take 1 tab daily for 2 days then stop. May increase your sugar level.         May consider physical therapy but he has gone to a chiropractor instead.  Consider back and spine consult or further imaging per PCP

## 2022-03-15 ENCOUNTER — PATIENT MESSAGE (OUTPATIENT)
Dept: INTERNAL MEDICINE | Facility: CLINIC | Age: 82
End: 2022-03-15
Payer: MEDICARE

## 2022-03-15 DIAGNOSIS — M54.16 LUMBAR RADICULOPATHY, CHRONIC: ICD-10-CM

## 2022-03-15 NOTE — TELEPHONE ENCOUNTER
Help patient get an MRI scan of his low back as long as he does not have a pacemaker, stimulator or other stent.

## 2022-03-16 ENCOUNTER — PATIENT MESSAGE (OUTPATIENT)
Dept: INTERNAL MEDICINE | Facility: CLINIC | Age: 82
End: 2022-03-16
Payer: MEDICARE

## 2022-03-17 ENCOUNTER — PATIENT MESSAGE (OUTPATIENT)
Dept: INTERNAL MEDICINE | Facility: CLINIC | Age: 82
End: 2022-03-17
Payer: MEDICARE

## 2022-03-17 DIAGNOSIS — R35.0 FREQUENCY OF MICTURITION: Primary | ICD-10-CM

## 2022-03-19 ENCOUNTER — PATIENT MESSAGE (OUTPATIENT)
Dept: INTERNAL MEDICINE | Facility: CLINIC | Age: 82
End: 2022-03-19
Payer: MEDICARE

## 2022-03-19 DIAGNOSIS — M62.830 BACK SPASM: ICD-10-CM

## 2022-03-20 ENCOUNTER — NURSE TRIAGE (OUTPATIENT)
Dept: ADMINISTRATIVE | Facility: CLINIC | Age: 82
End: 2022-03-20
Payer: MEDICARE

## 2022-03-20 ENCOUNTER — HOSPITAL ENCOUNTER (EMERGENCY)
Facility: HOSPITAL | Age: 82
Discharge: HOME | End: 2022-03-21
Attending: EMERGENCY MEDICINE
Payer: MEDICARE

## 2022-03-20 VITALS
TEMPERATURE: 98 F | WEIGHT: 183 LBS | SYSTOLIC BLOOD PRESSURE: 159 MMHG | OXYGEN SATURATION: 98 % | HEIGHT: 72 IN | HEART RATE: 65 BPM | RESPIRATION RATE: 16 BRPM | DIASTOLIC BLOOD PRESSURE: 85 MMHG | BODY MASS INDEX: 24.79 KG/M2

## 2022-03-20 DIAGNOSIS — R30.0 DYSURIA: Primary | ICD-10-CM

## 2022-03-20 LAB
ANION GAP SERPL CALC-SCNC: 8 MMOL/L (ref 8–16)
BASOPHILS # BLD AUTO: 0.05 K/UL (ref 0–0.2)
BASOPHILS NFR BLD: 0.5 % (ref 0–1.9)
BILIRUB UR QL STRIP: NEGATIVE
BUN SERPL-MCNC: 15 MG/DL (ref 8–23)
BUN SERPL-MCNC: 16 MG/DL (ref 6–30)
CALCIUM SERPL-MCNC: 9.6 MG/DL (ref 8.7–10.5)
CHLORIDE SERPL-SCNC: 102 MMOL/L (ref 95–110)
CHLORIDE SERPL-SCNC: 105 MMOL/L (ref 95–110)
CLARITY UR REFRACT.AUTO: ABNORMAL
CO2 SERPL-SCNC: 27 MMOL/L (ref 23–29)
COLOR UR AUTO: YELLOW
CREAT SERPL-MCNC: 1.1 MG/DL (ref 0.5–1.4)
CREAT SERPL-MCNC: 1.1 MG/DL (ref 0.5–1.4)
DIFFERENTIAL METHOD: ABNORMAL
EOSINOPHIL # BLD AUTO: 0.2 K/UL (ref 0–0.5)
EOSINOPHIL NFR BLD: 1.5 % (ref 0–8)
ERYTHROCYTE [DISTWIDTH] IN BLOOD BY AUTOMATED COUNT: 13.8 % (ref 11.5–14.5)
EST. GFR  (AFRICAN AMERICAN): >60 ML/MIN/1.73 M^2
EST. GFR  (NON AFRICAN AMERICAN): >60 ML/MIN/1.73 M^2
GLUCOSE SERPL-MCNC: 126 MG/DL (ref 70–110)
GLUCOSE SERPL-MCNC: 129 MG/DL (ref 70–110)
GLUCOSE UR QL STRIP: NEGATIVE
HCT VFR BLD AUTO: 42.4 % (ref 40–54)
HCT VFR BLD CALC: 44 %PCV (ref 36–54)
HGB BLD-MCNC: 14 G/DL (ref 14–18)
HGB UR QL STRIP: NEGATIVE
IMM GRANULOCYTES # BLD AUTO: 0.12 K/UL (ref 0–0.04)
IMM GRANULOCYTES NFR BLD AUTO: 1.2 % (ref 0–0.5)
KETONES UR QL STRIP: NEGATIVE
LACTATE SERPL-SCNC: 1.1 MMOL/L (ref 0.5–2.2)
LEUKOCYTE ESTERASE UR QL STRIP: NEGATIVE
LYMPHOCYTES # BLD AUTO: 1.6 K/UL (ref 1–4.8)
LYMPHOCYTES NFR BLD: 15.7 % (ref 18–48)
MCH RBC QN AUTO: 30.2 PG (ref 27–31)
MCHC RBC AUTO-ENTMCNC: 33 G/DL (ref 32–36)
MCV RBC AUTO: 91 FL (ref 82–98)
MONOCYTES # BLD AUTO: 0.9 K/UL (ref 0.3–1)
MONOCYTES NFR BLD: 8.8 % (ref 4–15)
NEUTROPHILS # BLD AUTO: 7.5 K/UL (ref 1.8–7.7)
NEUTROPHILS NFR BLD: 72.3 % (ref 38–73)
NITRITE UR QL STRIP: NEGATIVE
NRBC BLD-RTO: 0 /100 WBC
PH UR STRIP: 8 [PH] (ref 5–8)
PLATELET # BLD AUTO: 186 K/UL (ref 150–450)
PMV BLD AUTO: 10 FL (ref 9.2–12.9)
POC IONIZED CALCIUM: 1.19 MMOL/L (ref 1.06–1.42)
POC TCO2 (MEASURED): 26 MMOL/L (ref 23–29)
POTASSIUM BLD-SCNC: 4 MMOL/L (ref 3.5–5.1)
POTASSIUM SERPL-SCNC: 4 MMOL/L (ref 3.5–5.1)
PROT UR QL STRIP: NEGATIVE
RBC # BLD AUTO: 4.64 M/UL (ref 4.6–6.2)
SAMPLE: ABNORMAL
SODIUM BLD-SCNC: 140 MMOL/L (ref 136–145)
SODIUM SERPL-SCNC: 140 MMOL/L (ref 136–145)
SP GR UR STRIP: 1.01 (ref 1–1.03)
URN SPEC COLLECT METH UR: ABNORMAL
WBC # BLD AUTO: 10.43 K/UL (ref 3.9–12.7)

## 2022-03-20 PROCEDURE — 99284 EMERGENCY DEPT VISIT MOD MDM: CPT | Mod: 25

## 2022-03-20 PROCEDURE — 87040 BLOOD CULTURE FOR BACTERIA: CPT | Mod: 59 | Performed by: EMERGENCY MEDICINE

## 2022-03-20 PROCEDURE — 85025 COMPLETE CBC W/AUTO DIFF WBC: CPT | Performed by: EMERGENCY MEDICINE

## 2022-03-20 PROCEDURE — 81003 URINALYSIS AUTO W/O SCOPE: CPT | Performed by: EMERGENCY MEDICINE

## 2022-03-20 PROCEDURE — 99284 EMERGENCY DEPT VISIT MOD MDM: CPT | Mod: ,,, | Performed by: EMERGENCY MEDICINE

## 2022-03-20 PROCEDURE — 99284 PR EMERGENCY DEPT VISIT,LEVEL IV: ICD-10-PCS | Mod: ,,, | Performed by: EMERGENCY MEDICINE

## 2022-03-20 PROCEDURE — 83605 ASSAY OF LACTIC ACID: CPT | Performed by: EMERGENCY MEDICINE

## 2022-03-20 PROCEDURE — 80048 BASIC METABOLIC PNL TOTAL CA: CPT | Performed by: EMERGENCY MEDICINE

## 2022-03-21 RX ORDER — DOXAZOSIN 4 MG/1
4 TABLET ORAL NIGHTLY
Qty: 30 TABLET | Refills: 11 | Status: SHIPPED | OUTPATIENT
Start: 2022-03-21 | End: 2023-04-24 | Stop reason: SDUPTHER

## 2022-03-21 NOTE — ED PROVIDER NOTES
Encounter Date: 3/20/2022    SCRIBE #1 NOTE: I, Chris Shaikh, am scribing for, and in the presence of,  Leo Long MD. I have scribed the following portions of the note - Other sections scribed: HPI.       History     Chief Complaint   Patient presents with    Urinary Frequency     Pt reports increased urination and burning with urination X2 days.      Time patient was seen by the provider: 8:46 PM        The patient is a 81 y.o. male with co-morbidities including: HTN, muscle spasms, BPH who presents to the ED with a complaint of urinary frequency with onset 2 days.  He is concerned that he might have a UTI because he had a temperature of 99.2° that decreased after he took 2 tylenol. Patient notes that he has had increased urination and burning urination for the past 2 days. He reports that his bladder constantly feels full. Patient says that he has never had an UTI before. Patient reports that he had back muscle spasms and neuropathic pain in his right leg last week. He states that he went to an  where he was prescribed prednisone for the back pain. Patient notes that he has an upcoming MRI scheduled in two days and is worried that he cannot lie down in the MRI machine due to the continued back pain.  He denies any leg weakness or numbness. There is no urinary incontinence or stool incontinence.  He reports that he has been sleeping in a chair because he cannot lie down without being in 'excruciating pain'.     The history is provided by the patient, the spouse and medical records. No  was used.     Review of patient's allergies indicates:   Allergen Reactions    Clindamycin Rash    Doxycycline hyclate Rash    Ciprofloxacin      Tendon tear.    Iodine and iodide containing products Rash     Got red but no itching    Penicillins     Tizanidine hcl      Unknown     Past Medical History:   Diagnosis Date    Actinic keratosis     Allergy     Arthritis     Basal cell carcinoma      left nasofacial sulcus    BPH (benign prostatic hyperplasia)     Cataract     Colon polyp     benign    Dermatochalasis     Elevated PSA     Headache(784.0)     HEARING LOSS     Hypertension     Low tension glaucoma     Multiple thyroid nodules 11-27-17    Need repeat thyroid ultrasound in November 2018.    Neuropathy     Otitis media     Squamous cell carcinoma 12/10/16    left upper neck- in situ    Urinary tract infection      Past Surgical History:   Procedure Laterality Date    BELPHAROPTOSIS REPAIR      COLON SURGERY      COLONOSCOPY      COLONOSCOPY N/A 10/26/2015    Procedure: COLONOSCOPY;  Surgeon: Diony Kennedy MD;  [facility]  Service: Endoscopy;  Laterality: N/A;    COLONOSCOPY N/A 2/10/2020    Procedure: COLONOSCOPY;  Surgeon: Gunnar Avalos MD;  [facility]  Service: Endoscopy;  Laterality: N/A;    ESOPHAGOGASTRODUODENOSCOPY N/A 5/29/2020    Procedure: EGD (ESOPHAGOGASTRODUODENOSCOPY);  Surgeon: Gunnar Avalos MD;  [facility]  Service: Endoscopy;  Laterality: N/A;  COVID screening scheduled on 5/28/20 at Primary care-rb  pt updated on drop off location and no visitor policy-rb    MOHS Surgery      PROSTATE BIOPSY      SELECTIVE LASER TRABECULOPLASTY Bilateral 01/27/2022 AND 2/10/2022         Family History   Problem Relation Age of Onset    Cancer Father         prostate    Heart disease Father     Stroke Father     Hypertension Mother     Diabetes Mother     Glaucoma Mother     Diabetes Sister     Pancreatic cancer Sister 75    Skin cancer Neg Hx     Melanoma Neg Hx     Amblyopia Neg Hx     Blindness Neg Hx     Cataracts Neg Hx     Macular degeneration Neg Hx     Retinal detachment Neg Hx     Strabismus Neg Hx      Social History     Tobacco Use    Smoking status: Never Smoker    Smokeless tobacco: Never Used   Substance Use Topics    Alcohol use: Yes     Comment: every once in a  while    Drug use: No     Review of Systems   Constitutional: Negative for appetite change and chills.   HENT: Negative for congestion.    Respiratory: Negative for shortness of breath.    Cardiovascular: Negative for chest pain.   Gastrointestinal: Negative for abdominal pain and vomiting.   Endocrine: Negative for polydipsia and polyuria.   Genitourinary: Positive for flank pain.   Musculoskeletal: Negative for neck pain.   Neurological: Negative for headaches.   Hematological: Negative for adenopathy.   Psychiatric/Behavioral: Negative for agitation.       Physical Exam     Initial Vitals   BP Pulse Resp Temp SpO2   03/20/22 2037 03/20/22 2037 03/20/22 2037 03/20/22 2038 03/20/22 2037   (!) 178/124 81 16 97.9 °F (36.6 °C) 97 %      MAP       --                Physical Exam    Constitutional: He appears well-developed and well-nourished. He is not diaphoretic. No distress.   HENT:   Head: Normocephalic.   Eyes: Pupils are equal, round, and reactive to light.   Neck: Neck supple. No JVD present.   Normal range of motion.  Cardiovascular: Normal rate, regular rhythm and normal heart sounds.   No murmur heard.  Pulmonary/Chest: Breath sounds normal. No respiratory distress. He has no wheezes. He has no rales.   Abdominal: Abdomen is soft. Bowel sounds are normal. He exhibits no distension. There is no abdominal tenderness. There is no rebound and no guarding.   Musculoskeletal:         General: No tenderness or edema. Normal range of motion.      Cervical back: Normal range of motion and neck supple.     Neurological: He is alert. He has normal strength. No cranial nerve deficit or sensory deficit. GCS score is 15. GCS eye subscore is 4. GCS verbal subscore is 5. GCS motor subscore is 6.   Normal gait   Skin: Skin is warm. No rash noted.   Psychiatric: He has a normal mood and affect.         ED Course   Procedures  Labs Reviewed   URINALYSIS, REFLEX TO URINE CULTURE - Abnormal; Notable for the following components:        Result Value    Appearance, UA Hazy (*)     All other components within normal limits    Narrative:     Specimen Source->Urine   CBC W/ AUTO DIFFERENTIAL - Abnormal; Notable for the following components:    Immature Granulocytes 1.2 (*)     Immature Grans (Abs) 0.12 (*)     Lymph % 15.7 (*)     All other components within normal limits   BASIC METABOLIC PANEL - Abnormal; Notable for the following components:    Glucose 126 (*)     All other components within normal limits   ISTAT PROCEDURE - Abnormal; Notable for the following components:    POC Glucose 129 (*)     All other components within normal limits   CULTURE, BLOOD   CULTURE, BLOOD   LACTIC ACID, PLASMA          Imaging Results          CT Abdomen Pelvis  Without Contrast (Final result)  Result time 03/20/22 23:44:35    Final result by Ronnie García MD (03/20/22 23:44:35)                 Impression:      No acute intra-abdominal abnormality.    Trace left pleural fluid.    Right middle lobe tree-in-bud appearance, more prominent compared to prior.  Likely representing small airway infection/inflammation.    Colonic diverticulosis without evidence of diverticulitis.    Prostatomegaly.    Additional findings as above.    Electronically signed by resident: Jacque Samuels  Date:    03/20/2022  Time:    23:01    Electronically signed by: Ronnie García  Date:    03/20/2022  Time:    23:44             Narrative:    EXAMINATION:  CT ABDOMEN PELVIS WITHOUT CONTRAST    CLINICAL HISTORY:  Abdominal pain, acute, nonlocalized;    TECHNIQUE:  Low dose axial images, sagittal and coronal reformations were obtained from the lung bases to the pubic symphysis, Oral contrast was not administered.    COMPARISON:  Abdominal ultrasound 04/24/2020.  CT calcium score 11/30/2018.    FINDINGS:  Heart: Normal in size. No pericardial effusion.    Lung Bases: Trace left pleural fluid.  Left lower and right middle lobes atelectatic changes.  Right middle lobe tree-in-bud  appearance, more prominent compared to prior and likely representing small airway infection/inflammation.    Liver: Normal in size and attenuation, with no focal hepatic lesions.    Gallbladder: No calcified gallstones.    Bile Ducts: No evidence of dilated ducts.    Pancreas: No mass or peripancreatic fat stranding.    Spleen: Unremarkable.    Adrenals: Unremarkable.    Kidneys/ Ureters: No nephrolithiasis or hydronephrosis.    Bladder: No evidence of wall thickening.    Reproductive organs: Prostatomegaly.    GI Tract/Mesentery: Colonic diverticulosis without evidence of diverticulitis.  No high-grade obstruction or inflammation.    Peritoneal Space: No ascites. No free air.    Retroperitoneum: Prominent Liberty aortic lymph node without surgical enlargement.    Abdominal wall: Unremarkable.    Vasculature: Moderate atherosclerotic calcified stations of the infrarenal aorta.  No evidence of aneurysm.    Bones: Degenerative spine changes including grade 1 retrolisthesis of L5 on S1.  No evidence of acute fracture                                 Medications - No data to display  Medical Decision Making:   History:   Old Medical Records: I decided to obtain old medical records.  Initial Assessment:   Patient presents with concern of UTI with dysuria and low-grade fever.  He also has been having this right flank pain.  His physical exam is reassuring.  He has benign abdomen.  He has a normal neurologic exam.  Will check basic labs, urinalysis, lactate for possible sepsis since he is likely to have a high white count with recent steroid use.  Will also do a CT abdomen in case this is infected kidney stone.  Independently Interpreted Test(s):   I have ordered and independently interpreted X-rays - see prior notes.  Clinical Tests:   Lab Tests: Ordered and Reviewed  Radiological Study: Ordered and Reviewed  ED Management:  Labs were reassuring.  CT was unremarkable.  When I reviewed the CT images I do see a large bladder.   I suspect that he has some element of prostatism.  We discussed doing a urinary catheter which he would like to avoid.  We can increase his Cardura to 4 mg a day.  I discussed being cautious for orthostatic hypertension.  Recommend he follow-up with his urologist.          Scribe Attestation:   Scribe #1: I performed the above scribed service and the documentation accurately describes the services I performed. I attest to the accuracy of the note.                 Clinical Impression:   Final diagnoses:  [R30.0] Dysuria (Primary)          ED Disposition Condition    Discharge Stable        ED Prescriptions     Medication Sig Dispense Start Date End Date Auth. Provider    doxazosin (CARDURA) 4 MG tablet Take 1 tablet (4 mg total) by mouth every evening. 30 tablet 3/21/2022 3/21/2023 Leo Long MD        Follow-up Information     Follow up With Specialties Details Why Contact Info    Marco Antonio Vaughan MD Internal Medicine Call in 1 day  [address]  [place] [address]  [phone]      [facility] - Emergency Dept Emergency Medicine  If symptoms worsen [address] [facility] [id number] [phone]    Boy Richardson MD Urology Schedule an appointment as soon as possible for a visit   [address] [facility]  4th Floor  [facility] [address]  [phone]             Leo Long MD  03/21/22 0020

## 2022-03-21 NOTE — ED TRIAGE NOTES
Pt c/o urinary frequency every half hour and burning w/ urination for 3-4 or more days. Denies fever

## 2022-03-21 NOTE — ED NOTES
I-STAT Chem-8+ Results:   Value Reference Range   Sodium 140 136-145 mmol/L   Potassium  4.0 3.5-5.1 mmol/L   Chloride 102  mmol/L   Ionized Calcium 1.19 1.06-1.42 mmol/L   CO2 (measured) 26 23-29 mmol/L   Glucose 129  mg/dL   BUN 16 6-30 mg/dL   Creatinine 1.1 0.5-1.4 mg/dL   Hematocrit 44 36-54%

## 2022-03-22 ENCOUNTER — HOSPITAL ENCOUNTER (OUTPATIENT)
Dept: RADIOLOGY | Facility: HOSPITAL | Age: 82
Discharge: HOME | End: 2022-03-22
Attending: INTERNAL MEDICINE
Payer: MEDICARE

## 2022-03-22 DIAGNOSIS — M54.16 LUMBAR RADICULOPATHY, CHRONIC: ICD-10-CM

## 2022-03-22 PROCEDURE — 72148 MRI LUMBAR SPINE W/O DYE: CPT | Mod: 26,,, | Performed by: RADIOLOGY

## 2022-03-22 PROCEDURE — 72148 MRI LUMBAR SPINE W/O DYE: CPT | Mod: TC

## 2022-03-22 PROCEDURE — 72148 MRI LUMBAR SPINE WITHOUT CONTRAST: ICD-10-PCS | Mod: 26,,, | Performed by: RADIOLOGY

## 2022-03-23 NOTE — PROGRESS NOTES
HPI     Glaucoma     Comments: 6 week IOP ck and pt states no changes since last exam              Comments     DLS: 2/10/22    1. LTG  2. NS OU  3. Ptosis Sx  (Dr. Mendoza)  4. PIERCE  5. Disc Heme OS  6. Astigmatism and Presbyopia    MEDS:  AT's PRN OU          Last edited by Betsy Barker MA on 3/25/2022  9:26 AM. (History)            Assessment /Plan     For exam results, see Encounter Report.    Low-tension glaucoma of both eyes, mild stage    Dry eye syndrome of both eyes    Nuclear sclerotic cataract of both eyes    Ptosis of both eyelids          Lost to F/U 5/2017 to  5/2021 (4yrs)   Has been seeing his optometrist   Sent back in for OCT - RNFL changes - his optom told him to start gtts - so came back in     Pts wife is Carole Schwab - a glaucoma patient of Dr. Snyder    1.   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 Snyder)        Glaucoma meds   None presently -  Tried xalatan and lumigan - mason ok - but no change in IOP // intol to cosopt         H/O adverse rxn to glaucoma drops    cosopt - caused stomach issues - improved off it         LASERS    SLT OD - 1/27/2022 good resp 22--> 16 // SLT os - 2/10/2022 20-->16        GLAUCOMA SURGERIES    none        OTHER EYE SURGERIES    none        CDR    0.85/0.8        Tbase    10-20 / 12-20          Tmax    19/17    (20 at outside optom per pt)         Ttarget    ?             HVF    12 test 2000 to 2017 - full od / full os   1 test 2021 to 2021 (4yr gap) - hint early SAD od // full os         Gonio    +3 ou        CCT    [phone]        OCT    8 test 2005 to 2021 - RNFL  - dec G/TS  od// dec TI, anthony G/N os (+prog ou)         HRT    7 test 2006 to 2017 - MR -  Dec. Ianthony od // Squaxin off os /// CDR 0.73 od // Squaxin off  os        Disc photos    2003, 2005 - slides // 2010, 2015 - + disc heme ST os  - OIS    - Ttoday     16/20    Good initial resp to slt od - 22--> 16 // good resp os 20--> 16   - Test done today IOP check post slt ou     2.   NS ou - mild - monitor   Pt noting decreased vision at near- more hazy than last visit     3. ERM os - see OCT - 5/31/2021 - mild     4.   Ptosis / Dermatochalasis        S/P sx with Dr. Mendoza 4/5/2001    5.   PIERCE - AT's prn   (Needed steroid gtts from Dr. Urena for PIERCE) and AT's    6. Disc Heme OS   See photos 11/4/2015     7.  Astigmatism / presbyopia       Glasses - Florian - given RX today says the glasses are old - no real change    8.   Skin CA face - S/P sx    Plan  LTG - suspect vs mild disease   OCT w/ inf thinning ou - first time 1/2017 - some prog from 2017 to 2021   -H/O  disc heme today (+H/O disc heme in past)   -HVF - hint SAD od // full os (?prog od)     No apparent effect with latanoprost  Or lumigan - but tolerated ok - used q am    cosopt bid ou -  Intolerant - had to stop GI / stomach issues      IOP still at baseline  (LTG)  of 18-20 consider SLT's  - rec SLT's ou - OD then OS     SLT OD 1/27/2022 - good initial reap 22--> 16   SLT OS -good initial resp 20--> 16     Pt left without being seen - he contacted us and explained that he had severe back pain and was on his way to see the chiropracter. He did not want to miss his appt with the chiropracter - and he did not see anyone to tell as he was leaving     Called pt and told him the IOP was better after the slt's and that he should F/U in 4 months with  HVF / DFE / OCT     F/U 4 months with HVF / DFE / OCT     I have seen and personally examined the patient.  I agree with the findings, assessment and plan of the resident and/or fellow.     Latonia Snyder MD

## 2022-03-24 ENCOUNTER — PATIENT MESSAGE (OUTPATIENT)
Dept: INTERNAL MEDICINE | Facility: CLINIC | Age: 82
End: 2022-03-24
Payer: MEDICARE

## 2022-03-24 RX ORDER — CYCLOBENZAPRINE HCL 5 MG
5 TABLET ORAL 3 TIMES DAILY PRN
Qty: 20 TABLET | Refills: 0 | Status: SHIPPED | OUTPATIENT
Start: 2022-03-24 | End: 2022-03-28

## 2022-03-24 NOTE — TELEPHONE ENCOUNTER
Marco Antonio, I think Mr Schwab wants to transfer the eval of his back pain back to you. He told me he has an appointment. I wanted to bring this to your attention, that his back MRI shows retrolisthesis at L5S1 which looks fairly significant and a change at T12 which does not seem clear and may need more eval. He thinks the pain came from the IM injection.   Anyway I just wanted to pass this along to you. Let me know of any questions if it there is anything you want me to do before his follow up with you?     Dav Jeffers

## 2022-03-25 ENCOUNTER — PATIENT MESSAGE (OUTPATIENT)
Dept: OPHTHALMOLOGY | Facility: CLINIC | Age: 82
End: 2022-03-25

## 2022-03-25 ENCOUNTER — OFFICE VISIT (OUTPATIENT)
Dept: OPHTHALMOLOGY | Facility: CLINIC | Age: 82
End: 2022-03-25
Payer: MEDICARE

## 2022-03-25 DIAGNOSIS — H04.123 DRY EYE SYNDROME OF BOTH EYES: ICD-10-CM

## 2022-03-25 DIAGNOSIS — H25.13 NUCLEAR SCLEROTIC CATARACT OF BOTH EYES: ICD-10-CM

## 2022-03-25 DIAGNOSIS — H02.403 PTOSIS OF BOTH EYELIDS: ICD-10-CM

## 2022-03-25 DIAGNOSIS — H40.1231 LOW-TENSION GLAUCOMA OF BOTH EYES, MILD STAGE: Primary | ICD-10-CM

## 2022-03-25 PROCEDURE — 1160F RVW MEDS BY RX/DR IN RCRD: CPT | Mod: CPTII,S$GLB,, | Performed by: OPHTHALMOLOGY

## 2022-03-25 PROCEDURE — 1101F PT FALLS ASSESS-DOCD LE1/YR: CPT | Mod: CPTII,S$GLB,, | Performed by: OPHTHALMOLOGY

## 2022-03-25 PROCEDURE — 99499 UNLISTED E&M SERVICE: CPT | Mod: S$GLB,,, | Performed by: OPHTHALMOLOGY

## 2022-03-25 PROCEDURE — 1159F MED LIST DOCD IN RCRD: CPT | Mod: CPTII,S$GLB,, | Performed by: OPHTHALMOLOGY

## 2022-03-25 PROCEDURE — 99499 NO LOS: ICD-10-PCS | Mod: S$GLB,,, | Performed by: OPHTHALMOLOGY

## 2022-03-25 PROCEDURE — 99999 PR PBB SHADOW E&M-EST. PATIENT-LVL II: CPT | Mod: PBBFAC,,, | Performed by: OPHTHALMOLOGY

## 2022-03-25 PROCEDURE — 1101F PR PT FALLS ASSESS DOC 0-1 FALLS W/OUT INJ PAST YR: ICD-10-PCS | Mod: CPTII,S$GLB,, | Performed by: OPHTHALMOLOGY

## 2022-03-25 PROCEDURE — 3288F FALL RISK ASSESSMENT DOCD: CPT | Mod: CPTII,S$GLB,, | Performed by: OPHTHALMOLOGY

## 2022-03-25 PROCEDURE — 99999 PR PBB SHADOW E&M-EST. PATIENT-LVL II: ICD-10-PCS | Mod: PBBFAC,,, | Performed by: OPHTHALMOLOGY

## 2022-03-25 PROCEDURE — 1126F AMNT PAIN NOTED NONE PRSNT: CPT | Mod: CPTII,S$GLB,, | Performed by: OPHTHALMOLOGY

## 2022-03-25 PROCEDURE — 1126F PR PAIN SEVERITY QUANTIFIED, NO PAIN PRESENT: ICD-10-PCS | Mod: CPTII,S$GLB,, | Performed by: OPHTHALMOLOGY

## 2022-03-25 PROCEDURE — 1159F PR MEDICATION LIST DOCUMENTED IN MEDICAL RECORD: ICD-10-PCS | Mod: CPTII,S$GLB,, | Performed by: OPHTHALMOLOGY

## 2022-03-25 PROCEDURE — 3288F PR FALLS RISK ASSESSMENT DOCUMENTED: ICD-10-PCS | Mod: CPTII,S$GLB,, | Performed by: OPHTHALMOLOGY

## 2022-03-25 PROCEDURE — 1160F PR REVIEW ALL MEDS BY PRESCRIBER/CLIN PHARMACIST DOCUMENTED: ICD-10-PCS | Mod: CPTII,S$GLB,, | Performed by: OPHTHALMOLOGY

## 2022-03-26 LAB
BACTERIA BLD CULT: NORMAL
BACTERIA BLD CULT: NORMAL

## 2022-03-28 ENCOUNTER — OFFICE VISIT (OUTPATIENT)
Dept: INTERNAL MEDICINE | Facility: CLINIC | Age: 82
End: 2022-03-28
Payer: MEDICARE

## 2022-03-28 VITALS
HEART RATE: 70 BPM | HEIGHT: 72 IN | BODY MASS INDEX: 23.89 KG/M2 | OXYGEN SATURATION: 98 % | SYSTOLIC BLOOD PRESSURE: 130 MMHG | DIASTOLIC BLOOD PRESSURE: 86 MMHG | WEIGHT: 176.38 LBS

## 2022-03-28 DIAGNOSIS — K40.90 DIRECT HERNIA: Primary | ICD-10-CM

## 2022-03-28 DIAGNOSIS — M54.41 ACUTE RIGHT-SIDED LOW BACK PAIN WITH RIGHT-SIDED SCIATICA: ICD-10-CM

## 2022-03-28 PROCEDURE — 3079F DIAST BP 80-89 MM HG: CPT | Mod: CPTII,S$GLB,, | Performed by: INTERNAL MEDICINE

## 2022-03-28 PROCEDURE — 1160F RVW MEDS BY RX/DR IN RCRD: CPT | Mod: CPTII,S$GLB,, | Performed by: INTERNAL MEDICINE

## 2022-03-28 PROCEDURE — 99999 PR PBB SHADOW E&M-EST. PATIENT-LVL IV: CPT | Mod: PBBFAC,,, | Performed by: INTERNAL MEDICINE

## 2022-03-28 PROCEDURE — 1101F PT FALLS ASSESS-DOCD LE1/YR: CPT | Mod: CPTII,S$GLB,, | Performed by: INTERNAL MEDICINE

## 2022-03-28 PROCEDURE — 1126F AMNT PAIN NOTED NONE PRSNT: CPT | Mod: CPTII,S$GLB,, | Performed by: INTERNAL MEDICINE

## 2022-03-28 PROCEDURE — 1160F PR REVIEW ALL MEDS BY PRESCRIBER/CLIN PHARMACIST DOCUMENTED: ICD-10-PCS | Mod: CPTII,S$GLB,, | Performed by: INTERNAL MEDICINE

## 2022-03-28 PROCEDURE — 1159F PR MEDICATION LIST DOCUMENTED IN MEDICAL RECORD: ICD-10-PCS | Mod: CPTII,S$GLB,, | Performed by: INTERNAL MEDICINE

## 2022-03-28 PROCEDURE — 1159F MED LIST DOCD IN RCRD: CPT | Mod: CPTII,S$GLB,, | Performed by: INTERNAL MEDICINE

## 2022-03-28 PROCEDURE — 3288F FALL RISK ASSESSMENT DOCD: CPT | Mod: CPTII,S$GLB,, | Performed by: INTERNAL MEDICINE

## 2022-03-28 PROCEDURE — 99213 PR OFFICE/OUTPT VISIT, EST, LEVL III, 20-29 MIN: ICD-10-PCS | Mod: S$GLB,,, | Performed by: INTERNAL MEDICINE

## 2022-03-28 PROCEDURE — 1126F PR PAIN SEVERITY QUANTIFIED, NO PAIN PRESENT: ICD-10-PCS | Mod: CPTII,S$GLB,, | Performed by: INTERNAL MEDICINE

## 2022-03-28 PROCEDURE — 99999 PR PBB SHADOW E&M-EST. PATIENT-LVL IV: ICD-10-PCS | Mod: PBBFAC,,, | Performed by: INTERNAL MEDICINE

## 2022-03-28 PROCEDURE — 1101F PR PT FALLS ASSESS DOC 0-1 FALLS W/OUT INJ PAST YR: ICD-10-PCS | Mod: CPTII,S$GLB,, | Performed by: INTERNAL MEDICINE

## 2022-03-28 PROCEDURE — 3075F SYST BP GE 130 - 139MM HG: CPT | Mod: CPTII,S$GLB,, | Performed by: INTERNAL MEDICINE

## 2022-03-28 PROCEDURE — 3288F PR FALLS RISK ASSESSMENT DOCUMENTED: ICD-10-PCS | Mod: CPTII,S$GLB,, | Performed by: INTERNAL MEDICINE

## 2022-03-28 PROCEDURE — 3079F PR MOST RECENT DIASTOLIC BLOOD PRESSURE 80-89 MM HG: ICD-10-PCS | Mod: CPTII,S$GLB,, | Performed by: INTERNAL MEDICINE

## 2022-03-28 PROCEDURE — 3075F PR MOST RECENT SYSTOLIC BLOOD PRESS GE 130-139MM HG: ICD-10-PCS | Mod: CPTII,S$GLB,, | Performed by: INTERNAL MEDICINE

## 2022-03-28 PROCEDURE — 99213 OFFICE O/P EST LOW 20 MIN: CPT | Mod: S$GLB,,, | Performed by: INTERNAL MEDICINE

## 2022-03-28 NOTE — PROGRESS NOTES
CC:  Follow-up    HPI:  The patient is 81-year-old male with BPH, elevated PSA, colon polyps, low tension glaucoma, multinodular goiter, hyperlipidemia, peripheral neuropathy and tremors who presents today for follow-up of back pain.  The patient was seen in urgent care for muscle spasms involving his back.  He was given injection of Toradol which resulted in him and having pain going from his right hip into the right groin.  The patient when saw a chiropractor who cracked his back as well as for his leg for adjustment.  He has had 3 such treatments with the last on Friday.  Each 1 gave him some relief with the best being this past Friday.  He is not taking any medication for his back.  Patient was also recently seen in the emergency room department for dysuria.  His Cardura was increased to 4 mg a day and he was placed on a prednisone taper.  His urinary symptoms have improved.  This also seemed to help his back as well.    ROS:  Patient reports feeling 90% better as long as he is not doing anything to aggravate his back.  The patient was given gabapentin which caused stomach upset after 1 dose.    Physical exam:  General appearance:  No acute distress  Pulmonary:  Good inspiratory, expiratory breath sounds are heard.  Lungs clear to auscultation.  Cardiovascular:  S1-S2, rhythm is normal.  Extremities without edema.  GI:  Abdomen was nontender, nondistended without hepatosplenomegaly.  The patient does bring up that he has a bulge in the right lower quadrant.  This was evaluated.  Patient has a possible direct hernia.    Assessment:  1. Back spasms which appeared resolved  2. Right lower back pain with radiation to the right groin currently improved  3. Possible direct hernia    Plan:  1. Put the patient for an ultrasound  2. The patient will follow-up pending ultrasound report.

## 2022-03-29 ENCOUNTER — HOSPITAL ENCOUNTER (OUTPATIENT)
Dept: RADIOLOGY | Facility: HOSPITAL | Age: 82
Discharge: HOME | End: 2022-03-29
Attending: INTERNAL MEDICINE
Payer: MEDICARE

## 2022-03-29 DIAGNOSIS — K40.90 DIRECT HERNIA: ICD-10-CM

## 2022-03-29 PROCEDURE — 76705 ECHO EXAM OF ABDOMEN: CPT | Mod: TC

## 2022-03-29 PROCEDURE — 76705 ECHO EXAM OF ABDOMEN: CPT | Mod: 26,,, | Performed by: RADIOLOGY

## 2022-03-29 PROCEDURE — 76705 US ABDOMEN LIMITED_HERNIA: ICD-10-PCS | Mod: 26,,, | Performed by: RADIOLOGY

## 2022-03-30 ENCOUNTER — PATIENT MESSAGE (OUTPATIENT)
Dept: INTERNAL MEDICINE | Facility: CLINIC | Age: 82
End: 2022-03-30
Payer: MEDICARE

## 2022-03-31 ENCOUNTER — TELEPHONE (OUTPATIENT)
Dept: INTERNAL MEDICINE | Facility: CLINIC | Age: 82
End: 2022-03-31
Payer: MEDICARE

## 2022-03-31 ENCOUNTER — PATIENT MESSAGE (OUTPATIENT)
Dept: INTERNAL MEDICINE | Facility: CLINIC | Age: 82
End: 2022-03-31
Payer: MEDICARE

## 2022-03-31 DIAGNOSIS — K40.90 DIRECT HERNIA: Primary | ICD-10-CM

## 2022-03-31 NOTE — TELEPHONE ENCOUNTER
He sent a message yesterday. I cannot make a call as to what the next step is.  See Portal message.

## 2022-03-31 NOTE — TELEPHONE ENCOUNTER
----- Message from Natalie Madrigal sent at 3/31/2022  9:28 AM CDT -----  Contact: Self/[phone]  Pt said that he is calling in regards to needing to speak with the nurse to see what the doctor recommends needs to be done about his hernia. Please advise

## 2022-04-01 ENCOUNTER — TELEPHONE (OUTPATIENT)
Dept: SURGERY | Facility: CLINIC | Age: 82
End: 2022-04-01
Payer: MEDICARE

## 2022-04-01 NOTE — TELEPHONE ENCOUNTER
----- Message from Caitlin Horvath sent at 4/1/2022  4:24 PM CDT -----  Regarding: Call Back  Who Called: pt         What is the reason for the call: patient is calling in regards to scheduling new patient appointment. Referral listed in Epic. Please contact to further assist.         Can patient be contacted on [employer]: No          Call back number: [phone]/ [phone]

## 2022-04-08 ENCOUNTER — OFFICE VISIT (OUTPATIENT)
Dept: SURGERY | Facility: CLINIC | Age: 82
End: 2022-04-08
Payer: MEDICARE

## 2022-04-08 ENCOUNTER — PATIENT MESSAGE (OUTPATIENT)
Dept: SURGERY | Facility: CLINIC | Age: 82
End: 2022-04-08

## 2022-04-08 ENCOUNTER — IMMUNIZATION (OUTPATIENT)
Dept: INTERNAL MEDICINE | Facility: CLINIC | Age: 82
End: 2022-04-08
Payer: MEDICARE

## 2022-04-08 VITALS
BODY MASS INDEX: 23.66 KG/M2 | HEIGHT: 72 IN | WEIGHT: 174.69 LBS | SYSTOLIC BLOOD PRESSURE: 133 MMHG | HEART RATE: 73 BPM | DIASTOLIC BLOOD PRESSURE: 75 MMHG

## 2022-04-08 DIAGNOSIS — R10.31 RIGHT GROIN PAIN: Primary | ICD-10-CM

## 2022-04-08 DIAGNOSIS — Z23 NEED FOR VACCINATION: Primary | ICD-10-CM

## 2022-04-08 PROCEDURE — 1101F PR PT FALLS ASSESS DOC 0-1 FALLS W/OUT INJ PAST YR: ICD-10-PCS | Mod: CPTII,S$GLB,, | Performed by: SURGERY

## 2022-04-08 PROCEDURE — 1159F MED LIST DOCD IN RCRD: CPT | Mod: CPTII,S$GLB,, | Performed by: SURGERY

## 2022-04-08 PROCEDURE — 99999 PR PBB SHADOW E&M-EST. PATIENT-LVL III: ICD-10-PCS | Mod: PBBFAC,,, | Performed by: SURGERY

## 2022-04-08 PROCEDURE — 1160F PR REVIEW ALL MEDS BY PRESCRIBER/CLIN PHARMACIST DOCUMENTED: ICD-10-PCS | Mod: CPTII,S$GLB,, | Performed by: SURGERY

## 2022-04-08 PROCEDURE — 99999 PR PBB SHADOW E&M-EST. PATIENT-LVL III: CPT | Mod: PBBFAC,,, | Performed by: SURGERY

## 2022-04-08 PROCEDURE — 1125F PR PAIN SEVERITY QUANTIFIED, PAIN PRESENT: ICD-10-PCS | Mod: CPTII,S$GLB,, | Performed by: SURGERY

## 2022-04-08 PROCEDURE — 3288F FALL RISK ASSESSMENT DOCD: CPT | Mod: CPTII,S$GLB,, | Performed by: SURGERY

## 2022-04-08 PROCEDURE — 1159F PR MEDICATION LIST DOCUMENTED IN MEDICAL RECORD: ICD-10-PCS | Mod: CPTII,S$GLB,, | Performed by: SURGERY

## 2022-04-08 PROCEDURE — 3288F PR FALLS RISK ASSESSMENT DOCUMENTED: ICD-10-PCS | Mod: CPTII,S$GLB,, | Performed by: SURGERY

## 2022-04-08 PROCEDURE — 91305 COVID-19, MRNA, LNP-S, PF, 30 MCG/0.3 ML DOSE VACCINE (PFIZER): CPT | Mod: PBBFAC | Performed by: INTERNAL MEDICINE

## 2022-04-08 PROCEDURE — 3078F DIAST BP <80 MM HG: CPT | Mod: CPTII,S$GLB,, | Performed by: SURGERY

## 2022-04-08 PROCEDURE — 99204 OFFICE O/P NEW MOD 45 MIN: CPT | Mod: S$GLB,,, | Performed by: SURGERY

## 2022-04-08 PROCEDURE — 1125F AMNT PAIN NOTED PAIN PRSNT: CPT | Mod: CPTII,S$GLB,, | Performed by: SURGERY

## 2022-04-08 PROCEDURE — 3075F SYST BP GE 130 - 139MM HG: CPT | Mod: CPTII,S$GLB,, | Performed by: SURGERY

## 2022-04-08 PROCEDURE — 1101F PT FALLS ASSESS-DOCD LE1/YR: CPT | Mod: CPTII,S$GLB,, | Performed by: SURGERY

## 2022-04-08 PROCEDURE — 3075F PR MOST RECENT SYSTOLIC BLOOD PRESS GE 130-139MM HG: ICD-10-PCS | Mod: CPTII,S$GLB,, | Performed by: SURGERY

## 2022-04-08 PROCEDURE — 1160F RVW MEDS BY RX/DR IN RCRD: CPT | Mod: CPTII,S$GLB,, | Performed by: SURGERY

## 2022-04-08 PROCEDURE — 99204 PR OFFICE/OUTPT VISIT, NEW, LEVL IV, 45-59 MIN: ICD-10-PCS | Mod: S$GLB,,, | Performed by: SURGERY

## 2022-04-08 PROCEDURE — 3078F PR MOST RECENT DIASTOLIC BLOOD PRESSURE < 80 MM HG: ICD-10-PCS | Mod: CPTII,S$GLB,, | Performed by: SURGERY

## 2022-04-08 NOTE — PROGRESS NOTES
General Surgery Office Visit   History and Physical    Patient Name: Fred Schwab Jr.  YOB: 1940 (81 y.o.)  MRN: [id number]  Today's Date: 04/08/2022    Referring Md:   Marco Antonio Vaughan Md  [address] [place] [address]    SUBJECTIVE:     Chief Complaint: Bulge in right groin    History of Present Illness:  Fred Schwab Jr. is a 81 y.o. male with medical problems including BPH and HTN who presents to the clinic today complaining of a bulge in his right jeffrey. He first noticed it after receiving an injection into the right flank area for ongoing poin in his back and afterwards had ongoing burning pain in his grown. He reports a bulge in his lower abdomen, however denies a bulge in his groin. He reports some debility since the pain began but is still able to play ping pong. He denies prior history of similar symptoms. The symptoms are aggravated by prolonged activity and alleviated by rest. Patient also endorses difficulty with urination during this time however workup for UTI was negative as well as intermittent constipation.     He is leaving for [place] in early May and is hoping that this will not interfere with his trip.     He denies fever, chills, unintentional weight loss, CP, SOB, n/v/d, hematochezia, hematuria, and all other symptoms.     Patient denies personal history of MI, CVA, lung disease, DM  Previous abdominal surgeries include: None  1-2 beers per week. Denies tobacco and elicit drug use.   Not currently on any anticoagulants      Review of patient's allergies indicates:   Allergen Reactions    Clindamycin Rash    Doxycycline hyclate Rash    Ciprofloxacin      Tendon tear.    Iodine and iodide containing products Rash     Got red but no itching    Penicillins     Tizanidine hcl      Unknown       Past Medical History:   Diagnosis Date    Actinic keratosis     Allergy     Arthritis     Basal cell carcinoma     left nasofacial sulcus    BPH (benign prostatic hyperplasia)      Cataract     Colon polyp     benign    Dermatochalasis     Elevated PSA     Headache(784.0)     HEARING LOSS     Hypertension     Low tension glaucoma     Multiple thyroid nodules 11-27-17    Need repeat thyroid ultrasound in November 2018.    Neuropathy     Otitis media     Squamous cell carcinoma 12/10/16    left upper neck- in situ    Urinary tract infection      Past Surgical History:   Procedure Laterality Date    BELPHAROPTOSIS REPAIR      COLON SURGERY      COLONOSCOPY      COLONOSCOPY N/A 10/26/2015    Procedure: COLONOSCOPY;  Surgeon: Diony Kennedy MD;  Location: [address]);  Service: Endoscopy;  Laterality: N/A;    COLONOSCOPY N/A 2/10/2020    Procedure: COLONOSCOPY;  Surgeon: Gunnar Avalos MD;  Location: [facility] ([address] [place] [address]);  Service: Endoscopy;  Laterality: N/A;    ESOPHAGOGASTRODUODENOSCOPY N/A 5/29/2020    Procedure: EGD (ESOPHAGOGASTRODUODENOSCOPY);  Surgeon: Gunnar Avalos MD;  Location: [address]);  Service: Endoscopy;  Laterality: N/A;  COVID screening scheduled on 5/28/20 at Primary care-rb  pt updated on drop off location and no visitor policy-rb    MOHS Surgery      PROSTATE BIOPSY      SELECTIVE LASER TRABECULOPLASTY Bilateral 01/27/2022 AND 2/10/2022         Family History   Problem Relation Age of Onset    Cancer Father         prostate    Heart disease Father     Stroke Father     Hypertension Mother     Diabetes Mother     Glaucoma Mother     Diabetes Sister     Pancreatic cancer Sister 75    Skin cancer Neg Hx     Melanoma Neg Hx     Amblyopia Neg Hx     Blindness Neg Hx     Cataracts Neg Hx     Macular degeneration Neg Hx     Retinal detachment Neg Hx     Strabismus Neg Hx      Social History     Tobacco Use    Smoking status: Never Smoker    Smokeless tobacco: Never Used   Substance Use Topics    Alcohol use: Yes     Comment: every once in a while    Drug use: No        Review of Systems:  Review of  Systems   Constitutional: Negative for chills, fever and malaise/fatigue.   Respiratory: Negative for sputum production.    Cardiovascular: Negative for chest pain.   Gastrointestinal: Positive for abdominal pain. Negative for blood in stool, constipation, diarrhea, nausea and vomiting.   Genitourinary: Positive for dysuria and frequency. Negative for hematuria.   Musculoskeletal: Positive for back pain and joint pain.   Neurological: Negative for weakness.       OBJECTIVE:     Vital Signs (Most Recent)  /75   Pulse 73   Ht 6' (1.829 m)   Wt 79.2 kg (174 lb 11.4 oz)   BMI 23.70 kg/m²   Body mass index is 23.7 kg/m².    Physical Exam  Vitals reviewed.   Constitutional:       Appearance: Normal appearance.   Eyes:      General: No scleral icterus.     Extraocular Movements: Extraocular movements intact.   Cardiovascular:      Rate and Rhythm: Normal rate.   Pulmonary:      Effort: Pulmonary effort is normal. No respiratory distress.   Abdominal:      General: There is no distension.      Palpations: Abdomen is soft.      Tenderness: There is no abdominal tenderness. There is no guarding or rebound.      Comments: Unable to appreciate R or L IH on exam. Diastasis of the right abdominal wall.   Skin:     General: Skin is warm and dry.   Neurological:      General: No focal deficit present.      Mental Status: He is alert and oriented to person, place, and time.   Psychiatric:         Mood and Affect: Mood normal.         Behavior: Behavior normal.           Labs:   3/20/22: CBC slight left shift without leukocytosis; CMP insulin resistance otherwise wnl  7/2022: HbA1c 5.5    Stress test 12/2018:  · The stress echo portion of this study is negative for myocardial ischemia.  · The EKG portion of this study is abnormal but not diagnostic.  · Normal left ventricular systolic function. The estimated ejection fraction is 60%  · The patient reported no symptoms during the stress test.  · The test was stopped  secondary to atypical leg pain.  · Arrhythmias during stress: occasional PACs.  · Normal LV diastolic function.  · Mild left atrial enlargement.  · Mild-to-moderate mitral regurgitation.  · Mild to moderate tricuspid regurgitation.  · Mild to moderate pulmonic regurgitation.  · There is right ventricular hypertrophy.  · Overall, the patient's exercise capacity was above average.  · There is 0.5 mm of depression in the leads.        Imaging:   CT AP 3/20/22: Small umbilical hernia; no evidence of R/L IH by my read      Provocative US 3/29/22  FINDINGS:  Images demonstrate right lower quadrant direct hernia.  There were fat and loops of bowel protruding through the anterior abdominal wall with provocative maneuvers.  The hernia neck size measuring 0.6 cm .     Impression:     Right fat and bowel containing direct hernia.      ASSESSMENT/PLAN:     Fred Schwab Jr. is a 81 y.o. male with medical problems including BPH and HTN who presents to the clinic with bulge in the right groin and US demonstrating a RIH.    -We discussed that it is reasonable for patient to delay elective repair of his hernia until after he returns from [place]  -We will plan to have him follow up in our clinic upon return     Case discussed with Dr. Kline.    Robe Blancas MD  General Surgery, PGY-1    I have personally taken the history and examined this patient and agree with the resident's note as stated above.         Marco Antonio Kline MD

## 2022-04-21 ENCOUNTER — PATIENT MESSAGE (OUTPATIENT)
Dept: INTERNAL MEDICINE | Facility: CLINIC | Age: 82
End: 2022-04-21
Payer: MEDICARE

## 2022-04-25 ENCOUNTER — TELEPHONE (OUTPATIENT)
Dept: INTERNAL MEDICINE | Facility: CLINIC | Age: 82
End: 2022-04-25
Payer: MEDICARE

## 2022-04-25 NOTE — TELEPHONE ENCOUNTER
----- Message from Hailey Irvin sent at 4/25/2022 11:15 AM CDT -----  Contact: [phone]  Pt called to speak to the staff in reference to a call he received on Friday. He believes his initial question was not answered and would like to clarify. Please Advise

## 2022-04-25 NOTE — TELEPHONE ENCOUNTER
Spoke with pt, he stated that the U/S only went over his bulge on the right side of his abdomen about 10 seconds then focused 20 minutes to the hernia in his groin area. Pt stated that he feel like the bulge has not been viewed and addressed and want you to look into and call him as soon as possible.

## 2022-04-26 ENCOUNTER — PATIENT MESSAGE (OUTPATIENT)
Dept: DERMATOLOGY | Facility: CLINIC | Age: 82
End: 2022-04-26
Payer: MEDICARE

## 2022-05-25 ENCOUNTER — OFFICE VISIT (OUTPATIENT)
Dept: SURGERY | Facility: CLINIC | Age: 82
End: 2022-05-25
Payer: MEDICARE

## 2022-05-25 VITALS
HEIGHT: 72 IN | BODY MASS INDEX: 23.63 KG/M2 | DIASTOLIC BLOOD PRESSURE: 68 MMHG | WEIGHT: 174.5 LBS | HEART RATE: 57 BPM | SYSTOLIC BLOOD PRESSURE: 124 MMHG

## 2022-05-25 DIAGNOSIS — K40.90 DIRECT HERNIA: ICD-10-CM

## 2022-05-25 DIAGNOSIS — K40.90 RIGHT INGUINAL HERNIA: Primary | ICD-10-CM

## 2022-05-25 PROCEDURE — 3074F SYST BP LT 130 MM HG: CPT | Mod: CPTII,S$GLB,, | Performed by: SURGERY

## 2022-05-25 PROCEDURE — 99999 PR PBB SHADOW E&M-EST. PATIENT-LVL IV: ICD-10-PCS | Mod: PBBFAC,,, | Performed by: SURGERY

## 2022-05-25 PROCEDURE — 1160F RVW MEDS BY RX/DR IN RCRD: CPT | Mod: CPTII,S$GLB,, | Performed by: SURGERY

## 2022-05-25 PROCEDURE — 1159F PR MEDICATION LIST DOCUMENTED IN MEDICAL RECORD: ICD-10-PCS | Mod: CPTII,S$GLB,, | Performed by: SURGERY

## 2022-05-25 PROCEDURE — 1101F PR PT FALLS ASSESS DOC 0-1 FALLS W/OUT INJ PAST YR: ICD-10-PCS | Mod: CPTII,S$GLB,, | Performed by: SURGERY

## 2022-05-25 PROCEDURE — 99213 PR OFFICE/OUTPT VISIT, EST, LEVL III, 20-29 MIN: ICD-10-PCS | Mod: S$GLB,,, | Performed by: SURGERY

## 2022-05-25 PROCEDURE — 3078F PR MOST RECENT DIASTOLIC BLOOD PRESSURE < 80 MM HG: ICD-10-PCS | Mod: CPTII,S$GLB,, | Performed by: SURGERY

## 2022-05-25 PROCEDURE — 99999 PR PBB SHADOW E&M-EST. PATIENT-LVL IV: CPT | Mod: PBBFAC,,, | Performed by: SURGERY

## 2022-05-25 PROCEDURE — 1160F PR REVIEW ALL MEDS BY PRESCRIBER/CLIN PHARMACIST DOCUMENTED: ICD-10-PCS | Mod: CPTII,S$GLB,, | Performed by: SURGERY

## 2022-05-25 PROCEDURE — 3288F FALL RISK ASSESSMENT DOCD: CPT | Mod: CPTII,S$GLB,, | Performed by: SURGERY

## 2022-05-25 PROCEDURE — 1125F PR PAIN SEVERITY QUANTIFIED, PAIN PRESENT: ICD-10-PCS | Mod: CPTII,S$GLB,, | Performed by: SURGERY

## 2022-05-25 PROCEDURE — 1159F MED LIST DOCD IN RCRD: CPT | Mod: CPTII,S$GLB,, | Performed by: SURGERY

## 2022-05-25 PROCEDURE — 1101F PT FALLS ASSESS-DOCD LE1/YR: CPT | Mod: CPTII,S$GLB,, | Performed by: SURGERY

## 2022-05-25 PROCEDURE — 3074F PR MOST RECENT SYSTOLIC BLOOD PRESSURE < 130 MM HG: ICD-10-PCS | Mod: CPTII,S$GLB,, | Performed by: SURGERY

## 2022-05-25 PROCEDURE — 3078F DIAST BP <80 MM HG: CPT | Mod: CPTII,S$GLB,, | Performed by: SURGERY

## 2022-05-25 PROCEDURE — 99213 OFFICE O/P EST LOW 20 MIN: CPT | Mod: S$GLB,,, | Performed by: SURGERY

## 2022-05-25 PROCEDURE — 3288F PR FALLS RISK ASSESSMENT DOCUMENTED: ICD-10-PCS | Mod: CPTII,S$GLB,, | Performed by: SURGERY

## 2022-05-25 PROCEDURE — 1125F AMNT PAIN NOTED PAIN PRSNT: CPT | Mod: CPTII,S$GLB,, | Performed by: SURGERY

## 2022-05-25 NOTE — PROGRESS NOTES
General Surgery Office Visit   History and Physical    Patient Name: Fred Schwab Jr.  YOB: 1940 (81 y.o.)  MRN: [id number]  Today's Date: 05/25/2022    Referring Md:   Marco Antonio Vaughan Md  [address] [place] [address]    SUBJECTIVE:     Chief Complaint: Bulge in right groin    History of Present Illness:  Fred Schwab Jr. is a 81 y.o. male with medical problems including BPH and HTN who presents to the clinic today complaining of a bulge in his right jeffrey. He first noticed it after receiving an injection into the right flank area for ongoing poin in his back and afterwards had ongoing burning pain in his grown. He reports a bulge in his lower abdomen, however denies a bulge in his groin. He reports some debility since the pain began but is still able to play ping pong. He denies prior history of similar symptoms. The symptoms are aggravated by prolonged activity and alleviated by rest. Patient also endorses difficulty with urination during this time however workup for UTI was negative as well as intermittent constipation.     He is leaving for [place] in early May and is hoping that this will not interfere with his trip.     He denies fever, chills, unintentional weight loss, CP, SOB, n/v/d, hematochezia, hematuria, and all other symptoms.     Patient denies personal history of MI, CVA, lung disease, DM  Previous abdominal surgeries include: None  1-2 beers per week. Denies tobacco and elicit drug use.   Not currently on any anticoagulants    Interval History 5/25/22: he returned from his trip to [place]. Still notes intermittent right groin bulge and discomfort.       Review of patient's allergies indicates:   Allergen Reactions    Clindamycin Rash    Doxycycline hyclate Rash    Ciprofloxacin      Tendon tear.    Iodine and iodide containing products Rash     Got red but no itching    Penicillins     Tizanidine hcl      Unknown       Past Medical History:   Diagnosis Date    Actinic  keratosis     Allergy     Arthritis     Basal cell carcinoma     left nasofacial sulcus    BPH (benign prostatic hyperplasia)     Cataract     Colon polyp     benign    Dermatochalasis     Elevated PSA     Headache(784.0)     HEARING LOSS     Hypertension     Low tension glaucoma     Multiple thyroid nodules 11-27-17    Need repeat thyroid ultrasound in November 2018.    Neuropathy     Otitis media     Squamous cell carcinoma 12/10/16    left upper neck- in situ    Urinary tract infection      Past Surgical History:   Procedure Laterality Date    BELPHAROPTOSIS REPAIR      COLON SURGERY      COLONOSCOPY      COLONOSCOPY N/A 10/26/2015    Procedure: COLONOSCOPY;  Surgeon: Diony Kennedy MD;  [facility]  Service: Endoscopy;  Laterality: N/A;    COLONOSCOPY N/A 2/10/2020    Procedure: COLONOSCOPY;  Surgeon: Gunnar Avalos MD;  [facility]  Service: Endoscopy;  Laterality: N/A;    ESOPHAGOGASTRODUODENOSCOPY N/A 5/29/2020    Procedure: EGD (ESOPHAGOGASTRODUODENOSCOPY);  Surgeon: Gunnar Avalos MD;  Location: [address]);  Service: Endoscopy;  Laterality: N/A;  COVID screening scheduled on 5/28/20 at [facility] care-[facility]  pt updated on drop off location and no visitor policy-rb    MOHS Surgery      PROSTATE BIOPSY      SELECTIVE LASER TRABECULOPLASTY Bilateral 01/27/2022 AND 2/10/2022         Family History   Problem Relation Age of Onset    Cancer Father         prostate    Heart disease Father     Stroke Father     Hypertension Mother     Diabetes Mother     Glaucoma Mother     Diabetes Sister     Pancreatic cancer Sister 75    Skin cancer Neg Hx     Melanoma Neg Hx     Amblyopia Neg Hx     Blindness Neg Hx     Cataracts Neg Hx     Macular degeneration Neg Hx     Retinal detachment Neg Hx     Strabismus Neg Hx      Social History     Tobacco Use    Smoking status: Never Smoker    Smokeless tobacco: Never Used    Substance Use Topics    Alcohol use: Yes     Comment: every once in a while    Drug use: No        Review of Systems:  Review of Systems   Constitutional: Negative for chills, fever and malaise/fatigue.   Respiratory: Negative for sputum production.    Cardiovascular: Negative for chest pain.   Gastrointestinal: Positive for abdominal pain. Negative for blood in stool, constipation, diarrhea, nausea and vomiting.   Genitourinary: Positive for dysuria and frequency. Negative for hematuria.   Musculoskeletal: Positive for back pain and joint pain.   Neurological: Negative for weakness.       OBJECTIVE:     Vital Signs (Most Recent)  /68   Pulse (!) 57   Ht 6' (1.829 m)   Wt 79.2 kg (174 lb 7.9 oz)   BMI 23.67 kg/m²   Body mass index is 23.67 kg/m².    Physical Exam  Vitals reviewed.   Constitutional:       Appearance: Normal appearance.   Eyes:      General: No scleral icterus.     Extraocular Movements: Extraocular movements intact.   Cardiovascular:      Rate and Rhythm: Normal rate.   Pulmonary:      Effort: Pulmonary effort is normal. No respiratory distress.   Abdominal:      General: There is no distension.      Palpations: Abdomen is soft.      Tenderness: There is no abdominal tenderness. There is no guarding or rebound.      Comments: Small right inguinal hernia on exam, no left inguinal hernia appreciated   Skin:     General: Skin is warm and dry.   Neurological:      General: No focal deficit present.      Mental Status: He is alert and oriented to person, place, and time.   Psychiatric:         Mood and Affect: Mood normal.         Behavior: Behavior normal.           Labs:   3/20/22: CBC slight left shift without leukocytosis; CMP insulin resistance otherwise wnl  7/2022: HbA1c 5.5    Stress test 12/2018:  · The stress echo portion of this study is negative for myocardial ischemia.  · The EKG portion of this study is abnormal but not diagnostic.  · Normal left ventricular systolic function.  The estimated ejection fraction is 60%  · The patient reported no symptoms during the stress test.  · The test was stopped secondary to atypical leg pain.  · Arrhythmias during stress: occasional PACs.  · Normal LV diastolic function.  · Mild left atrial enlargement.  · Mild-to-moderate mitral regurgitation.  · Mild to moderate tricuspid regurgitation.  · Mild to moderate pulmonic regurgitation.  · There is right ventricular hypertrophy.  · Overall, the patient's exercise capacity was above average.  · There is 0.5 mm of depression in the leads.        Imaging:   CT AP 3/20/22: Small umbilical hernia; no evidence of R/L IH by my read      Provocative US 3/29/22  FINDINGS:  Images demonstrate right lower quadrant direct hernia.  There were fat and loops of bowel protruding through the anterior abdominal wall with provocative maneuvers.  The hernia neck size measuring 0.6 cm .     Impression:     Right fat and bowel containing direct hernia.      ASSESSMENT/PLAN:     Fred Schwab Jr. is a 81 y.o. male with medical problems including BPH and HTN who presents to the clinic with bulge in the right groin and US demonstrating a RIH.    -Plan for lap right inguinal hernia repair with eval on left, consent obtained.  Will get PCP clearance.  -F/u information will be provided postop  -Please call with questions      Jarrett Millan MD  General Surgery Resident - PGY3  Pager: [phone]    I have personally taken the history and examined this patient and agree with the resident's note as stated above.         Marco Antonio Kline MD

## 2022-06-03 ENCOUNTER — LAB VISIT (OUTPATIENT)
Dept: LAB | Facility: HOSPITAL | Age: 82
End: 2022-06-03
Attending: INTERNAL MEDICINE
Payer: MEDICARE

## 2022-06-03 ENCOUNTER — OFFICE VISIT (OUTPATIENT)
Dept: INTERNAL MEDICINE | Facility: CLINIC | Age: 82
End: 2022-06-03
Payer: MEDICARE

## 2022-06-03 ENCOUNTER — PATIENT MESSAGE (OUTPATIENT)
Dept: SURGERY | Facility: HOSPITAL | Age: 82
End: 2022-06-03
Payer: MEDICARE

## 2022-06-03 VITALS
HEART RATE: 57 BPM | DIASTOLIC BLOOD PRESSURE: 64 MMHG | OXYGEN SATURATION: 92 % | HEIGHT: 72 IN | BODY MASS INDEX: 23.71 KG/M2 | WEIGHT: 175.06 LBS | SYSTOLIC BLOOD PRESSURE: 125 MMHG

## 2022-06-03 DIAGNOSIS — Z01.818 PRE-OP EXAM: ICD-10-CM

## 2022-06-03 DIAGNOSIS — Z01.818 PRE-OP EXAM: Primary | ICD-10-CM

## 2022-06-03 LAB
ALBUMIN SERPL BCP-MCNC: 3.6 G/DL (ref 3.5–5.2)
ALP SERPL-CCNC: 64 U/L (ref 55–135)
ALT SERPL W/O P-5'-P-CCNC: 12 U/L (ref 10–44)
ANION GAP SERPL CALC-SCNC: 6 MMOL/L (ref 8–16)
AST SERPL-CCNC: 17 U/L (ref 10–40)
BASOPHILS # BLD AUTO: 0.06 K/UL (ref 0–0.2)
BASOPHILS NFR BLD: 1 % (ref 0–1.9)
BILIRUB SERPL-MCNC: 0.9 MG/DL (ref 0.1–1)
BUN SERPL-MCNC: 12 MG/DL (ref 8–23)
CALCIUM SERPL-MCNC: 9.2 MG/DL (ref 8.7–10.5)
CHLORIDE SERPL-SCNC: 101 MMOL/L (ref 95–110)
CO2 SERPL-SCNC: 29 MMOL/L (ref 23–29)
CREAT SERPL-MCNC: 1.1 MG/DL (ref 0.5–1.4)
DIFFERENTIAL METHOD: ABNORMAL
EOSINOPHIL # BLD AUTO: 0.1 K/UL (ref 0–0.5)
EOSINOPHIL NFR BLD: 1.2 % (ref 0–8)
ERYTHROCYTE [DISTWIDTH] IN BLOOD BY AUTOMATED COUNT: 13.4 % (ref 11.5–14.5)
EST. GFR  (AFRICAN AMERICAN): >60 ML/MIN/1.73 M^2
EST. GFR  (NON AFRICAN AMERICAN): >60 ML/MIN/1.73 M^2
GLUCOSE SERPL-MCNC: 102 MG/DL (ref 70–110)
HCT VFR BLD AUTO: 46.6 % (ref 40–54)
HGB BLD-MCNC: 14.6 G/DL (ref 14–18)
IMM GRANULOCYTES # BLD AUTO: 0.02 K/UL (ref 0–0.04)
IMM GRANULOCYTES NFR BLD AUTO: 0.3 % (ref 0–0.5)
LYMPHOCYTES # BLD AUTO: 1.7 K/UL (ref 1–4.8)
LYMPHOCYTES NFR BLD: 27.5 % (ref 18–48)
MCH RBC QN AUTO: 30.4 PG (ref 27–31)
MCHC RBC AUTO-ENTMCNC: 31.3 G/DL (ref 32–36)
MCV RBC AUTO: 97 FL (ref 82–98)
MONOCYTES # BLD AUTO: 0.5 K/UL (ref 0.3–1)
MONOCYTES NFR BLD: 7.7 % (ref 4–15)
NEUTROPHILS # BLD AUTO: 3.7 K/UL (ref 1.8–7.7)
NEUTROPHILS NFR BLD: 62.3 % (ref 38–73)
NRBC BLD-RTO: 0 /100 WBC
PLATELET # BLD AUTO: 183 K/UL (ref 150–450)
PMV BLD AUTO: 11 FL (ref 9.2–12.9)
POTASSIUM SERPL-SCNC: 3.8 MMOL/L (ref 3.5–5.1)
PROT SERPL-MCNC: 7.1 G/DL (ref 6–8.4)
RBC # BLD AUTO: 4.81 M/UL (ref 4.6–6.2)
SODIUM SERPL-SCNC: 136 MMOL/L (ref 136–145)
WBC # BLD AUTO: 5.99 K/UL (ref 3.9–12.7)

## 2022-06-03 PROCEDURE — 99397 PR PREVENTIVE VISIT,EST,65 & OVER: ICD-10-PCS | Mod: GY,S$GLB,, | Performed by: INTERNAL MEDICINE

## 2022-06-03 PROCEDURE — 3288F PR FALLS RISK ASSESSMENT DOCUMENTED: ICD-10-PCS | Mod: CPTII,S$GLB,, | Performed by: INTERNAL MEDICINE

## 2022-06-03 PROCEDURE — 1101F PT FALLS ASSESS-DOCD LE1/YR: CPT | Mod: CPTII,S$GLB,, | Performed by: INTERNAL MEDICINE

## 2022-06-03 PROCEDURE — 1160F PR REVIEW ALL MEDS BY PRESCRIBER/CLIN PHARMACIST DOCUMENTED: ICD-10-PCS | Mod: CPTII,S$GLB,, | Performed by: INTERNAL MEDICINE

## 2022-06-03 PROCEDURE — 1126F PR PAIN SEVERITY QUANTIFIED, NO PAIN PRESENT: ICD-10-PCS | Mod: CPTII,S$GLB,, | Performed by: INTERNAL MEDICINE

## 2022-06-03 PROCEDURE — 93010 ELECTROCARDIOGRAM REPORT: CPT | Mod: S$GLB,,, | Performed by: INTERNAL MEDICINE

## 2022-06-03 PROCEDURE — 1159F PR MEDICATION LIST DOCUMENTED IN MEDICAL RECORD: ICD-10-PCS | Mod: CPTII,S$GLB,, | Performed by: INTERNAL MEDICINE

## 2022-06-03 PROCEDURE — 3078F PR MOST RECENT DIASTOLIC BLOOD PRESSURE < 80 MM HG: ICD-10-PCS | Mod: CPTII,S$GLB,, | Performed by: INTERNAL MEDICINE

## 2022-06-03 PROCEDURE — 93005 ELECTROCARDIOGRAM TRACING: CPT | Mod: S$GLB,,, | Performed by: INTERNAL MEDICINE

## 2022-06-03 PROCEDURE — 1160F RVW MEDS BY RX/DR IN RCRD: CPT | Mod: CPTII,S$GLB,, | Performed by: INTERNAL MEDICINE

## 2022-06-03 PROCEDURE — 3074F PR MOST RECENT SYSTOLIC BLOOD PRESSURE < 130 MM HG: ICD-10-PCS | Mod: CPTII,S$GLB,, | Performed by: INTERNAL MEDICINE

## 2022-06-03 PROCEDURE — 93010 EKG 12-LEAD: ICD-10-PCS | Mod: S$GLB,,, | Performed by: INTERNAL MEDICINE

## 2022-06-03 PROCEDURE — 99397 PER PM REEVAL EST PAT 65+ YR: CPT | Mod: GY,S$GLB,, | Performed by: INTERNAL MEDICINE

## 2022-06-03 PROCEDURE — 3288F FALL RISK ASSESSMENT DOCD: CPT | Mod: CPTII,S$GLB,, | Performed by: INTERNAL MEDICINE

## 2022-06-03 PROCEDURE — 1126F AMNT PAIN NOTED NONE PRSNT: CPT | Mod: CPTII,S$GLB,, | Performed by: INTERNAL MEDICINE

## 2022-06-03 PROCEDURE — 99999 PR PBB SHADOW E&M-EST. PATIENT-LVL IV: CPT | Mod: PBBFAC,,, | Performed by: INTERNAL MEDICINE

## 2022-06-03 PROCEDURE — 1101F PR PT FALLS ASSESS DOC 0-1 FALLS W/OUT INJ PAST YR: ICD-10-PCS | Mod: CPTII,S$GLB,, | Performed by: INTERNAL MEDICINE

## 2022-06-03 PROCEDURE — 3078F DIAST BP <80 MM HG: CPT | Mod: CPTII,S$GLB,, | Performed by: INTERNAL MEDICINE

## 2022-06-03 PROCEDURE — 99999 PR PBB SHADOW E&M-EST. PATIENT-LVL IV: ICD-10-PCS | Mod: PBBFAC,,, | Performed by: INTERNAL MEDICINE

## 2022-06-03 PROCEDURE — 80053 COMPREHEN METABOLIC PANEL: CPT | Performed by: INTERNAL MEDICINE

## 2022-06-03 PROCEDURE — 93005 EKG 12-LEAD: ICD-10-PCS | Mod: S$GLB,,, | Performed by: INTERNAL MEDICINE

## 2022-06-03 PROCEDURE — 85025 COMPLETE CBC W/AUTO DIFF WBC: CPT | Performed by: INTERNAL MEDICINE

## 2022-06-03 PROCEDURE — 3074F SYST BP LT 130 MM HG: CPT | Mod: CPTII,S$GLB,, | Performed by: INTERNAL MEDICINE

## 2022-06-03 PROCEDURE — 1159F MED LIST DOCD IN RCRD: CPT | Mod: CPTII,S$GLB,, | Performed by: INTERNAL MEDICINE

## 2022-06-03 PROCEDURE — 36415 COLL VENOUS BLD VENIPUNCTURE: CPT | Performed by: INTERNAL MEDICINE

## 2022-06-03 NOTE — PROGRESS NOTES
CC:  Annual exam plus preoperative clearance    HPI:  The patient is an 81-year-old male with BPH, elevated PSA, colon polyps, low tension glaucoma, multinodular thyroid, hyperlipidemia, peripheral neuropathy and tremors who presents today for annual exam plus preoperative clearance.  The patient does have a direct hernia in the right lower quadrant.  He has never had general anesthesia before.    ROS:Answers for HPI/ROS submitted by the patient on 5/31/2022  activity change: No  unexpected weight change: No  neck pain: Yes  hearing loss: Yes  rhinorrhea: No  trouble swallowing: No  eye discharge: No  visual disturbance: No  chest tightness: No  wheezing: No  chest pain: No  palpitations: No  blood in stool: No  constipation: No  vomiting: No  diarrhea: No  polydipsia: No  polyuria: No  difficulty urinating: No  urgency: No  hematuria: No  joint swelling: No  arthralgias: Yes  headaches: No  weakness: No  confusion: No  dysphoric mood: No  The patient does wear hearing aids.  He does play [employer] for up to 3 hours once or twice a week.  He has a 3 story home and is constantly going up and down the stairs.  He recently went on a trip to [place] and when para light in without any problem.  He did see a chiropractor for his neck which seemed to help his neck pain.  The patient did have a colonoscopy in 2020. As well as an EGD.    Physical exam:  General appearance:  No acute distress  HEENT:  Conjunctiva is clear.  Pupils equal.  He has bilateral cataracts.  TMs are clear.  Nasal septum is midline without discharge.  Oropharynx is without erythema.  Trachea is midline without JVD or thyromegaly.  Pulmonary:  Good inspiratory, expiratory breath sounds are heard.  Lungs are clear to auscultation.  Cardiovascular:  S1-S2, rhythm is normal.  2+ carotid pulse of bruits.  Extremities with slight trace edema.  GI:  Abdomen was nontender, nondistended.  The patient did have a ball to the right lower quadrant consistent  with direct hernia  :  Digital rectal exam was performed.  Rectum was normal.  The stool was brown heme-negative.  Prostate without masses or nodules.  Penis and testes were normal.  Lymphatics:  No cervical, axillary inguinal adenopathy    Assessment:  1. Annual exam  2. Preoperative clearance  3. Elevated PSA  4. Colon polyps  5. Multinodular thyroid last ultrasound in January 21  6. Peripheral neuropathy  7. Hyperlipidemia  8. Tremors currently treated with propanolol.      Plan:  1. Will schedule a CBC, CMP, EKG  2. Will clear patient pending test results.

## 2022-06-20 ENCOUNTER — PATIENT MESSAGE (OUTPATIENT)
Dept: SURGERY | Facility: HOSPITAL | Age: 82
End: 2022-06-20
Payer: MEDICARE

## 2022-06-21 ENCOUNTER — PATIENT MESSAGE (OUTPATIENT)
Dept: SURGERY | Facility: HOSPITAL | Age: 82
End: 2022-06-21
Payer: MEDICARE

## 2022-07-06 ENCOUNTER — PATIENT MESSAGE (OUTPATIENT)
Dept: INTERNAL MEDICINE | Facility: CLINIC | Age: 82
End: 2022-07-06
Payer: MEDICARE

## 2022-07-16 NOTE — PROGRESS NOTES
HPI     DLS: 3/25/22  Here for HVF/OCT/DFE    1. LTG  2. NS OU  3. Ptosis Sx  (Dr. Mendoza)  4. PIERCE  5. Disc Heme OS  6. Astigmatism and Presbyopia    MEDS:  AT's PRN OU --> been using more frequently    Last edited by Apple Mendosa on 7/22/2022 10:19 AM. (History)             Assessment /Plan     For exam results, see Encounter Report.    Low-tension glaucoma of both eyes, mild stage  -     Posterior Segment OCT Optic Nerve- Both eyes  -     Reyes Visual Field - OU - Extended - Both Eyes    Dry eye syndrome of both eyes    Nuclear sclerotic cataract of both eyes    Ptosis of both eyelids          Lost to F/U 5/2017 to  5/2021 (4yrs)   Has been seeing his optometrist   Sent back in for OCT - RNFL changes - his optom told him to start gtts - so came back in     Pts wife is Carole Schwab - a glaucoma patient of Dr. Snyder    1.   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 Snyder)        Glaucoma meds   None presently -  Tried xalatan and lumigan - mason ok - but no change in IOP // intol to cosopt         H/O adverse rxn to glaucoma drops    cosopt - caused stomach issues - improved off it         LASERS    SLT OD - 1/27/2022 good resp 22--> 16 // SLT os - 2/10/2022 20-->16        GLAUCOMA SURGERIES    none        OTHER EYE SURGERIES    none        CDR    0.85/0.8        Tbase    10-20 / 12-20          Tmax    19 - 20 /17- 20    (20 at outside optom per pt)         Ttarget   About 16-17               HVF    12 test 2000 to 2017 - full od / full os   2 test 2021 to 2022 (4yr gap) - hint early SAD od // full os         Gonio    +3 ou        CCT    [id number]        OCT    9 test 2005 to 2022 - RNFL  - dec G/TS  od// dec TI, anthony G/N os (+prog ou)         HRT    7 test 2006 to 2017 - MR -  Dec. I, anthony T od // [place] off os /// CDR 0.73 od // [place] off  os        Disc photos    2003,  2005 - slides // 2010, 2015 - + disc heme ST os  - OIS    - Ttoday    16/15    - Test done today - HVF / DFE / OCT     2.   NS ou - mild - monitor   Pt noting decreased vision at near- more hazy than last visit     3. ERM os - see OCT - 5/31/2021 - mild     4.   Ptosis / Dermatochalasis        S/P sx with Dr. Mendoza 4/5/2001    5.   PIERCE - AT's prn   (Needed steroid gtts from Dr. Urena for PIERCE) and AT's    6. Disc Heme OS   See photos 11/4/2015     7.  Astigmatism / presbyopia       Glasses - Folrian - given RX today says the glasses are old - no real change    8.   Skin CA face - S/P sx    Plan  LTG -  Mild  Disease od and suspect os   OCT w/ inf thinning ou - first time 1/2017 - some prog from 2017 to 2021   -H/O  disc heme today (+H/O disc heme in past)   -HVF - hint SAD od // full os (?prog od)     No apparent effect with latanoprost  Or lumigan - but tolerated ok - used q am    cosopt bid ou -  Intolerant - had to stop GI / stomach issues      IOP  baseline  (LTG)  of 18-20    SLT OD 1/27/2022 - good initial reap 22--> 16   SLT OS -good initial resp 20--> 16     Pt was having back pain last visit - doing much better after seeing the chiropractor     F/U 4 months with IOP // gonio  If needed can try timolol alone or brimonidine - likely it was the dorzolamide in the cosopt that upset his stomach     I have seen and personally examined the patient.  I agree with the findings, assessment and plan of the resident and/or fellow.     Latonia Snyder MD

## 2022-07-22 ENCOUNTER — OFFICE VISIT (OUTPATIENT)
Dept: OPHTHALMOLOGY | Facility: CLINIC | Age: 82
End: 2022-07-22
Payer: MEDICARE

## 2022-07-22 ENCOUNTER — CLINICAL SUPPORT (OUTPATIENT)
Dept: OPHTHALMOLOGY | Facility: CLINIC | Age: 82
End: 2022-07-22
Payer: MEDICARE

## 2022-07-22 DIAGNOSIS — H04.123 DRY EYE SYNDROME OF BOTH EYES: ICD-10-CM

## 2022-07-22 DIAGNOSIS — H40.1231 LOW-TENSION GLAUCOMA OF BOTH EYES, MILD STAGE: Primary | ICD-10-CM

## 2022-07-22 DIAGNOSIS — H02.403 PTOSIS OF BOTH EYELIDS: ICD-10-CM

## 2022-07-22 DIAGNOSIS — H25.13 NUCLEAR SCLEROTIC CATARACT OF BOTH EYES: ICD-10-CM

## 2022-07-22 PROCEDURE — 1159F PR MEDICATION LIST DOCUMENTED IN MEDICAL RECORD: ICD-10-PCS | Mod: CPTII,S$GLB,, | Performed by: OPHTHALMOLOGY

## 2022-07-22 PROCEDURE — 92083 EXTENDED VISUAL FIELD XM: CPT | Mod: S$GLB,,, | Performed by: OPHTHALMOLOGY

## 2022-07-22 PROCEDURE — 1126F AMNT PAIN NOTED NONE PRSNT: CPT | Mod: CPTII,S$GLB,, | Performed by: OPHTHALMOLOGY

## 2022-07-22 PROCEDURE — 1159F MED LIST DOCD IN RCRD: CPT | Mod: CPTII,S$GLB,, | Performed by: OPHTHALMOLOGY

## 2022-07-22 PROCEDURE — 3288F FALL RISK ASSESSMENT DOCD: CPT | Mod: CPTII,S$GLB,, | Performed by: OPHTHALMOLOGY

## 2022-07-22 PROCEDURE — 3288F PR FALLS RISK ASSESSMENT DOCUMENTED: ICD-10-PCS | Mod: CPTII,S$GLB,, | Performed by: OPHTHALMOLOGY

## 2022-07-22 PROCEDURE — 1101F PT FALLS ASSESS-DOCD LE1/YR: CPT | Mod: CPTII,S$GLB,, | Performed by: OPHTHALMOLOGY

## 2022-07-22 PROCEDURE — 92133 POSTERIOR SEGMENT OCT OPTIC NERVE(OCULAR COHERENCE TOMOGRAPHY) - OU - BOTH EYES: ICD-10-PCS | Mod: S$GLB,,, | Performed by: OPHTHALMOLOGY

## 2022-07-22 PROCEDURE — 92014 COMPRE OPH EXAM EST PT 1/>: CPT | Mod: S$GLB,,, | Performed by: OPHTHALMOLOGY

## 2022-07-22 PROCEDURE — 99999 PR PBB SHADOW E&M-EST. PATIENT-LVL III: CPT | Mod: PBBFAC,,, | Performed by: OPHTHALMOLOGY

## 2022-07-22 PROCEDURE — 92014 PR EYE EXAM, EST PATIENT,COMPREHESV: ICD-10-PCS | Mod: S$GLB,,, | Performed by: OPHTHALMOLOGY

## 2022-07-22 PROCEDURE — 1160F RVW MEDS BY RX/DR IN RCRD: CPT | Mod: CPTII,S$GLB,, | Performed by: OPHTHALMOLOGY

## 2022-07-22 PROCEDURE — 99999 PR PBB SHADOW E&M-EST. PATIENT-LVL III: ICD-10-PCS | Mod: PBBFAC,,, | Performed by: OPHTHALMOLOGY

## 2022-07-22 PROCEDURE — 1101F PR PT FALLS ASSESS DOC 0-1 FALLS W/OUT INJ PAST YR: ICD-10-PCS | Mod: CPTII,S$GLB,, | Performed by: OPHTHALMOLOGY

## 2022-07-22 PROCEDURE — 92083 HUMPHREY VISUAL FIELD - OU - BOTH EYES: ICD-10-PCS | Mod: S$GLB,,, | Performed by: OPHTHALMOLOGY

## 2022-07-22 PROCEDURE — 1160F PR REVIEW ALL MEDS BY PRESCRIBER/CLIN PHARMACIST DOCUMENTED: ICD-10-PCS | Mod: CPTII,S$GLB,, | Performed by: OPHTHALMOLOGY

## 2022-07-22 PROCEDURE — 1126F PR PAIN SEVERITY QUANTIFIED, NO PAIN PRESENT: ICD-10-PCS | Mod: CPTII,S$GLB,, | Performed by: OPHTHALMOLOGY

## 2022-07-22 PROCEDURE — 92133 CPTRZD OPH DX IMG PST SGM ON: CPT | Mod: S$GLB,,, | Performed by: OPHTHALMOLOGY

## 2022-07-22 NOTE — PROGRESS NOTES
OCT DONE OU     24-2  SS DONE OU     REL & FIX =  GOOD OU        COOP =     GOOD     PATIENT DENIES ANY ALLERGIES TO LATEX OR ADHESIVES AT THIS TIME    JTHOMAS

## 2022-11-09 ENCOUNTER — PATIENT MESSAGE (OUTPATIENT)
Dept: DERMATOLOGY | Facility: CLINIC | Age: 82
End: 2022-11-09
Payer: MEDICARE

## 2022-12-05 ENCOUNTER — OFFICE VISIT (OUTPATIENT)
Dept: DERMATOLOGY | Facility: CLINIC | Age: 82
End: 2022-12-05
Payer: MEDICARE

## 2022-12-05 ENCOUNTER — OFFICE VISIT (OUTPATIENT)
Dept: PRIMARY CARE CLINIC | Facility: CLINIC | Age: 82
End: 2022-12-05
Payer: MEDICARE

## 2022-12-05 ENCOUNTER — TELEPHONE (OUTPATIENT)
Dept: PRIMARY CARE CLINIC | Facility: CLINIC | Age: 82
End: 2022-12-05

## 2022-12-05 ENCOUNTER — TELEPHONE (OUTPATIENT)
Dept: PRIMARY CARE CLINIC | Facility: CLINIC | Age: 82
End: 2022-12-05
Payer: MEDICARE

## 2022-12-05 VITALS
TEMPERATURE: 98 F | WEIGHT: 179.25 LBS | HEART RATE: 70 BPM | HEIGHT: 72 IN | DIASTOLIC BLOOD PRESSURE: 82 MMHG | BODY MASS INDEX: 24.28 KG/M2 | SYSTOLIC BLOOD PRESSURE: 148 MMHG | OXYGEN SATURATION: 97 %

## 2022-12-05 DIAGNOSIS — L30.4 INTERTRIGO: ICD-10-CM

## 2022-12-05 DIAGNOSIS — M35.01 KCS (KERATOCONJUNCTIVITIS SICCA): ICD-10-CM

## 2022-12-05 DIAGNOSIS — L57.0 AK (ACTINIC KERATOSIS): Primary | ICD-10-CM

## 2022-12-05 DIAGNOSIS — J20.9 BRONCHOSPASM WITH BRONCHITIS, ACUTE: Primary | ICD-10-CM

## 2022-12-05 DIAGNOSIS — R05.9 COUGH, UNSPECIFIED TYPE: ICD-10-CM

## 2022-12-05 LAB
CTP QC/QA: YES
FLUAV AG NPH QL: NEGATIVE
FLUBV AG NPH QL: NEGATIVE

## 2022-12-05 PROCEDURE — 99213 PR OFFICE/OUTPT VISIT, EST, LEVL III, 20-29 MIN: ICD-10-PCS | Mod: 25,S$GLB,, | Performed by: DERMATOLOGY

## 2022-12-05 PROCEDURE — 87804 INFLUENZA ASSAY W/OPTIC: CPT | Mod: QW,S$GLB,, | Performed by: FAMILY MEDICINE

## 2022-12-05 PROCEDURE — 99213 PR OFFICE/OUTPT VISIT, EST, LEVL III, 20-29 MIN: ICD-10-PCS | Mod: S$GLB,,, | Performed by: FAMILY MEDICINE

## 2022-12-05 PROCEDURE — 99999 PR PBB SHADOW E&M-EST. PATIENT-LVL III: ICD-10-PCS | Mod: PBBFAC,,, | Performed by: DERMATOLOGY

## 2022-12-05 PROCEDURE — 3077F PR MOST RECENT SYSTOLIC BLOOD PRESSURE >= 140 MM HG: ICD-10-PCS | Mod: CPTII,S$GLB,, | Performed by: FAMILY MEDICINE

## 2022-12-05 PROCEDURE — 17000 DESTRUCT PREMALG LESION: CPT | Mod: S$GLB,,, | Performed by: DERMATOLOGY

## 2022-12-05 PROCEDURE — 99213 OFFICE O/P EST LOW 20 MIN: CPT | Mod: 25,S$GLB,, | Performed by: DERMATOLOGY

## 2022-12-05 PROCEDURE — 99999 PR PBB SHADOW E&M-EST. PATIENT-LVL IV: CPT | Mod: PBBFAC,,, | Performed by: FAMILY MEDICINE

## 2022-12-05 PROCEDURE — 1126F PR PAIN SEVERITY QUANTIFIED, NO PAIN PRESENT: ICD-10-PCS | Mod: CPTII,S$GLB,, | Performed by: DERMATOLOGY

## 2022-12-05 PROCEDURE — 17000 PR DESTRUCTION(LASER SURGERY,CRYOSURGERY,CHEMOSURGERY),PREMALIGNANT LESIONS,FIRST LESION: ICD-10-PCS | Mod: S$GLB,,, | Performed by: DERMATOLOGY

## 2022-12-05 PROCEDURE — 87804 POCT INFLUENZA A/B: ICD-10-PCS | Mod: QW,S$GLB,, | Performed by: FAMILY MEDICINE

## 2022-12-05 PROCEDURE — 3077F SYST BP >= 140 MM HG: CPT | Mod: CPTII,S$GLB,, | Performed by: FAMILY MEDICINE

## 2022-12-05 PROCEDURE — 3079F PR MOST RECENT DIASTOLIC BLOOD PRESSURE 80-89 MM HG: ICD-10-PCS | Mod: CPTII,S$GLB,, | Performed by: FAMILY MEDICINE

## 2022-12-05 PROCEDURE — 3079F DIAST BP 80-89 MM HG: CPT | Mod: CPTII,S$GLB,, | Performed by: FAMILY MEDICINE

## 2022-12-05 PROCEDURE — 1126F AMNT PAIN NOTED NONE PRSNT: CPT | Mod: CPTII,S$GLB,, | Performed by: FAMILY MEDICINE

## 2022-12-05 PROCEDURE — 99999 PR PBB SHADOW E&M-EST. PATIENT-LVL IV: ICD-10-PCS | Mod: PBBFAC,,, | Performed by: FAMILY MEDICINE

## 2022-12-05 PROCEDURE — 1126F AMNT PAIN NOTED NONE PRSNT: CPT | Mod: CPTII,S$GLB,, | Performed by: DERMATOLOGY

## 2022-12-05 PROCEDURE — 1126F PR PAIN SEVERITY QUANTIFIED, NO PAIN PRESENT: ICD-10-PCS | Mod: CPTII,S$GLB,, | Performed by: FAMILY MEDICINE

## 2022-12-05 PROCEDURE — 1159F MED LIST DOCD IN RCRD: CPT | Mod: CPTII,S$GLB,, | Performed by: DERMATOLOGY

## 2022-12-05 PROCEDURE — 1159F PR MEDICATION LIST DOCUMENTED IN MEDICAL RECORD: ICD-10-PCS | Mod: CPTII,S$GLB,, | Performed by: DERMATOLOGY

## 2022-12-05 PROCEDURE — 1160F RVW MEDS BY RX/DR IN RCRD: CPT | Mod: CPTII,S$GLB,, | Performed by: DERMATOLOGY

## 2022-12-05 PROCEDURE — 99999 PR PBB SHADOW E&M-EST. PATIENT-LVL III: CPT | Mod: PBBFAC,,, | Performed by: DERMATOLOGY

## 2022-12-05 PROCEDURE — 99213 OFFICE O/P EST LOW 20 MIN: CPT | Mod: S$GLB,,, | Performed by: FAMILY MEDICINE

## 2022-12-05 PROCEDURE — 1160F PR REVIEW ALL MEDS BY PRESCRIBER/CLIN PHARMACIST DOCUMENTED: ICD-10-PCS | Mod: CPTII,S$GLB,, | Performed by: DERMATOLOGY

## 2022-12-05 RX ORDER — PROMETHAZINE HYDROCHLORIDE AND CODEINE PHOSPHATE 6.25; 1 MG/5ML; MG/5ML
5 SOLUTION ORAL EVERY 4 HOURS PRN
Qty: 118 ML | Refills: 0 | Status: SHIPPED | OUTPATIENT
Start: 2022-12-05 | End: 2022-12-05

## 2022-12-05 RX ORDER — PREDNISONE 10 MG/1
10 TABLET ORAL 2 TIMES DAILY
Qty: 10 TABLET | Refills: 0 | Status: SHIPPED | OUTPATIENT
Start: 2022-12-05 | End: 2023-01-13

## 2022-12-05 RX ORDER — ALBUTEROL SULFATE 90 UG/1
2 AEROSOL, METERED RESPIRATORY (INHALATION) EVERY 4 HOURS PRN
Qty: 18 G | Refills: 1 | Status: SHIPPED | OUTPATIENT
Start: 2022-12-05 | End: 2023-01-13

## 2022-12-05 RX ORDER — CODEINE PHOSPHATE AND GUAIFENESIN 10; 100 MG/5ML; MG/5ML
5 SOLUTION ORAL NIGHTLY PRN
Qty: 118 ML | Refills: 0 | Status: SHIPPED | OUTPATIENT
Start: 2022-12-05 | End: 2022-12-15

## 2022-12-05 RX ORDER — TRIAMCINOLONE ACETONIDE 0.25 MG/G
CREAM TOPICAL
Qty: 45 G | Refills: 1 | Status: SHIPPED | OUTPATIENT
Start: 2022-12-05 | End: 2023-10-02 | Stop reason: SDUPTHER

## 2022-12-05 NOTE — PROGRESS NOTES
Subjective:       Patient ID:  Fred Schwab Jr. is a 82 y.o. male who presents for   Chief Complaint   Patient presents with    Lesion     Scalp     C/o rash in groin for years. Used ketoconazole 2 % cream which helped but rash flared right away after he stops. He uses betamethasone and also coltrimazole/betamethasone from his wife and this helps the most     Lesion - Initial  Affected locations: scalp  Duration: 4 months  Signs / symptoms: scabs, scaling and tender  Aggravated by: picking  Relieving factors/Treatments tried: nothing    Review of Systems   Skin:  Positive for itching, rash and wears hat. Negative for tendency to form keloidal scars.   Hematologic/Lymphatic: Does not bruise/bleed easily.      Objective:    Physical Exam   Constitutional: He appears well-developed and well-nourished. No distress.   Neurological: He is alert and oriented to person, place, and time. He is not disoriented.   Psychiatric: He has a normal mood and affect.   Skin:   Areas Examined (abnormalities noted in diagram):   Scalp / Hair Palpated and Inspected            Diagram Legend     Erythematous scaling macule/papule c/w actinic keratosis       Vascular papule c/w angioma      Pigmented verrucoid papule/plaque c/w seborrheic keratosis      Yellow umbilicated papule c/w sebaceous hyperplasia      Irregularly shaped tan macule c/w lentigo     1-2 mm smooth white papules consistent with Milia      Movable subcutaneous cyst with punctum c/w epidermal inclusion cyst      Subcutaneous movable cyst c/w pilar cyst      Firm pink to brown papule c/w dermatofibroma      Pedunculated fleshy papule(s) c/w skin tag(s)      Evenly pigmented macule c/w junctional nevus     Mildly variegated pigmented, slightly irregular-bordered macule c/w mildly atypical nevus      Flesh colored to evenly pigmented papule c/w intradermal nevus       Pink pearly papule/plaque c/w basal cell carcinoma      Erythematous hyperkeratotic cursted plaque c/w SCC       Surgical scar with no sign of skin cancer recurrence      Open and closed comedones      Inflammatory papules and pustules      Verrucoid papule consistent consistent with wart     Erythematous eczematous patches and plaques     Dystrophic onycholytic nail with subungual debris c/w onychomycosis     Umbilicated papule    Erythematous-base heme-crusted tan verrucoid plaque consistent with inflamed seborrheic keratosis     Erythematous Silvery Scaling Plaque c/w Psoriasis     See annotation      Assessment / Plan:        AK (actinic keratosis)  Cryosurgery Procedure Note    Verbal consent from the patient is obtained including, but not limited to, risk of hypopigmentation/hyperpigmentation, scar, recurrence of lesion. The patient is aware of the precancerous quality and need for treatment of these lesions. Liquid nitrogen cryosurgery is applied to the 1 actinic keratoses, as detailed in the physical exam, to produce a freeze injury. The patient is aware that blisters may form and is instructed on wound care with gentle cleansing and use of vaseline ointment to keep moist until healed. The patient is supplied a handout on cryosurgery and is instructed to call if lesions do not completely resolve.    Intertrigo  Cautioned pt that betamethasone was too strong for groin use  Recommend white vinegar: water 1:1 compresses  nightly after bath/shower followed by cool blow dry and then application of prescription medication.     Use  powder for maintenance in the morning.     Wash affected areas 2x per week with Hibiclens wash which can be purchased over the counter    -     triamcinolone acetonide 0.025% (KENALOG) 0.025 % cream; aaa qhs prn flare.  Not more than 1-2 weeks straight in same location  Dispense: 45 g; Refill: 1           Follow up in about 3 months (around 3/5/2023) for UBSE.

## 2022-12-05 NOTE — TELEPHONE ENCOUNTER
----- Message from Shirley Islas sent at 12/5/2022  3:10 PM CST -----  Pharmacy is calling to clarify an RX.  RX name:  promethazine-codeine 6.25-10 mg/5 ml (PHENERGAN WITH CODEINE) 6.25-10 mg/5 mL syrup  What do they need to clarify: please provide alternative medication Phenergan is out until 1/23   Comments: Guaifenesin with Codeine is available

## 2022-12-05 NOTE — PROGRESS NOTES
BP (!) 148/82 (BP Location: Left arm, Patient Position: Sitting, BP Method: Medium (Manual))   Pulse 70   Temp 98.1 °F (36.7 °C) (Oral)   Ht 6' (1.829 m)   Wt 81.3 kg (179 lb 3.7 oz)   SpO2 97%   BMI 24.31 kg/m²       ===========    Chief Complaint: No chief complaint on file.        Fred Schwab Jr. is a 82 y.o. male here for    Cough  This is a new problem. The current episode started in the past 7 days. The problem has been gradually worsening. The cough is Productive of sputum. Pertinent negatives include no chest pain, chills, ear congestion, ear pain, eye redness, fever, headaches, myalgias, postnasal drip, rash, rhinorrhea, sore throat, shortness of breath or wheezing. The symptoms are aggravated by exercise, dust, fumes and cold air. He has tried rest and OTC cough suppressant for the symptoms. There is no history of asthma.     Taking nyquil with minimal relief.  Negative home covid test          Patient queried and denies any further complaints    SURGICAL AND MEDICAL HISTORY: updated and reviewed.  ALLERGIES updated and reviewed.  Review of patient's allergies indicates:   Allergen Reactions    Clindamycin Rash    Doxycycline hyclate Rash    Ciprofloxacin      Tendon tear.    Iodine and iodide containing products Rash     Got red but no itching    Penicillins     Tizanidine hcl      Unknown     CURRENT OUTPATIENT MEDICATIONS updated and reviewed    Current Outpatient Medications:     ascorbic acid, vitamin C, (VITAMIN C) 500 MG tablet, Take by mouth. 1 Tablet Oral Every day, Disp: , Rfl:     betamethasone dipropionate (DIPROLENE) 0.05 % cream, aaa qd prn rash. Not more than 2 weeks straight in same location. Avoid use on face and groin, Disp: 45 g, Rfl: 1    calcium carbonate (OS-DIAMOND) 600 mg (1,500 mg) Tab, Take by mouth. 1 Tablet Oral Every day, Disp: , Rfl:     cholecalciferol, vitamin D3, 10 mcg (400 unit) Cap, Take 1 capsule by mouth once daily., Disp: , Rfl:     co-enzyme Q-10 30 mg capsule,  Take 30 mg by mouth 3 (three) times daily., Disp: , Rfl:     doxazosin (CARDURA) 4 MG tablet, Take 1 tablet (4 mg total) by mouth every evening., Disp: 30 tablet, Rfl: 11    fluocinolone acetonide oiL 0.01 % Drop, Place 4 drops in ear(s) 2 (two) times daily., Disp: 20 mL, Rfl: 5    loratadine (CLARITIN) 10 mg tablet, Take 10 mg by mouth daily as needed. Every day, Disp: , Rfl:     OMEGA-3 FATTY ACIDS/FISH OIL (OMEGA 3 FISH OIL ORAL), Take by mouth. 1 Capsule Oral Every day, Disp: , Rfl:     propranoloL (INDERAL) 10 MG tablet, Take 1 tablet (10 mg total) by mouth 2 (two) times daily., Disp: 180 tablet, Rfl: 3    sildenafil (VIAGRA) 100 MG tablet, TAKE ONE TABLET BY MOUTH AS DIRECTED, Disp: 30 tablet, Rfl: 11    terbinafine HCL (LAMISIL) 1 % cream, , Disp: , Rfl:     vit A palm,D3 in cod liver oil 1,250 unit-130 unit-530 mg Cap, Take 1 capsule by mouth once daily. , Disp: , Rfl:     albuterol (PROVENTIL/VENTOLIN HFA) 90 mcg/actuation inhaler, Inhale 2 puffs into the lungs every 4 (four) hours as needed for Wheezing or Shortness of Breath., Disp: 18 g, Rfl: 1    predniSONE (DELTASONE) 10 MG tablet, Take 1 tablet (10 mg total) by mouth 2 (two) times daily., Disp: 10 tablet, Rfl: 0    triamcinolone acetonide 0.025% (KENALOG) 0.025 % cream, aaa qhs prn flare.  Not more than 1-2 weeks straight in same location, Disp: 45 g, Rfl: 1    Review of Systems   Constitutional:  Negative for activity change, appetite change, chills, diaphoresis, fatigue, fever and unexpected weight change.   HENT:  Negative for congestion, ear discharge, ear pain, facial swelling, hearing loss, nosebleeds, postnasal drip, rhinorrhea, sinus pressure, sneezing, sore throat, tinnitus, trouble swallowing and voice change.    Eyes:  Negative for photophobia, pain, discharge, redness, itching and visual disturbance.   Respiratory:  Positive for cough. Negative for chest tightness, shortness of breath and wheezing.    Cardiovascular:  Negative for chest  pain, palpitations and leg swelling.   Gastrointestinal:  Negative for abdominal distention, abdominal pain, anal bleeding, blood in stool, constipation, diarrhea, nausea, rectal pain and vomiting.   Endocrine: Negative for cold intolerance, heat intolerance, polydipsia, polyphagia and polyuria.   Genitourinary:  Negative for difficulty urinating, dysuria and flank pain.   Musculoskeletal:  Negative for arthralgias, back pain, joint swelling, myalgias and neck pain.   Skin:  Negative for rash.   Neurological:  Negative for dizziness, tremors, seizures, syncope, speech difficulty, weakness, light-headedness, numbness and headaches.   Psychiatric/Behavioral:  Negative for behavioral problems, confusion, decreased concentration, dysphoric mood, sleep disturbance and suicidal ideas. The patient is not nervous/anxious and is not hyperactive.      BP (!) 148/82 (BP Location: Left arm, Patient Position: Sitting, BP Method: Medium (Manual))   Pulse 70   Temp 98.1 °F (36.7 °C) (Oral)   Ht 6' (1.829 m)   Wt 81.3 kg (179 lb 3.7 oz)   SpO2 97%   BMI 24.31 kg/m²   Physical Exam  Vitals and nursing note reviewed.   Constitutional:       General: He is not in acute distress.     Appearance: Normal appearance. He is well-developed. He is not ill-appearing, toxic-appearing or diaphoretic.   HENT:      Head: Normocephalic and atraumatic.      Right Ear: Tympanic membrane, ear canal and external ear normal.      Left Ear: Tympanic membrane, ear canal and external ear normal.      Nose: Congestion present.      Mouth/Throat:      Lips: Pink.      Mouth: Mucous membranes are moist.      Pharynx: Posterior oropharyngeal erythema present. No oropharyngeal exudate.   Eyes:      General: No scleral icterus.        Right eye: No discharge.         Left eye: No discharge.      Extraocular Movements: Extraocular movements intact.      Conjunctiva/sclera: Conjunctivae normal.   Cardiovascular:      Rate and Rhythm: Normal rate and regular  rhythm.      Pulses: Normal pulses.      Heart sounds: Normal heart sounds. No murmur heard.  Pulmonary:      Effort: Pulmonary effort is normal. No respiratory distress.      Breath sounds: Wheezing present. No rales.   Musculoskeletal:      Cervical back: Normal range of motion and neck supple. No rigidity or tenderness.   Lymphadenopathy:      Cervical: No cervical adenopathy.   Skin:     General: Skin is warm and dry.   Neurological:      Mental Status: He is alert and oriented to person, place, and time. Mental status is at baseline.   Psychiatric:         Mood and Affect: Mood normal.         Behavior: Behavior normal. Behavior is cooperative.       Diagnoses and all orders for this visit:    Bronchospasm with bronchitis, acute    Cough, unspecified type  -     POCT Influenza A/B    KCS (keratoconjunctivitis sicca)    Other orders  -     Discontinue: promethazine-codeine 6.25-10 mg/5 ml (PHENERGAN WITH CODEINE) 6.25-10 mg/5 mL syrup; Take 5 mLs by mouth every 4 (four) hours as needed for Cough.  -     predniSONE (DELTASONE) 10 MG tablet; Take 1 tablet (10 mg total) by mouth 2 (two) times daily.  -     albuterol (PROVENTIL/VENTOLIN HFA) 90 mcg/actuation inhaler; Inhale 2 puffs into the lungs every 4 (four) hours as needed for Wheezing or Shortness of Breath.  -     guaiFENesin-codeine 100-10 mg/5 ml (TUSSI-ORGANIDIN NR)  mg/5 mL syrup; Take 5 mLs by mouth nightly as needed for Cough.        Hydrate, rest, Tylenol, Mucinex expectorant, nasal saline, Flonase       Reviewed old pertinent medical records available.     All lab results personally reviewed and interpreted by me including last year, if available.        Teodoro Leblanc MD

## 2022-12-05 NOTE — PATIENT INSTRUCTIONS
Recommend white vinegar: water 1:1 compresses  nightly after bath/shower followed by cool blow dry and then application of prescription medication.     Use  powder for maintenance in the morning.     Wash affected areas 2x per week with Hibiclens wash which can be purchased over the counter

## 2022-12-06 ENCOUNTER — PATIENT MESSAGE (OUTPATIENT)
Dept: PRIMARY CARE CLINIC | Facility: CLINIC | Age: 82
End: 2022-12-06
Payer: MEDICARE

## 2022-12-12 ENCOUNTER — OFFICE VISIT (OUTPATIENT)
Dept: OPHTHALMOLOGY | Facility: CLINIC | Age: 82
End: 2022-12-12
Payer: MEDICARE

## 2022-12-12 DIAGNOSIS — H40.1131 PRIMARY OPEN ANGLE GLAUCOMA (POAG) OF BOTH EYES, MILD STAGE: ICD-10-CM

## 2022-12-12 DIAGNOSIS — H40.1231 LOW-TENSION GLAUCOMA OF BOTH EYES, MILD STAGE: Primary | ICD-10-CM

## 2022-12-12 DIAGNOSIS — H25.13 NUCLEAR SCLEROTIC CATARACT OF BOTH EYES: ICD-10-CM

## 2022-12-12 DIAGNOSIS — H04.123 DRY EYE SYNDROME OF BOTH EYES: ICD-10-CM

## 2022-12-12 PROCEDURE — 92014 PR EYE EXAM, EST PATIENT,COMPREHESV: ICD-10-PCS | Mod: S$GLB,,, | Performed by: OPHTHALMOLOGY

## 2022-12-12 PROCEDURE — 1126F PR PAIN SEVERITY QUANTIFIED, NO PAIN PRESENT: ICD-10-PCS | Mod: CPTII,S$GLB,, | Performed by: OPHTHALMOLOGY

## 2022-12-12 PROCEDURE — 1159F MED LIST DOCD IN RCRD: CPT | Mod: CPTII,S$GLB,, | Performed by: OPHTHALMOLOGY

## 2022-12-12 PROCEDURE — 1101F PR PT FALLS ASSESS DOC 0-1 FALLS W/OUT INJ PAST YR: ICD-10-PCS | Mod: CPTII,S$GLB,, | Performed by: OPHTHALMOLOGY

## 2022-12-12 PROCEDURE — 92014 COMPRE OPH EXAM EST PT 1/>: CPT | Mod: S$GLB,,, | Performed by: OPHTHALMOLOGY

## 2022-12-12 PROCEDURE — 1160F PR REVIEW ALL MEDS BY PRESCRIBER/CLIN PHARMACIST DOCUMENTED: ICD-10-PCS | Mod: CPTII,S$GLB,, | Performed by: OPHTHALMOLOGY

## 2022-12-12 PROCEDURE — 99999 PR PBB SHADOW E&M-EST. PATIENT-LVL III: ICD-10-PCS | Mod: PBBFAC,,, | Performed by: OPHTHALMOLOGY

## 2022-12-12 PROCEDURE — 1160F RVW MEDS BY RX/DR IN RCRD: CPT | Mod: CPTII,S$GLB,, | Performed by: OPHTHALMOLOGY

## 2022-12-12 PROCEDURE — 3288F FALL RISK ASSESSMENT DOCD: CPT | Mod: CPTII,S$GLB,, | Performed by: OPHTHALMOLOGY

## 2022-12-12 PROCEDURE — 3288F PR FALLS RISK ASSESSMENT DOCUMENTED: ICD-10-PCS | Mod: CPTII,S$GLB,, | Performed by: OPHTHALMOLOGY

## 2022-12-12 PROCEDURE — 99999 PR PBB SHADOW E&M-EST. PATIENT-LVL III: CPT | Mod: PBBFAC,,, | Performed by: OPHTHALMOLOGY

## 2022-12-12 PROCEDURE — 1101F PT FALLS ASSESS-DOCD LE1/YR: CPT | Mod: CPTII,S$GLB,, | Performed by: OPHTHALMOLOGY

## 2022-12-12 PROCEDURE — 1126F AMNT PAIN NOTED NONE PRSNT: CPT | Mod: CPTII,S$GLB,, | Performed by: OPHTHALMOLOGY

## 2022-12-12 PROCEDURE — 1159F PR MEDICATION LIST DOCUMENTED IN MEDICAL RECORD: ICD-10-PCS | Mod: CPTII,S$GLB,, | Performed by: OPHTHALMOLOGY

## 2022-12-12 NOTE — PROGRESS NOTES
HPI    DLS: 7/22/22    Pt here for 4 month IOP check and gonio. Pt without visual complaints   today OU.  Pt denies eye pain OU.     1. LTG (mild od and suspect os)   2. NS OU   3. Ptosis Sx  (Dr. Mendoza)   4. PIERCE   5. Disc Heme OS   6. Astigmatism and Presbyopia   7. S/P SLT ou (intol to several glaucoma drops) - fair response     MEDS:   AT's PRN OU -- > been using more frequently    Last edited by Jennyfer Thompson MA on 12/12/2022  3:20 PM.            Assessment /Plan     For exam results, see Encounter Report.    Low-tension glaucoma of both eyes, mild stage    Primary open angle glaucoma (POAG) of both eyes, mild stage    Dry eye syndrome of both eyes    Nuclear sclerotic cataract of both eyes        HPI    DLS: 7/22/22    Pt here for 4 month IOP check and gonio. Pt without visual complaints   today OU.  Pt denies eye pain OU.     1. LTG (mild od and suspect os)   2. NS OU   3. Ptosis Sx  (Dr. eMndoza)   4. PIERCE   5. Disc Heme OS   6. Astigmatism and Presbyopia   7. S/P SLT ou (intol to several glaucoma drops) - fair response     MEDS:   AT's PRN OU -- > been using more frequently    Last edited by Jennyfer Thompson MA on 12/12/2022  3:20 PM.             Assessment /Plan     For exam results, see Encounter Report.    Low-tension glaucoma of both eyes, mild stage    Primary open angle glaucoma (POAG) of both eyes, mild stage    Dry eye syndrome of both eyes    Nuclear sclerotic cataract of both eyes          Lost to F/U 5/2017 to  5/2021 (4yrs)   Has been seeing his optometrist   Sent back in for OCT - RNFL changes - his optom told him to start gtts - so came back in     Pts wife is Carole Schwab - a glaucoma patient of Dr. Snyder    1.   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 Snyder)        Glaucoma meds   None presently -  Tried xalatan and lumigan - mason ok -  but no change in IOP // intol to cosopt         H/O adverse rxn to glaucoma drops    cosopt - caused stomach issues - improved off it         LASERS    SLT OD - 1/27/2022 good resp 22--> 16 // SLT os - 2/10/2022 20-->16        GLAUCOMA SURGERIES    none        OTHER EYE SURGERIES    none        CDR    0.85/0.8        Tbase    10-20 / 12-20          Tmax    19 - 20 /17- 20    (20 at outside optom per pt)         Ttarget   About 16-17               HVF    12 test 2000 to 2017 - full od / full os   2 test 2021 to 2022 (4yr gap) - hint early SAD od // full os         Gonio    +3 ou        CCT    [phone]        OCT    9 test 2005 to 2022 - RNFL  - dec G/TS  od// dec TI, bord G/N os (+prog ou)         HRT    7 test 2006 to 2017 - MR -  Dec. I, bord T od // [place] off os /// CDR 0.73 od // [place] off  os        Disc photos    2003, 2005 - slides // 2010, 2015 - + disc heme ST os  - OIS    - Ttoday   15//15  - Test done today - iop gonio // cataract check     2.   NS ou - mild - monitor   Pt noting decreased vision at near- more hazy than last visit     3. ERM os - see OCT - 5/31/2021 - mild     4.   Ptosis / Dermatochalasis        S/P sx with Dr. Mendoza 4/5/2001    5.   PIERCE - AT's prn   (Needed steroid gtts from Dr. Urena for PIERCE) and AT's    6. Disc Heme OS   See photos 11/4/2015     7.  Astigmatism / presbyopia       Glasses - Florian - given RX today says the glasses are old - no real change    8.   Skin CA face - S/P sx    Plan  POAG vs LTG -  Mild  Disease od and suspect to mild  os   OCT w/ inf thinning ou - first time 1/2017 - some prog from 2017 to 2021   -H/O  disc heme today (+H/O disc heme in past)   -HVF - hint SAD od // full os (?prog od)     No apparent effect with latanoprost  Or lumigan - but tolerated ok - used q am    cosopt bid ou -  Intolerant - had to stop GI / stomach issues      IOP  baseline  (LTG)  of 18-20    SLT OD 1/27/2022 - good initial reap 22--> 16   SLT OS -good initial resp 20--> 16     Pt  was having back pain last visit - doing much better after seeing the chiropractor     If needed can try timolol alone or brimonidine - likely it was the dorzolamide in the cosopt that upset his stomach       Mixed cataract - vis sign ou   Pt is interested in phaco/IOL os  first - with ossible MIGs - istent or [place]   F/U pre-op phaco/IOL and HIMA - trypan (check but I do not think he is on any blood thinners )     I have seen and personally examined the patient.  I agree with the findings, assessment and plan of the resident and/or fellow.     Latonia Snyder MD

## 2022-12-14 ENCOUNTER — PATIENT MESSAGE (OUTPATIENT)
Dept: PRIMARY CARE CLINIC | Facility: CLINIC | Age: 82
End: 2022-12-14
Payer: MEDICARE

## 2022-12-20 ENCOUNTER — PATIENT MESSAGE (OUTPATIENT)
Dept: OPHTHALMOLOGY | Facility: CLINIC | Age: 82
End: 2022-12-20
Payer: MEDICARE

## 2022-12-20 ENCOUNTER — TELEPHONE (OUTPATIENT)
Dept: OPHTHALMOLOGY | Facility: CLINIC | Age: 82
End: 2022-12-20
Payer: MEDICARE

## 2022-12-20 DIAGNOSIS — H40.1122 PRIMARY OPEN ANGLE GLAUCOMA (POAG) OF LEFT EYE, MODERATE STAGE: ICD-10-CM

## 2022-12-20 DIAGNOSIS — H25.13 NUCLEAR SCLEROTIC CATARACT OF BOTH EYES: ICD-10-CM

## 2022-12-20 DIAGNOSIS — H40.1222 LOW TENSION GLAUCOMA OF LEFT EYE, MODERATE STAGE: ICD-10-CM

## 2022-12-20 DIAGNOSIS — H40.1231 LOW-TENSION GLAUCOMA OF BOTH EYES, MILD STAGE: ICD-10-CM

## 2022-12-20 DIAGNOSIS — H25.12 NUCLEAR SCLEROSIS OF LEFT EYE: Primary | ICD-10-CM

## 2023-01-13 ENCOUNTER — OFFICE VISIT (OUTPATIENT)
Dept: OPHTHALMOLOGY | Facility: CLINIC | Age: 83
End: 2023-01-13
Payer: MEDICARE

## 2023-01-13 ENCOUNTER — OFFICE VISIT (OUTPATIENT)
Dept: INTERNAL MEDICINE | Facility: CLINIC | Age: 83
End: 2023-01-13
Payer: MEDICARE

## 2023-01-13 VITALS
DIASTOLIC BLOOD PRESSURE: 86 MMHG | HEART RATE: 66 BPM | WEIGHT: 177.94 LBS | BODY MASS INDEX: 24.1 KG/M2 | OXYGEN SATURATION: 96 % | HEIGHT: 72 IN | SYSTOLIC BLOOD PRESSURE: 132 MMHG

## 2023-01-13 DIAGNOSIS — H25.13 NUCLEAR SCLEROTIC CATARACT OF BOTH EYES: ICD-10-CM

## 2023-01-13 DIAGNOSIS — H40.1122 PRIMARY OPEN ANGLE GLAUCOMA (POAG) OF LEFT EYE, MODERATE STAGE: ICD-10-CM

## 2023-01-13 DIAGNOSIS — Z01.818 PRE-OP EVALUATION: Primary | ICD-10-CM

## 2023-01-13 DIAGNOSIS — H40.1131 PRIMARY OPEN ANGLE GLAUCOMA (POAG) OF BOTH EYES, MILD STAGE: ICD-10-CM

## 2023-01-13 DIAGNOSIS — H25.12 NUCLEAR SCLEROSIS OF LEFT EYE: Primary | ICD-10-CM

## 2023-01-13 DIAGNOSIS — H02.403 PTOSIS OF BOTH EYELIDS: ICD-10-CM

## 2023-01-13 DIAGNOSIS — H25.13 NUCLEAR SCLEROSIS OF BOTH EYES: ICD-10-CM

## 2023-01-13 PROCEDURE — 1101F PR PT FALLS ASSESS DOC 0-1 FALLS W/OUT INJ PAST YR: ICD-10-PCS | Mod: CPTII,S$GLB,, | Performed by: OPHTHALMOLOGY

## 2023-01-13 PROCEDURE — 99499 RISK ADDL DX/OHS AUDIT: ICD-10-PCS | Mod: S$GLB,,,

## 2023-01-13 PROCEDURE — 3075F PR MOST RECENT SYSTOLIC BLOOD PRESS GE 130-139MM HG: ICD-10-PCS | Mod: CPTII,GC,S$GLB,

## 2023-01-13 PROCEDURE — 1160F RVW MEDS BY RX/DR IN RCRD: CPT | Mod: CPTII,S$GLB,, | Performed by: OPHTHALMOLOGY

## 2023-01-13 PROCEDURE — 99499 UNLISTED E&M SERVICE: CPT | Mod: S$GLB,,, | Performed by: OPHTHALMOLOGY

## 2023-01-13 PROCEDURE — 99213 OFFICE O/P EST LOW 20 MIN: CPT | Mod: GC,S$GLB,,

## 2023-01-13 PROCEDURE — 1126F PR PAIN SEVERITY QUANTIFIED, NO PAIN PRESENT: ICD-10-PCS | Mod: CPTII,GC,S$GLB,

## 2023-01-13 PROCEDURE — 3079F DIAST BP 80-89 MM HG: CPT | Mod: CPTII,GC,S$GLB,

## 2023-01-13 PROCEDURE — 3079F PR MOST RECENT DIASTOLIC BLOOD PRESSURE 80-89 MM HG: ICD-10-PCS | Mod: CPTII,GC,S$GLB,

## 2023-01-13 PROCEDURE — 92136 OPHTHALMIC BIOMETRY: CPT | Mod: LT,S$GLB,, | Performed by: OPHTHALMOLOGY

## 2023-01-13 PROCEDURE — 1101F PT FALLS ASSESS-DOCD LE1/YR: CPT | Mod: CPTII,GC,S$GLB,

## 2023-01-13 PROCEDURE — 1159F PR MEDICATION LIST DOCUMENTED IN MEDICAL RECORD: ICD-10-PCS | Mod: CPTII,S$GLB,, | Performed by: OPHTHALMOLOGY

## 2023-01-13 PROCEDURE — 1101F PT FALLS ASSESS-DOCD LE1/YR: CPT | Mod: CPTII,S$GLB,, | Performed by: OPHTHALMOLOGY

## 2023-01-13 PROCEDURE — 99213 PR OFFICE/OUTPT VISIT, EST, LEVL III, 20-29 MIN: ICD-10-PCS | Mod: GC,S$GLB,,

## 2023-01-13 PROCEDURE — 1126F AMNT PAIN NOTED NONE PRSNT: CPT | Mod: CPTII,S$GLB,, | Performed by: OPHTHALMOLOGY

## 2023-01-13 PROCEDURE — 99499 NO LOS: ICD-10-PCS | Mod: S$GLB,,, | Performed by: OPHTHALMOLOGY

## 2023-01-13 PROCEDURE — 99499 UNLISTED E&M SERVICE: CPT | Mod: S$GLB,,,

## 2023-01-13 PROCEDURE — 3288F PR FALLS RISK ASSESSMENT DOCUMENTED: ICD-10-PCS | Mod: CPTII,GC,S$GLB,

## 2023-01-13 PROCEDURE — 3288F FALL RISK ASSESSMENT DOCD: CPT | Mod: CPTII,S$GLB,, | Performed by: OPHTHALMOLOGY

## 2023-01-13 PROCEDURE — 3288F PR FALLS RISK ASSESSMENT DOCUMENTED: ICD-10-PCS | Mod: CPTII,S$GLB,, | Performed by: OPHTHALMOLOGY

## 2023-01-13 PROCEDURE — 99999 PR PBB SHADOW E&M-EST. PATIENT-LVL IV: ICD-10-PCS | Mod: PBBFAC,GC,,

## 2023-01-13 PROCEDURE — 1159F MED LIST DOCD IN RCRD: CPT | Mod: CPTII,S$GLB,, | Performed by: OPHTHALMOLOGY

## 2023-01-13 PROCEDURE — 99999 PR PBB SHADOW E&M-EST. PATIENT-LVL III: CPT | Mod: PBBFAC,,, | Performed by: OPHTHALMOLOGY

## 2023-01-13 PROCEDURE — 1160F PR REVIEW ALL MEDS BY PRESCRIBER/CLIN PHARMACIST DOCUMENTED: ICD-10-PCS | Mod: CPTII,S$GLB,, | Performed by: OPHTHALMOLOGY

## 2023-01-13 PROCEDURE — 99999 PR PBB SHADOW E&M-EST. PATIENT-LVL III: ICD-10-PCS | Mod: PBBFAC,,, | Performed by: OPHTHALMOLOGY

## 2023-01-13 PROCEDURE — 99999 PR PBB SHADOW E&M-EST. PATIENT-LVL IV: CPT | Mod: PBBFAC,GC,,

## 2023-01-13 PROCEDURE — 3075F SYST BP GE 130 - 139MM HG: CPT | Mod: CPTII,GC,S$GLB,

## 2023-01-13 PROCEDURE — 1126F AMNT PAIN NOTED NONE PRSNT: CPT | Mod: CPTII,GC,S$GLB,

## 2023-01-13 PROCEDURE — 1101F PR PT FALLS ASSESS DOC 0-1 FALLS W/OUT INJ PAST YR: ICD-10-PCS | Mod: CPTII,GC,S$GLB,

## 2023-01-13 PROCEDURE — 92136 IOL MASTER - OU - BOTH EYES: ICD-10-PCS | Mod: LT,S$GLB,, | Performed by: OPHTHALMOLOGY

## 2023-01-13 PROCEDURE — 3288F FALL RISK ASSESSMENT DOCD: CPT | Mod: CPTII,GC,S$GLB,

## 2023-01-13 PROCEDURE — 1126F PR PAIN SEVERITY QUANTIFIED, NO PAIN PRESENT: ICD-10-PCS | Mod: CPTII,S$GLB,, | Performed by: OPHTHALMOLOGY

## 2023-01-13 RX ORDER — DICLOFENAC SODIUM 10 MG/G
GEL TOPICAL
COMMUNITY
Start: 2022-07-18

## 2023-01-13 NOTE — PROGRESS NOTES
Subjective     Chief Complaint: pre-op evaluation    History of Present Illness:  Mr. Fred Schwab Jr. is a 82 y.o. male with BPH, elevated PSA, colon polyps, low tension glaucoma, multinodular thyroid, hyperlipidemia, peripheral neuropathy and tremors here for pre-op evaluation for cataract surgery 1/25. Patient denies CP, SOB, HILL, n/v/d, abdominal pain, orthopnea, leg swelling, back pain. No history of cardiac risk factors, no DM, CAD, Last Cr normal, no recent MI    Surgical Risk Assessment   Active cardiac issues:  Active decompensated heart failure? No   Unstable angina?  No   Significant uncontrolled arrhythmias? No   Severe valvular heart disease-Aortic or Mitral Stenosis? No   Recent MI or coronary revascularization < 30 days? No     Cardiac Risk Factors  History of CAD/ischemic heart disease? No   History of cerebrovascular disease?   No   History of congestive heart failure? No   Type 2 diabetes requiring insulin? No   Serum Creatinine > 2? No   Total cardiac risk factors 0   Add 1 point for high risk surgery (intraperitoneal, intrathoracic, or suprainguinal vascular)  Class I Risk (0 points): 3.9%  Class II Risk (1 point): 6.0%  Class III Risk (2 points): 10.1%  Class IV Risk (3+ points): 15.0%    Recommendation  his estimated risk with the proposed surgery/procedure for an adverse perioperative cardiac outcome (MI, pulmonary edema, ventricular fibrillation or primary  cardiac arrest, and complete heart block) is 0.4% (0.05%-1.5%) and for an adverse 30 day cardiac outcome (myocardial infarction, cardiac arrest, or death) is 3.9% (95% CI 2.8%-5.4%) (Revised Cardiac Risk Index [RCRI] Score = 0  based on the following risk factors: None). (Perioperative outcomes - Augustin at al Circ 1999; 100: 0881-7458; 30 day outcomes - Vernon et al. Can J Cardiol 2017; 33:17-32)    1. Patient has the above korey-operative risk at this time given the information currently available.   2. Reverse Warfarin with Vit K and FFP  if necessary.  3. Resume full anticoagulation (Warfarin with Heparin/LMWH bridging or DOAC) on POD #2 morning or per surgeon recommendations.    Review of Systems   Constitutional:  Negative for chills, fever and malaise/fatigue.   HENT:  Negative for congestion and sore throat.    Eyes:  Negative for pain.   Respiratory:  Negative for cough, shortness of breath and wheezing.    Cardiovascular:  Negative for chest pain, palpitations, orthopnea and leg swelling.   Gastrointestinal:  Negative for abdominal pain, constipation, diarrhea, nausea and vomiting.   Genitourinary:  Negative for dysuria and hematuria.   Musculoskeletal:  Negative for falls.   Neurological:  Positive for tremors. Negative for dizziness, weakness and headaches.     PAST HISTORY:     Past Medical History:   Diagnosis Date    Actinic keratosis     Allergy     Arthritis     Basal cell carcinoma     left nasofacial sulcus    BPH (benign prostatic hyperplasia)     Cataract     Colon polyp     benign    Dermatochalasis     Elevated PSA     Headache(784.0)     HEARING LOSS     Hypertension     Low tension glaucoma     Multiple thyroid nodules 11-27-17    Need repeat thyroid ultrasound in November 2018.    Neuropathy     Otitis media     Squamous cell carcinoma 12/10/16    left upper neck- in situ    Urinary tract infection        Past Surgical History:   Procedure Laterality Date    BELPHAROPTOSIS REPAIR      COLON SURGERY      COLONOSCOPY      COLONOSCOPY N/A 10/26/2015    Procedure: COLONOSCOPY;  Surgeon: Diony Kennedy MD;  Location: [address]);  Service: Endoscopy;  Laterality: N/A;    COLONOSCOPY N/A 2/10/2020    Procedure: COLONOSCOPY;  Surgeon: Gunnar Avalos MD;  Location: [address]);  Service: Endoscopy;  Laterality: N/A;    ESOPHAGOGASTRODUODENOSCOPY N/A 5/29/2020    Procedure: EGD (ESOPHAGOGASTRODUODENOSCOPY);  Surgeon: Gunnar Avalos MD;  Location: [address]);  Service: Endoscopy;  Laterality: N/A;  COVID  screening scheduled on 5/28/20 at [facility]  pt updated on drop off location and no visitor policy-[facility]    MOHS Surgery      PROSTATE BIOPSY      SELECTIVE LASER TRABECULOPLASTY Bilateral 01/27/2022 AND 2/10/2022           Family History   Problem Relation Age of Onset    Cancer Father         prostate    Heart disease Father     Stroke Father     Hypertension Mother     Diabetes Mother     Glaucoma Mother     Diabetes Sister     Pancreatic cancer Sister 75    Skin cancer Neg Hx     Melanoma Neg Hx     Amblyopia Neg Hx     Blindness Neg Hx     Cataracts Neg Hx     Macular degeneration Neg Hx     Retinal detachment Neg Hx     Strabismus Neg Hx        Social History     Tobacco Use    Smoking status: Never    Smokeless tobacco: Never   Substance Use Topics    Alcohol use: Yes     Comment: every once in a while    Drug use: No       MEDICATIONS & ALLERGIES:     Current Outpatient Medications on File Prior to Visit   Medication Sig    ascorbic acid, vitamin C, (VITAMIN C) 500 MG tablet Take by mouth. 1 Tablet Oral Every day    betamethasone dipropionate (DIPROLENE) 0.05 % cream aaa qd prn rash. Not more than 2 weeks straight in same location. Avoid use on face and groin    calcium carbonate (OS-DIAMOND) 600 mg (1,500 mg) Tab Take by mouth. 1 Tablet Oral Every day    cholecalciferol, vitamin D3, 10 mcg (400 unit) Cap Take 1 capsule by mouth once daily.    co-enzyme Q-10 30 mg capsule Take 30 mg by mouth 3 (three) times daily.    diclofenac sodium (VOLTAREN) 1 % Gel     doxazosin (CARDURA) 4 MG tablet Take 1 tablet (4 mg total) by mouth every evening.    fluocinolone acetonide oiL 0.01 % Drop Place 4 drops in ear(s) 2 (two) times daily.    loratadine (CLARITIN) 10 mg tablet Take 10 mg by mouth daily as needed. Every day    OMEGA-3 FATTY ACIDS/FISH OIL (OMEGA 3 FISH OIL ORAL) Take by mouth. 1 Capsule Oral Every day    propranoloL (INDERAL) 10 MG tablet Take 1 tablet (10 mg total) by mouth 2 (two) times daily.     sildenafil (VIAGRA) 100 MG tablet TAKE ONE TABLET BY MOUTH AS DIRECTED    terbinafine HCL (LAMISIL) 1 % cream     triamcinolone acetonide 0.025% (KENALOG) 0.025 % cream aaa qhs prn flare.  Not more than 1-2 weeks straight in same location    vit A palm,D3 in cod liver oil 1,250 unit-130 unit-530 mg Cap Take 1 capsule by mouth once daily.     [DISCONTINUED] albuterol (PROVENTIL/VENTOLIN HFA) 90 mcg/actuation inhaler Inhale 2 puffs into the lungs every 4 (four) hours as needed for Wheezing or Shortness of Breath.    [DISCONTINUED] predniSONE (DELTASONE) 10 MG tablet Take 1 tablet (10 mg total) by mouth 2 (two) times daily.     No current facility-administered medications on file prior to visit.       Review of patient's allergies indicates:   Allergen Reactions    Clindamycin Rash    Doxycycline hyclate Rash    Ciprofloxacin      Tendon tear.    Iodine and iodide containing products Rash     Got red but no itching    Penicillins     Tizanidine hcl      Unknown       OBJECTIVE:     Vital Signs:  Vitals:    01/13/23 1529   BP: 132/86   BP Location: Right arm   Patient Position: Sitting   BP Method: Medium (Manual)   Pulse: 66   SpO2: 96%   Weight: 80.7 kg (177 lb 14.6 oz)   Height: 6' (1.829 m)       Body mass index is 24.13 kg/m².     Physical Exam  Vitals and nursing note reviewed.   Constitutional:       General: He is not in acute distress.     Appearance: Normal appearance. He is not ill-appearing.   HENT:      Head: Normocephalic and atraumatic.      Right Ear: External ear normal.      Left Ear: External ear normal.      Nose: Nose normal. No congestion or rhinorrhea.      Mouth/Throat:      Mouth: Mucous membranes are dry.   Eyes:      Extraocular Movements: Extraocular movements intact.      Pupils: Pupils are equal, round, and reactive to light.   Cardiovascular:      Rate and Rhythm: Normal rate and regular rhythm.      Pulses: Normal pulses.      Heart sounds: No murmur heard.    No gallop.   Pulmonary:       Effort: Pulmonary effort is normal. No respiratory distress.      Breath sounds: Normal breath sounds. No wheezing.   Abdominal:      General: Abdomen is flat. There is no distension.      Palpations: Abdomen is soft.      Tenderness: There is no abdominal tenderness.   Musculoskeletal:         General: No tenderness.      Cervical back: Normal range of motion. No rigidity.      Right lower leg: No edema.      Left lower leg: Edema present.   Skin:     Coloration: Skin is not jaundiced.      Findings: No bruising.   Neurological:      General: No focal deficit present.      Mental Status: He is alert and oriented to person, place, and time.   Psychiatric:         Mood and Affect: Mood normal.         Behavior: Behavior normal.       Health Maintenance Due   Topic Date Due    TETANUS VACCINE  01/16/2014    DEXA Scan  12/02/2017         ASSESSMENT & PLAN:   Mr. Fred Schwab Jr. is a 82 y.o. male in good health here for pre op eval. Low risk surgery, RCRI score 0    Pre-op evaluation  Patient undergoing low risk procedure 1/25, RCRI score 0  Patient instructed to take his propanolol morning of his procedure with small sip of water.    Nuclear sclerosis of both eyes  To undergo phacoemulsification 1/25    Return to primary care physician as needed    Discussed with Dr. Sullivan - staff attestation to follow    Deepak Epps MD  Internal Medicine, PGY-I  [facility]  [address] [place] [address] [phone]

## 2023-01-13 NOTE — PROGRESS NOTES
HPI     Pre-op Exam     Additional comments: Phaco iol and HIMA OS           Comments    Preop Phaco IOL and Possible HIMA OS (sx 1/25/23)    1. POAG vs LTG   2. NS OU  3. Ptosis Sx  (Dr. Mendoza)  4. PIERCE  5. Disc Heme OS  6. Astigmatism and Presbyopia    MEDS:  AT's PRN OU          Last edited by Betsy Barker MA on 1/13/2023  1:54 PM.            Assessment /Plan     For exam results, see Encounter Report.    Nuclear sclerotic cataract of both eyes    Nuclear sclerosis of left eye    Primary open angle glaucoma (POAG) of both eyes, mild stage    Primary open angle glaucoma (POAG) of left eye, moderate stage    Ptosis of both eyelids      HPI     Pre-op Exam     Additional comments: Phaco iol and HIMA OS           Comments    Preop Phaco IOL and Possible HIMA OS (sx 1/25/23)    1. POAG vs LTG   2. NS OU  3. Ptosis Sx  (Dr. Mendoza)  4. PIERCE  5. Disc Heme OS  6. Astigmatism and Presbyopia    MEDS:  AT's PRN OU          Last edited by Betsy Barker MA on 1/13/2023  1:54 PM.            Assessment /Plan     For exam results, see Encounter Report.    Nuclear sclerotic cataract of both eyes    Nuclear sclerosis of left eye    Primary open angle glaucoma (POAG) of both eyes, mild stage    Primary open angle glaucoma (POAG) of left eye, moderate stage    Ptosis of both eyelids        HPI     Pre-op Exam     Additional comments: Phaco iol and HIMA OS           Comments    Preop Phaco IOL and Possible HIMA OS (sx 1/25/23)    1. POAG vs LTG   2. NS OU  3. Ptosis Sx  (Dr. Mendoza)  4. PIERCE  5. Disc Heme OS  6. Astigmatism and Presbyopia    MEDS:  AT's PRN OU          Last edited by Betsy Barker MA on 1/13/2023  1:54 PM.             Assessment /Plan     For exam results, see Encounter Report.    Nuclear sclerotic cataract of both eyes    Nuclear sclerosis of left eye    Primary open angle glaucoma (POAG) of both eyes, mild stage    Primary open angle glaucoma (POAG) of left eye, moderate stage    Ptosis of both eyelids          Lost to F/U  5/2017 to  5/2021 (4yrs)   Has been seeing his optometrist   Sent back in for OCT - RNFL changes - his optom told him to start gtts - so came back in     Pts wife is Carole Schwab - a glaucoma patient of Dr. Snyder    1.   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 Snyder)        Glaucoma meds   None presently -  IOP ok off gtts post SLT's // Tried xalatan and lumigan - mason ok - but no change in IOP // intol to cosopt         H/O adverse rxn to glaucoma drops    cosopt - caused stomach issues - improved off it         LASERS    SLT OD - 1/27/2022 good resp 22--> 16 // SLT os - 2/10/2022 20-->16        GLAUCOMA SURGERIES    none        OTHER EYE SURGERIES    none        CDR    0.85/0.8        Tbase    10-20 / 12-20          Tmax    19 - 20 /17- 20    (20 at outside optom per pt)         Ttarget   About 16-17               HVF    12 test 2000 to 2017 - full od / full os   2 test 2021 to 2022 (4yr gap) - hint early SAD od // full os         Gonio    +3 ou        CCT    [id number]        OCT    9 test 2005 to 2022 - RNFL  - dec G/TS  od// dec TI, bord G/N os (+prog ou)         HRT    7 test 2006 to 2017 - MR -  Dec. I, bord T od // [place] off os /// CDR 0.73 od // [place] off  os        Disc photos    2003, 2005 - slides // 2010, 2015 - + disc heme ST os  - OIS    - Ttoday   14//15  - Test done today -pre-op phaco/IOL OS nd possible kahook goniotomy - OS     2.   NS ou - mild - monitor   Pt noting decreased vision at near- more hazy than last visit     3. ERM os - see OCT - 5/31/2021 - mild     4.   Ptosis / Dermatochalasis        S/P sx with Dr. Mendoza 4/5/2001    5.   PIERCE - AT's prn   (Needed steroid gtts from Dr. Urena for PIERCE) and AT's    6. Disc Heme OS   See photos 11/4/2015     7.  Astigmatism / presbyopia       Glasses - Florian - given RX today says the glasses are old - no  real change    8.   Skin CA face - S/P sx    Plan  POAG vs LTG -  Mild  Disease od and suspect to mild  os   OCT w/ inf thinning ou - first time 1/2017 - some prog from 2017 to 2021   -H/O  disc heme today (+H/O disc heme in past)   -HVF - hint SAD od // full os (?prog od)     No apparent effect with latanoprost  Or lumigan - but tolerated ok - used q am    cosopt bid ou -  Intolerant - had to stop GI / stomach issues      IOP  baseline  (LTG)  of 18-20    SLT OD 1/27/2022 - good initial reap 22--> 16   SLT OS -good initial resp 20--> 16     Pt was having back pain last visit - doing much better after seeing the chiropractor     If needed can try timolol alone or brimonidine - likely it was the dorzolamide in the cosopt that upset his stomach       Mixed cataract - vis sign ou   Pt is interested in phaco/IOL os  first - with ossible MIGs -  kahook   F/U pre-op phaco/IOL and HIMA - trypan (check but I do not think he is on any blood thinners )     CEIOL OS Istent vs KDB  Lens selection   ZCBOO 22.50   MTA4UO 19.50     Risks and benefits discussed and consent signed.    I have seen and personally examined the patient.  I agree with the findings, assessment and plan of the resident and/or fellow.     Latonia Snyder MD

## 2023-01-17 ENCOUNTER — PATIENT MESSAGE (OUTPATIENT)
Dept: INTERNAL MEDICINE | Facility: CLINIC | Age: 83
End: 2023-01-17
Payer: MEDICARE

## 2023-01-17 RX ORDER — TROPICAMIDE 10 MG/ML
1 SOLUTION/ DROPS OPHTHALMIC
Status: CANCELLED | OUTPATIENT
Start: 2023-01-17

## 2023-01-17 RX ORDER — PHENYLEPHRINE HYDROCHLORIDE 100 MG/ML
1 SOLUTION/ DROPS OPHTHALMIC
Status: CANCELLED | OUTPATIENT
Start: 2023-01-17

## 2023-01-17 RX ORDER — PROPARACAINE HYDROCHLORIDE 5 MG/ML
1 SOLUTION/ DROPS OPHTHALMIC
Status: CANCELLED | OUTPATIENT
Start: 2023-01-17

## 2023-01-17 RX ORDER — KETOROLAC TROMETHAMINE 5 MG/ML
1 SOLUTION OPHTHALMIC
Status: CANCELLED | OUTPATIENT
Start: 2023-01-17

## 2023-01-17 RX ORDER — SODIUM CHLORIDE 0.9 % (FLUSH) 0.9 %
10 SYRINGE (ML) INJECTION
Status: DISCONTINUED | OUTPATIENT
Start: 2023-01-17 | End: 2023-04-12 | Stop reason: HOSPADM

## 2023-01-17 RX ORDER — MOXIFLOXACIN 5 MG/ML
1 SOLUTION/ DROPS OPHTHALMIC
Status: CANCELLED | OUTPATIENT
Start: 2023-01-17

## 2023-01-18 ENCOUNTER — OFFICE VISIT (OUTPATIENT)
Dept: INTERNAL MEDICINE | Facility: CLINIC | Age: 83
End: 2023-01-18
Payer: MEDICARE

## 2023-01-18 VITALS
SYSTOLIC BLOOD PRESSURE: 118 MMHG | HEIGHT: 72 IN | DIASTOLIC BLOOD PRESSURE: 74 MMHG | OXYGEN SATURATION: 96 % | BODY MASS INDEX: 24.04 KG/M2 | WEIGHT: 177.5 LBS | HEART RATE: 60 BPM

## 2023-01-18 DIAGNOSIS — E04.1 RIGHT THYROID NODULE: Primary | ICD-10-CM

## 2023-01-18 DIAGNOSIS — R07.0 THROAT PAIN: ICD-10-CM

## 2023-01-18 PROCEDURE — 3288F PR FALLS RISK ASSESSMENT DOCUMENTED: ICD-10-PCS | Mod: CPTII,S$GLB,, | Performed by: INTERNAL MEDICINE

## 2023-01-18 PROCEDURE — 1125F AMNT PAIN NOTED PAIN PRSNT: CPT | Mod: CPTII,S$GLB,, | Performed by: INTERNAL MEDICINE

## 2023-01-18 PROCEDURE — 1159F PR MEDICATION LIST DOCUMENTED IN MEDICAL RECORD: ICD-10-PCS | Mod: CPTII,S$GLB,, | Performed by: INTERNAL MEDICINE

## 2023-01-18 PROCEDURE — 3078F DIAST BP <80 MM HG: CPT | Mod: CPTII,S$GLB,, | Performed by: INTERNAL MEDICINE

## 2023-01-18 PROCEDURE — 3078F PR MOST RECENT DIASTOLIC BLOOD PRESSURE < 80 MM HG: ICD-10-PCS | Mod: CPTII,S$GLB,, | Performed by: INTERNAL MEDICINE

## 2023-01-18 PROCEDURE — 99999 PR PBB SHADOW E&M-EST. PATIENT-LVL IV: CPT | Mod: PBBFAC,,, | Performed by: INTERNAL MEDICINE

## 2023-01-18 PROCEDURE — 1159F MED LIST DOCD IN RCRD: CPT | Mod: CPTII,S$GLB,, | Performed by: INTERNAL MEDICINE

## 2023-01-18 PROCEDURE — 3074F SYST BP LT 130 MM HG: CPT | Mod: CPTII,S$GLB,, | Performed by: INTERNAL MEDICINE

## 2023-01-18 PROCEDURE — 99213 PR OFFICE/OUTPT VISIT, EST, LEVL III, 20-29 MIN: ICD-10-PCS | Mod: S$GLB,,, | Performed by: INTERNAL MEDICINE

## 2023-01-18 PROCEDURE — 99213 OFFICE O/P EST LOW 20 MIN: CPT | Mod: S$GLB,,, | Performed by: INTERNAL MEDICINE

## 2023-01-18 PROCEDURE — 3074F PR MOST RECENT SYSTOLIC BLOOD PRESSURE < 130 MM HG: ICD-10-PCS | Mod: CPTII,S$GLB,, | Performed by: INTERNAL MEDICINE

## 2023-01-18 PROCEDURE — 1101F PR PT FALLS ASSESS DOC 0-1 FALLS W/OUT INJ PAST YR: ICD-10-PCS | Mod: CPTII,S$GLB,, | Performed by: INTERNAL MEDICINE

## 2023-01-18 PROCEDURE — 3288F FALL RISK ASSESSMENT DOCD: CPT | Mod: CPTII,S$GLB,, | Performed by: INTERNAL MEDICINE

## 2023-01-18 PROCEDURE — 1125F PR PAIN SEVERITY QUANTIFIED, PAIN PRESENT: ICD-10-PCS | Mod: CPTII,S$GLB,, | Performed by: INTERNAL MEDICINE

## 2023-01-18 PROCEDURE — 1101F PT FALLS ASSESS-DOCD LE1/YR: CPT | Mod: CPTII,S$GLB,, | Performed by: INTERNAL MEDICINE

## 2023-01-18 PROCEDURE — 99999 PR PBB SHADOW E&M-EST. PATIENT-LVL IV: ICD-10-PCS | Mod: PBBFAC,,, | Performed by: INTERNAL MEDICINE

## 2023-01-18 NOTE — PROGRESS NOTES
I have personally taken the history and examined this patient and agree with the resident's note as stated above with the following thoughts:  /86 (BP Location: Right arm, Patient Position: Sitting, BP Method: Medium (Manual))   Pulse 66   Ht 6' (1.829 m)   Wt 80.7 kg (177 lb 14.6 oz)   SpO2 96%   BMI 24.13 kg/m²      He is low risk for a low risk procedure.  He is clear for both anesthesia and surgery on the medical perspective.  I did request that he avoid large crowds and he crease his risk of catching a cold symptoms flu or COVID before his procedure.

## 2023-01-18 NOTE — H&P
Subjective:       Patient ID: Fred Schwab Jr. is a 82 y.o. male.    Chief Complaint: Pre-op Exam (Phaco iol and HIMA OS)    HPI  Review of Systems   Constitutional: Negative.    HENT: Negative.     Eyes:  Positive for visual disturbance.   Respiratory: Negative.     Cardiovascular: Negative.    Gastrointestinal: Negative.    Endocrine: Negative.    Genitourinary: Negative.        Objective:      Physical Exam  HENT:      Head: Normocephalic and atraumatic.   Cardiovascular:      Rate and Rhythm: Normal rate.   Pulmonary:      Effort: Pulmonary effort is normal.   Skin:     General: Skin is warm and dry.   Neurological:      Mental Status: He is alert and oriented to person, place, and time.       Assessment:       Problem List Items Addressed This Visit       Nuclear sclerosis - Both Eyes    Relevant Orders    IOL Master - OU - Both Eyes (Completed)    Ptosis - Both Eyes     Other Visit Diagnoses       Nuclear sclerotic cataract of both eyes    -  Primary    Relevant Orders    IOL Master - OU - Both Eyes (Completed)    Primary open angle glaucoma (POAG) of both eyes, mild stage        Primary open angle glaucoma (POAG) of left eye, moderate stage        Relevant Orders    IOL Master - OU - Both Eyes (Completed)              Plan:       Phaco/IOL OS and possible kahook goniotomy or other HIMA's

## 2023-01-18 NOTE — PROGRESS NOTES
CC:  Throat pain with swallowing     HPI:  The patient is an 82-year-old male with BPH, elevated PSA, colon polyps, low tension glaucoma, multinodular thyroid, hyperlipidemia, peripheral neuropathy and tremors who presents today with complaints of throat pain with swallowing.  It occurs when swallowing.  The pain is located above the sternal notch.  He has no pain if he flexes his head.  The patient does have a history thyroid nodules.  His last ultrasound was in 21.  He reports no trouble swallowing solids or liquids.    ROS:Answers submitted by the patient for this visit:  Review of Systems Questionnaire (Submitted on 1/17/2023)  activity change: No  unexpected weight change: No  neck pain: Yes  hearing loss: No  rhinorrhea: No  trouble swallowing: Yes  eye discharge: No  visual disturbance: No  chest tightness: No  wheezing: No  chest pain: No  palpitations: No  blood in stool: No  constipation: No  vomiting: No  diarrhea: No  polydipsia: No  polyuria: No  difficulty urinating: No  urgency: No  hematuria: No  joint swelling: No  arthralgias: No  headaches: No  weakness: No  confusion: No  dysphoric mood: No  Patient also reports no fever chills.  He does report postnasal drip since November.      Physical exam:   General appearance:  No acute distress   HEENT:  Conjunctiva is clear.  Pupils equal.  TMs were clear.  Nasal septum is midline without discharge.  Oropharynx was without erythema.  Trachea was midline.  A right thyroid nodule was felt when the patient was asked to swallow.  Pulmonary:  Good inspiratory, expiratory breath sounds are heard.  Lungs are clear auscultation.    Cardiovascular:  S1-S2, rhythm is normal.      Assessment:  1.  Right thyroid nodule  2.  Throat pain with swallowing  3.  History of multinodular thyroid with last ultrasound in 21     Plan:  1. Will repeat a thyroid ultrasound.  If no change, we will refer the patient to ENT.

## 2023-01-20 ENCOUNTER — HOSPITAL ENCOUNTER (OUTPATIENT)
Dept: RADIOLOGY | Facility: HOSPITAL | Age: 83
Discharge: HOME | End: 2023-01-20
Attending: INTERNAL MEDICINE
Payer: MEDICARE

## 2023-01-20 DIAGNOSIS — R07.0 THROAT PAIN: ICD-10-CM

## 2023-01-20 DIAGNOSIS — E04.1 RIGHT THYROID NODULE: ICD-10-CM

## 2023-01-20 PROCEDURE — 76536 US EXAM OF HEAD AND NECK: CPT | Mod: TC

## 2023-01-20 PROCEDURE — 76536 US SOFT TISSUE HEAD NECK THYROID: ICD-10-PCS | Mod: 26,,, | Performed by: RADIOLOGY

## 2023-01-20 PROCEDURE — 76536 US EXAM OF HEAD AND NECK: CPT | Mod: 26,,, | Performed by: RADIOLOGY

## 2023-01-22 DIAGNOSIS — E04.1 THYROID NODULE: Primary | ICD-10-CM

## 2023-01-23 ENCOUNTER — TELEPHONE (OUTPATIENT)
Dept: INTERNAL MEDICINE | Facility: CLINIC | Age: 83
End: 2023-01-23
Payer: MEDICARE

## 2023-01-23 ENCOUNTER — PATIENT MESSAGE (OUTPATIENT)
Dept: INTERNAL MEDICINE | Facility: CLINIC | Age: 83
End: 2023-01-23
Payer: MEDICARE

## 2023-01-23 NOTE — TELEPHONE ENCOUNTER
----- Message from Marco Antonio Vaughan MD sent at 1/22/2023  7:56 AM CST -----  His thyroid nodule did increase in size. I would like for him to see Endocrinology. A referral is in.

## 2023-01-24 ENCOUNTER — TELEPHONE (OUTPATIENT)
Dept: OPHTHALMOLOGY | Facility: CLINIC | Age: 83
End: 2023-01-24
Payer: MEDICARE

## 2023-01-24 NOTE — PRE-PROCEDURE INSTRUCTIONS
Informed patient that these same instructions apply to second eye surgery and patient stated an understanding

## 2023-01-25 ENCOUNTER — TELEPHONE (OUTPATIENT)
Dept: ENDOCRINOLOGY | Facility: CLINIC | Age: 83
End: 2023-01-25
Payer: MEDICARE

## 2023-01-25 ENCOUNTER — HOSPITAL ENCOUNTER (OUTPATIENT)
Facility: HOSPITAL | Age: 83
Discharge: HOME | End: 2023-01-25
Attending: OPHTHALMOLOGY | Admitting: OPHTHALMOLOGY
Payer: MEDICARE

## 2023-01-25 ENCOUNTER — ANESTHESIA (OUTPATIENT)
Dept: SURGERY | Facility: HOSPITAL | Age: 83
End: 2023-01-25
Payer: MEDICARE

## 2023-01-25 ENCOUNTER — ANESTHESIA EVENT (OUTPATIENT)
Dept: SURGERY | Facility: HOSPITAL | Age: 83
End: 2023-01-25
Payer: MEDICARE

## 2023-01-25 VITALS
BODY MASS INDEX: 24.13 KG/M2 | TEMPERATURE: 99 F | OXYGEN SATURATION: 99 % | WEIGHT: 177.94 LBS | HEART RATE: 56 BPM | SYSTOLIC BLOOD PRESSURE: 130 MMHG | RESPIRATION RATE: 16 BRPM | DIASTOLIC BLOOD PRESSURE: 71 MMHG

## 2023-01-25 DIAGNOSIS — H40.1122 PRIMARY OPEN ANGLE GLAUCOMA (POAG) OF LEFT EYE, MODERATE STAGE: ICD-10-CM

## 2023-01-25 DIAGNOSIS — E04.1 THYROID NODULE: Primary | ICD-10-CM

## 2023-01-25 DIAGNOSIS — H25.13 NUCLEAR SCLEROTIC CATARACT OF BOTH EYES: ICD-10-CM

## 2023-01-25 DIAGNOSIS — H25.12 NUCLEAR SCLEROSIS OF LEFT EYE: Primary | ICD-10-CM

## 2023-01-25 PROCEDURE — V2632 POST CHMBR INTRAOCULAR LENS: HCPCS | Performed by: OPHTHALMOLOGY

## 2023-01-25 PROCEDURE — 71000044 HC DOSC ROUTINE RECOVERY FIRST HOUR: Performed by: OPHTHALMOLOGY

## 2023-01-25 PROCEDURE — 25000003 PHARM REV CODE 250: Performed by: OPHTHALMOLOGY

## 2023-01-25 PROCEDURE — 71000015 HC POSTOP RECOV 1ST HR: Performed by: OPHTHALMOLOGY

## 2023-01-25 PROCEDURE — D9220A PRA ANESTHESIA: Mod: ANES,,, | Performed by: STUDENT IN AN ORGANIZED HEALTH CARE EDUCATION/TRAINING PROGRAM

## 2023-01-25 PROCEDURE — 36000709 HC OR TIME LEV III EA ADD 15 MIN: Performed by: OPHTHALMOLOGY

## 2023-01-25 PROCEDURE — 65820 PR RELIEVE INNER EYE PRESSURE: ICD-10-PCS | Mod: LT,,, | Performed by: OPHTHALMOLOGY

## 2023-01-25 PROCEDURE — 63600175 PHARM REV CODE 636 W HCPCS: Performed by: NURSE ANESTHETIST, CERTIFIED REGISTERED

## 2023-01-25 PROCEDURE — 63600175 PHARM REV CODE 636 W HCPCS: Performed by: OPHTHALMOLOGY

## 2023-01-25 PROCEDURE — 37000009 HC ANESTHESIA EA ADD 15 MINS: Performed by: OPHTHALMOLOGY

## 2023-01-25 PROCEDURE — 25000003 PHARM REV CODE 250

## 2023-01-25 PROCEDURE — D9220A PRA ANESTHESIA: ICD-10-PCS | Mod: CRNA,,, | Performed by: NURSE ANESTHETIST, CERTIFIED REGISTERED

## 2023-01-25 PROCEDURE — 66984 PR REMOVAL, CATARACT, W/INSRT INTRAOC LENS, W/O ENDO CYCLO: ICD-10-PCS | Mod: 51,LT,, | Performed by: OPHTHALMOLOGY

## 2023-01-25 PROCEDURE — 65820 GONIOTOMY: CPT | Mod: LT,,, | Performed by: OPHTHALMOLOGY

## 2023-01-25 PROCEDURE — D9220A PRA ANESTHESIA: ICD-10-PCS | Mod: ANES,,, | Performed by: STUDENT IN AN ORGANIZED HEALTH CARE EDUCATION/TRAINING PROGRAM

## 2023-01-25 PROCEDURE — 36000708 HC OR TIME LEV III 1ST 15 MIN: Performed by: OPHTHALMOLOGY

## 2023-01-25 PROCEDURE — 99499 UNLISTED E&M SERVICE: CPT | Mod: ,,, | Performed by: STUDENT IN AN ORGANIZED HEALTH CARE EDUCATION/TRAINING PROGRAM

## 2023-01-25 PROCEDURE — 99499 NO LOS: ICD-10-PCS | Mod: ,,, | Performed by: STUDENT IN AN ORGANIZED HEALTH CARE EDUCATION/TRAINING PROGRAM

## 2023-01-25 PROCEDURE — 37000008 HC ANESTHESIA 1ST 15 MINUTES: Performed by: OPHTHALMOLOGY

## 2023-01-25 PROCEDURE — D9220A PRA ANESTHESIA: Mod: CRNA,,, | Performed by: NURSE ANESTHETIST, CERTIFIED REGISTERED

## 2023-01-25 PROCEDURE — 66984 XCAPSL CTRC RMVL W/O ECP: CPT | Mod: 51,LT,, | Performed by: OPHTHALMOLOGY

## 2023-01-25 DEVICE — LENS EYHANCE +22.5D: Type: IMPLANTABLE DEVICE | Site: EYE | Status: FUNCTIONAL

## 2023-01-25 RX ORDER — PROPARACAINE HYDROCHLORIDE 5 MG/ML
1 SOLUTION/ DROPS OPHTHALMIC
Status: DISCONTINUED | OUTPATIENT
Start: 2023-01-25 | End: 2023-01-25 | Stop reason: HOSPADM

## 2023-01-25 RX ORDER — OXYCODONE HYDROCHLORIDE 5 MG/1
5 TABLET ORAL
Status: CANCELLED | OUTPATIENT
Start: 2023-01-25

## 2023-01-25 RX ORDER — MOXIFLOXACIN 5 MG/ML
SOLUTION/ DROPS OPHTHALMIC
Status: DISCONTINUED | OUTPATIENT
Start: 2023-01-25 | End: 2023-01-25 | Stop reason: HOSPADM

## 2023-01-25 RX ORDER — PREDNISOLONE ACETATE 10 MG/ML
SUSPENSION/ DROPS OPHTHALMIC
Status: DISCONTINUED
Start: 2023-01-25 | End: 2023-01-25 | Stop reason: HOSPADM

## 2023-01-25 RX ORDER — TROPICAMIDE 10 MG/ML
1 SOLUTION/ DROPS OPHTHALMIC
Status: DISCONTINUED | OUTPATIENT
Start: 2023-01-25 | End: 2023-01-25 | Stop reason: HOSPADM

## 2023-01-25 RX ORDER — MIDAZOLAM HYDROCHLORIDE 1 MG/ML
INJECTION, SOLUTION INTRAMUSCULAR; INTRAVENOUS
Status: DISCONTINUED | OUTPATIENT
Start: 2023-01-25 | End: 2023-01-25

## 2023-01-25 RX ORDER — MOXIFLOXACIN 5 MG/ML
1 SOLUTION/ DROPS OPHTHALMIC
Status: DISCONTINUED | OUTPATIENT
Start: 2023-01-25 | End: 2023-01-25 | Stop reason: HOSPADM

## 2023-01-25 RX ORDER — FENTANYL CITRATE 50 UG/ML
INJECTION, SOLUTION INTRAMUSCULAR; INTRAVENOUS
Status: DISCONTINUED | OUTPATIENT
Start: 2023-01-25 | End: 2023-01-25

## 2023-01-25 RX ORDER — PREDNISOLONE ACETATE 10 MG/ML
SUSPENSION/ DROPS OPHTHALMIC
Status: DISCONTINUED | OUTPATIENT
Start: 2023-01-25 | End: 2023-01-25 | Stop reason: HOSPADM

## 2023-01-25 RX ORDER — ACETAZOLAMIDE 500 MG/1
500 CAPSULE, EXTENDED RELEASE ORAL ONCE
Status: DISCONTINUED | OUTPATIENT
Start: 2023-01-25 | End: 2023-01-25 | Stop reason: HOSPADM

## 2023-01-25 RX ORDER — PHENYLEPHRINE HYDROCHLORIDE 100 MG/ML
1 SOLUTION/ DROPS OPHTHALMIC
Status: DISCONTINUED | OUTPATIENT
Start: 2023-01-25 | End: 2023-01-25 | Stop reason: HOSPADM

## 2023-01-25 RX ORDER — KETOROLAC TROMETHAMINE 5 MG/ML
1 SOLUTION OPHTHALMIC
Status: DISCONTINUED | OUTPATIENT
Start: 2023-01-25 | End: 2023-01-25 | Stop reason: HOSPADM

## 2023-01-25 RX ORDER — LIDOCAINE HYDROCHLORIDE 40 MG/ML
INJECTION, SOLUTION RETROBULBAR
Status: DISCONTINUED
Start: 2023-01-25 | End: 2023-01-25 | Stop reason: HOSPADM

## 2023-01-25 RX ORDER — PROCHLORPERAZINE EDISYLATE 5 MG/ML
5 INJECTION INTRAMUSCULAR; INTRAVENOUS EVERY 30 MIN PRN
Status: CANCELLED | OUTPATIENT
Start: 2023-01-25

## 2023-01-25 RX ORDER — LIDOCAINE HYDROCHLORIDE 10 MG/ML
INJECTION, SOLUTION EPIDURAL; INFILTRATION; INTRACAUDAL; PERINEURAL
Status: DISCONTINUED
Start: 2023-01-25 | End: 2023-01-25 | Stop reason: HOSPADM

## 2023-01-25 RX ORDER — ACETAMINOPHEN 325 MG/1
650 TABLET ORAL EVERY 6 HOURS PRN
Status: DISCONTINUED | OUTPATIENT
Start: 2023-01-25 | End: 2023-01-25 | Stop reason: HOSPADM

## 2023-01-25 RX ORDER — LIDOCAINE HYDROCHLORIDE 10 MG/ML
INJECTION, SOLUTION EPIDURAL; INFILTRATION; INTRACAUDAL; PERINEURAL
Status: DISCONTINUED | OUTPATIENT
Start: 2023-01-25 | End: 2023-01-25 | Stop reason: HOSPADM

## 2023-01-25 RX ORDER — LIDOCAINE HYDROCHLORIDE 40 MG/ML
INJECTION, SOLUTION RETROBULBAR
Status: DISCONTINUED | OUTPATIENT
Start: 2023-01-25 | End: 2023-01-25 | Stop reason: HOSPADM

## 2023-01-25 RX ADMIN — MOXIFLOXACIN OPHTHALMIC 1 DROP: 5 SOLUTION/ DROPS OPHTHALMIC at 08:01

## 2023-01-25 RX ADMIN — PROPARACAINE HYDROCHLORIDE 1 DROP: 5 SOLUTION/ DROPS OPHTHALMIC at 08:01

## 2023-01-25 RX ADMIN — KETOROLAC TROMETHAMINE 1 DROP: 5 SOLUTION OPHTHALMIC at 08:01

## 2023-01-25 RX ADMIN — MIDAZOLAM 0.5 MG: 1 INJECTION INTRAMUSCULAR; INTRAVENOUS at 09:01

## 2023-01-25 RX ADMIN — PHENYLEPHRINE HYDROCHLORIDE 1 DROP: 100 SOLUTION/ DROPS OPHTHALMIC at 08:01

## 2023-01-25 RX ADMIN — TROPICAMIDE 1 DROP: 10 SOLUTION/ DROPS OPHTHALMIC at 08:01

## 2023-01-25 RX ADMIN — FENTANYL CITRATE 12.5 MCG: 50 INJECTION, SOLUTION INTRAMUSCULAR; INTRAVENOUS at 09:01

## 2023-01-25 NOTE — PLAN OF CARE
Pt in post op recovery. Pt denies any pain and nausea. Pt received discharge instructions and verbalizes understanding.

## 2023-01-25 NOTE — TRANSFER OF CARE
Anesthesia Transfer of Care Note    Patient: Fred Schwab Jr.    Procedure(s) Performed: Procedure(s) (LRB):  INSERTION, IOL PROSTHESIS (Left)  GONIOTOMY (Left)    Patient location: PACU    Anesthesia Type: MAC    Transport from OR: Transported from OR on room air with adequate spontaneous ventilation    Post pain: adequate analgesia    Post assessment: no apparent anesthetic complications and tolerated procedure well    Post vital signs: stable    Level of consciousness: awake, alert and oriented    Nausea/Vomiting: no nausea/vomiting    Complications: none    Transfer of care protocol was followed      Last vitals:   Visit Vitals  /78 (BP Location: Left arm, Patient Position: Lying)   Pulse (!) 56   Temp 36.4 °C (97.5 °F) (Temporal)   Resp 16   Wt 80.7 kg (177 lb 14.6 oz)   SpO2 98%   BMI 24.13 kg/m²

## 2023-01-25 NOTE — TELEPHONE ENCOUNTER
Mercedes, can we schedule this patient in my [facility] biopsy slot on Feb 20th at 1:15? No visit, just the FNA.

## 2023-01-25 NOTE — INTERVAL H&P NOTE
PT ID confirmed x2. HP performed, and reviewed previous, no updates. RBA discussed. Questions answered. Proceed with surgery as planned.

## 2023-01-25 NOTE — ANESTHESIA POSTPROCEDURE EVALUATION
Anesthesia Post Evaluation    Patient: Fred Schwab Jr.    Procedure(s) Performed: Procedure(s) (LRB):  INSERTION, IOL PROSTHESIS (Left)  GONIOTOMY (Left)    Final Anesthesia Type: MAC      Patient location during evaluation: PACU  Patient participation: Yes- Able to Participate  Level of consciousness: awake  Post-procedure vital signs: reviewed and stable  Pain management: adequate  Airway patency: patent    PONV status at discharge: No PONV  Anesthetic complications: no      Cardiovascular status: blood pressure returned to baseline  Respiratory status: unassisted, spontaneous ventilation and room air            Vitals Value Taken Time   /71 01/25/23 1032   Temp 36.9 °C (98.5 °F) 01/25/23 1045   Pulse 56 01/25/23 1045   Resp 16 01/25/23 1045   SpO2 99 % 01/25/23 1045         No case tracking events are documented in the log.      Pain/Paulino Score: Paulino Score: 10 (1/25/2023 10:45 AM)

## 2023-01-25 NOTE — ANESTHESIA PREPROCEDURE EVALUATION
01/25/2023  Pre-operative evaluation for Procedure(s) (LRB):  PHACOEMULSIFICATION WITH INSERTION,I-STENT (Left)  INSERTION, IOL PROSTHESIS (Left)    Fred Schwab Jr. is a 82 y.o. male w/ hx of HLD, peripheral neuropathy, multinodular thyroid, and glaucoma who presents for the above procedure.       Prev airway: none on record      EKG (6/3/2022):   Vent. Rate : 052 BPM     Atrial Rate : 052 BPM      P-R Int : 200 ms          QRS Dur : 098 ms       QT Int : 484 ms       P-R-T Axes : 073 -32 -03 degrees      QTc Int : 450 ms     Sinus bradycardia   Left axis deviation   Abnormal ECG   When compared with ECG of 11-AUG-2020 12:22,   Criteria for Septal infarct are no longer Present   Nonspecific T wave abnormality no longer evident in Lateral leads       2D Echo (12/10/2018):    The stress echo portion of this study is negative for myocardial ischemia.   The EKG portion of this study is abnormal but not diagnostic.   Normal left ventricular systolic function. The estimated ejection fraction is 60%   The patient reported no symptoms during the stress test.   The test was stopped secondary to atypical leg pain.   Arrhythmias during stress: occasional PACs.   Normal LV diastolic function.   Mild left atrial enlargement.   Mild-to-moderate mitral regurgitation.   Mild to moderate tricuspid regurgitation.   Mild to moderate pulmonic regurgitation.   There is right ventricular hypertrophy.   Overall, the patient's exercise capacity was above average.   There is 0.5 mm of depression in the leads.      Patient Active Problem List   Diagnosis    Low tension glaucoma, mild stage - Both Eyes    Nuclear sclerosis - Both Eyes    Ptosis - Both Eyes    Dermatochalasia - Both Eyes    Dry eye syndrome - Both Eyes    Astigmatism with presbyopia - Both Eyes    Skin cancer of face    KCS (keratoconjunctivitis  sicca) - Both Eyes    Low-tension glaucoma, bilateral    Osteoarthritis, hand    Hand pain    Rheumatoid factor positive    Osteoporosis prophylaxis    Elevated PSA    Multiple thyroid nodules    Elevated coronary artery calcium score    Allergic rhinitis    Nasal septal deviation    Personal history of skin cancer    Upper abdominal pain    Benign localized prostatic hyperplasia with lower urinary tract symptoms (LUTS)    Decreased range of motion of neck    Poor posture       Review of patient's allergies indicates:   Allergen Reactions    Clindamycin Rash    Doxycycline hyclate Rash    Ciprofloxacin      Tendon tear.    Iodine and iodide containing products Rash     Got red but no itching    Penicillins     Tizanidine hcl      Unknown        No current facility-administered medications on file prior to encounter.     Current Outpatient Medications on File Prior to Encounter   Medication Sig Dispense Refill    doxazosin (CARDURA) 4 MG tablet Take 1 tablet (4 mg total) by mouth every evening. 30 tablet 11    loratadine (CLARITIN) 10 mg tablet Take 10 mg by mouth daily as needed. Every day      propranoloL (INDERAL) 10 MG tablet Take 1 tablet (10 mg total) by mouth 2 (two) times daily. 180 tablet 3    ascorbic acid, vitamin C, (VITAMIN C) 500 MG tablet Take by mouth. 1 Tablet Oral Every day      betamethasone dipropionate (DIPROLENE) 0.05 % cream aaa qd prn rash. Not more than 2 weeks straight in same location. Avoid use on face and groin 45 g 1    calcium carbonate (OS-DIAMOND) 600 mg (1,500 mg) Tab Take by mouth. 1 Tablet Oral Every day      cholecalciferol, vitamin D3, 10 mcg (400 unit) Cap Take 1 capsule by mouth once daily.      co-enzyme Q-10 30 mg capsule Take 30 mg by mouth 3 (three) times daily.      fluocinolone acetonide oiL 0.01 % Drop Place 4 drops in ear(s) 2 (two) times daily. 20 mL 5    OMEGA-3 FATTY ACIDS/FISH OIL (OMEGA 3 FISH OIL ORAL) Take by mouth. 1 Capsule Oral Every  day      sildenafil (VIAGRA) 100 MG tablet TAKE ONE TABLET BY MOUTH AS DIRECTED 30 tablet 11    terbinafine HCL (LAMISIL) 1 % cream       triamcinolone acetonide 0.025% (KENALOG) 0.025 % cream aaa qhs prn flare.  Not more than 1-2 weeks straight in same location 45 g 1    vit A palm,D3 in cod liver oil 1,250 unit-130 unit-530 mg Cap Take 1 capsule by mouth once daily.          Past Surgical History:   Procedure Laterality Date    BELPHAROPTOSIS REPAIR      COLON SURGERY      COLONOSCOPY      COLONOSCOPY N/A 10/26/2015    Procedure: COLONOSCOPY;  Surgeon: Diony Kennedy MD;  Location: [facility] ([address] [place] [address]);  Service: Endoscopy;  Laterality: N/A;    COLONOSCOPY N/A 2/10/2020    Procedure: COLONOSCOPY;  Surgeon: Gunnar Avalos MD;  Location: [address]);  Service: Endoscopy;  Laterality: N/A;    ESOPHAGOGASTRODUODENOSCOPY N/A 5/29/2020    Procedure: EGD (ESOPHAGOGASTRODUODENOSCOPY);  Surgeon: Gunnar Avalos MD;  Location: [address]);  Service: Endoscopy;  Laterality: N/A;  COVID screening scheduled on 5/28/20 at [facility]  pt updated on drop off location and no visitor policy-    MOHS Surgery      PROSTATE BIOPSY      SELECTIVE LASER TRABECULOPLASTY Bilateral 01/27/2022 AND 2/10/2022           Social History     Socioeconomic History    Marital status:    Occupational History    Occupation: retired   Tobacco Use    Smoking status: Never    Smokeless tobacco: Never   Substance and Sexual Activity    Alcohol use: Yes     Comment: every once in a while    Drug use: No     Social Determinants of Health     Financial Resource Strain: Low Risk     Difficulty of Paying Living Expenses: Not hard at all   Food Insecurity: No Food Insecurity    Worried About Running Out of Food in the Last Year: Never true    Ran Out of Food in the Last Year: Never true   Transportation Needs: No Transportation Needs    Lack of Transportation (Medical): No    Lack of  Transportation (Non-Medical): No   Physical Activity: Sufficiently Active    Days of Exercise per Week: 3 days    Minutes of Exercise per Session: 120 min   Stress: No Stress Concern Present    Feeling of Stress : Not at all   Social Connections: Unknown    Frequency of Communication with Friends and Family: Once a week    Frequency of Social Gatherings with Friends and Family: More than three times a week    Active Member of Clubs or Organizations: Yes    Attends Club or Organization Meetings: More than 4 times per year    Marital Status:    Housing Stability: Low Risk     Unable to Pay for Housing in the Last Year: No    Number of Places Lived in the Last Year: 1    Unstable Housing in the Last Year: No         Vital Signs Range (Last 24H):         CBC: No results for input(s): WBC, RBC, HGB, HCT, PLT, MCV, MCH, MCHC in the last 72 hours.    CMP: No results for input(s): NA, K, CL, CO2, BUN, CREATININE, GLU, MG, PHOS, CALCIUM, ALBUMIN, PROT, ALKPHOS, ALT, AST, BILITOT in the last 72 hours.    INR  No results for input(s): PT, INR, PROTIME, APTT in the last 72 hours.          Pre-op Assessment    I have reviewed the Patient Summary Reports.     I have reviewed the Nursing Notes. I have reviewed the NPO Status.      Review of Systems  Anesthesia Hx:   Denies Personal Hx of Anesthesia complications.   Hematology/Oncology:     Oncology Normal     Cardiovascular:   Hypertension CAD      Pulmonary:  Pulmonary Normal    Hepatic/GI:  Hepatic/GI Normal    Musculoskeletal:   Arthritis     Neurological:   Headaches    Endocrine:  Endocrine Normal        Physical Exam  General: Alert and Oriented    Airway:  Mallampati: II   Mouth Opening: Normal  TM Distance: Normal  Tongue: Normal    Dental:  Intact    Chest/Lungs:  Clear to auscultation, Normal Respiratory Rate    Heart:  Rate: Normal  Rhythm: Regular Rhythm        Anesthesia Plan  Type of Anesthesia, risks & benefits discussed:    Anesthesia Type: Gen  Natural Airway, MAC  Intra-op Monitoring Plan: Standard ASA Monitors  Post Op Pain Control Plan: IV/PO Opioids PRN and multimodal analgesia  Induction:  IV  Informed Consent: Informed consent signed with the Patient and all parties understand the risks and agree with anesthesia plan.  All questions answered.   ASA Score: 3  Day of Surgery Review of History & Physical: H&P Update referred to the surgeon/provider.    Ready For Surgery From Anesthesia Perspective.     .

## 2023-01-25 NOTE — OP NOTE
DATE OF PROCEDURE: 1/25/2023    PREOPERATIVE DIAGNOSIS: 1. Cataract// Nuclear sclerosis of left eye OS.      2. Primary open angle glaucoma - left eye - moderate stage     POSTOPERATIVE DIAGNOSIS: Cataract// Nuclear sclerosis of left eyeOS,   2. Primary open angle glaucoma left eye moderate stage , status post procedure.     PROCEDURE PERFORMED: 1. Cataract extraction with trypan blue and phacoemulsification, posterior   chamber intraocular lens placement.      Goniotomy with a kahook dual blade - left eye     SURGEON: Latonia Snyder M.D.     ASSISTANT:  Gerry Leblanc MD       COMPLICATIONS: None.     BLOOD LOSS: Less than 5 mL.     ANESTHESIA: Local MAC    IMPLANT DATA:   DIB00 , power 22.5 diopters, serial #    PROCEDURE IN DETAIL: After informed consent including risks, benefits,   alternatives, the patient was brought to the operating room table, placed in   supine position. Monitored anesthesia care was used throughout the entire   procedure. Once adequate anesthesia was confirmed, the patient was then prepped   and draped in usual sterile fashion for intraocular surgery. Wire speculum was   used to hold the eyelids apart and the procedure was initiated by making   a partial thickness corneal wound with a zi blade temporally.   A supersharp blade was then used to make a second entry into the eye via a paracentesis incision.  Intracameral lidocaine followed by trypan blue, followed by  Viscoat were placed in the anterior chamber.   A 2.4 mm blade was then used to make to complete the clear corneal   incision temporally. A cystotome was used to make a tear in   the anterior capsular flap, which was continued around with Utrata forceps for   continuous curvilinear capsulorrhexis. BSS on a Gomez cannula was then used for   hydrodissection and hydrodelineation of lens. The lens was noted to spin   freely in the bag. Phacoemulsification handpiece was then used to remove the   lens in a divide-and-conquer  manner. Irrigation aspiration handpiece removed   the remaining cortical material. Provisc was placed in the eye followed by the   lens as mentioned above without any complications. Once the lens was in proper   position, the anterior chamber was refilled with viscoelastic.    Attention was directed to the glaucoma portion of the surgery.  The head was turned and the microscope tilted.  A gonio prism was used to visualize the nasal angle .  A mPortal dual blade glide knife was used to excise 90 degrees of the nasal trabecular meshwork.    The head and microscope were returned to the original positions.  The irrigation aspiration handpiece was used to remove the remaining Provisc. The   wounds were then hydrated with BSS and noted to be watertight. The eye had a   good physiological pressure and the lid speculum was removed under the   microscope without any shallowing. The eye was then covered with a collagen   shield soaked in Pred Forte and Vigamox and turned over to Anesthesia in stable   condition after placement of patch and shield.'

## 2023-01-25 NOTE — DISCHARGE SUMMARY
[facility] (1st Fl)  Discharge Note  Short Stay    Procedure(s) (LRB):  INSERTION, IOL PROSTHESIS (Left)  GONIOTOMY (Left)      OUTCOME: Patient tolerated treatment/procedure well without complication and is now ready for discharge.    DISPOSITION: Home or Self Care    FINAL DIAGNOSIS:  Nuclear sclerosis of left eye    FOLLOWUP: In clinic    DISCHARGE INSTRUCTIONS:  No discharge procedures on file.     TIME SPENT ON DISCHARGE: 15 minutes

## 2023-01-26 ENCOUNTER — OFFICE VISIT (OUTPATIENT)
Dept: OPHTHALMOLOGY | Facility: CLINIC | Age: 83
End: 2023-01-26
Payer: MEDICARE

## 2023-01-26 DIAGNOSIS — H02.403 PTOSIS OF BOTH EYELIDS: ICD-10-CM

## 2023-01-26 DIAGNOSIS — H25.12 NUCLEAR SCLEROSIS OF LEFT EYE: ICD-10-CM

## 2023-01-26 DIAGNOSIS — H04.123 DRY EYE SYNDROME OF BOTH EYES: ICD-10-CM

## 2023-01-26 DIAGNOSIS — Z48.89 ENCOUNTER FOR POSTOPERATIVE CARE: Primary | ICD-10-CM

## 2023-01-26 DIAGNOSIS — Z98.83 STATUS POST GLAUCOMA SURGERY: ICD-10-CM

## 2023-01-26 DIAGNOSIS — H40.1131 PRIMARY OPEN ANGLE GLAUCOMA (POAG) OF BOTH EYES, MILD STAGE: ICD-10-CM

## 2023-01-26 PROCEDURE — 3288F FALL RISK ASSESSMENT DOCD: CPT | Mod: CPTII,S$GLB,, | Performed by: OPHTHALMOLOGY

## 2023-01-26 PROCEDURE — 1101F PT FALLS ASSESS-DOCD LE1/YR: CPT | Mod: CPTII,S$GLB,, | Performed by: OPHTHALMOLOGY

## 2023-01-26 PROCEDURE — 1126F PR PAIN SEVERITY QUANTIFIED, NO PAIN PRESENT: ICD-10-PCS | Mod: CPTII,S$GLB,, | Performed by: OPHTHALMOLOGY

## 2023-01-26 PROCEDURE — 99024 POSTOP FOLLOW-UP VISIT: CPT | Mod: S$GLB,,, | Performed by: OPHTHALMOLOGY

## 2023-01-26 PROCEDURE — 99999 PR PBB SHADOW E&M-EST. PATIENT-LVL III: CPT | Mod: PBBFAC,,, | Performed by: OPHTHALMOLOGY

## 2023-01-26 PROCEDURE — 1159F PR MEDICATION LIST DOCUMENTED IN MEDICAL RECORD: ICD-10-PCS | Mod: CPTII,S$GLB,, | Performed by: OPHTHALMOLOGY

## 2023-01-26 PROCEDURE — 1160F RVW MEDS BY RX/DR IN RCRD: CPT | Mod: CPTII,S$GLB,, | Performed by: OPHTHALMOLOGY

## 2023-01-26 PROCEDURE — 1159F MED LIST DOCD IN RCRD: CPT | Mod: CPTII,S$GLB,, | Performed by: OPHTHALMOLOGY

## 2023-01-26 PROCEDURE — 3288F PR FALLS RISK ASSESSMENT DOCUMENTED: ICD-10-PCS | Mod: CPTII,S$GLB,, | Performed by: OPHTHALMOLOGY

## 2023-01-26 PROCEDURE — 99024 PR POST-OP FOLLOW-UP VISIT: ICD-10-PCS | Mod: S$GLB,,, | Performed by: OPHTHALMOLOGY

## 2023-01-26 PROCEDURE — 1160F PR REVIEW ALL MEDS BY PRESCRIBER/CLIN PHARMACIST DOCUMENTED: ICD-10-PCS | Mod: CPTII,S$GLB,, | Performed by: OPHTHALMOLOGY

## 2023-01-26 PROCEDURE — 99999 PR PBB SHADOW E&M-EST. PATIENT-LVL III: ICD-10-PCS | Mod: PBBFAC,,, | Performed by: OPHTHALMOLOGY

## 2023-01-26 PROCEDURE — 1101F PR PT FALLS ASSESS DOC 0-1 FALLS W/OUT INJ PAST YR: ICD-10-PCS | Mod: CPTII,S$GLB,, | Performed by: OPHTHALMOLOGY

## 2023-01-26 PROCEDURE — 1126F AMNT PAIN NOTED NONE PRSNT: CPT | Mod: CPTII,S$GLB,, | Performed by: OPHTHALMOLOGY

## 2023-01-26 RX ORDER — MOXIFLOXACIN 5 MG/ML
1 SOLUTION/ DROPS OPHTHALMIC 4 TIMES DAILY
COMMUNITY
Start: 2023-01-26 | End: 2023-02-03

## 2023-01-26 RX ORDER — PREDNISOLONE ACETATE 10 MG/ML
1 SUSPENSION/ DROPS OPHTHALMIC 4 TIMES DAILY
COMMUNITY
Start: 2023-01-26 | End: 2023-02-03 | Stop reason: SDUPTHER

## 2023-01-26 NOTE — PROGRESS NOTES
HPI    DLS: 1/13/2023    Pt here for 1 day post phaco w/IOL OS with kahook goniotomy -  1/25/2023  Pt states no eye pain just discomfort from the eye shield.     1. POAG vs LTG   2. NS OU   3. Ptosis Sx  (Dr. Mendoza)   4. PIERCE   5. Disc Heme OS   6. Astigmatism and Presbyopia      Last edited by Latonia Snyder MD on 1/26/2023  9:22 AM.            Assessment /Plan     For exam results, see Encounter Report.    Encounter for postoperative care    Primary open angle glaucoma (POAG) of both eyes, mild stage    Nuclear sclerosis of left eye    Ptosis of both eyelids    Dry eye syndrome of both eyes    Status post glaucoma surgery            Lost to F/U 5/2017 to  5/2021 (4yrs)   Has been seeing his optometrist   Sent back in for OCT - RNFL changes - his optom told him to start gtts - so came back in     Pts wife is Carole Schwab - a glaucoma patient of Dr. Snyder    1.   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 Snyder)        Glaucoma meds   None presently -  IOP ok off gtts post SLT's // Tried xalatan and lumigan - mason ok - but no change in IOP // intol to cosopt         H/O adverse rxn to glaucoma drops    cosopt - caused stomach issues - improved off it         LASERS    SLT OD - 1/27/2022 good resp 22--> 16 // SLT os - 2/10/2022 20-->16        GLAUCOMA SURGERIES    none        OTHER EYE SURGERIES    none        CDR    0.85/0.8        Tbase    10-20 / 12-20          Tmax    19 - 20 /17- 20    (20 at outside optom per pt)         Ttarget   About 16-17               HVF    12 test 2000 to 2017 - full od / full os   2 test 2021 to 2022 (4yr gap) - hint early SAD od // full os         Gonio    +3 ou        CCT    [id number]        OCT    9 test 2005 to 2022 - RNFL  - dec G/TS  od// dec TI, bord G/N os (+prog ou)         HRT    7 test 2006 to 2017 - MR -  Dec. Ianthony T od //  [place] off os /// CDR 0.73 od // [place] off  os        Disc photos    2003, 2005 - slides // 2010, 2015 - + disc heme ST os  - OIS    - Ttoday   18 / ?   - Test done today -post-op phaco/IOL OS // kahook goniotomy - OS - 1/25/2023     2.   NS od - mild - monitor   Pt noting decreased vision at near- more hazy than last visit     3. ERM os - see OCT - 5/31/2021 - mild     4.   Ptosis / Dermatochalasis        S/P sx with Dr. Mendoza 4/5/2001    5.   PIERCE - AT's prn   (Needed steroid gtts from Dr. rUena for PIERCE) and AT's    6. Disc Heme OS   See photos 11/4/2015     7.  Astigmatism / presbyopia       Glasses - Florian - given RX today says the glasses are old - no real change    8.   Skin CA face - S/P sx    Plan  POAG vs LTG -  Mild  Disease od and suspect to mild  os   OFF GLAUCOMA DROPS POST SLT's OU   OCT w/ inf thinning ou - first time 1/2017 - some prog from 2017 to 2021   -H/O  disc heme today (+H/O disc heme in past)   -HVF - hint SAD od // full os (?prog od)     No apparent effect with latanoprost  Or lumigan - but tolerated ok - used q am    cosopt bid ou -  Intolerant - had to stop GI / stomach issues      IOP  baseline  (LTG)  of 18-20    SLT OD 1/27/2022 - good initial reap 22--> 16   SLT OS -good initial resp 20--> 16     Pt was having back pain last visit - doing much better after seeing the chiropractor     If needed can try timolol alone or brimonidine - likely it was the dorzolamide in the cosopt that upset his stomach     Phaco / IOL OD w/ kahook goniotomy  Date: 1/25/2023  POD # 1 - phaco/IOL - DIB00 22.5  Doing well - but does have some corneal edema at POD 1   Begin Pred Acetate QID   Begin vigamox QID  Shield at night  Glasses day  No lifting, no bending, no eye rubbing  F/U 1 week, AR/MR ou     ZCBOO 22.50   MTA4UO 19.50     I have seen and personally examined the patient.  I agree with the findings, assessment and plan of the resident and/or fellow.     Latonia Snyder MD

## 2023-01-31 NOTE — PROGRESS NOTES
HPI    DLS: 1/13/2023    Pt here for 1 week post phaco w/IOL OS with kahook goniotomy -  1/25/2023  Pt states his left eye is doing well. He believes the right eye is much   clear than the left.    1. POAG vs LTG   2. NS OU   3. Ptosis Sx  (Dr. Mendoza)   4. PIERCE   5. Disc Heme OS   6. Astigmatism and Presbyopia    7. No glaucoma drops after SLT's    Moxi - qid os  stop when runs out   Pred acetate - 4 x day - os    Last edited by Latonia Snyder MD on 2/3/2023  3:41 PM.            Assessment /Plan     For exam results, see Encounter Report.    Encounter for postoperative care    Primary open angle glaucoma (POAG) of both eyes, mild stage    Nuclear sclerosis of left eye    Ptosis of both eyelids    Dry eye syndrome of both eyes    Status post glaucoma surgery    Pseudophakia, right eye        Lost to F/U 5/2017 to  5/2021 (4yrs)   Has been seeing his optometrist   Sent back in for OCT - RNFL changes - his optom told him to start gtts - so came back in     Pts wife is Carole Schwab - a glaucoma patient of Dr. Snyder    1.   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 Snyder)        Glaucoma meds   None presently -  IOP ok off gtts post SLT's // Tried xalatan and lumigan - mason ok - but no change in IOP // intol to cosopt         H/O adverse rxn to glaucoma drops    cosopt - caused stomach issues - improved off it         LASERS    SLT OD - 1/27/2022 good resp 22--> 16 // SLT os - 2/10/2022 20-->16        GLAUCOMA SURGERIES    none        OTHER EYE SURGERIES    none        CDR    0.85/0.8        Tbase    10-20 / 12-20          Tmax    19 - 20 /17- 20    (20 at outside optom per pt)         Ttarget   About 16-17               HVF    12 test 2000 to 2017 - full od / full os   2 test 2021 to 2022 (4yr gap) - hint early SAD od // full os         Gonio    +3 ou        CCT     [id number]        OCT    9 test 2005 to 2022 - RNFL  - dec G/TS  od// dec TI, bord G/N os (+prog ou)         HRT    7 test 2006 to 2017 - MR -  Dec. I, anthony T od // [place] off os /// CDR 0.73 od // [place] off  os        Disc photos    2003, 2005 - slides // 2010, 2015 - + disc heme ST os  - OIS    - Ttoday   15/16  - Test done today -post-op phaco/IOL OS // kahook goniotomy - OS - 1/25/2023     2.   NS od - mild - monitor   Pt noting decreased vision at near- more hazy than last visit     3. ERM os - see OCT - 5/31/2021 - mild     4.   Ptosis / Dermatochalasis        S/P sx with Dr. Mendoza 4/5/2001    5.   PIERCE - AT's prn   (Needed steroid gtts from Dr. Urena for PIERCE) and AT's    6. Disc Heme OS   See photos 11/4/2015     7.  Astigmatism / presbyopia       Glasses - Florian - given RX today says the glasses are old - no real change    8.   Skin CA face - S/P sx    Plan  POAG vs LTG -  Mild  Disease od and suspect to mild  os   OFF GLAUCOMA DROPS POST SLT's OU   OCT w/ inf thinning ou - first time 1/2017 - some prog from 2017 to 2021   -H/O  disc heme today (+H/O disc heme in past)   -HVF - hint SAD od // full os (?prog od)     No apparent effect with latanoprost  Or lumigan - but tolerated ok - used q am    cosopt bid ou -  Intolerant - had to stop GI / stomach issues      IOP  baseline  (LTG)  of 18-20    SLT OD 1/27/2022 - good initial reap 22--> 16   SLT OS -good initial resp 20--> 16     Pt was having back pain last visit - doing much better after seeing the chiropractor     If needed can try timolol alone or brimonidine - likely it was the dorzolamide in the cosopt that upset his stomach     Phaco / IOL OD w/ kahook goniotomy  Date: 1/25/2023  POW  # 1 - phaco/IOL - DIB00 22.5  Doing well - 20/60 uncorrected // with MR - 20/30    Pred Acetate QID for 1 more week then decrease to tid - Rx sent   Begin vigamox QID - stop   Shield at night - 1 more week  Glasses day  No lifting, no bending, no eye rubbing  F/U 3   week, AR/MR ou and OCT macula     If no cme and if vision doing ok / consider CE od     ZCBOO 22.50   MTA4UO 19.50     I have seen and personally examined the patient.  I agree with the findings, assessment and plan of the resident and/or fellow.     Latonia Snyder MD

## 2023-02-03 ENCOUNTER — OFFICE VISIT (OUTPATIENT)
Dept: OPHTHALMOLOGY | Facility: CLINIC | Age: 83
End: 2023-02-03
Payer: MEDICARE

## 2023-02-03 DIAGNOSIS — Z98.83 STATUS POST GLAUCOMA SURGERY: ICD-10-CM

## 2023-02-03 DIAGNOSIS — H04.123 DRY EYE SYNDROME OF BOTH EYES: ICD-10-CM

## 2023-02-03 DIAGNOSIS — Z96.1 PSEUDOPHAKIA, RIGHT EYE: ICD-10-CM

## 2023-02-03 DIAGNOSIS — Z48.89 ENCOUNTER FOR POSTOPERATIVE CARE: Primary | ICD-10-CM

## 2023-02-03 DIAGNOSIS — H25.12 NUCLEAR SCLEROSIS OF LEFT EYE: ICD-10-CM

## 2023-02-03 DIAGNOSIS — H40.1131 PRIMARY OPEN ANGLE GLAUCOMA (POAG) OF BOTH EYES, MILD STAGE: ICD-10-CM

## 2023-02-03 DIAGNOSIS — H02.403 PTOSIS OF BOTH EYELIDS: ICD-10-CM

## 2023-02-03 PROCEDURE — 3288F FALL RISK ASSESSMENT DOCD: CPT | Mod: CPTII,S$GLB,, | Performed by: OPHTHALMOLOGY

## 2023-02-03 PROCEDURE — 1160F RVW MEDS BY RX/DR IN RCRD: CPT | Mod: CPTII,S$GLB,, | Performed by: OPHTHALMOLOGY

## 2023-02-03 PROCEDURE — 99999 PR PBB SHADOW E&M-EST. PATIENT-LVL III: CPT | Mod: PBBFAC,,, | Performed by: OPHTHALMOLOGY

## 2023-02-03 PROCEDURE — 1126F AMNT PAIN NOTED NONE PRSNT: CPT | Mod: CPTII,S$GLB,, | Performed by: OPHTHALMOLOGY

## 2023-02-03 PROCEDURE — 3288F PR FALLS RISK ASSESSMENT DOCUMENTED: ICD-10-PCS | Mod: CPTII,S$GLB,, | Performed by: OPHTHALMOLOGY

## 2023-02-03 PROCEDURE — 1101F PT FALLS ASSESS-DOCD LE1/YR: CPT | Mod: CPTII,S$GLB,, | Performed by: OPHTHALMOLOGY

## 2023-02-03 PROCEDURE — 99999 PR PBB SHADOW E&M-EST. PATIENT-LVL III: ICD-10-PCS | Mod: PBBFAC,,, | Performed by: OPHTHALMOLOGY

## 2023-02-03 PROCEDURE — 1159F MED LIST DOCD IN RCRD: CPT | Mod: CPTII,S$GLB,, | Performed by: OPHTHALMOLOGY

## 2023-02-03 PROCEDURE — 1126F PR PAIN SEVERITY QUANTIFIED, NO PAIN PRESENT: ICD-10-PCS | Mod: CPTII,S$GLB,, | Performed by: OPHTHALMOLOGY

## 2023-02-03 PROCEDURE — 1160F PR REVIEW ALL MEDS BY PRESCRIBER/CLIN PHARMACIST DOCUMENTED: ICD-10-PCS | Mod: CPTII,S$GLB,, | Performed by: OPHTHALMOLOGY

## 2023-02-03 PROCEDURE — 1159F PR MEDICATION LIST DOCUMENTED IN MEDICAL RECORD: ICD-10-PCS | Mod: CPTII,S$GLB,, | Performed by: OPHTHALMOLOGY

## 2023-02-03 PROCEDURE — 99024 PR POST-OP FOLLOW-UP VISIT: ICD-10-PCS | Mod: S$GLB,,, | Performed by: OPHTHALMOLOGY

## 2023-02-03 PROCEDURE — 1101F PR PT FALLS ASSESS DOC 0-1 FALLS W/OUT INJ PAST YR: ICD-10-PCS | Mod: CPTII,S$GLB,, | Performed by: OPHTHALMOLOGY

## 2023-02-03 PROCEDURE — 99024 POSTOP FOLLOW-UP VISIT: CPT | Mod: S$GLB,,, | Performed by: OPHTHALMOLOGY

## 2023-02-03 RX ORDER — PREDNISOLONE ACETATE 10 MG/ML
1 SUSPENSION/ DROPS OPHTHALMIC 3 TIMES DAILY
Qty: 5 ML | Refills: 0 | Status: SHIPPED | OUTPATIENT
Start: 2023-02-03 | End: 2023-04-13 | Stop reason: ALTCHOICE

## 2023-02-13 ENCOUNTER — PATIENT MESSAGE (OUTPATIENT)
Dept: OPHTHALMOLOGY | Facility: CLINIC | Age: 83
End: 2023-02-13
Payer: MEDICARE

## 2023-02-15 NOTE — PROGRESS NOTES
History and Physical -  [facility]  Jhon Graham 38 y o  female MRN: [id number]    HPI: Jhon Graham is a 38y o  year old female who presents for dysphagia, abdominal pain    REVIEW OF SYSTEMS: Per the HPI, and otherwise unremarkable      Historical Information   Past Medical History:   Diagnosis Date   • Anal fissure    • Epilepsy ([address] )     grand mal, last seizure 2014     Past Surgical History:   Procedure Laterality Date   • COLONOSCOPY     • POLYPECTOMY     • TONSILECTOMY AND ADNOIDECTOMY       Social History   Social History     Substance and Sexual Activity   Alcohol Use Yes    Comment: wine     Social History     Substance and Sexual Activity   Drug Use No     Social History     Tobacco Use   Smoking Status Never   Smokeless Tobacco Never     Family History   Problem Relation Age of Onset   • Heart disease Mother    • Diabetes Father    • Heart disease Father    • Diabetes Maternal Grandmother    • Heart disease Maternal Grandmother    • Ovarian cancer Maternal Grandmother    • Breast cancer Maternal Grandmother    • Colon cancer Maternal Grandmother    • Heart disease Maternal Grandfather    • Diabetes Paternal Grandmother    • Heart disease Paternal Grandmother    • Ovarian cancer Paternal Grandmother    • Breast cancer Paternal Grandmother    • Heart disease Paternal Grandfather    • No Known Problems Daughter    • No Known Problems Daughter    • No Known Problems Daughter    • Breast cancer Other        Meds/Allergies       Current Outpatient Medications:   •  lamoTRIgine (LaMICtal) 100 mg tablet  •  zonisamide (ZONEGRAN) 50 MG capsule  •  lamoTRIgine (LaMICtal) 25 mg tablet    Current Facility-Administered Medications:   •  sodium chloride 0 9 % infusion, 100 mL/hr, Intravenous, Continuous, New Bag at 02/15/23 0939    No Known Allergies    Objective     /72   Pulse 81   Resp 14   Ht 5' 1" (1 549 m)   Wt 44 9 kg (99 lb)   SpO2 97%   BMI 18 71 kg/m²     PHYSICAL Refill Authorization Note   Mr.Schwab is requesting a refill authorization.    Brief assessment and rationale for refill: APPROVE: prr          Medication Therapy Plan: CDMR: approve 9 more    Medication reconciliation completed: No                    Comments:      Orders Placed This Encounter    propranoloL (INDERAL) 10 MG tablet      Requested Prescriptions   Signed Prescriptions Disp Refills    propranoloL (INDERAL) 10 MG tablet 180 tablet 2     Sig: TAKE ONE TABLET BY MOUTH TWICE DAILY       Cardiovascular:  Beta Blockers Passed - 9/3/2020  7:32 AM        Passed - Patient is at least 18 years old        Passed - Last BP in normal range within 360 days.     BP Readings from Last 3 Encounters:   08/11/20 122/82   06/09/20 124/77   05/29/20 114/72              Passed - Last Heart Rate in normal range within 360 days.     Pulse Readings from Last 3 Encounters:   08/11/20 62   06/09/20 56   05/29/20 68             Passed - Office visit in past 12 months or future 90 days.     Recent Outpatient Visits            Yesterday Hand eczema    [place] [facility] Nieves Cassidy MD    2 weeks ago Abdominal distension    [facility] Marco Antonio Haider MD    2 months ago Periumbilical abdominal pain    [facility] Gunnar Avalos MD    4 months ago Periumbilical abdominal pain    [facility] Gunnar Avalos MD    4 months ago Periumbilical abdominal pain    [facility] Marco Antonio Vaughan MD                        Appointments  past 12m or future 3m with PCP    Date Provider   Last Visit   4/22/2020 Marco Antonio Vaughan MD   Next Visit   Visit date not found Marco Antonio Vaughan MD   ED visits in past 90 days: 1     Note composed:12:49 PM 09/04/2020           EXAM    Gen: NAD AAOx3  Head: Normocephalic, Atraumatic  CV: S1S2 RRR no m/r/g  CHEST: Clear b/l no c/r/w  ABD: soft, +BS NT/ND  EXT: no edema    ASSESSMENT/PLAN:  This is a 38y o  year old female here for EGD, and she is stable and optimized for her procedure

## 2023-02-17 ENCOUNTER — PATIENT MESSAGE (OUTPATIENT)
Dept: SURGERY | Facility: CLINIC | Age: 83
End: 2023-02-17
Payer: MEDICARE

## 2023-02-17 ENCOUNTER — TELEPHONE (OUTPATIENT)
Dept: SURGERY | Facility: CLINIC | Age: 83
End: 2023-02-17
Payer: MEDICARE

## 2023-02-20 ENCOUNTER — TELEPHONE (OUTPATIENT)
Dept: SURGERY | Facility: CLINIC | Age: 83
End: 2023-02-20
Payer: MEDICARE

## 2023-02-20 ENCOUNTER — PATIENT MESSAGE (OUTPATIENT)
Dept: SURGERY | Facility: CLINIC | Age: 83
End: 2023-02-20
Payer: MEDICARE

## 2023-02-20 ENCOUNTER — TELEPHONE (OUTPATIENT)
Dept: ENDOCRINOLOGY | Facility: CLINIC | Age: 83
End: 2023-02-20
Payer: MEDICARE

## 2023-02-20 NOTE — PROGRESS NOTES
HPI     Post-op Evaluation     Additional comments: 1 month phaco/goniotomy OS           Comments    DLS: 2/3/23    Pt here for 1 month post phaco w/IOL OS with kahook goniotomy -  1/25/2023  Patient states OS doing well. Ready to proceed with cataract sx OD so he   can get new glasses.    Meds:  PF BID OS    1. POAG vs LTG   2. NS OU   3. Ptosis Sx  (Dr. Mendoza)   4. PIERCE   5. Disc Heme OS   6. Astigmatism and Presbyopia    7. No glaucoma drops after SLT's                Last edited by Apple Mcleod MA on 2/24/2023  2:19 PM.            Assessment /Plan     For exam results, see Encounter Report.    Encounter for postoperative care    Primary open angle glaucoma (POAG) of both eyes, mild stage    Nuclear sclerosis of left eye    Ptosis of both eyelids    Dry eye syndrome of both eyes    Status post glaucoma surgery    Pseudophakia, right eye              Lost to F/U 5/2017 to  5/2021 (4yrs)   Has been seeing his optometrist   Sent back in for OCT - RNFL changes - his optom told him to start gtts - so came back in     Pts wife is Carole Schwab - a glaucoma patient of Dr. Snyder    1.   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 Snyder)        Glaucoma meds   None presently -  IOP ok off gtts post SLT's // Tried xalatan and lumigan - mason ok - but no change in IOP // intol to cosopt         H/O adverse rxn to glaucoma drops    cosopt - caused stomach issues - improved off it         LASERS    SLT OD - 1/27/2022 good resp 22--> 16 // SLT os - 2/10/2022 20-->16        GLAUCOMA SURGERIES    none        OTHER EYE SURGERIES    none        CDR    0.85/0.8        Tbase    10-20 / 12-20          Tmax    19 - 20 /17- 20    (20 at outside optom per pt)         Ttarget   About 16-17               HVF    12 test 2000 to 2017 - full od / full os   2 test 2021 to 2022 (4yr gap) - hint early  SAD od // full os         Gonio    +3 ou        CCT    [id number]        OCT    9 test 2005 to 2022 - RNFL  - dec G/TS  od// dec TI, bord G/N os (+prog ou)         HRT    7 test 2006 to 2017 - MR -  Dec. I, anthony T od // [place] off os /// CDR 0.73 od // [place] off  os        Disc photos    2003, 2005 - slides // 2010, 2015 - + disc heme ST os  - OIS    - Ttoday   17/14  - Test done today -post-op phaco/IOL OS // kahook goniotomy - OS - 1/25/2023     2.   NS od - mild - monitor   Pt noting decreased vision at near- more hazy than last visit     3. ERM os - see OCT - 5/31/2021 - mild     4.   Ptosis / Dermatochalasis        S/P sx with Dr. Mendoza 4/5/2001    5.   PIERCE - AT's prn   (Needed steroid gtts from Dr. Urena for PIERCE) and AT's    6. Disc Heme OS   See photos 11/4/2015     7.  Astigmatism / presbyopia       Glasses - Florian - given RX today says the glasses are old - no real change    8.   Skin CA face - S/P sx    Plan  POAG vs LTG -  Mild  Disease od and suspect to mild  os   OFF GLAUCOMA DROPS POST SLT's OU   OCT w/ inf thinning ou - first time 1/2017 - some prog from 2017 to 2021   -H/O  disc heme today (+H/O disc heme in past)   -HVF - hint SAD od // full os (?prog od)     No apparent effect with latanoprost  Or lumigan - but tolerated ok - used q am    cosopt bid ou -  Intolerant - had to stop GI / stomach issues      IOP  baseline  (LTG)  of 18-20    SLT OD 1/27/2022 - good initial reap 22--> 16   SLT OS -good initial resp 20--> 16     Pt was having back pain last visit - doing much better after seeing the chiropractor     If needed can try timolol alone or brimonidine - likely it was the dorzolamide in the cosopt that upset his stomach     Phaco / IOL OD w/ kahook goniotomy  Date: 1/25/2023  POM  # 1 - phaco/IOL - DIB00 22.5  Doing well - 20/60 uncorrected // with MR - 20/30    Pred Acetate - 1 x day for 2 more weeks then stop     OCT mac os - + ERM - (pre-existing ) - no cme     ZCBOO 22.50   MTA4UO 19.50      Pt would like to schedule cataract surgery od // consider goniotomy (visco-canaloplasty) with Om[facility] or kahook      F/U for pre-op     I have seen and personally examined the patient.  I agree with the findings, assessment and plan of the resident and/or fellow.     Latonia Snyder MD

## 2023-02-20 NOTE — TELEPHONE ENCOUNTER
Called and informed pt we Rescheduled Biopsy for 3/6/23 @ 9:15 am.    Patient understood and confirmed new date and time

## 2023-02-24 ENCOUNTER — OFFICE VISIT (OUTPATIENT)
Dept: OPHTHALMOLOGY | Facility: CLINIC | Age: 83
End: 2023-02-24
Payer: MEDICARE

## 2023-02-24 DIAGNOSIS — Z48.89 ENCOUNTER FOR POSTOPERATIVE CARE: Primary | ICD-10-CM

## 2023-02-24 DIAGNOSIS — Z98.83 STATUS POST GLAUCOMA SURGERY: ICD-10-CM

## 2023-02-24 DIAGNOSIS — H25.12 NUCLEAR SCLEROSIS OF LEFT EYE: ICD-10-CM

## 2023-02-24 DIAGNOSIS — Z96.1 PSEUDOPHAKIA, RIGHT EYE: ICD-10-CM

## 2023-02-24 DIAGNOSIS — H40.1131 PRIMARY OPEN ANGLE GLAUCOMA (POAG) OF BOTH EYES, MILD STAGE: ICD-10-CM

## 2023-02-24 DIAGNOSIS — H02.403 PTOSIS OF BOTH EYELIDS: ICD-10-CM

## 2023-02-24 DIAGNOSIS — H04.123 DRY EYE SYNDROME OF BOTH EYES: ICD-10-CM

## 2023-02-24 PROCEDURE — 1101F PT FALLS ASSESS-DOCD LE1/YR: CPT | Mod: CPTII,S$GLB,, | Performed by: OPHTHALMOLOGY

## 2023-02-24 PROCEDURE — 1126F PR PAIN SEVERITY QUANTIFIED, NO PAIN PRESENT: ICD-10-PCS | Mod: CPTII,S$GLB,, | Performed by: OPHTHALMOLOGY

## 2023-02-24 PROCEDURE — 1160F RVW MEDS BY RX/DR IN RCRD: CPT | Mod: CPTII,S$GLB,, | Performed by: OPHTHALMOLOGY

## 2023-02-24 PROCEDURE — 1159F PR MEDICATION LIST DOCUMENTED IN MEDICAL RECORD: ICD-10-PCS | Mod: CPTII,S$GLB,, | Performed by: OPHTHALMOLOGY

## 2023-02-24 PROCEDURE — 3288F PR FALLS RISK ASSESSMENT DOCUMENTED: ICD-10-PCS | Mod: CPTII,S$GLB,, | Performed by: OPHTHALMOLOGY

## 2023-02-24 PROCEDURE — 3288F FALL RISK ASSESSMENT DOCD: CPT | Mod: CPTII,S$GLB,, | Performed by: OPHTHALMOLOGY

## 2023-02-24 PROCEDURE — 1160F PR REVIEW ALL MEDS BY PRESCRIBER/CLIN PHARMACIST DOCUMENTED: ICD-10-PCS | Mod: CPTII,S$GLB,, | Performed by: OPHTHALMOLOGY

## 2023-02-24 PROCEDURE — 99024 POSTOP FOLLOW-UP VISIT: CPT | Mod: S$GLB,,, | Performed by: OPHTHALMOLOGY

## 2023-02-24 PROCEDURE — 1126F AMNT PAIN NOTED NONE PRSNT: CPT | Mod: CPTII,S$GLB,, | Performed by: OPHTHALMOLOGY

## 2023-02-24 PROCEDURE — 1159F MED LIST DOCD IN RCRD: CPT | Mod: CPTII,S$GLB,, | Performed by: OPHTHALMOLOGY

## 2023-02-24 PROCEDURE — 99024 PR POST-OP FOLLOW-UP VISIT: ICD-10-PCS | Mod: S$GLB,,, | Performed by: OPHTHALMOLOGY

## 2023-02-24 PROCEDURE — 99999 PR PBB SHADOW E&M-EST. PATIENT-LVL III: CPT | Mod: PBBFAC,,, | Performed by: OPHTHALMOLOGY

## 2023-02-24 PROCEDURE — 99999 PR PBB SHADOW E&M-EST. PATIENT-LVL III: ICD-10-PCS | Mod: PBBFAC,,, | Performed by: OPHTHALMOLOGY

## 2023-02-24 PROCEDURE — 1101F PR PT FALLS ASSESS DOC 0-1 FALLS W/OUT INJ PAST YR: ICD-10-PCS | Mod: CPTII,S$GLB,, | Performed by: OPHTHALMOLOGY

## 2023-03-02 ENCOUNTER — TELEPHONE (OUTPATIENT)
Dept: OPHTHALMOLOGY | Facility: CLINIC | Age: 83
End: 2023-03-02
Payer: MEDICARE

## 2023-03-03 ENCOUNTER — TELEPHONE (OUTPATIENT)
Dept: OPHTHALMOLOGY | Facility: CLINIC | Age: 83
End: 2023-03-03
Payer: MEDICARE

## 2023-03-03 DIAGNOSIS — H25.13 NUCLEAR SCLEROTIC CATARACT OF BOTH EYES: ICD-10-CM

## 2023-03-03 DIAGNOSIS — H40.1131 PRIMARY OPEN ANGLE GLAUCOMA (POAG) OF BOTH EYES, MILD STAGE: Primary | ICD-10-CM

## 2023-03-06 ENCOUNTER — HOSPITAL ENCOUNTER (OUTPATIENT)
Dept: ENDOCRINOLOGY | Facility: CLINIC | Age: 83
Discharge: HOME | End: 2023-03-06
Attending: INTERNAL MEDICINE
Payer: MEDICARE

## 2023-03-06 DIAGNOSIS — E04.1 THYROID NODULE: ICD-10-CM

## 2023-03-06 PROCEDURE — 88173 PR  INTERPRETATION OF FNA SMEAR: ICD-10-PCS | Mod: 26,,, | Performed by: PATHOLOGY

## 2023-03-06 PROCEDURE — 10005 US FINE NEEDLE ASPIRATION THYROID, FIRST LESION: ICD-10-PCS | Mod: S$GLB,,, | Performed by: INTERNAL MEDICINE

## 2023-03-06 PROCEDURE — 88173 CYTOPATH EVAL FNA REPORT: CPT | Performed by: PATHOLOGY

## 2023-03-06 PROCEDURE — 10005 FNA BX W/US GDN 1ST LES: CPT | Mod: S$GLB,,, | Performed by: INTERNAL MEDICINE

## 2023-03-06 PROCEDURE — 88173 CYTOPATH EVAL FNA REPORT: CPT | Mod: 26,,, | Performed by: PATHOLOGY

## 2023-03-07 ENCOUNTER — TELEPHONE (OUTPATIENT)
Dept: ENDOCRINOLOGY | Facility: CLINIC | Age: 83
End: 2023-03-07
Payer: MEDICARE

## 2023-03-07 DIAGNOSIS — E04.2 MULTIPLE THYROID NODULES: Primary | ICD-10-CM

## 2023-03-07 LAB
FINAL PATHOLOGIC DIAGNOSIS: ABNORMAL
Lab: ABNORMAL

## 2023-03-07 NOTE — TELEPHONE ENCOUNTER
Called and advise patient that Dr. Savage reviewed the results of his thyroid biopsy. Unfortunately, the biopsy came back non-diagnostic/unsatisfactory. This means there weren't enough cells on the slides to make a call one way or the other. This can happen 5-10% of the time despite our best efforts. Dr. Savage generally recommend a repeat biopsy in 6-8 weeks. We wait 6-8 weeks to allow the inflammation to go down from the last biopsy. Also, We  didn't realize you had not established care with us in clinic so I informed him we can get him visit with Dr. Savage [place] [facility] [place] and have the biopsy the same day that would be ideal.      Pt stated he is ok with making F/U appointment and having Repeat Biopsy. I informed him that I will have him scheduled and will give him a call back with day and time.

## 2023-03-07 NOTE — TELEPHONE ENCOUNTER
Please advise patient that I reviewed the results of his thyroid biopsy. Unfortunately, the biopsy came back non-diagnostic/unsatisfactory. This means there weren't enough cells on the slides to make a call one way or the other. This can happen 5-10% of the time despite our best efforts. I generally recommend a repeat biopsy in 6-8 weeks. We wait 6-8 weeks to allow the inflammation to go down from the last biopsy. Also, I didn't realize he had not established care with us in clinic so if he can get a visit with me [place] [facility] [place] and have the biopsy the same day that would be ideal.

## 2023-03-13 ENCOUNTER — OFFICE VISIT (OUTPATIENT)
Dept: DERMATOLOGY | Facility: CLINIC | Age: 83
End: 2023-03-13
Payer: MEDICARE

## 2023-03-13 DIAGNOSIS — L90.5 SCAR: ICD-10-CM

## 2023-03-13 DIAGNOSIS — L82.1 SK (SEBORRHEIC KERATOSIS): ICD-10-CM

## 2023-03-13 DIAGNOSIS — L30.9 DERMATITIS: ICD-10-CM

## 2023-03-13 DIAGNOSIS — D22.9 NEVUS: ICD-10-CM

## 2023-03-13 DIAGNOSIS — L81.4 LENTIGO: ICD-10-CM

## 2023-03-13 DIAGNOSIS — Z85.828 PERSONAL HISTORY OF SKIN CANCER: ICD-10-CM

## 2023-03-13 DIAGNOSIS — D18.01 CHERRY ANGIOMA: ICD-10-CM

## 2023-03-13 DIAGNOSIS — L82.0 INFLAMED SEBORRHEIC KERATOSIS: Primary | ICD-10-CM

## 2023-03-13 PROCEDURE — 1101F PR PT FALLS ASSESS DOC 0-1 FALLS W/OUT INJ PAST YR: ICD-10-PCS | Mod: CPTII,S$GLB,, | Performed by: DERMATOLOGY

## 2023-03-13 PROCEDURE — 17110 PR DESTRUCTION BENIGN LESIONS UP TO 14: ICD-10-PCS | Mod: S$GLB,,, | Performed by: DERMATOLOGY

## 2023-03-13 PROCEDURE — 99999 PR PBB SHADOW E&M-EST. PATIENT-LVL III: ICD-10-PCS | Mod: PBBFAC,,, | Performed by: DERMATOLOGY

## 2023-03-13 PROCEDURE — 1160F PR REVIEW ALL MEDS BY PRESCRIBER/CLIN PHARMACIST DOCUMENTED: ICD-10-PCS | Mod: CPTII,S$GLB,, | Performed by: DERMATOLOGY

## 2023-03-13 PROCEDURE — 1159F MED LIST DOCD IN RCRD: CPT | Mod: CPTII,S$GLB,, | Performed by: DERMATOLOGY

## 2023-03-13 PROCEDURE — 99999 PR PBB SHADOW E&M-EST. PATIENT-LVL III: CPT | Mod: PBBFAC,,, | Performed by: DERMATOLOGY

## 2023-03-13 PROCEDURE — 3288F PR FALLS RISK ASSESSMENT DOCUMENTED: ICD-10-PCS | Mod: CPTII,S$GLB,, | Performed by: DERMATOLOGY

## 2023-03-13 PROCEDURE — 1160F RVW MEDS BY RX/DR IN RCRD: CPT | Mod: CPTII,S$GLB,, | Performed by: DERMATOLOGY

## 2023-03-13 PROCEDURE — 17110 DESTRUCTION B9 LES UP TO 14: CPT | Mod: S$GLB,,, | Performed by: DERMATOLOGY

## 2023-03-13 PROCEDURE — 3288F FALL RISK ASSESSMENT DOCD: CPT | Mod: CPTII,S$GLB,, | Performed by: DERMATOLOGY

## 2023-03-13 PROCEDURE — 1159F PR MEDICATION LIST DOCUMENTED IN MEDICAL RECORD: ICD-10-PCS | Mod: CPTII,S$GLB,, | Performed by: DERMATOLOGY

## 2023-03-13 PROCEDURE — 99214 OFFICE O/P EST MOD 30 MIN: CPT | Mod: 25,S$GLB,, | Performed by: DERMATOLOGY

## 2023-03-13 PROCEDURE — 1101F PT FALLS ASSESS-DOCD LE1/YR: CPT | Mod: CPTII,S$GLB,, | Performed by: DERMATOLOGY

## 2023-03-13 PROCEDURE — 99214 PR OFFICE/OUTPT VISIT, EST, LEVL IV, 30-39 MIN: ICD-10-PCS | Mod: 25,S$GLB,, | Performed by: DERMATOLOGY

## 2023-03-13 PROCEDURE — 1126F PR PAIN SEVERITY QUANTIFIED, NO PAIN PRESENT: ICD-10-PCS | Mod: CPTII,S$GLB,, | Performed by: DERMATOLOGY

## 2023-03-13 PROCEDURE — 1126F AMNT PAIN NOTED NONE PRSNT: CPT | Mod: CPTII,S$GLB,, | Performed by: DERMATOLOGY

## 2023-03-13 RX ORDER — BETAMETHASONE DIPROPIONATE 0.5 MG/G
CREAM TOPICAL
Qty: 45 G | Refills: 1 | Status: SHIPPED | OUTPATIENT
Start: 2023-03-13 | End: 2023-10-02 | Stop reason: SDUPTHER

## 2023-03-13 NOTE — PROGRESS NOTES
"  Subjective:       Patient ID:  Fred Schwab Jr. is a 82 y.o. male who presents for   Chief Complaint   Patient presents with    Skin Check     TBSE    Rash     Neck      Patient is here today for a TBSE.     Patient is here today for a "mole" check.   Pt has a history of  moderate sun exposure in the past.   Pt recalls several blistering sunburns in the past- yes  Pt has history of tanning bed use- no  Pt has  had moles removed in the past- no  Pt has history of melanoma in first degree relatives-  no    Patient with new complaint of rash(s)  Location: neck  Duration: 6-7 months - intermittent  Symptoms: itches and irritated by clothing  Relieving factors/Previous treatments: betamethasone- helps but then recurs    Pt is also concerned about spot on left ear, tender spots on scalp, and oily nose.     This is a high risk patient here to check for the development of new lesions.          Review of Systems   Skin:  Positive for itching, rash, activity-related sunscreen use and wears hat. Negative for daily sunscreen use and recent sunburn.   Hematologic/Lymphatic: Does not bruise/bleed easily.      Objective:    Physical Exam   Constitutional: He appears well-developed and well-nourished. No distress.   Neurological: He is alert and oriented to person, place, and time. He is not disoriented.   Psychiatric: He has a normal mood and affect.   Skin:   Areas Examined (abnormalities noted in diagram):   Scalp / Hair Palpated and Inspected  Head / Face Inspection Performed  Neck Inspection Performed  Chest / Axilla Inspection Performed  Abdomen Inspection Performed  Genitals / Buttocks / Groin Inspection Performed  Back Inspection Performed  RUE Inspected  LUE Inspection Performed  Nails and Digits Inspection Performed                         Diagram Legend     Erythematous scaling macule/papule c/w actinic keratosis       Vascular papule c/w angioma      Pigmented verrucoid papule/plaque c/w seborrheic keratosis      " Yellow umbilicated papule c/w sebaceous hyperplasia      Irregularly shaped tan macule c/w lentigo     1-2 mm smooth white papules consistent with Milia      Movable subcutaneous cyst with punctum c/w epidermal inclusion cyst      Subcutaneous movable cyst c/w pilar cyst      Firm pink to brown papule c/w dermatofibroma      Pedunculated fleshy papule(s) c/w skin tag(s)      Evenly pigmented macule c/w junctional nevus     Mildly variegated pigmented, slightly irregular-bordered macule c/w mildly atypical nevus      Flesh colored to evenly pigmented papule c/w intradermal nevus       Pink pearly papule/plaque c/w basal cell carcinoma      Erythematous hyperkeratotic cursted plaque c/w SCC      Surgical scar with no sign of skin cancer recurrence      Open and closed comedones      Inflammatory papules and pustules      Verrucoid papule consistent consistent with wart     Erythematous eczematous patches and plaques     Dystrophic onycholytic nail with subungual debris c/w onychomycosis     Umbilicated papule    Erythematous-base heme-crusted tan verrucoid plaque consistent with inflamed seborrheic keratosis     Erythematous Silvery Scaling Plaque c/w Psoriasis     See annotation      Assessment / Plan:        Inflamed seborrheic keratosis  Cryosurgery procedure note:    Verbal consent from the patient is obtained including, but not limited to, risk of hypopigmentation/hyperpigmentation, scar, recurrence of lesion. Liquid nitrogen cryosurgery is applied to 1 lesions to produce a freeze injury. The patient is aware that blisters may form and is instructed on wound care with gentle cleansing and use of vaseline ointment to keep moist until healed. The patient is supplied a handout on cryosurgery and is instructed to call if lesions do not completely resolve.    Dermatitis  Discussed with patient good skin care regimen including avoiding fragranced products and very hot showers.  Recommended dove sensitive skin bar soap  or cerave hydrating cleanser or bar.  Recommend Cerave cream  For moisturization daily -2x daily.   Avoid cologne in this area  Pt educated that overuse of steroids can lead to skin thinning/atrophy, hypopigmentation, striae.    -     betamethasone dipropionate 0.05 % cream; aaa qd prn rash. Not more than 2 weeks straight in same location. Avoid use on face and groin  Dispense: 45 g; Refill: 1    Nevus  Discussed ABCDE's of nevi.  Monitor for new mole or moles that are becoming bigger, darker, irritated, or developing irregular borders. Brochure provided. Instructed patient to observe lesion(s) for changes and follow up in clinic if changes are noted. Patient to monitor skin at home for new or changing lesions.     Lentigo  This is a benign hyperpigmented sun induced lesion. Recommend daily sun protection/avoidance and use of at least SPF 30, broad spectrum sunscreen (OTC drug) will reduce the number of new lesions. Treatment of these benign lesions are considered cosmetic.  The nature of sun-induced photo-aging and skin cancers is discussed.  Sun avoidance, protective clothing, and the use of 30-SPF sunscreens is advised. Observe closely for skin damage/changes, and call if such occurs.    Cherry angioma  These are benign vascular lesions that are inherited.  Treatment is not necessary.    SK (seborrheic keratosis)  These are benign inherited growths without a malignant potential. Reassurance given to patient. No treatment is necessary.     Personal history of skin cancer  Scar  Area(s) of previous NMSC evaluated with no signs of recurrence.    Upper body skin examination performed today including at least 6 points as noted in physical examination. No lesions suspicious for malignancy noted.    Recommend daily sun protection/avoidance and use of at least SPF 30, broad spectrum sunscreen (OTC drug).              Follow up in about 6 months (around 9/13/2023) for UBSE.

## 2023-03-13 NOTE — PATIENT INSTRUCTIONS
We would like to see you back in the clinic in 6 months.  You will be able to schedule this appointment by calling or by using your [facility] portal 3 months before this time. Because our schedule fills so quickly, please set a reminder in your phone or on your calendar to schedule 3 months before you are due to come in so that we can see you in a timely fashion.  You should also receive a reminder from us in the mail. This will help us ensure we can continue to provide excellent healthcare for you. Thank you.     Pt educated that overuse of steroids can lead to skin thinning/atrophy, hypopigmentation, striae.

## 2023-03-27 NOTE — PROGRESS NOTES
HPI    DLS: 2/24/2023    Pt here for Pre-op phaco w/IOL OD with possible canaloplasty and goniotomy   with OMNI or kahook - Surgery is 4/12/2023  S/P phaco w/IOL OS with [place] goniotomy- 1/25/2023  Pt states no eye pain or discomfort.     Meds;  No GTTS    1. POAG vs LTG   2. NS OD   3. Ptosis Sx  (Dr. Mendoza)   4. PIERCE   5. Disc Heme OS   6. Astigmatism and Presbyopia     7. No glaucoma drops after SLT's  8. PC IOL os     Last edited by Latonia Snyder MD on 3/31/2023  1:54 PM.            Assessment /Plan     For exam results, see Encounter Report.    Nuclear sclerotic cataract of left eye    Cortical age-related cataract of left eye    Primary open angle glaucoma (POAG) of both eyes, mild stage    Ptosis of both eyelids    Dry eye syndrome of both eyes    Status post glaucoma surgery          Lost to F/U 5/2017 to  5/2021 (4yrs)   Has been seeing his optometrist   Sent back in for OCT - RNFL changes - his optom told him to start gtts - so came back in     Pts wife is Carole Schwab - a glaucoma patient of Dr. Snyder    1.   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 Snyder)        Glaucoma meds   None presently -  IOP ok off gtts post SLT's // Tried xalatan and lumigan - mason ok - but no change in IOP // intol to cosopt         H/O adverse rxn to glaucoma drops    cosopt - caused stomach issues - improved off it         LASERS    SLT OD - 1/27/2022 good resp 22--> 16 // SLT os - 2/10/2022 20-->16        GLAUCOMA SURGERIES    kahook goniotomy os 1/25/2023         OTHER EYE SURGERIES    PC IOL and kahook os - 1/25/2023        CDR    0.85/0.8        Tbase    10-20 / 12-20          Tmax    19 - 20 /17- 20    (20 at outside optom per pt)         Ttarget   About 16-17               HVF    12 test 2000 to 2017 - full od / full os   2 test 2021 to 2022 (4yr gap) - hint early  SAD od // full os         Gonio    +3 ou        CCT    [phone]        OCT    9 test 2005 to 2022 - RNFL  - dec G/TS  od// dec TI, bord G/N os (+prog ou)         HRT    7 test 2006 to 2017 - MR -  Dec. I, anthony T od // [place] off os /// CDR 0.73 od // [place] off  os        Disc photos    2003, 2005 - slides // 2010, 2015 - + disc heme ST os  - OIS    - Ttoday   16/14  - Test done today -post-op phaco/IOL OS // kahook goniotomy - OS - 1/25/2023    Pre-op phaco/IOL OD with goniotomy / canaloplasty - ? [facility]     2.   NS od - mild - monitor   Pt noting decreased vision at near- more hazy than last visit     3. ERM os - see OCT - 5/31/2021 - mild     4.   Ptosis / Dermatochalasis        S/P sx with Dr. Mendoza 4/5/2001    5.   PIERCE - AT's prn   (Needed steroid gtts from Dr. Urena for PIERCE) and AT's    6. Disc Heme OS   See photos 11/4/2015     7.  Astigmatism / presbyopia       Glasses - Florian - given RX today says the glasses are old - no real change    8.   Skin CA face - S/P sx    Plan  POAG vs LTG -  Mild  Disease od and suspect to mild  os   OFF GLAUCOMA DROPS POST SLT's OU   OCT w/ inf thinning ou - first time 1/2017 - some prog from 2017 to 2021   -H/O  disc heme today (+H/O disc heme in past)   -HVF - hint SAD od // full os (?prog od)     No apparent effect with latanoprost  Or lumigan - but tolerated ok - used q am    cosopt bid ou -  Intolerant - had to stop GI / stomach issues      IOP  baseline  (LTG)  of 18-20    SLT OD 1/27/2022 - good initial reap 22--> 16   SLT OS -good initial resp 20--> 16     Pt was having back pain last visit - doing much better after seeing the chiropractor     If needed can try timolol alone or brimonidine - likely it was the dorzolamide in the cosopt that upset his stomach     Phaco / IOL OS w/ kahook goniotomy  Date: 1/25/2023  POM  # 2  - phaco/IOL - DIB00 22.5  Doing well - 20/60 uncorrected // with MR - 20/30    Pred Acetate - 1 x day for 2 more weeks then stop     OCT mac os - +  ERM - (pre-existing ) - no cme     OS  ZCBOO 22.50   MTA4UO 19.50      Pt would like to schedule cataract surgery od // consider goniotomy (visco-canaloplasty) with Omni or kahook   F/U for pre-op          03/31/2023     OD  ZCBOO 21.5   MTA4UO 18.5     Phaco Omni OS - 180 goniotomy / canaloplasty - no blood thinners // angle nice and open       rba discussed. Consent obtained.   Patient endorses poor vision/glare is impairing quality of life and ability to perform tasks including: complete/ partial inability/difficultiesdriving at night, working, performing self care. Patient fully understands that cataract surgery may improve some of these difficulties, but there is a chance that it does not. Moreover, they fully understand this surgery will not eliminate the need for glasses and or eye drops.        I have seen and personally examined the patient.  I agree with the findings, assessment and plan of the resident and/or fellow.     Latonia Snyder MD

## 2023-03-31 ENCOUNTER — OFFICE VISIT (OUTPATIENT)
Dept: OPHTHALMOLOGY | Facility: CLINIC | Age: 83
End: 2023-03-31
Payer: MEDICARE

## 2023-03-31 DIAGNOSIS — H04.123 DRY EYE SYNDROME OF BOTH EYES: ICD-10-CM

## 2023-03-31 DIAGNOSIS — H02.403 PTOSIS OF BOTH EYELIDS: ICD-10-CM

## 2023-03-31 DIAGNOSIS — H25.011 CORTICAL AGE-RELATED CATARACT OF RIGHT EYE: ICD-10-CM

## 2023-03-31 DIAGNOSIS — H25.11 NUCLEAR SCLEROTIC CATARACT OF RIGHT EYE: Primary | ICD-10-CM

## 2023-03-31 DIAGNOSIS — H40.1131 PRIMARY OPEN ANGLE GLAUCOMA (POAG) OF BOTH EYES, MILD STAGE: ICD-10-CM

## 2023-03-31 DIAGNOSIS — H40.1111 PRIMARY OPEN-ANGLE GLAUCOMA, RIGHT EYE, MILD STAGE: ICD-10-CM

## 2023-03-31 DIAGNOSIS — Z98.83 STATUS POST GLAUCOMA SURGERY: ICD-10-CM

## 2023-03-31 PROCEDURE — 1126F AMNT PAIN NOTED NONE PRSNT: CPT | Mod: CPTII,S$GLB,, | Performed by: OPHTHALMOLOGY

## 2023-03-31 PROCEDURE — 3288F FALL RISK ASSESSMENT DOCD: CPT | Mod: CPTII,S$GLB,, | Performed by: OPHTHALMOLOGY

## 2023-03-31 PROCEDURE — 99499 NO LOS: ICD-10-PCS | Mod: S$GLB,,, | Performed by: OPHTHALMOLOGY

## 2023-03-31 PROCEDURE — 1160F RVW MEDS BY RX/DR IN RCRD: CPT | Mod: CPTII,S$GLB,, | Performed by: OPHTHALMOLOGY

## 2023-03-31 PROCEDURE — 1160F PR REVIEW ALL MEDS BY PRESCRIBER/CLIN PHARMACIST DOCUMENTED: ICD-10-PCS | Mod: CPTII,S$GLB,, | Performed by: OPHTHALMOLOGY

## 2023-03-31 PROCEDURE — 99499 UNLISTED E&M SERVICE: CPT | Mod: S$GLB,,, | Performed by: OPHTHALMOLOGY

## 2023-03-31 PROCEDURE — 92136 IOL MASTER - OD - RIGHT EYE: ICD-10-PCS | Mod: 26,RT,S$GLB, | Performed by: OPHTHALMOLOGY

## 2023-03-31 PROCEDURE — 3288F PR FALLS RISK ASSESSMENT DOCUMENTED: ICD-10-PCS | Mod: CPTII,S$GLB,, | Performed by: OPHTHALMOLOGY

## 2023-03-31 PROCEDURE — 1101F PR PT FALLS ASSESS DOC 0-1 FALLS W/OUT INJ PAST YR: ICD-10-PCS | Mod: CPTII,S$GLB,, | Performed by: OPHTHALMOLOGY

## 2023-03-31 PROCEDURE — 92136 OPHTHALMIC BIOMETRY: CPT | Mod: 26,RT,S$GLB, | Performed by: OPHTHALMOLOGY

## 2023-03-31 PROCEDURE — 1126F PR PAIN SEVERITY QUANTIFIED, NO PAIN PRESENT: ICD-10-PCS | Mod: CPTII,S$GLB,, | Performed by: OPHTHALMOLOGY

## 2023-03-31 PROCEDURE — 99999 PR PBB SHADOW E&M-EST. PATIENT-LVL III: CPT | Mod: PBBFAC,,, | Performed by: OPHTHALMOLOGY

## 2023-03-31 PROCEDURE — 99999 PR PBB SHADOW E&M-EST. PATIENT-LVL III: ICD-10-PCS | Mod: PBBFAC,,, | Performed by: OPHTHALMOLOGY

## 2023-03-31 PROCEDURE — 1101F PT FALLS ASSESS-DOCD LE1/YR: CPT | Mod: CPTII,S$GLB,, | Performed by: OPHTHALMOLOGY

## 2023-03-31 PROCEDURE — 1159F PR MEDICATION LIST DOCUMENTED IN MEDICAL RECORD: ICD-10-PCS | Mod: CPTII,S$GLB,, | Performed by: OPHTHALMOLOGY

## 2023-03-31 PROCEDURE — 1159F MED LIST DOCD IN RCRD: CPT | Mod: CPTII,S$GLB,, | Performed by: OPHTHALMOLOGY

## 2023-03-31 RX ORDER — KETOROLAC TROMETHAMINE 5 MG/ML
1 SOLUTION OPHTHALMIC
Status: CANCELLED | OUTPATIENT
Start: 2023-03-31

## 2023-03-31 RX ORDER — SODIUM CHLORIDE 0.9 % (FLUSH) 0.9 %
10 SYRINGE (ML) INJECTION
Status: DISCONTINUED | OUTPATIENT
Start: 2023-03-31 | End: 2023-04-12 | Stop reason: HOSPADM

## 2023-03-31 RX ORDER — PHENYLEPHRINE HYDROCHLORIDE 100 MG/ML
1 SOLUTION/ DROPS OPHTHALMIC
Status: CANCELLED | OUTPATIENT
Start: 2023-03-31

## 2023-03-31 RX ORDER — TROPICAMIDE 10 MG/ML
1 SOLUTION/ DROPS OPHTHALMIC
Status: CANCELLED | OUTPATIENT
Start: 2023-03-31

## 2023-03-31 RX ORDER — MOXIFLOXACIN 5 MG/ML
1 SOLUTION/ DROPS OPHTHALMIC
Status: CANCELLED | OUTPATIENT
Start: 2023-03-31

## 2023-03-31 NOTE — PROGRESS NOTES
HPI    DLS: 2/24/2023    Pt here for Pre-op phaco w/IOL OD- Surgery is 4/12/2023  S/P phaco w/IOL OS with [place] goniotomy- 1/25/2023  Pt states no eye pain or discomfort.     Meds;  No GTTS    1. POAG vs LTG   2. NS OU   3. Ptosis Sx  (Dr. Mendoza)   4. PIERCE   5. Disc Heme OS   6. Astigmatism and Presbyopia     7. No glaucoma drops after SLT's    Last edited by Emma Ambrosio on 3/31/2023  1:17 PM.            Assessment /Plan     For exam results, see Encounter Report.    Nuclear sclerotic cataract of left eye    Cortical age-related cataract of left eye    Primary open angle glaucoma (POAG) of both eyes, mild stage    Ptosis of both eyelids    Dry eye syndrome of both eyes    Status post glaucoma surgery          HPI    DLS: 2/24/2023    Pt here for Pre-op phaco w/IOL OD- Surgery is 4/12/2023  S/P phaco w/IOL OS with [place] goniotomy- 1/25/2023  Pt states no eye pain or discomfort.     Meds;  No GTTS    1. POAG vs LTG   2. NS OU   3. Ptosis Sx  (Dr. Mendoza)   4. PIERCE   5. Disc Heme OS   6. Astigmatism and Presbyopia     7. No glaucoma drops after SLT's    Last edited by Emma Ambrosio on 3/31/2023  1:17 PM.            Assessment /Plan     For exam results, see Encounter Report.    Nuclear sclerotic cataract of left eye    Cortical age-related cataract of left eye    Primary open angle glaucoma (POAG) of both eyes, mild stage    Ptosis of both eyelids    Dry eye syndrome of both eyes    Status post glaucoma surgery              Lost to F/U 5/2017 to  5/2021 (4yrs)   Has been seeing his optometrist   Sent back in for OCT - RNFL changes - his optom told him to start gtts - so came back in     Pts wife is Carole Schwab - a glaucoma patient of Dr. Snyder    1.   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 Snyder)        Glaucoma meds   None presently -  IOP ok off gtts post SLT's //  Tried xalatan and lumigan - mason ok - but no change in IOP // intol to cosopt         H/O adverse rxn to glaucoma drops    cosopt - caused stomach issues - improved off it         LASERS    SLT OD - 1/27/2022 good resp 22--> 16 // SLT os - 2/10/2022 20-->16        GLAUCOMA SURGERIES    none        OTHER EYE SURGERIES    none        CDR    0.85/0.8        Tbase    10-20 / 12-20          Tmax    19 - 20 /17- 20    (20 at outside optom per pt)         Ttarget   About 16-17               HVF    12 test 2000 to 2017 - full od / full os   2 test 2021 to 2022 (4yr gap) - hint early SAD od // full os         Gonio    +3 ou        CCT    587/583        OCT    9 test 2005 to 2022 - RNFL  - dec G/TS  od// dec TI, bord G/N os (+prog ou)         HRT    7 test 2006 to 2017 - MR -  Dec. I, bord T od // [place] off os /// CDR 0.73 od // [place] off  os        Disc photos    2003, 2005 - slides // 2010, 2015 - + disc heme ST os  - OIS    - Ttoday   17/14  - Test done today -post-op phaco/IOL OS // kahook goniotomy - OS - 1/25/2023     2.   NS od - mild - monitor   Pt noting decreased vision at near- more hazy than last visit     3. ERM os - see OCT - 5/31/2021 - mild     4.   Ptosis / Dermatochalasis        S/P sx with Dr. Mendoza 4/5/2001    5.   PIERCE - AT's prn   (Needed steroid gtts from Dr. Urena for PIERCE) and AT's    6. Disc Heme OS   See photos 11/4/2015     7.  Astigmatism / presbyopia       Glasses - Florian - given RX today says the glasses are old - no real change    8.   Skin CA face - S/P sx    Plan  POAG vs LTG -  Mild  Disease od and suspect to mild  os   OFF GLAUCOMA DROPS POST SLT's OU   OCT w/ inf thinning ou - first time 1/2017 - some prog from 2017 to 2021   -H/O  disc heme today (+H/O disc heme in past)   -HVF - hint SAD od // full os (?prog od)     No apparent effect with latanoprost  Or lumigan - but tolerated ok - used q am    cosopt bid ou -  Intolerant - had to stop GI / stomach issues      IOP  baseline  (LTG)   of 18-20    SLT OD 1/27/2022 - good initial reap 22--> 16   SLT OS -good initial resp 20--> 16     Pt was having back pain last visit - doing much better after seeing the chiropractor     If needed can try timolol alone or brimonidine - likely it was the dorzolamide in the cosopt that upset his stomach     Phaco / IOL OD w/ k[place]ook goniotomy  Date: 1/25/2023  POM  # 1 - phaco/IOL - DIB00 22.5  Doing well - 20/60 uncorrected // with MR - 20/30    Pred Acetate - 1 x day for 2 more weeks then stop     OCT mac os - + ERM - (pre-existing ) - no cme     ZCBOO 22.50   MTA4UO 19.50     Pt would like to schedule cataract surgery od // consider goniotomy (visco-canaloplasty) with [facility] or kahook        03/31/2023      ZCBOO 21.5   MTA4UO 18.5    Phaco Omni OS - 180 goniotomy      rba discussed. Consent obtained.   Patient endorses poor vision/glare is impairing quality of life and ability to perform tasks including: complete/ partial inability/difficultiesdriving at night, working, performing self care. Patient fully understands that cataract surgery may improve some of these difficulties, but there is a chance that it does not. Moreover, they fully understand this surgery will not eliminate the need for glasses and or eye drops.             .

## 2023-03-31 NOTE — H&P (VIEW-ONLY)
Subjective     Patient ID: Fred Schwab Jr. is a 82 y.o. male.    Chief Complaint: Pre-op Exam    HPI  Review of Systems   Constitutional: Negative.    HENT: Negative.     Eyes:  Positive for visual disturbance.   Respiratory: Negative.     Cardiovascular: Negative.    Gastrointestinal: Negative.    Endocrine: Negative.    Genitourinary: Negative.         Objective     Physical Exam  HENT:      Head: Normocephalic and atraumatic.   Cardiovascular:      Rate and Rhythm: Normal rate.   Pulmonary:      Effort: Pulmonary effort is normal.   Skin:     General: Skin is warm and dry.   Neurological:      Mental Status: He is alert and oriented to person, place, and time.          Assessment and Plan     Problem List Items Addressed This Visit       Ptosis - Both Eyes    Dry eye syndrome - Both Eyes     Other Visit Diagnoses       Nuclear sclerotic cataract of right eye    -  Primary    Relevant Orders    IOL Master - OD - Right Eye (Completed)    Cortical age-related cataract of right eye        Relevant Orders    IOL Master - OD - Right Eye (Completed)    Primary open-angle glaucoma, right eye, mild stage        Primary open angle glaucoma (POAG) of both eyes, mild stage        Status post glaucoma surgery                Phaco/IOL od and canalooplast / goniotomy right eye - [facility] or kahook

## 2023-04-06 ENCOUNTER — TELEPHONE (OUTPATIENT)
Dept: OPHTHALMOLOGY | Facility: CLINIC | Age: 83
End: 2023-04-06
Payer: MEDICARE

## 2023-04-10 ENCOUNTER — PATIENT MESSAGE (OUTPATIENT)
Dept: INTERNAL MEDICINE | Facility: CLINIC | Age: 83
End: 2023-04-10
Payer: MEDICARE

## 2023-04-10 NOTE — TELEPHONE ENCOUNTER
Does this require an in office appt with you or a referral to a Specialist.    Please advise,  Thank You.

## 2023-04-12 ENCOUNTER — ANESTHESIA (OUTPATIENT)
Dept: SURGERY | Facility: HOSPITAL | Age: 83
End: 2023-04-12
Payer: MEDICARE

## 2023-04-12 ENCOUNTER — ANESTHESIA EVENT (OUTPATIENT)
Dept: SURGERY | Facility: HOSPITAL | Age: 83
End: 2023-04-12
Payer: MEDICARE

## 2023-04-12 ENCOUNTER — HOSPITAL ENCOUNTER (OUTPATIENT)
Facility: HOSPITAL | Age: 83
Discharge: HOME | End: 2023-04-12
Attending: OPHTHALMOLOGY | Admitting: OPHTHALMOLOGY
Payer: MEDICARE

## 2023-04-12 VITALS
SYSTOLIC BLOOD PRESSURE: 119 MMHG | HEIGHT: 72 IN | HEART RATE: 54 BPM | RESPIRATION RATE: 20 BRPM | OXYGEN SATURATION: 94 % | TEMPERATURE: 98 F | BODY MASS INDEX: 24.1 KG/M2 | WEIGHT: 177.94 LBS | DIASTOLIC BLOOD PRESSURE: 72 MMHG

## 2023-04-12 DIAGNOSIS — H25.11 NUCLEAR SCLEROTIC CATARACT OF RIGHT EYE: Primary | ICD-10-CM

## 2023-04-12 DIAGNOSIS — H40.1111 PRIMARY OPEN-ANGLE GLAUCOMA, RIGHT EYE, MILD STAGE: ICD-10-CM

## 2023-04-12 DIAGNOSIS — H25.011 CORTICAL AGE-RELATED CATARACT OF RIGHT EYE: ICD-10-CM

## 2023-04-12 PROCEDURE — 71000015 HC POSTOP RECOV 1ST HR: Performed by: OPHTHALMOLOGY

## 2023-04-12 PROCEDURE — 71000044 HC DOSC ROUTINE RECOVERY FIRST HOUR: Performed by: OPHTHALMOLOGY

## 2023-04-12 PROCEDURE — D9220A PRA ANESTHESIA: ICD-10-PCS | Mod: ANES,,, | Performed by: ANESTHESIOLOGY

## 2023-04-12 PROCEDURE — C1783 OCULAR IMP, AQUEOUS DRAIN DE: HCPCS | Performed by: OPHTHALMOLOGY

## 2023-04-12 PROCEDURE — 63600175 PHARM REV CODE 636 W HCPCS: Performed by: OPHTHALMOLOGY

## 2023-04-12 PROCEDURE — 99499 UNLISTED E&M SERVICE: CPT | Mod: ,,, | Performed by: STUDENT IN AN ORGANIZED HEALTH CARE EDUCATION/TRAINING PROGRAM

## 2023-04-12 PROCEDURE — 36000708 HC OR TIME LEV III 1ST 15 MIN: Performed by: OPHTHALMOLOGY

## 2023-04-12 PROCEDURE — D9220A PRA ANESTHESIA: Mod: ANES,,, | Performed by: ANESTHESIOLOGY

## 2023-04-12 PROCEDURE — 99499 NO LOS: ICD-10-PCS | Mod: ,,, | Performed by: STUDENT IN AN ORGANIZED HEALTH CARE EDUCATION/TRAINING PROGRAM

## 2023-04-12 PROCEDURE — 25000003 PHARM REV CODE 250: Performed by: NURSE ANESTHETIST, CERTIFIED REGISTERED

## 2023-04-12 PROCEDURE — 63600175 PHARM REV CODE 636 W HCPCS: Performed by: NURSE ANESTHETIST, CERTIFIED REGISTERED

## 2023-04-12 PROCEDURE — V2632 POST CHMBR INTRAOCULAR LENS: HCPCS | Performed by: OPHTHALMOLOGY

## 2023-04-12 PROCEDURE — 66982 XCAPSL CTRC RMVL CPLX WO ECP: CPT | Mod: 79,51,RT, | Performed by: OPHTHALMOLOGY

## 2023-04-12 PROCEDURE — D9220A PRA ANESTHESIA: Mod: CRNA,,, | Performed by: NURSE ANESTHETIST, CERTIFIED REGISTERED

## 2023-04-12 PROCEDURE — 37000009 HC ANESTHESIA EA ADD 15 MINS: Performed by: OPHTHALMOLOGY

## 2023-04-12 PROCEDURE — 36000709 HC OR TIME LEV III EA ADD 15 MIN: Performed by: OPHTHALMOLOGY

## 2023-04-12 PROCEDURE — 65820 GONIOTOMY: CPT | Mod: 79,RT,, | Performed by: OPHTHALMOLOGY

## 2023-04-12 PROCEDURE — 37000008 HC ANESTHESIA 1ST 15 MINUTES: Performed by: OPHTHALMOLOGY

## 2023-04-12 PROCEDURE — C1876 STENT, NON-COA/NON-COV W/DEL: HCPCS | Performed by: OPHTHALMOLOGY

## 2023-04-12 PROCEDURE — 25000003 PHARM REV CODE 250: Performed by: OPHTHALMOLOGY

## 2023-04-12 PROCEDURE — 65820 PR RELIEVE INNER EYE PRESSURE: ICD-10-PCS | Mod: 79,RT,, | Performed by: OPHTHALMOLOGY

## 2023-04-12 PROCEDURE — 66982 PR REMOVAL, CATARACT, W/INSRT INTRAOC LENS, W/O ENDO CYCLO, CMPLX: ICD-10-PCS | Mod: 79,51,RT, | Performed by: OPHTHALMOLOGY

## 2023-04-12 PROCEDURE — D9220A PRA ANESTHESIA: ICD-10-PCS | Mod: CRNA,,, | Performed by: NURSE ANESTHETIST, CERTIFIED REGISTERED

## 2023-04-12 PROCEDURE — 25000003 PHARM REV CODE 250

## 2023-04-12 DEVICE — LENS EYHANCE +21.5D: Type: IMPLANTABLE DEVICE | Site: EYE | Status: FUNCTIONAL

## 2023-04-12 RX ORDER — SODIUM CHLORIDE 9 MG/ML
INJECTION, SOLUTION INTRAVENOUS CONTINUOUS
Status: DISCONTINUED | OUTPATIENT
Start: 2023-04-12 | End: 2023-04-12 | Stop reason: HOSPADM

## 2023-04-12 RX ORDER — ACETAZOLAMIDE 500 MG/1
500 CAPSULE, EXTENDED RELEASE ORAL ONCE
Status: DISCONTINUED | OUTPATIENT
Start: 2023-04-12 | End: 2023-04-12 | Stop reason: HOSPADM

## 2023-04-12 RX ORDER — ONDANSETRON 2 MG/ML
4 INJECTION INTRAMUSCULAR; INTRAVENOUS DAILY PRN
Status: DISCONTINUED | OUTPATIENT
Start: 2023-04-12 | End: 2023-04-12 | Stop reason: HOSPADM

## 2023-04-12 RX ORDER — TROPICAMIDE 10 MG/ML
1 SOLUTION/ DROPS OPHTHALMIC
Status: DISCONTINUED | OUTPATIENT
Start: 2023-04-12 | End: 2023-04-12 | Stop reason: HOSPADM

## 2023-04-12 RX ORDER — KETOROLAC TROMETHAMINE 5 MG/ML
1 SOLUTION OPHTHALMIC
Status: DISCONTINUED | OUTPATIENT
Start: 2023-04-12 | End: 2023-04-12 | Stop reason: HOSPADM

## 2023-04-12 RX ORDER — LIDOCAINE HYDROCHLORIDE 20 MG/ML
JELLY TOPICAL
Status: DISCONTINUED | OUTPATIENT
Start: 2023-04-12 | End: 2023-04-12 | Stop reason: HOSPADM

## 2023-04-12 RX ORDER — MOXIFLOXACIN 5 MG/ML
1 SOLUTION/ DROPS OPHTHALMIC
Status: DISCONTINUED | OUTPATIENT
Start: 2023-04-12 | End: 2023-04-12 | Stop reason: HOSPADM

## 2023-04-12 RX ORDER — MIDAZOLAM HYDROCHLORIDE 1 MG/ML
INJECTION, SOLUTION INTRAMUSCULAR; INTRAVENOUS
Status: DISCONTINUED | OUTPATIENT
Start: 2023-04-12 | End: 2023-04-12

## 2023-04-12 RX ORDER — LIDOCAINE HYDROCHLORIDE 10 MG/ML
1 INJECTION, SOLUTION EPIDURAL; INFILTRATION; INTRACAUDAL; PERINEURAL ONCE
Status: COMPLETED | OUTPATIENT
Start: 2023-04-12 | End: 2023-04-12

## 2023-04-12 RX ORDER — LIDOCAINE HYDROCHLORIDE 10 MG/ML
INJECTION, SOLUTION EPIDURAL; INFILTRATION; INTRACAUDAL; PERINEURAL
Status: DISCONTINUED | OUTPATIENT
Start: 2023-04-12 | End: 2023-04-12 | Stop reason: HOSPADM

## 2023-04-12 RX ORDER — PHENYLEPHRINE HYDROCHLORIDE 100 MG/ML
1 SOLUTION/ DROPS OPHTHALMIC
Status: DISCONTINUED | OUTPATIENT
Start: 2023-04-12 | End: 2023-04-12 | Stop reason: HOSPADM

## 2023-04-12 RX ORDER — LIDOCAINE HYDROCHLORIDE 10 MG/ML
INJECTION, SOLUTION EPIDURAL; INFILTRATION; INTRACAUDAL; PERINEURAL
Status: DISCONTINUED
Start: 2023-04-12 | End: 2023-04-12 | Stop reason: HOSPADM

## 2023-04-12 RX ORDER — PREDNISOLONE ACETATE 10 MG/ML
SUSPENSION/ DROPS OPHTHALMIC
Status: DISCONTINUED
Start: 2023-04-12 | End: 2023-04-12 | Stop reason: HOSPADM

## 2023-04-12 RX ORDER — FENTANYL CITRATE 50 UG/ML
INJECTION, SOLUTION INTRAMUSCULAR; INTRAVENOUS
Status: DISCONTINUED | OUTPATIENT
Start: 2023-04-12 | End: 2023-04-12

## 2023-04-12 RX ORDER — LIDOCAINE HYDROCHLORIDE 20 MG/ML
JELLY TOPICAL
Status: DISCONTINUED
Start: 2023-04-12 | End: 2023-04-12 | Stop reason: HOSPADM

## 2023-04-12 RX ORDER — PREDNISOLONE ACETATE 10 MG/ML
SUSPENSION/ DROPS OPHTHALMIC
Status: DISCONTINUED | OUTPATIENT
Start: 2023-04-12 | End: 2023-04-12 | Stop reason: HOSPADM

## 2023-04-12 RX ORDER — ACETAMINOPHEN 325 MG/1
650 TABLET ORAL EVERY 6 HOURS PRN
Status: DISCONTINUED | OUTPATIENT
Start: 2023-04-12 | End: 2023-04-12 | Stop reason: HOSPADM

## 2023-04-12 RX ORDER — LIDOCAINE HYDROCHLORIDE 40 MG/ML
INJECTION, SOLUTION RETROBULBAR
Status: DISCONTINUED | OUTPATIENT
Start: 2023-04-12 | End: 2023-04-12 | Stop reason: HOSPADM

## 2023-04-12 RX ORDER — LIDOCAINE HYDROCHLORIDE 40 MG/ML
INJECTION, SOLUTION RETROBULBAR
Status: DISCONTINUED
Start: 2023-04-12 | End: 2023-04-12 | Stop reason: HOSPADM

## 2023-04-12 RX ADMIN — KETOROLAC TROMETHAMINE 1 DROP: 5 SOLUTION OPHTHALMIC at 07:04

## 2023-04-12 RX ADMIN — LIDOCAINE HYDROCHLORIDE 1 MG: 10 INJECTION, SOLUTION EPIDURAL; INFILTRATION; INTRACAUDAL; PERINEURAL at 07:04

## 2023-04-12 RX ADMIN — MIDAZOLAM HYDROCHLORIDE 1 MG: 1 INJECTION, SOLUTION INTRAMUSCULAR; INTRAVENOUS at 08:04

## 2023-04-12 RX ADMIN — MOXIFLOXACIN OPHTHALMIC 1 DROP: 5 SOLUTION/ DROPS OPHTHALMIC at 07:04

## 2023-04-12 RX ADMIN — TROPICAMIDE 1 DROP: 10 SOLUTION/ DROPS OPHTHALMIC at 07:04

## 2023-04-12 RX ADMIN — SODIUM CHLORIDE: 9 INJECTION, SOLUTION INTRAVENOUS at 08:04

## 2023-04-12 RX ADMIN — PHENYLEPHRINE HYDROCHLORIDE 1 DROP: 100 SOLUTION/ DROPS OPHTHALMIC at 07:04

## 2023-04-12 RX ADMIN — SODIUM CHLORIDE: 9 INJECTION, SOLUTION INTRAVENOUS at 07:04

## 2023-04-12 RX ADMIN — FENTANYL CITRATE 50 MCG: 50 INJECTION, SOLUTION INTRAMUSCULAR; INTRAVENOUS at 08:04

## 2023-04-12 RX ADMIN — FENTANYL CITRATE 25 MCG: 50 INJECTION, SOLUTION INTRAMUSCULAR; INTRAVENOUS at 08:04

## 2023-04-12 NOTE — OP NOTE
DATE OF PROCEDURE: 4/12/2023    PREOPERATIVE DIAGNOSIS: mixed cortical and nuclear Cataract - poor red reflex // Nuclear sclerotic cataract of right eye OD.      2. Primary open angle glaucoma - right eye - mild stage     POSTOPERATIVE DIAGNOSIS: mixed cortical and nuclear Cataract- poor red reflex / Nuclear sclerotic cataract of right eyeOD,   2. Primary open angle glaucoma - right eye - mild stage and status post procedure.     PROCEDURE PERFORMED: 1. Cataract extraction with trypan blue and phacoemulsification, posterior   chamber intraocular lens placement.       2. GONIOTOMY  - 180 degrees - nasal and superior with OMNI device     SURGEON: Latonia Snyder M.D.     ASSISTANT:  Gerry Leblanc MD       COMPLICATIONS: None.     BLOOD LOSS: Less than 5 mL.     ANESTHESIA: Local MAC    IMPLANT DATA:   1. DIB00 , power 21.5 diopters, serial #    PROCEDURE IN DETAIL: After informed consent including risks, benefits,   alternatives, the patient was brought to the operating room table, placed in   supine position. Monitored anesthesia care was used throughout the entire   procedure. Once adequate anesthesia was confirmed, the patient was then prepped   and draped in usual sterile fashion for intraocular surgery. Wire speculum was   used to hold the eyelids apart and the procedure was initiated by making   a partial thickness corneal wound with a zi blade temporally.   A supersharp blade was then used to make a second entry into the eye via a paracentesis incision.  Intracameral lidocaine followed by trypan blue ( to stain the capsule and trabecular meshwork) followed by Viscoat were placed in the anterior chamber.   A 2.4 mm blade was then used to make to complete the clear corneal   incision temporally. A cystotome was used to make a tear in   the anterior capsular flap, which was continued around with Utrata forceps for   continuous curvilinear capsulorrhexis. BSS on a Gomez cannula was then used for    hydrodissection and hydrodelineation of lens. The lens was noted to spin   freely in the bag. Phacoemulsification handpiece was then used to remove the   lens in a divide-and-conquer manner. Irrigation aspiration handpiece removed   the remaining cortical material. Provisc was placed in the eye followed by the   lens as mentioned above without any complications. Once the lens was in proper   position, the eye was refilled with viscoelastic.    Next attention was given to the glaucoma portion of the surgery.  The head was turned and the microscope tilted and a gonio prism was used to see the nasal angle.  The omni device was used to mckeon trabecular meshwork and and the prolene was advanced into schlemm canal.   It was then mostly retracted - depositing Healon into schlemm's canal , then it was once again advanced and the   Goniotomy was performed - unroof ing schlemm canal.    The head and microscope were returned to their original position.    The irrigation aspiration handpiece was used to remove the remaining Provisc. The   wounds were then hydrated with BSS and noted to be watertight. The eye had a   good physiological pressure and the lid speculum was removed under the   microscope without any shallowing. The eye was then covered with a collagen   shield soaked in Pred Forte and Vigamox and turned over to Anesthesia in stable   condition after placement of patch and shield.

## 2023-04-12 NOTE — ANESTHESIA PREPROCEDURE EVALUATION
04/12/2023  Fred Schwab Jr. is a 82 y.o., male.  Pre-operative evaluation for Procedure(s) (LRB):  PHACOEMULSIFICATION, CATARACT (Right)  INSERTION, IOL PROSTHESIS (Right)  GONIOTOMY (Right)    Fred Schwab Jr. is a 82 y.o. male     Patient Active Problem List   Diagnosis    Low tension glaucoma, mild stage - Both Eyes    Nuclear sclerosis of left eye    Ptosis - Both Eyes    Dermatochalasia - Both Eyes    Dry eye syndrome - Both Eyes    Astigmatism with presbyopia - Both Eyes    Skin cancer of face    KCS (keratoconjunctivitis sicca) - Both Eyes    Low-tension glaucoma, bilateral    Osteoarthritis, hand    Hand pain    Rheumatoid factor positive    Osteoporosis prophylaxis    Elevated PSA    Multiple thyroid nodules    Elevated coronary artery calcium score    Allergic rhinitis    Nasal septal deviation    Personal history of skin cancer    Upper abdominal pain    Benign localized prostatic hyperplasia with lower urinary tract symptoms (LUTS)    Decreased range of motion of neck    Poor posture    Primary open angle glaucoma (POAG) of left eye, moderate stage    Nuclear sclerotic cataract of right eye    Primary open-angle glaucoma, right eye, mild stage       Review of patient's allergies indicates:   Allergen Reactions    Clindamycin Rash    Doxycycline hyclate Rash    Ciprofloxacin      Tendon tear.    Iodine and iodide containing products Rash     Got red but no itching    Penicillins     Tizanidine hcl      Unknown       No current facility-administered medications on file prior to encounter.     Current Outpatient Medications on File Prior to Encounter   Medication Sig Dispense Refill    ascorbic acid, vitamin C, (VITAMIN C) 500 MG tablet Take by mouth. 1 Tablet Oral Every day      calcium carbonate (OS-DIAMOND) 600 mg (1,500 mg) Tab Take by mouth. 1 Tablet Oral Every day       cholecalciferol, vitamin D3, 10 mcg (400 unit) Cap Take 1 capsule by mouth once daily.      co-enzyme Q-10 30 mg capsule Take 30 mg by mouth 3 (three) times daily.      diclofenac sodium (VOLTAREN) 1 % Gel       doxazosin (CARDURA) 4 MG tablet Take 1 tablet (4 mg total) by mouth every evening. 30 tablet 11    OMEGA-3 FATTY ACIDS/FISH OIL (OMEGA 3 FISH OIL ORAL) Take by mouth. 1 Capsule Oral Every day      propranoloL (INDERAL) 10 MG tablet TAKE ONE TABLET BY MOUTH TWICE DAILY 180 tablet 3    sildenafil (VIAGRA) 100 MG tablet TAKE ONE TABLET BY MOUTH AS DIRECTED 30 tablet 11    vit A palm,D3 in cod liver oil 1,250 unit-130 unit-530 mg Cap Take 1 capsule by mouth once daily.       fluocinolone acetonide oiL 0.01 % Drop Place 4 drops in ear(s) 2 (two) times daily. 20 mL 5    loratadine (CLARITIN) 10 mg tablet Take 10 mg by mouth daily as needed. Every day      prednisoLONE acetate (PRED FORTE) 1 % DrpS Place 1 drop into the left eye 3 (three) times daily. 5 mL 0    terbinafine HCL (LAMISIL) 1 % cream       triamcinolone acetonide 0.025% (KENALOG) 0.025 % cream aaa qhs prn flare.  Not more than 1-2 weeks straight in same location 45 g 1       Past Surgical History:   Procedure Laterality Date    BELPHAROPTOSIS REPAIR      CATARACT EXTRACTION W/ INTRAOCULAR LENS IMPLANT Left 01/25/2023    Dr. Snyder    COLON SURGERY      COLONOSCOPY      COLONOSCOPY N/A 10/26/2015    Procedure: COLONOSCOPY;  Surgeon: Diony Kennedy MD;  Location: [address]);  Service: Endoscopy;  Laterality: N/A;    COLONOSCOPY N/A 02/10/2020    Procedure: COLONOSCOPY;  Surgeon: Gunnar Avalos MD;  Location: [facility] (4TH FLR);  Service: Endoscopy;  Laterality: N/A;    ESOPHAGOGASTRODUODENOSCOPY N/A 05/29/2020    Procedure: EGD (ESOPHAGOGASTRODUODENOSCOPY);  Surgeon: Gunnar Avalos MD;  Location: [facility]4TH FLR);  Service: Endoscopy;  Laterality: N/A;  COVID screening scheduled on 5/28/20 at [facility]  care-rb  pt updated on drop off location and no visitor policy-rb    GONIOTOMY Left 1/25/2023    Procedure: GONIOTOMY;  Surgeon: Latonia Snyder MD;  Location: [facility] OR [address] [place] [address];  Service: Ophthalmology;  Laterality: Left;    INTRAOCULAR PROSTHESES INSERTION Left 1/25/2023    Procedure: INSERTION, IOL PROSTHESIS;  Surgeon: Latonia Snyder MD;  Location: [facility] OR [address] [place] [address];  Service: Ophthalmology;  Laterality: Left;    MOHS Surgery      PROSTATE BIOPSY      SELECTIVE LASER TRABECULOPLASTY Bilateral 01/27/2022 AND 2/10/2022           Social History     Socioeconomic History    Marital status:    Occupational History    Occupation: retired   Tobacco Use    Smoking status: Never    Smokeless tobacco: Never   Substance and Sexual Activity    Alcohol use: Yes     Comment: every once in a while    Drug use: No     Social Determinants of Health     Financial Resource Strain: Low Risk     Difficulty of Paying Living Expenses: Not hard at all   Food Insecurity: No Food Insecurity    Worried About Running Out of Food in the Last Year: Never true    Ran Out of Food in the Last Year: Never true   Transportation Needs: No Transportation Needs    Lack of Transportation (Medical): No    Lack of Transportation (Non-Medical): No   Physical Activity: Sufficiently Active    Days of Exercise per Week: 3 days    Minutes of Exercise per Session: 120 min   Stress: No Stress Concern Present    Feeling of Stress : Not at all   Social Connections: Unknown    Frequency of Communication with Friends and Family: Once a week    Frequency of Social Gatherings with Friends and Family: More than three times a week    Active Member of Clubs or Organizations: Yes    Attends Club or Organization Meetings: More than 4 times per year    Marital Status:    Housing Stability: Low Risk     Unable to Pay for Housing in the Last Year: No    Number of Places Lived in the Last Year: 1    Unstable  Housing in the Last Year: No             Pre-op Assessment    I have reviewed the Patient Summary Reports.     I have reviewed the Nursing Notes.    I have reviewed the Medications.     Review of Systems  Anesthesia Hx:  No problems with previous Anesthesia  History of prior surgery of interest to airway management or planning: Denies Family Hx of Anesthesia complications.   Denies Personal Hx of Anesthesia complications.   Social:  Non-Smoker    Hematology/Oncology:  Hematology Normal   Oncology Normal     EENT/Dental:EENT/Dental Normal   Cardiovascular:   Hypertension CAD      Pulmonary:  Pulmonary Normal    Renal/:  Renal/ Normal     Hepatic/GI:  Hepatic/GI Normal    Musculoskeletal:   Arthritis     Neurological:   Headaches    Endocrine:  Endocrine Normal    Psych:  Psychiatric Normal           Physical Exam  General: Well nourished and Cooperative    Airway:  Mouth Opening: Normal  TM Distance: Normal  Tongue: Normal  Neck ROM: Normal ROM    Dental:  Intact    Chest/Lungs:  Clear to auscultation, Normal Respiratory Rate    Heart:  Rate: Normal  Rhythm: Regular Rhythm  Sounds: Normal        Anesthesia Plan  Type of Anesthesia, risks & benefits discussed:    Anesthesia Type: Gen Natural Airway, MAC  Intra-op Monitoring Plan: Standard ASA Monitors  Post Op Pain Control Plan: multimodal analgesia  Induction:  IV  Airway Plan: , Post-Induction  Informed Consent: Informed consent signed with the Patient and all parties understand the risks and agree with anesthesia plan.  All questions answered.   ASA Score: 3  Day of Surgery Review of History & Physical: H&P Update referred to the surgeon/provider.    Ready For Surgery From Anesthesia Perspective.     .

## 2023-04-12 NOTE — TRANSFER OF CARE
Anesthesia Transfer of Care Note    Patient: Fred Schwab Jr.    Procedure(s) Performed: Procedure(s) (LRB):  PHACOEMULSIFICATION, CATARACT (Right)  INSERTION, IOL PROSTHESIS (Right)  GONIOTOMY (Right)    Patient location: PACU    Anesthesia Type: MAC    Transport from OR: Transported from OR on room air with adequate spontaneous ventilation    Post pain: adequate analgesia    Post assessment: no apparent anesthetic complications    Post vital signs: stable    Level of consciousness: awake and alert    Nausea/Vomiting: no nausea/vomiting    Complications: none    Transfer of care protocol was followed      Last vitals:

## 2023-04-12 NOTE — PLAN OF CARE
Discharge instructions reviewed with pt and pt's wife at bedside. Verbalized understanding. Packet given.

## 2023-04-12 NOTE — DISCHARGE SUMMARY
[facility] - [facility] (1st Fl)  Discharge Note  Short Stay    Procedure(s) (LRB):  PHACOEMULSIFICATION, CATARACT (Right)  INSERTION, IOL PROSTHESIS (Right)  GONIOTOMY (Right)      OUTCOME: Patient tolerated treatment/procedure well without complication and is now ready for discharge.    DISPOSITION: Home or Self Care    FINAL DIAGNOSIS:  Nuclear sclerotic cataract of right eye    FOLLOWUP: In clinic    DISCHARGE INSTRUCTIONS:    Discharge Procedure Orders   Leave dressing on - Keep it clean, dry, and intact until clinic visit         Clinical Reference Documents Added to Patient Instructions         Document    CATARACT REMOVAL DISCHARGE INSTRUCTIONS (ENGLISH)            TIME SPENT ON DISCHARGE: 15 minutes

## 2023-04-12 NOTE — ANESTHESIA POSTPROCEDURE EVALUATION
Anesthesia Post Evaluation    Patient: Fred Schwab Jr.    Procedure(s) Performed: Procedure(s) (LRB):  PHACOEMULSIFICATION, CATARACT (Right)  INSERTION, IOL PROSTHESIS (Right)  GONIOTOMY (Right)    Final Anesthesia Type: MAC      Patient location during evaluation: PACU  Patient participation: Yes- Able to Participate  Level of consciousness: awake and alert  Post-procedure vital signs: reviewed and stable  Pain management: adequate  Airway patency: patent    PONV status at discharge: No PONV  Anesthetic complications: no      Cardiovascular status: blood pressure returned to baseline and hemodynamically stable  Respiratory status: unassisted and spontaneous ventilation  Hydration status: euvolemic  Follow-up not needed.          Vitals Value Taken Time   /72 04/12/23 0945   Temp 36.8 °C (98.2 °F) 04/12/23 0908   Pulse 54 04/12/23 0945   Resp 20 04/12/23 0945   SpO2 94 % 04/12/23 0945         No case tracking events are documented in the log.      Pain/Paulino Score: Paulino Score: 10 (4/12/2023  9:30 AM)

## 2023-04-13 ENCOUNTER — OFFICE VISIT (OUTPATIENT)
Dept: OPHTHALMOLOGY | Facility: CLINIC | Age: 83
End: 2023-04-13
Payer: MEDICARE

## 2023-04-13 VITALS — SYSTOLIC BLOOD PRESSURE: 127 MMHG | HEART RATE: 56 BPM | DIASTOLIC BLOOD PRESSURE: 81 MMHG

## 2023-04-13 DIAGNOSIS — Z48.810 POSTOPERATIVE CARE FOR CATARACT: Primary | ICD-10-CM

## 2023-04-13 DIAGNOSIS — H40.1111 PRIMARY OPEN-ANGLE GLAUCOMA, RIGHT EYE, MILD STAGE: ICD-10-CM

## 2023-04-13 DIAGNOSIS — Z98.49 POSTOPERATIVE CARE FOR CATARACT: Primary | ICD-10-CM

## 2023-04-13 DIAGNOSIS — H40.1131 PRIMARY OPEN ANGLE GLAUCOMA (POAG) OF BOTH EYES, MILD STAGE: ICD-10-CM

## 2023-04-13 DIAGNOSIS — Z98.83 STATUS POST GLAUCOMA SURGERY: ICD-10-CM

## 2023-04-13 DIAGNOSIS — H04.123 DRY EYE SYNDROME OF BOTH EYES: ICD-10-CM

## 2023-04-13 DIAGNOSIS — H02.403 PTOSIS OF BOTH EYELIDS: ICD-10-CM

## 2023-04-13 PROCEDURE — 1159F PR MEDICATION LIST DOCUMENTED IN MEDICAL RECORD: ICD-10-PCS | Mod: CPTII,S$GLB,, | Performed by: OPHTHALMOLOGY

## 2023-04-13 PROCEDURE — 3079F DIAST BP 80-89 MM HG: CPT | Mod: CPTII,S$GLB,, | Performed by: OPHTHALMOLOGY

## 2023-04-13 PROCEDURE — 1126F AMNT PAIN NOTED NONE PRSNT: CPT | Mod: CPTII,S$GLB,, | Performed by: OPHTHALMOLOGY

## 2023-04-13 PROCEDURE — 1101F PR PT FALLS ASSESS DOC 0-1 FALLS W/OUT INJ PAST YR: ICD-10-PCS | Mod: CPTII,S$GLB,, | Performed by: OPHTHALMOLOGY

## 2023-04-13 PROCEDURE — 99999 PR PBB SHADOW E&M-EST. PATIENT-LVL III: CPT | Mod: PBBFAC,,, | Performed by: OPHTHALMOLOGY

## 2023-04-13 PROCEDURE — 3074F PR MOST RECENT SYSTOLIC BLOOD PRESSURE < 130 MM HG: ICD-10-PCS | Mod: CPTII,S$GLB,, | Performed by: OPHTHALMOLOGY

## 2023-04-13 PROCEDURE — 3079F PR MOST RECENT DIASTOLIC BLOOD PRESSURE 80-89 MM HG: ICD-10-PCS | Mod: CPTII,S$GLB,, | Performed by: OPHTHALMOLOGY

## 2023-04-13 PROCEDURE — 99024 PR POST-OP FOLLOW-UP VISIT: ICD-10-PCS | Mod: S$GLB,,, | Performed by: OPHTHALMOLOGY

## 2023-04-13 PROCEDURE — 3288F FALL RISK ASSESSMENT DOCD: CPT | Mod: CPTII,S$GLB,, | Performed by: OPHTHALMOLOGY

## 2023-04-13 PROCEDURE — 99999 PR PBB SHADOW E&M-EST. PATIENT-LVL III: ICD-10-PCS | Mod: PBBFAC,,, | Performed by: OPHTHALMOLOGY

## 2023-04-13 PROCEDURE — 1159F MED LIST DOCD IN RCRD: CPT | Mod: CPTII,S$GLB,, | Performed by: OPHTHALMOLOGY

## 2023-04-13 PROCEDURE — 1160F PR REVIEW ALL MEDS BY PRESCRIBER/CLIN PHARMACIST DOCUMENTED: ICD-10-PCS | Mod: CPTII,S$GLB,, | Performed by: OPHTHALMOLOGY

## 2023-04-13 PROCEDURE — 99024 POSTOP FOLLOW-UP VISIT: CPT | Mod: S$GLB,,, | Performed by: OPHTHALMOLOGY

## 2023-04-13 PROCEDURE — 1101F PT FALLS ASSESS-DOCD LE1/YR: CPT | Mod: CPTII,S$GLB,, | Performed by: OPHTHALMOLOGY

## 2023-04-13 PROCEDURE — 1160F RVW MEDS BY RX/DR IN RCRD: CPT | Mod: CPTII,S$GLB,, | Performed by: OPHTHALMOLOGY

## 2023-04-13 PROCEDURE — 1126F PR PAIN SEVERITY QUANTIFIED, NO PAIN PRESENT: ICD-10-PCS | Mod: CPTII,S$GLB,, | Performed by: OPHTHALMOLOGY

## 2023-04-13 PROCEDURE — 3288F PR FALLS RISK ASSESSMENT DOCUMENTED: ICD-10-PCS | Mod: CPTII,S$GLB,, | Performed by: OPHTHALMOLOGY

## 2023-04-13 PROCEDURE — 3074F SYST BP LT 130 MM HG: CPT | Mod: CPTII,S$GLB,, | Performed by: OPHTHALMOLOGY

## 2023-04-13 RX ORDER — PREDNISOLONE ACETATE 10 MG/ML
1 SUSPENSION/ DROPS OPHTHALMIC 4 TIMES DAILY
COMMUNITY
Start: 2023-04-13 | End: 2023-04-21 | Stop reason: SDUPTHER

## 2023-04-13 RX ORDER — MOXIFLOXACIN 5 MG/ML
1 SOLUTION/ DROPS OPHTHALMIC 4 TIMES DAILY
COMMUNITY
Start: 2023-04-13 | End: 2023-04-21

## 2023-04-13 NOTE — PROGRESS NOTES
HPI     Post-op Evaluation     Additional comments: Pt c/o itchy eye and states vision is very blurry   this morning           Comments    -1 day po Phaco IOL and OMNI Goniotomy OD 4/12/23  -S/p Phaco IOL and [place] Goniotomy OS 1/25/23    1. POAG vs LTG   2. PCIOL OU  3. Ptosis Sx  (Dr. Mendoza)  4. PIERCE  5. Disc Heme OS  6. Astigmatism and Presbyopia    MEDS:  AT's PRN OU          Last edited by Betsy Barker MA on 4/13/2023 10:15 AM.            Assessment /Plan     For exam results, see Encounter Report.    Postoperative care for cataract    Primary open-angle glaucoma, right eye, mild stage    Primary open angle glaucoma (POAG) of both eyes, mild stage    Ptosis of both eyelids    Dry eye syndrome of both eyes    Status post glaucoma surgery        Lost to F/U 5/2017 to  5/2021 (4yrs)   Has been seeing his optometrist   Sent back in for OCT - RNFL changes - his optom told him to start gtts - so came back in     Pts wife is Carole Schwab - a glaucoma patient of Dr. Snyder    1.   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 Snyder)        Glaucoma meds   None presently -  IOP ok off gtts post SLT's // Tried xalatan and lumigan - mason ok - but no change in IOP // intol to cosopt         H/O adverse rxn to glaucoma drops    cosopt - caused stomach issues - improved off it         LASERS    SLT OD - 1/27/2022 good resp 22--> 16 // SLT os - 2/10/2022 20-->16        GLAUCOMA SURGERIES    [place] goniotomy os 1/25/2023         OTHER EYE SURGERIES    PC IOL and [place] os - 1/25/2023        CDR    0.85/0.8        Tbase    10-20 / 12-20          Tmax    19 - 20 /17- 20    (20 at outside optom per pt)         Ttarget   About 16-17               HVF    12 test 2000 to 2017 - full od / full os   2 test 2021 to 2022 (4yr gap) - hint early SAD od // full os         Gonio    +3 ou        CCT     [phone]        OCT    9 test 2005 to 2022 - RNFL  - dec G/TS  od// dec TI, bord G/N os (+prog ou)         HRT    7 test 2006 to 2017 - MR -  Dec. I, niharikaakira T od // [place] off os /// CDR 0.73 od // [place] off  os        Disc photos    2003, 2005 - slides // 2010, 2015 - + disc heme ST os  - OIS    - Ttoday   16/14  - Test done today -post-op phaco/IOL OS // [place] goniotomy - OS - 1/25/2023    Pre-op phaco/IOL OD with goniotomy / canaloplasty - ? OMNI     2.   NS od - mild - monitor   Pt noting decreased vision at near- more hazy than last visit     3. ERM os - see OCT - 5/31/2021 - mild     4.   Ptosis / Dermatochalasis        S/P sx with Dr. Mendoza 4/5/2001    5.   PIERCE - AT's prn   (Needed steroid gtts from Dr. Urena for PIERCE) and AT's    6. Disc Heme OS   See photos 11/4/2015     7.  Astigmatism / presbyopia       Glasses - Florian - given RX today says the glasses are old - no real change    8.   Skin CA face - S/P sx    Plan  POAG vs LTG -  Mild  Disease od and suspect to mild  os   OFF GLAUCOMA DROPS POST SLT's OU   OCT w/ inf thinning ou - first time 1/2017 - some prog from 2017 to 2021   -H/O  disc heme today (+H/O disc heme in past)   -HVF - hint SAD od // full os (?prog od)     No apparent effect with latanoprost  Or lumigan - but tolerated ok - used q am    cosopt bid ou -  Intolerant - had to stop GI / stomach issues      IOP  baseline  (LTG)  of 18-20    SLT OD 1/27/2022 - good initial reap 22--> 16   SLT OS -good initial resp 20--> 16     Pt was having back pain last visit - doing much better after seeing the chiropractor     If needed can try timolol alone or brimonidine - likely it was the dorzolamide in the cosopt that upset his stomach     Phaco / IOL OS w/ [place] goniotomy  Date: 1/25/2023  POM  # 3  - phaco/IOL - DIB00 22.5  Doing well - 20/60 uncorrected // with MR - 20/30       OCT mac os - + ERM - (pre-existing ) - no cme     OS  ZCBOO 22.50   MTA4UO 19.50         03/31/2023     OD  DI MENDOZA 21.5    MTA4UO 18.5     Phaco Omni OS - 180 goniotomy / canaloplasty - no blood thinners // angle nice and open       rba discussed. Consent obtained.   Patient endorses poor vision/glare is impairing quality of life and ability to perform tasks including: complete/ partial inability/difficultiesdriving at night, working, performing self care. Patient fully understands that cataract surgery may improve some of these difficulties, but there is a chance that it does not. Moreover, they fully understand this surgery will not eliminate the need for glasses and or eye drops.        Phaco / IOL and goniotomy with OMNI OD Date: 4/13/2023  POD # 1 - phaco/IOL - DIB00 21.0  Doing well - but with some heme in AC and some corneal edema   Begin Pred Acetate QID   Begin vigamox QID  Begin luiza 128 - qid   Shield at night  Glasses day  No lifting, no bending, no eye rubbing  F/U 1 week, AR/MR ou     Latonia Snyder MD

## 2023-04-14 NOTE — TELEPHONE ENCOUNTER
Held slot, sent msg to advanced  RW, [id number] foot pain 04/18/23 1 pm Dr. Vaughan responded to pt portal msg.

## 2023-04-18 ENCOUNTER — OFFICE VISIT (OUTPATIENT)
Dept: INTERNAL MEDICINE | Facility: CLINIC | Age: 83
End: 2023-04-18
Payer: MEDICARE

## 2023-04-18 ENCOUNTER — LAB VISIT (OUTPATIENT)
Dept: LAB | Facility: HOSPITAL | Age: 83
End: 2023-04-18
Attending: INTERNAL MEDICINE
Payer: MEDICARE

## 2023-04-18 VITALS
WEIGHT: 175.94 LBS | HEIGHT: 72 IN | SYSTOLIC BLOOD PRESSURE: 122 MMHG | OXYGEN SATURATION: 98 % | DIASTOLIC BLOOD PRESSURE: 66 MMHG | HEART RATE: 53 BPM | BODY MASS INDEX: 23.83 KG/M2

## 2023-04-18 DIAGNOSIS — R60.9 EDEMA, UNSPECIFIED TYPE: ICD-10-CM

## 2023-04-18 DIAGNOSIS — R35.1 NOCTURIA: ICD-10-CM

## 2023-04-18 DIAGNOSIS — R68.2 DRY MOUTH: ICD-10-CM

## 2023-04-18 DIAGNOSIS — R35.1 NOCTURIA: Primary | ICD-10-CM

## 2023-04-18 DIAGNOSIS — R73.09 ELEVATED GLUCOSE: ICD-10-CM

## 2023-04-18 LAB
ANION GAP SERPL CALC-SCNC: 6 MMOL/L (ref 8–16)
BACTERIA #/AREA URNS AUTO: NORMAL /HPF
BILIRUB UR QL STRIP: NEGATIVE
BUN SERPL-MCNC: 16 MG/DL (ref 8–23)
CALCIUM SERPL-MCNC: 9.6 MG/DL (ref 8.7–10.5)
CHLORIDE SERPL-SCNC: 101 MMOL/L (ref 95–110)
CLARITY UR REFRACT.AUTO: ABNORMAL
CO2 SERPL-SCNC: 33 MMOL/L (ref 23–29)
COLOR UR AUTO: ABNORMAL
CREAT SERPL-MCNC: 1 MG/DL (ref 0.5–1.4)
EST. GFR  (NO RACE VARIABLE): >60 ML/MIN/1.73 M^2
ESTIMATED AVG GLUCOSE: 105 MG/DL (ref 68–131)
GLUCOSE SERPL-MCNC: 98 MG/DL (ref 70–110)
GLUCOSE UR QL STRIP: NEGATIVE
HBA1C MFR BLD: 5.3 % (ref 4–5.6)
HGB UR QL STRIP: NEGATIVE
KETONES UR QL STRIP: NEGATIVE
LEUKOCYTE ESTERASE UR QL STRIP: NEGATIVE
MICROSCOPIC COMMENT: NORMAL
MICROSCOPIC COMMENT: NORMAL
NITRITE UR QL STRIP: NEGATIVE
PH UR STRIP: 6 [PH] (ref 5–8)
POTASSIUM SERPL-SCNC: 4.3 MMOL/L (ref 3.5–5.1)
PROT UR QL STRIP: NEGATIVE
RBC #/AREA URNS AUTO: 0 /HPF (ref 0–4)
RBC #/AREA URNS AUTO: 0 /HPF (ref 0–4)
SODIUM SERPL-SCNC: 140 MMOL/L (ref 136–145)
SP GR UR STRIP: 1.01 (ref 1–1.03)
SQUAMOUS #/AREA URNS AUTO: 0 /HPF
URN SPEC COLLECT METH UR: ABNORMAL
WBC #/AREA URNS AUTO: 0 /HPF (ref 0–5)
WBC #/AREA URNS AUTO: 1 /HPF (ref 0–5)

## 2023-04-18 PROCEDURE — 1101F PT FALLS ASSESS-DOCD LE1/YR: CPT | Mod: CPTII,S$GLB,, | Performed by: INTERNAL MEDICINE

## 2023-04-18 PROCEDURE — 99999 PR PBB SHADOW E&M-EST. PATIENT-LVL IV: ICD-10-PCS | Mod: PBBFAC,,, | Performed by: INTERNAL MEDICINE

## 2023-04-18 PROCEDURE — 87086 URINE CULTURE/COLONY COUNT: CPT | Performed by: INTERNAL MEDICINE

## 2023-04-18 PROCEDURE — 81001 URINALYSIS AUTO W/SCOPE: CPT | Mod: 91 | Performed by: INTERNAL MEDICINE

## 2023-04-18 PROCEDURE — 83036 HEMOGLOBIN GLYCOSYLATED A1C: CPT | Performed by: INTERNAL MEDICINE

## 2023-04-18 PROCEDURE — 1126F PR PAIN SEVERITY QUANTIFIED, NO PAIN PRESENT: ICD-10-PCS | Mod: CPTII,S$GLB,, | Performed by: INTERNAL MEDICINE

## 2023-04-18 PROCEDURE — 1101F PR PT FALLS ASSESS DOC 0-1 FALLS W/OUT INJ PAST YR: ICD-10-PCS | Mod: CPTII,S$GLB,, | Performed by: INTERNAL MEDICINE

## 2023-04-18 PROCEDURE — 36415 COLL VENOUS BLD VENIPUNCTURE: CPT | Performed by: INTERNAL MEDICINE

## 2023-04-18 PROCEDURE — 3288F PR FALLS RISK ASSESSMENT DOCUMENTED: ICD-10-PCS | Mod: CPTII,S$GLB,, | Performed by: INTERNAL MEDICINE

## 2023-04-18 PROCEDURE — 1159F PR MEDICATION LIST DOCUMENTED IN MEDICAL RECORD: ICD-10-PCS | Mod: CPTII,S$GLB,, | Performed by: INTERNAL MEDICINE

## 2023-04-18 PROCEDURE — 1159F MED LIST DOCD IN RCRD: CPT | Mod: CPTII,S$GLB,, | Performed by: INTERNAL MEDICINE

## 2023-04-18 PROCEDURE — 3074F PR MOST RECENT SYSTOLIC BLOOD PRESSURE < 130 MM HG: ICD-10-PCS | Mod: CPTII,S$GLB,, | Performed by: INTERNAL MEDICINE

## 2023-04-18 PROCEDURE — 1126F AMNT PAIN NOTED NONE PRSNT: CPT | Mod: CPTII,S$GLB,, | Performed by: INTERNAL MEDICINE

## 2023-04-18 PROCEDURE — 99214 OFFICE O/P EST MOD 30 MIN: CPT | Mod: S$GLB,,, | Performed by: INTERNAL MEDICINE

## 2023-04-18 PROCEDURE — 3074F SYST BP LT 130 MM HG: CPT | Mod: CPTII,S$GLB,, | Performed by: INTERNAL MEDICINE

## 2023-04-18 PROCEDURE — 99214 PR OFFICE/OUTPT VISIT, EST, LEVL IV, 30-39 MIN: ICD-10-PCS | Mod: S$GLB,,, | Performed by: INTERNAL MEDICINE

## 2023-04-18 PROCEDURE — 80048 BASIC METABOLIC PNL TOTAL CA: CPT | Performed by: INTERNAL MEDICINE

## 2023-04-18 PROCEDURE — 99999 PR PBB SHADOW E&M-EST. PATIENT-LVL IV: CPT | Mod: PBBFAC,,, | Performed by: INTERNAL MEDICINE

## 2023-04-18 PROCEDURE — 3078F DIAST BP <80 MM HG: CPT | Mod: CPTII,S$GLB,, | Performed by: INTERNAL MEDICINE

## 2023-04-18 PROCEDURE — 3078F PR MOST RECENT DIASTOLIC BLOOD PRESSURE < 80 MM HG: ICD-10-PCS | Mod: CPTII,S$GLB,, | Performed by: INTERNAL MEDICINE

## 2023-04-18 PROCEDURE — 3288F FALL RISK ASSESSMENT DOCD: CPT | Mod: CPTII,S$GLB,, | Performed by: INTERNAL MEDICINE

## 2023-04-18 NOTE — PROGRESS NOTES
CC:  Foot pain    HPI:  The patient is an 82-year-old male with BPH, elevated PSA, colon polyps, low tension glaucoma multinodular thyroid, hyperlipidemia, peripheral neuropathy and tremors who presents today with complaints of left foot pain.  Symptoms started approximately 2 months ago.  No precipitating event.  Pain was located on the medial aspect over the left arch of the foot.  Symptoms resolved over the weekend.  The patient is still came in because been having episodes dry mouth.  His wife is concerned this might represent diabetes.      ROS:  Patient reports he has increased his fluid intake over the past few days.  He has been getting up 3 times a night of the past few days.  He normally gets up only once.  He reports his urine is dark yellow.  He did hurt his right shoulder while playing pickleball.  This has improved.    Physical exam:   General appearance:  No acute distress   HEENT:  The patient has moist mucous membranes.  Trachea is midline without JVD.    Pulmonary:  Good inspiratory, expiratory breath sounds are heard.  Lungs are clear to auscultation.    Cardiovascular:  S1-S2 rhythm regular.  Extremities show 1+ ankle edema on the left and trace to 1+ ankle edema on right.    GI: Abdomen was nontender, nondistended without hepatosplenomegaly  Ortho:  The patient's feet were inspected the patient had no pain on palpation over the origin of the left foot.  He would 2+ dorsal pedal posterior tibial pulses bilaterally.     Assessment:  1. Left foot pain currently resolved  2.  Dry mouth  3.  Nocturia  Plan:  1. Will schedule a BMP and A1c  2. Will schedule UA culture  3. The patient follow-up pending results.

## 2023-04-20 LAB — BACTERIA UR CULT: NORMAL

## 2023-04-21 ENCOUNTER — OFFICE VISIT (OUTPATIENT)
Dept: OPHTHALMOLOGY | Facility: CLINIC | Age: 83
End: 2023-04-21
Payer: MEDICARE

## 2023-04-21 DIAGNOSIS — H04.123 DRY EYE SYNDROME OF BOTH EYES: ICD-10-CM

## 2023-04-21 DIAGNOSIS — Z96.1 PSEUDOPHAKIA, LEFT EYE: ICD-10-CM

## 2023-04-21 DIAGNOSIS — Z98.83 STATUS POST GLAUCOMA SURGERY: ICD-10-CM

## 2023-04-21 DIAGNOSIS — H40.1131 PRIMARY OPEN ANGLE GLAUCOMA (POAG) OF BOTH EYES, MILD STAGE: ICD-10-CM

## 2023-04-21 DIAGNOSIS — Z48.810 POSTOPERATIVE CARE FOR CATARACT: Primary | ICD-10-CM

## 2023-04-21 DIAGNOSIS — H02.403 PTOSIS OF BOTH EYELIDS: ICD-10-CM

## 2023-04-21 DIAGNOSIS — H40.1111 PRIMARY OPEN-ANGLE GLAUCOMA, RIGHT EYE, MILD STAGE: ICD-10-CM

## 2023-04-21 DIAGNOSIS — Z98.49 POSTOPERATIVE CARE FOR CATARACT: Primary | ICD-10-CM

## 2023-04-21 DIAGNOSIS — Z96.1 PSEUDOPHAKIA, RIGHT EYE: ICD-10-CM

## 2023-04-21 PROCEDURE — 1159F PR MEDICATION LIST DOCUMENTED IN MEDICAL RECORD: ICD-10-PCS | Mod: CPTII,S$GLB,, | Performed by: OPHTHALMOLOGY

## 2023-04-21 PROCEDURE — 99999 PR PBB SHADOW E&M-EST. PATIENT-LVL III: CPT | Mod: PBBFAC,,, | Performed by: OPHTHALMOLOGY

## 2023-04-21 PROCEDURE — 1126F AMNT PAIN NOTED NONE PRSNT: CPT | Mod: CPTII,S$GLB,, | Performed by: OPHTHALMOLOGY

## 2023-04-21 PROCEDURE — 1160F PR REVIEW ALL MEDS BY PRESCRIBER/CLIN PHARMACIST DOCUMENTED: ICD-10-PCS | Mod: CPTII,S$GLB,, | Performed by: OPHTHALMOLOGY

## 2023-04-21 PROCEDURE — 99024 POSTOP FOLLOW-UP VISIT: CPT | Mod: S$GLB,,, | Performed by: OPHTHALMOLOGY

## 2023-04-21 PROCEDURE — 1160F RVW MEDS BY RX/DR IN RCRD: CPT | Mod: CPTII,S$GLB,, | Performed by: OPHTHALMOLOGY

## 2023-04-21 PROCEDURE — 1126F PR PAIN SEVERITY QUANTIFIED, NO PAIN PRESENT: ICD-10-PCS | Mod: CPTII,S$GLB,, | Performed by: OPHTHALMOLOGY

## 2023-04-21 PROCEDURE — 3288F FALL RISK ASSESSMENT DOCD: CPT | Mod: CPTII,S$GLB,, | Performed by: OPHTHALMOLOGY

## 2023-04-21 PROCEDURE — 3288F PR FALLS RISK ASSESSMENT DOCUMENTED: ICD-10-PCS | Mod: CPTII,S$GLB,, | Performed by: OPHTHALMOLOGY

## 2023-04-21 PROCEDURE — 99024 PR POST-OP FOLLOW-UP VISIT: ICD-10-PCS | Mod: S$GLB,,, | Performed by: OPHTHALMOLOGY

## 2023-04-21 PROCEDURE — 1101F PT FALLS ASSESS-DOCD LE1/YR: CPT | Mod: CPTII,S$GLB,, | Performed by: OPHTHALMOLOGY

## 2023-04-21 PROCEDURE — 1101F PR PT FALLS ASSESS DOC 0-1 FALLS W/OUT INJ PAST YR: ICD-10-PCS | Mod: CPTII,S$GLB,, | Performed by: OPHTHALMOLOGY

## 2023-04-21 PROCEDURE — 99999 PR PBB SHADOW E&M-EST. PATIENT-LVL III: ICD-10-PCS | Mod: PBBFAC,,, | Performed by: OPHTHALMOLOGY

## 2023-04-21 PROCEDURE — 1159F MED LIST DOCD IN RCRD: CPT | Mod: CPTII,S$GLB,, | Performed by: OPHTHALMOLOGY

## 2023-04-21 RX ORDER — SODIUM CHLORIDE 50 MG/ML
1 SOLUTION/ DROPS OPHTHALMIC 4 TIMES DAILY
COMMUNITY
Start: 2023-04-13 | End: 2023-06-01

## 2023-04-21 RX ORDER — PREDNISOLONE ACETATE 10 MG/ML
SUSPENSION/ DROPS OPHTHALMIC
Qty: 5 ML | Refills: 1 | Status: SHIPPED | OUTPATIENT
Start: 2023-04-21 | End: 2024-02-26

## 2023-04-21 NOTE — PROGRESS NOTES
HPI     Post-op Evaluation     Additional comments: Pt states that OD is doing okay and feels vision is   much better now           Comments    -S/p Phaco IOL and OMNI Goniotomy OD 4/12/23  -S/p Phaco IOL and [place] Goniotomy OS 1/25/23    1. POAG vs LTG   2. PCIOL OU  3. Ptosis Sx  (Dr. Mendoza)  4. PIERCE  5. Disc Heme OS  6. Astigmatism and Presbyopia    MEDS:  PA QID OD  Vigamox QID OD  Samantha 128 QID OD  AT's PRN OU          Last edited by Betsy Barker MA on 4/21/2023 10:23 AM.            Assessment /Plan     For exam results, see Encounter Report.    Postoperative care for cataract    Primary open-angle glaucoma, right eye, mild stage    Primary open angle glaucoma (POAG) of both eyes, mild stage    Ptosis of both eyelids    Dry eye syndrome of both eyes    Status post glaucoma surgery    Pseudophakia, right eye    Pseudophakia, left eye          Lost to F/U 5/2017 to  5/2021 (4yrs)   Has been seeing his optometrist   Sent back in for OCT - RNFL changes - his optom told him to start gtts - so came back in     Pts wife is Carole Schwab - a glaucoma patient of Dr. Snyder    1.   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 Snyder)        Glaucoma meds   None presently -  IOP ok off gtts post SLT's // Tried xalatan and lumigan - mason ok - but no change in IOP // intol to cosopt         H/O adverse rxn to glaucoma drops    cosopt - caused stomach issues - improved off it         LASERS    SLT OD - 1/27/2022 good resp 22--> 16 // SLT os - 2/10/2022 20-->16        GLAUCOMA SURGERIES    [place] goniotomy os 1/25/2023 /  OMNI -OD -  goniotomy - 4/12/2023         OTHER EYE SURGERIES    PC IOL and [place] os - 1/25/2023 / PC IOL and omni goniotomy od 4/12/2023        CDR    0.85/0.8        Tbase    10-20 / 12-20          Tmax    19 - 20 /17- 20    (20 at outside optom per pt)          Ttarget   About 16-17               HVF    12 test 2000 to 2017 - full od / full os   2 test 2021 to 2022 (4yr gap) - hint early SAD od // full os         Gonio    +3 ou        CCT    [phone]/[id number]        OCT    9 test 2005 to 2022 - RNFL  - dec G/TS  od// dec TI, bord G/N os (+prog ou)         HRT    7 test 2006 to 2017 - MR -  Dec. I, bord T od // [place] off os /// CDR 0.73 od // [place] off  os        Disc photos    2003, 2005 - slides // 2010, 2015 - + disc heme ST os  - OIS    - Ttoday   11/13  - Test done today -post-op phaco/IOL OS // [place] goniotomy - OS - 1/25/2023    Post-op phaco/IOL OD with goniotomy / -  OMNI     2. ERM os - see OCT - 5/31/2021 - mild     3.   Ptosis / Dermatochalasis        S/P sx with Dr. Mendoza 4/5/2001    4.   PIERCE - AT's prn   (Needed steroid gtts from Dr. Urena for PIERCE) and AT's    5. Disc Heme OS   See photos 11/4/2015     6. PC IOL OU     7.   Skin CA face - S/P sx    Plan  POAG vs LTG -  Mild  Disease od and suspect to mild  os   OFF GLAUCOMA DROPS POST SLT's OU   OCT w/ inf thinning ou - first time 1/2017 - some prog from 2017 to 2021   -H/O  disc heme today (+H/O disc heme in past)   -HVF - hint SAD od // full os (?prog od)     No apparent effect with latanoprost  Or lumigan - but tolerated ok - used q am    cosopt bid ou -  Intolerant - had to stop GI / stomach issues      IOP  baseline  (LTG)  of 18-20    SLT OD 1/27/2022 - good initial reap 22--> 16   SLT OS -good initial resp 20--> 16     Pt was having back pain last visit - doing much better after seeing the chiropractor     If needed can try timolol alone or brimonidine - likely it was the dorzolamide in the cosopt that upset his stomach     Phaco / IOL OS w/ [place] goniotomy  Date: 1/25/2023  POM  # 3  - phaco/IOL - DIB00 22.5  Doing well - 20/60 uncorrected // with MR - 20/30       OCT mac os - + ERM - (pre-existing ) - no cme     OS  ZCBOO 22.50   MTA4UO 19.50         03/31/2023     OD  DI MENDOZA 21.5   MTA4UO 18.5     Phaco  Omni OS - 180 goniotomy / canaloplasty - no blood thinners // angle nice and open       rba discussed. Consent obtained.   Patient endorses poor vision/glare is impairing quality of life and ability to perform tasks including: complete/ partial inability/difficultiesdriving at night, working, performing self care. Patient fully understands that cataract surgery may improve some of these difficulties, but there is a chance that it does not. Moreover, they fully understand this surgery will not eliminate the need for glasses and or eye drops.        Phaco / IOL and goniotomy with OMNI OD Date: 4/13/2023  POW  # 1 - phaco/IOL - DIB00 21.0  Doing well - but with some heme in AC and some corneal edema   Pred Acetate - taper every 2 weeks - Rx sent    vigamox -stop   luiza 128 - use bid   Shield at night- 1 more week   Glasses day  No lifting, no bending, no eye rubbing    F/U 4 week, AR/MR ou     Latonia Snyder MD

## 2023-04-24 RX ORDER — DOXAZOSIN 4 MG/1
4 TABLET ORAL NIGHTLY
Qty: 30 TABLET | Refills: 11 | Status: SHIPPED | OUTPATIENT
Start: 2023-04-24 | End: 2023-05-15

## 2023-04-24 NOTE — TELEPHONE ENCOUNTER
----- Message from Tamara Sanderson sent at 4/24/2023  4:26 PM CDT -----  Contact: [facility] / Jania [phone]  Requesting an RX refill or new RX.  Is this a refill or new RX: Refill  RX name and strength doxazosin (CARDURA) 4 MG tablet  Is this a 30 day or 90 day RX: 90  Pharmacy name and phone # [facility] - [address]   Phone:  [phone] [phone] Fax:  [phone]

## 2023-04-24 NOTE — TELEPHONE ENCOUNTER
No new care gaps identified.  [facility] Embedded Care Gaps. Reference number: [id number]. 4/24/2023   5:01:03 PM CDT

## 2023-05-03 ENCOUNTER — HOSPITAL ENCOUNTER (OUTPATIENT)
Dept: ENDOCRINOLOGY | Facility: CLINIC | Age: 83
Discharge: HOME | End: 2023-05-03
Attending: INTERNAL MEDICINE
Payer: MEDICARE

## 2023-05-03 DIAGNOSIS — E04.2 MULTIPLE THYROID NODULES: ICD-10-CM

## 2023-05-13 ENCOUNTER — IMMUNIZATION (OUTPATIENT)
Dept: INTERNAL MEDICINE | Facility: CLINIC | Age: 83
End: 2023-05-13
Payer: MEDICARE

## 2023-05-13 DIAGNOSIS — Z23 NEED FOR VACCINATION: Primary | ICD-10-CM

## 2023-05-13 PROCEDURE — 0124A COVID-19, MRNA, LNP-S, BIVALENT BOOSTER, PF, 30 MCG/0.3 ML DOSE: ICD-10-PCS | Mod: S$GLB,,, | Performed by: STUDENT IN AN ORGANIZED HEALTH CARE EDUCATION/TRAINING PROGRAM

## 2023-05-13 PROCEDURE — 91312 COVID-19, MRNA, LNP-S, BIVALENT BOOSTER, PF, 30 MCG/0.3 ML DOSE: CPT | Mod: S$GLB,,, | Performed by: STUDENT IN AN ORGANIZED HEALTH CARE EDUCATION/TRAINING PROGRAM

## 2023-05-13 PROCEDURE — 0124A COVID-19, MRNA, LNP-S, BIVALENT BOOSTER, PF, 30 MCG/0.3 ML DOSE: CPT | Mod: S$GLB,,, | Performed by: STUDENT IN AN ORGANIZED HEALTH CARE EDUCATION/TRAINING PROGRAM

## 2023-05-13 PROCEDURE — 91312 COVID-19, MRNA, LNP-S, BIVALENT BOOSTER, PF, 30 MCG/0.3 ML DOSE: ICD-10-PCS | Mod: S$GLB,,, | Performed by: STUDENT IN AN ORGANIZED HEALTH CARE EDUCATION/TRAINING PROGRAM

## 2023-05-15 RX ORDER — DOXAZOSIN 4 MG/1
4 TABLET ORAL NIGHTLY
Qty: 90 TABLET | Refills: 3 | Status: SHIPPED | OUTPATIENT
Start: 2023-05-15 | End: 2024-05-14

## 2023-05-15 NOTE — TELEPHONE ENCOUNTER
No care due was identified.  [facility] Embedded Care Due Messages. Reference number: [id number].   5/15/2023 10:21:35 AM CDT

## 2023-05-15 NOTE — TELEPHONE ENCOUNTER
Refill Routing Note   Medication(s) are not appropriate for processing by [facility] for the following reason(s):      New or recently adjusted medication    [facility] action(s):  Defer None identified          Appointments  past 12m or future 3m with PCP    Date Provider   Last Visit   4/18/2023 Marco Antonio Vaughan MD   Next Visit   6/5/2023 Marco Antonio Vaughan MD   ED visits in past 90 days: 0        Note composed:11:48 AM 05/15/2023

## 2023-05-29 ENCOUNTER — TELEPHONE (OUTPATIENT)
Dept: OPHTHALMOLOGY | Facility: CLINIC | Age: 83
End: 2023-05-29
Payer: MEDICARE

## 2023-05-29 NOTE — TELEPHONE ENCOUNTER
Pt has an appt today but  has emergency sx this morning. I left a message for pt to call back to get rescheduked

## 2023-05-30 NOTE — PROGRESS NOTES
Subjective:   Chief Complaint: Thoracic ascending aortic aneurysm    Last Clinic Visit:  New patient    History of Present Illness: Fred Schwab Jr. is a 78 y.o. gentleman with elevated coronary calcium score hypertension, and recently diagnosed enlarged ascending aorta, who presents to discuss enlarged ascending aorta and elevated coronary calcium.  He denies any history of known aneurysm, recently had an ultrasound of his abdomen showing no evidence of aneurysm, and no family history of any connective tissue diseases or aneurysm rupture.  He has had a tortuous aorta on chest x-ray for many years.  No prior known CT of thoracic aorta.  No enlargement of ascending aorta on echocardiogram.  He recently had a exercise stress echo when he was found to have very elevated coronary artery calcium, which was negative for ischemia.  Also negative for any significant aortic stenosis.  He denies any symptoms of chest pain, no lightheadedness, no shortness of breath, no presyncope, no syncope, no orthopnea, and no lower extremity edema.    Ascending aorta measures 3.42 cm on echocardiogram from 12/20/2018.  Coronary artery calcium score with trans axial slices measured ascending aorta at 4.5 cm.  Independent visualization and measurement by myself was 4.3 cm.    He was recently diagnosed with elevated coronary artery calcium score of 872, LDL of 146 from November of 2016, recently started on Crestor 10.    PMHx:  Elevated coronary calcium score 872  BPH  Hyperlipidemia  Skin cancer  Osteoarthritis    Review of Systems   Constitution: Negative.   HENT: Negative.    Eyes: Negative.    Cardiovascular: Negative.    Respiratory: Negative.    Hematologic/Lymphatic: Negative.    Skin: Negative.    Musculoskeletal: Negative.    Gastrointestinal: Negative.    Genitourinary: Negative.        Medications:  Current Outpatient Medications on File Prior to Visit   Medication Sig    ascorbic acid (VITAMIN C) 500 MG tablet Take by mouth. 1  Tablet Oral Every day    betamethasone dipropionate (DIPROLENE) 0.05 % cream aaa qd prn rash. Not more than 2 weeks straight in same location. Avoid use on face and groin    calcium carbonate (OS-DIAMOND) 600 mg (1,500 mg) Tab Take by mouth. 1 Tablet Oral Every day    ciclopirox (PENLAC) 8 % Soln Apply to affected nails qhs. Remove with acetone in 1 week and repeat    co-enzyme Q-10 30 mg capsule Take 30 mg by mouth 3 (three) times daily.    cyanocobalamin, vitamin B-12, (VITAMIN B-12) 50 mcg tablet Take 50 mcg by mouth once daily.    doxazosin (CARDURA) 2 MG tablet TAKE 1 TABLET BY MOUTH AT BEDTIME    ketoconazole (NIZORAL) 2 % cream AAA bid to left hand and feet x 3 weeks for fungus    loratadine (CLARITIN) 10 mg tablet Take 10 mg by mouth daily as needed. Every day    mometasone (ELOCON) 0.1 % ointment Apply topically 2 (two) times daily. aaa qd- bid for rash.    OMEGA-3 FATTY ACIDS/FISH OIL (OMEGA 3 FISH OIL ORAL) Take by mouth. 1 Capsule Oral Every day    propranolol (INDERAL) 10 MG tablet TAKE 1 TABLET BY MOUTH TWICE DAILY    rosuvastatin (CRESTOR) 10 MG tablet Take 1 tablet (10 mg total) by mouth once daily.    sildenafil (VIAGRA) 100 MG tablet TAKE ONE TABLET BY MOUTH AS DIRECTED    triamcinolone acetonide 0.025% (KENALOG) 0.025 % cream Apply topically 2 (two) times daily. Qd - bid after moisturizing prn flare on arms. Not more than 2 weeks straight in same location     No current facility-administered medications on file prior to visit.      Family History:  Tavares's family history includes Cancer in his father; Diabetes in his mother and sister; Glaucoma in his mother; Heart disease in his father; Hypertension in his mother; Stroke in his father.    Social History:  Tavares reports that  has never smoked. he has never used smokeless tobacco. He reports that he drinks alcohol. He reports that he does not use drugs.  Previously worked as an     Objective:   /67 (BP Location: Left arm,  Patient Position: Sitting, BP Method: Medium (Automatic))   Pulse 61   Ht 6' (1.829 m)   Wt 83.8 kg (184 lb 11.9 oz)   BMI 25.06 kg/m²     Physical Exam   Constitutional: He is oriented to person, place, and time and well-developed, well-nourished, and in no distress. No distress.   HENT:   Head: Normocephalic and atraumatic.   Mouth/Throat: No oropharyngeal exudate.   Eyes: EOM are normal. No scleral icterus.   Neck: No JVD present. No tracheal deviation present. No thyromegaly present.   Cardiovascular: Normal rate and regular rhythm. Exam reveals no gallop and no friction rub.   No murmur heard.  Pulmonary/Chest: Effort normal and breath sounds normal. No respiratory distress. He has no wheezes. He has no rales. He exhibits no tenderness.   Abdominal: Soft. He exhibits no distension. There is no tenderness. There is no rebound and no guarding.   Musculoskeletal: Normal range of motion. He exhibits no edema.   Neurological: He is alert and oriented to person, place, and time.   Skin: Skin is warm and dry. He is not diaphoretic. No erythema.   Psychiatric: Affect normal.     EKG:  My independent visualization of most recent EKG is normal sinus rhythm, leftward axis, nonspecific ST changes    TTE:  12/20/2018  · The stress echo portion of this study is negative for myocardial ischemia.  · The EKG portion of this study is abnormal but not diagnostic.  · Normal left ventricular systolic function. The estimated ejection fraction is 60%  · The patient reported no symptoms during the stress test.  · The test was stopped secondary to atypical leg pain.  · Arrhythmias during stress: occasional PACs.  · Normal LV diastolic function.  · Mild left atrial enlargement.  · Mild-to-moderate mitral regurgitation.  · Mild to moderate tricuspid regurgitation.  · Mild to moderate pulmonic regurgitation.  · There is right ventricular hypertrophy.  · Overall, the patient's exercise capacity was above average.  · There is 0.5 mm of  depression in the leads.      Lipids:  Recent Labs   Lab 11/16/18  1050   LDL CHOLESTEROL 146.4   HDL 45   CHOLESTEROL 206 H      Coronary calcium score  11/30/2018    Agatston score of 872 as above indicates high likelihood of at least one significant coronary artery stenosis of greater than 50% diameter.  There is greater clinical significance when the score is above the 75th percentile for age and sex or if calcium is present in two or more vessels, both of which apply to this patient.  Very aggressive risk factor modification is recommended.  Consider noninvasive stress test to rule out ischemia.    Mild dilatation of the mid ascending aorta and tortuosity of the descending thoracic aorta.  Consider dedicated contrast enhanced study of the chest for evaluation of aortic course and contents in this 78-year-old man.    Subsegmental opacities in the right upper lobe and right middle lobe as above.  Recommend chest x-ray to confirm resolution, or conventional chest CT for further characterization which can be contained in concert with the aforementioned recommended contrasted chest CT.        Assessment:     1. Thoracic aortic aneurysm without rupture    2. Elevated coronary artery calcium score        Plan:   1. Thoracic aortic aneurysm without rupture  Discussed the pathophysiology of aortic aneurysms, risk of rupture, as well as the off axis measurement using a transaxial slice from a coronary artery calcium score which was suboptimal. Also discussed incomplete visualization of aorta on coronary artery calcium score and that there could be additional aorta which was incompletely visualized. Offered him CTA of aorta to fully visualize the entire course of the thoracic aorta as well as measure appropriate perpendicular thoracic aortic diameter.  He does not wish to have that test at this time, and wishes to defer the test for a year.  Thus will obtain the test than.  - CTA Chest Non Coronary; Future    2. Elevated  coronary artery calcium score  Discussed the pathophysiology of atherosclerosis, and calcified versus noncalcified plaques.  Explained the benefits of statin therapy, cholesterol-lowering medication, as well as anti-platelet agents.   Continue Crestor, follow up with lipid panel  Will start aspirin    Follow-up in about 1 year (around 12/31/2019).       No

## 2023-05-30 NOTE — PROGRESS NOTES
HPI     Post-op Evaluation     Additional comments: Pt states eyes are doing well and has no complaints   today           Comments    -S/p Phaco IOL and OMNI Goniotomy OD 4/12/23  -S/p Phaco IOL and [place] Goniotomy OS 1/25/23    1. POAG vs LTG   2. PCIOL OU  3. Ptosis Sx  (Dr. Mendoza)  4. PIERCE  5. Disc Heme OS  6. Astigmatism and Presbyopia    MEDS:  PA QDAY OD  AT's PRN OU          Last edited by Betsy Barker MA on 6/1/2023 10:47 AM.            Assessment /Plan     For exam results, see Encounter Report.    Postoperative care for cataract    Primary open-angle glaucoma, right eye, mild stage    Primary open angle glaucoma (POAG) of both eyes, mild stage    Ptosis of both eyelids    Dry eye syndrome of both eyes    Status post glaucoma surgery    Pseudophakia, right eye    Pseudophakia, left eye          Lost to F/U 5/2017 to  5/2021 (4yrs)   Has been seeing his optometrist   Sent back in for OCT - RNFL changes - his optom told him to start gtts - so came back in     Pts wife is Carole Schwab - a glaucoma patient of Dr. Snyder    1.   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 Snyder)        Glaucoma meds   None presently -  IOP ok off gtts post SLT's // Tried xalatan and lumigan - mason ok - but no change in IOP // intol to cosopt         H/O adverse rxn to glaucoma drops    cosopt - caused stomach issues - improved off it         LASERS    SLT OD - 1/27/2022 good resp 22--> 16 // SLT os - 2/10/2022 20-->16        GLAUCOMA SURGERIES    [place] goniotomy os 1/25/2023 /  OMNI -OD -  goniotomy - 4/12/2023         OTHER EYE SURGERIES    PC IOL and [place] os - 1/25/2023 / PC IOL and omni goniotomy od 4/12/2023        CDR    0.85/0.8        Tbase    10-20 / 12-20          Tmax    19 - 20 /17- 20    (20 at outside optom per pt)         Ttarget   About 16-17               HVF    12  test 2000 to 2017 - full od / full os   2 test 2021 to 2022 (4yr gap) - hint early SAD od // full os         Gonio    +3 ou        CCT    587/583        OCT    9 test 2005 to 2022 - RNFL  - dec G/TS  od// dec TI, bord G/N os (+prog ou)         HRT    7 test 2006 to 2017 - MR -  Dec. I, bord T od // [place] off os /// CDR 0.73 od // [place] off  os        Disc photos    2003, 2005 - slides // 2010, 2015 - + disc heme ST os  - OIS    - Ttoday   11/16   - Test done today -post-op phaco/IOL OS // [place] goniotomy - OS - 1/25/2023    Post-op phaco/IOL OD with goniotomy / -  OMNI     2. ERM os - see OCT - 5/31/2021 - mild     3.   Ptosis / Dermatochalasis        S/P sx with Dr. Mendoza 4/5/2001    4.   PIERCE - AT's prn   (Needed steroid gtts from Dr. Urena for PIERCE) and AT's    5. Disc Heme OS   See photos 11/4/2015     6. PC IOL OU     7.   Skin CA face - S/P sx    Plan  POAG vs LTG -  Mild  Disease od and suspect to mild  os   OFF GLAUCOMA DROPS POST SLT's OU   OCT w/ inf thinning ou - first time 1/2017 - some prog from 2017 to 2021   -H/O  disc heme today (+H/O disc heme in past)   -HVF - hint SAD od // full os (?prog od)     No apparent effect with latanoprost  Or lumigan - but tolerated ok - used q am    cosopt bid ou -  Intolerant - had to stop GI / stomach issues      IOP  baseline  (LTG)  of 18-20    SLT OD 1/27/2022 - good initial reap 22--> 16   SLT OS -good initial resp 20--> 16     Pt was having back pain last visit - doing much better after seeing the chiropractor     If needed can try timolol alone or brimonidine - likely it was the dorzolamide in the cosopt that upset his stomach     Phaco / IOL OS w/ [place] goniotomy  Date: 1/25/2023  POM  # 4  - phaco/IOL - DIB00 22.5  Doing well - 20/60 uncorrected // with MR - 20/30       OCT mac os - + ERM - (pre-existing ) - no cme     OS  ZCBOO 22.50   MTA4UO 19.50         03/31/2023     OD  DI MENDOZA 21.5   MTA4UO 18.5     Phaco Omni OS - 180 goniotomy / canaloplasty - no  blood thinners // angle nice and open       rba discussed. Consent obtained.   Patient endorses poor vision/glare is impairing quality of life and ability to perform tasks including: complete/ partial inability/difficultiesdriving at night, working, performing self care. Patient fully understands that cataract surgery may improve some of these difficulties, but there is a chance that it does not. Moreover, they fully understand this surgery will not eliminate the need for glasses and or eye drops.        Phaco / IOL and goniotomy with OMNI OD Date: 4/13/2023  POW  7 - phaco/IOL - DIB00 21.0  Doing well -cornea clear   Pred Acetate  1 x day - stop     Rx glasses given - 6/1/2023    F/U 4 months with HVF / DFE / OCT - sooner raffaele Snyder MD

## 2023-06-01 ENCOUNTER — OFFICE VISIT (OUTPATIENT)
Dept: OPHTHALMOLOGY | Facility: CLINIC | Age: 83
End: 2023-06-01
Payer: MEDICARE

## 2023-06-01 DIAGNOSIS — H40.1131 PRIMARY OPEN ANGLE GLAUCOMA (POAG) OF BOTH EYES, MILD STAGE: ICD-10-CM

## 2023-06-01 DIAGNOSIS — H02.403 PTOSIS OF BOTH EYELIDS: ICD-10-CM

## 2023-06-01 DIAGNOSIS — Z96.1 PSEUDOPHAKIA, RIGHT EYE: ICD-10-CM

## 2023-06-01 DIAGNOSIS — Z98.83 STATUS POST GLAUCOMA SURGERY: ICD-10-CM

## 2023-06-01 DIAGNOSIS — Z98.49 POSTOPERATIVE CARE FOR CATARACT: Primary | ICD-10-CM

## 2023-06-01 DIAGNOSIS — H04.123 DRY EYE SYNDROME OF BOTH EYES: ICD-10-CM

## 2023-06-01 DIAGNOSIS — Z48.810 POSTOPERATIVE CARE FOR CATARACT: Primary | ICD-10-CM

## 2023-06-01 DIAGNOSIS — H40.1111 PRIMARY OPEN-ANGLE GLAUCOMA, RIGHT EYE, MILD STAGE: ICD-10-CM

## 2023-06-01 DIAGNOSIS — Z96.1 PSEUDOPHAKIA, LEFT EYE: ICD-10-CM

## 2023-06-01 PROCEDURE — 1126F AMNT PAIN NOTED NONE PRSNT: CPT | Mod: CPTII,S$GLB,, | Performed by: OPHTHALMOLOGY

## 2023-06-01 PROCEDURE — 1160F PR REVIEW ALL MEDS BY PRESCRIBER/CLIN PHARMACIST DOCUMENTED: ICD-10-PCS | Mod: CPTII,S$GLB,, | Performed by: OPHTHALMOLOGY

## 2023-06-01 PROCEDURE — 1160F RVW MEDS BY RX/DR IN RCRD: CPT | Mod: CPTII,S$GLB,, | Performed by: OPHTHALMOLOGY

## 2023-06-01 PROCEDURE — 99024 POSTOP FOLLOW-UP VISIT: CPT | Mod: S$GLB,,, | Performed by: OPHTHALMOLOGY

## 2023-06-01 PROCEDURE — 99024 PR POST-OP FOLLOW-UP VISIT: ICD-10-PCS | Mod: S$GLB,,, | Performed by: OPHTHALMOLOGY

## 2023-06-01 PROCEDURE — 3288F PR FALLS RISK ASSESSMENT DOCUMENTED: ICD-10-PCS | Mod: CPTII,S$GLB,, | Performed by: OPHTHALMOLOGY

## 2023-06-01 PROCEDURE — 1101F PR PT FALLS ASSESS DOC 0-1 FALLS W/OUT INJ PAST YR: ICD-10-PCS | Mod: CPTII,S$GLB,, | Performed by: OPHTHALMOLOGY

## 2023-06-01 PROCEDURE — 99999 PR PBB SHADOW E&M-EST. PATIENT-LVL III: CPT | Mod: PBBFAC,,, | Performed by: OPHTHALMOLOGY

## 2023-06-01 PROCEDURE — 99999 PR PBB SHADOW E&M-EST. PATIENT-LVL III: ICD-10-PCS | Mod: PBBFAC,,, | Performed by: OPHTHALMOLOGY

## 2023-06-01 PROCEDURE — 1159F MED LIST DOCD IN RCRD: CPT | Mod: CPTII,S$GLB,, | Performed by: OPHTHALMOLOGY

## 2023-06-01 PROCEDURE — 3288F FALL RISK ASSESSMENT DOCD: CPT | Mod: CPTII,S$GLB,, | Performed by: OPHTHALMOLOGY

## 2023-06-01 PROCEDURE — 1159F PR MEDICATION LIST DOCUMENTED IN MEDICAL RECORD: ICD-10-PCS | Mod: CPTII,S$GLB,, | Performed by: OPHTHALMOLOGY

## 2023-06-01 PROCEDURE — 1101F PT FALLS ASSESS-DOCD LE1/YR: CPT | Mod: CPTII,S$GLB,, | Performed by: OPHTHALMOLOGY

## 2023-06-01 PROCEDURE — 1126F PR PAIN SEVERITY QUANTIFIED, NO PAIN PRESENT: ICD-10-PCS | Mod: CPTII,S$GLB,, | Performed by: OPHTHALMOLOGY

## 2023-06-05 ENCOUNTER — PATIENT MESSAGE (OUTPATIENT)
Dept: DERMATOLOGY | Facility: CLINIC | Age: 83
End: 2023-06-05
Payer: MEDICARE

## 2023-06-05 ENCOUNTER — LAB VISIT (OUTPATIENT)
Dept: LAB | Facility: HOSPITAL | Age: 83
End: 2023-06-05
Attending: INTERNAL MEDICINE
Payer: MEDICARE

## 2023-06-05 ENCOUNTER — OFFICE VISIT (OUTPATIENT)
Dept: INTERNAL MEDICINE | Facility: CLINIC | Age: 83
End: 2023-06-05
Payer: MEDICARE

## 2023-06-05 VITALS
BODY MASS INDEX: 23.77 KG/M2 | OXYGEN SATURATION: 97 % | WEIGHT: 175.5 LBS | HEART RATE: 56 BPM | DIASTOLIC BLOOD PRESSURE: 68 MMHG | SYSTOLIC BLOOD PRESSURE: 124 MMHG | HEIGHT: 72 IN

## 2023-06-05 DIAGNOSIS — R25.1 TREMORS OF NERVOUS SYSTEM: ICD-10-CM

## 2023-06-05 DIAGNOSIS — Z12.5 PROSTATE CANCER SCREENING: ICD-10-CM

## 2023-06-05 DIAGNOSIS — E78.00 ELEVATED CHOLESTEROL: ICD-10-CM

## 2023-06-05 DIAGNOSIS — Z00.00 ANNUAL PHYSICAL EXAM: Primary | ICD-10-CM

## 2023-06-05 DIAGNOSIS — R97.20 ELEVATED PSA: ICD-10-CM

## 2023-06-05 DIAGNOSIS — E04.1 RIGHT THYROID NODULE: ICD-10-CM

## 2023-06-05 DIAGNOSIS — K40.90 DIRECT HERNIA: ICD-10-CM

## 2023-06-05 DIAGNOSIS — Z00.00 ANNUAL PHYSICAL EXAM: ICD-10-CM

## 2023-06-05 LAB
ALBUMIN SERPL BCP-MCNC: 3.7 G/DL (ref 3.5–5.2)
ALP SERPL-CCNC: 70 U/L (ref 55–135)
ALT SERPL W/O P-5'-P-CCNC: 12 U/L (ref 10–44)
ANION GAP SERPL CALC-SCNC: 6 MMOL/L (ref 8–16)
AST SERPL-CCNC: 17 U/L (ref 10–40)
BASOPHILS # BLD AUTO: 0.04 K/UL (ref 0–0.2)
BASOPHILS NFR BLD: 0.6 % (ref 0–1.9)
BILIRUB SERPL-MCNC: 0.9 MG/DL (ref 0.1–1)
BILIRUB UR QL STRIP: NEGATIVE
BUN SERPL-MCNC: 12 MG/DL (ref 8–23)
CALCIUM SERPL-MCNC: 9.3 MG/DL (ref 8.7–10.5)
CHLORIDE SERPL-SCNC: 104 MMOL/L (ref 95–110)
CHOLEST SERPL-MCNC: 199 MG/DL (ref 120–199)
CHOLEST/HDLC SERPL: 4.3 {RATIO} (ref 2–5)
CLARITY UR REFRACT.AUTO: ABNORMAL
CO2 SERPL-SCNC: 31 MMOL/L (ref 23–29)
COLOR UR AUTO: ABNORMAL
COMPLEXED PSA SERPL-MCNC: 5.8 NG/ML (ref 0–4)
CREAT SERPL-MCNC: 1 MG/DL (ref 0.5–1.4)
DIFFERENTIAL METHOD: ABNORMAL
EOSINOPHIL # BLD AUTO: 0.1 K/UL (ref 0–0.5)
EOSINOPHIL NFR BLD: 1.3 % (ref 0–8)
ERYTHROCYTE [DISTWIDTH] IN BLOOD BY AUTOMATED COUNT: 13.7 % (ref 11.5–14.5)
EST. GFR  (NO RACE VARIABLE): >60 ML/MIN/1.73 M^2
GLUCOSE SERPL-MCNC: 93 MG/DL (ref 70–110)
GLUCOSE UR QL STRIP: NEGATIVE
HCT VFR BLD AUTO: 45.4 % (ref 40–54)
HDLC SERPL-MCNC: 46 MG/DL (ref 40–75)
HDLC SERPL: 23.1 % (ref 20–50)
HGB BLD-MCNC: 14.3 G/DL (ref 14–18)
HGB UR QL STRIP: NEGATIVE
HYALINE CASTS UR QL AUTO: 4 /LPF
IMM GRANULOCYTES # BLD AUTO: 0.01 K/UL (ref 0–0.04)
IMM GRANULOCYTES NFR BLD AUTO: 0.2 % (ref 0–0.5)
KETONES UR QL STRIP: NEGATIVE
LDLC SERPL CALC-MCNC: 136.2 MG/DL (ref 63–159)
LEUKOCYTE ESTERASE UR QL STRIP: NEGATIVE
LYMPHOCYTES # BLD AUTO: 1.8 K/UL (ref 1–4.8)
LYMPHOCYTES NFR BLD: 28.1 % (ref 18–48)
MCH RBC QN AUTO: 29.4 PG (ref 27–31)
MCHC RBC AUTO-ENTMCNC: 31.5 G/DL (ref 32–36)
MCV RBC AUTO: 93 FL (ref 82–98)
MICROSCOPIC COMMENT: ABNORMAL
MICROSCOPIC COMMENT: NORMAL
MONOCYTES # BLD AUTO: 0.4 K/UL (ref 0.3–1)
MONOCYTES NFR BLD: 6.3 % (ref 4–15)
NEUTROPHILS # BLD AUTO: 4 K/UL (ref 1.8–7.7)
NEUTROPHILS NFR BLD: 63.5 % (ref 38–73)
NITRITE UR QL STRIP: NEGATIVE
NONHDLC SERPL-MCNC: 153 MG/DL
NRBC BLD-RTO: 0 /100 WBC
PH UR STRIP: 5 [PH] (ref 5–8)
PLATELET # BLD AUTO: 182 K/UL (ref 150–450)
PMV BLD AUTO: 11 FL (ref 9.2–12.9)
POTASSIUM SERPL-SCNC: 3.8 MMOL/L (ref 3.5–5.1)
PROT SERPL-MCNC: 7.1 G/DL (ref 6–8.4)
PROT UR QL STRIP: NEGATIVE
RBC # BLD AUTO: 4.87 M/UL (ref 4.6–6.2)
RBC #/AREA URNS AUTO: 0 /HPF (ref 0–4)
SODIUM SERPL-SCNC: 141 MMOL/L (ref 136–145)
SP GR UR STRIP: 1.01 (ref 1–1.03)
TRIGL SERPL-MCNC: 84 MG/DL (ref 30–150)
TSH SERPL DL<=0.005 MIU/L-ACNC: 2.69 UIU/ML (ref 0.4–4)
URN SPEC COLLECT METH UR: ABNORMAL
WBC # BLD AUTO: 6.36 K/UL (ref 3.9–12.7)
WBC #/AREA URNS AUTO: 0 /HPF (ref 0–5)
WBC #/AREA URNS AUTO: 2 /HPF (ref 0–5)

## 2023-06-05 PROCEDURE — 1126F PR PAIN SEVERITY QUANTIFIED, NO PAIN PRESENT: ICD-10-PCS | Mod: CPTII,S$GLB,, | Performed by: INTERNAL MEDICINE

## 2023-06-05 PROCEDURE — 1101F PR PT FALLS ASSESS DOC 0-1 FALLS W/OUT INJ PAST YR: ICD-10-PCS | Mod: CPTII,S$GLB,, | Performed by: INTERNAL MEDICINE

## 2023-06-05 PROCEDURE — 1159F PR MEDICATION LIST DOCUMENTED IN MEDICAL RECORD: ICD-10-PCS | Mod: CPTII,S$GLB,, | Performed by: INTERNAL MEDICINE

## 2023-06-05 PROCEDURE — 80061 LIPID PANEL: CPT | Performed by: INTERNAL MEDICINE

## 2023-06-05 PROCEDURE — 1101F PT FALLS ASSESS-DOCD LE1/YR: CPT | Mod: CPTII,S$GLB,, | Performed by: INTERNAL MEDICINE

## 2023-06-05 PROCEDURE — 3074F PR MOST RECENT SYSTOLIC BLOOD PRESSURE < 130 MM HG: ICD-10-PCS | Mod: CPTII,S$GLB,, | Performed by: INTERNAL MEDICINE

## 2023-06-05 PROCEDURE — 36415 COLL VENOUS BLD VENIPUNCTURE: CPT | Performed by: INTERNAL MEDICINE

## 2023-06-05 PROCEDURE — 3078F PR MOST RECENT DIASTOLIC BLOOD PRESSURE < 80 MM HG: ICD-10-PCS | Mod: CPTII,S$GLB,, | Performed by: INTERNAL MEDICINE

## 2023-06-05 PROCEDURE — 1126F AMNT PAIN NOTED NONE PRSNT: CPT | Mod: CPTII,S$GLB,, | Performed by: INTERNAL MEDICINE

## 2023-06-05 PROCEDURE — 3288F PR FALLS RISK ASSESSMENT DOCUMENTED: ICD-10-PCS | Mod: CPTII,S$GLB,, | Performed by: INTERNAL MEDICINE

## 2023-06-05 PROCEDURE — 3288F FALL RISK ASSESSMENT DOCD: CPT | Mod: CPTII,S$GLB,, | Performed by: INTERNAL MEDICINE

## 2023-06-05 PROCEDURE — 84153 ASSAY OF PSA TOTAL: CPT | Performed by: INTERNAL MEDICINE

## 2023-06-05 PROCEDURE — 1160F PR REVIEW ALL MEDS BY PRESCRIBER/CLIN PHARMACIST DOCUMENTED: ICD-10-PCS | Mod: CPTII,S$GLB,, | Performed by: INTERNAL MEDICINE

## 2023-06-05 PROCEDURE — 99397 PER PM REEVAL EST PAT 65+ YR: CPT | Mod: GZ,S$GLB,, | Performed by: INTERNAL MEDICINE

## 2023-06-05 PROCEDURE — 84443 ASSAY THYROID STIM HORMONE: CPT | Performed by: INTERNAL MEDICINE

## 2023-06-05 PROCEDURE — 1159F MED LIST DOCD IN RCRD: CPT | Mod: CPTII,S$GLB,, | Performed by: INTERNAL MEDICINE

## 2023-06-05 PROCEDURE — 1160F RVW MEDS BY RX/DR IN RCRD: CPT | Mod: CPTII,S$GLB,, | Performed by: INTERNAL MEDICINE

## 2023-06-05 PROCEDURE — 99999 PR PBB SHADOW E&M-EST. PATIENT-LVL IV: ICD-10-PCS | Mod: PBBFAC,,, | Performed by: INTERNAL MEDICINE

## 2023-06-05 PROCEDURE — 81001 URINALYSIS AUTO W/SCOPE: CPT | Performed by: INTERNAL MEDICINE

## 2023-06-05 PROCEDURE — 80053 COMPREHEN METABOLIC PANEL: CPT | Performed by: INTERNAL MEDICINE

## 2023-06-05 PROCEDURE — 99999 PR PBB SHADOW E&M-EST. PATIENT-LVL IV: CPT | Mod: PBBFAC,,, | Performed by: INTERNAL MEDICINE

## 2023-06-05 PROCEDURE — 3074F SYST BP LT 130 MM HG: CPT | Mod: CPTII,S$GLB,, | Performed by: INTERNAL MEDICINE

## 2023-06-05 PROCEDURE — 85025 COMPLETE CBC W/AUTO DIFF WBC: CPT | Performed by: INTERNAL MEDICINE

## 2023-06-05 PROCEDURE — 99397 PR PREVENTIVE VISIT,EST,65 & OVER: ICD-10-PCS | Mod: GZ,S$GLB,, | Performed by: INTERNAL MEDICINE

## 2023-06-05 PROCEDURE — 3078F DIAST BP <80 MM HG: CPT | Mod: CPTII,S$GLB,, | Performed by: INTERNAL MEDICINE

## 2023-06-05 NOTE — PROGRESS NOTES
CC:  Annual exam     HPI:  The patient is an 82-year-old male BPH, elevated PSA colon polyps, low tension glaucoma, multinodular thyroid, hyperlipidemia, peripheral neuropathy and tremors who presents today for annual exam.  Patient does feel like he is hand tremors are little bit worse with the left being less than right.  It is not constant off and on.  The patient was also found to have a right direct hernia.  He was supposed have surgery done canceled.  It does not bother him currently.  He does not feel like it is getting any bigger.      ROS:  Patient reports weight is the same.  He is had bilateral lens replacement since we last saw him.  He does wear hearing aids.  No trouble swallowing.  No chest pain.  No shortness of breath.  He reports playing pickleball as well as [employer] plus does weights.  He will ride his bike on occasion.  No nausea vomiting.  No abdominal pain.  Does have some lactose intolerance.  He takes Lactaid for this.  No problems urinating except 1st thing in the morning.  He reports his hard for him to hold it.  No weakness in arms or legs.  He had his skin checked in March.  Reports a new lesion on his left forearm.  He reports 2 episodes of muscle cramps involving the middle toe of both feet.  He had to get up and walk it off.  His last colonoscopy was in 2020.      Physical exam:   General appearance:  No acute distress   HEENT:  Conjunctiva is clear.  Pupils were equal.  TMs were clear.  Left TM was partially obscured by wax.  Nasal septum is midline without discharge.  Oropharynx is without erythema.  Trachea is midline without JVD.    Pulmonary:  Good inspiratory, expiratory breath sounds heard.  Lungs are clear to auscultation.    Cardiovascular:  S1-S2, rhythm is regular.  2+ carotid pulse of bruits.  Extremities with trace to 1+ ankle edema.    GI: Abdomen was nontender, nondistended without hepatosplenomegaly the patient did have a right direct hernia in the right lower  quadrant.    : A digital rectal exam was performed.  The rectum was normal.  Stools brown heme-negative.  The patient did have 2 resolving hemorrhoid at 12 and 6:00 o'clock in position.  Penis and testes were normal.  Prostate without masses or nodules.  Lymphatics:  No cervical, axillary inguinal adenopathy     Assessment:  1. Annual exam   2. Right direct hernia  3. Elevated PSA  4.  Tremors  5. Elevated cholesterol   6. History thyroid nodules  status post biopsy.      Plan:   1. Will order a CBC, CMP, lipid panel, PSA, TSH, UA

## 2023-06-12 ENCOUNTER — PATIENT MESSAGE (OUTPATIENT)
Dept: INTERNAL MEDICINE | Facility: CLINIC | Age: 83
End: 2023-06-12
Payer: MEDICARE

## 2023-06-17 ENCOUNTER — PATIENT MESSAGE (OUTPATIENT)
Dept: DERMATOLOGY | Facility: CLINIC | Age: 83
End: 2023-06-17
Payer: MEDICARE

## 2023-06-17 ENCOUNTER — PATIENT MESSAGE (OUTPATIENT)
Dept: OPHTHALMOLOGY | Facility: CLINIC | Age: 83
End: 2023-06-17
Payer: MEDICARE

## 2023-06-27 ENCOUNTER — OFFICE VISIT (OUTPATIENT)
Dept: DERMATOLOGY | Facility: CLINIC | Age: 83
End: 2023-06-27
Payer: MEDICARE

## 2023-06-27 DIAGNOSIS — L82.1 SEBORRHEIC KERATOSIS: Primary | ICD-10-CM

## 2023-06-27 DIAGNOSIS — D48.9 NEOPLASM OF UNCERTAIN BEHAVIOR: ICD-10-CM

## 2023-06-27 PROCEDURE — 99999 PR PBB SHADOW E&M-EST. PATIENT-LVL III: CPT | Mod: PBBFAC,,,

## 2023-06-27 PROCEDURE — 1101F PR PT FALLS ASSESS DOC 0-1 FALLS W/OUT INJ PAST YR: ICD-10-PCS | Mod: CPTII,S$GLB,, | Performed by: DERMATOLOGY

## 2023-06-27 PROCEDURE — 3288F FALL RISK ASSESSMENT DOCD: CPT | Mod: CPTII,S$GLB,, | Performed by: DERMATOLOGY

## 2023-06-27 PROCEDURE — 1126F AMNT PAIN NOTED NONE PRSNT: CPT | Mod: CPTII,S$GLB,, | Performed by: DERMATOLOGY

## 2023-06-27 PROCEDURE — 1101F PT FALLS ASSESS-DOCD LE1/YR: CPT | Mod: CPTII,S$GLB,, | Performed by: DERMATOLOGY

## 2023-06-27 PROCEDURE — 99212 PR OFFICE/OUTPT VISIT, EST, LEVL II, 10-19 MIN: ICD-10-PCS | Mod: S$GLB,,, | Performed by: DERMATOLOGY

## 2023-06-27 PROCEDURE — 1126F PR PAIN SEVERITY QUANTIFIED, NO PAIN PRESENT: ICD-10-PCS | Mod: CPTII,S$GLB,, | Performed by: DERMATOLOGY

## 2023-06-27 PROCEDURE — 1160F PR REVIEW ALL MEDS BY PRESCRIBER/CLIN PHARMACIST DOCUMENTED: ICD-10-PCS | Mod: CPTII,S$GLB,, | Performed by: DERMATOLOGY

## 2023-06-27 PROCEDURE — 1159F PR MEDICATION LIST DOCUMENTED IN MEDICAL RECORD: ICD-10-PCS | Mod: CPTII,S$GLB,, | Performed by: DERMATOLOGY

## 2023-06-27 PROCEDURE — 99999 PR PBB SHADOW E&M-EST. PATIENT-LVL III: ICD-10-PCS | Mod: PBBFAC,,,

## 2023-06-27 PROCEDURE — 3288F PR FALLS RISK ASSESSMENT DOCUMENTED: ICD-10-PCS | Mod: CPTII,S$GLB,, | Performed by: DERMATOLOGY

## 2023-06-27 PROCEDURE — 99212 OFFICE O/P EST SF 10 MIN: CPT | Mod: S$GLB,,, | Performed by: DERMATOLOGY

## 2023-06-27 PROCEDURE — 1159F MED LIST DOCD IN RCRD: CPT | Mod: CPTII,S$GLB,, | Performed by: DERMATOLOGY

## 2023-06-27 PROCEDURE — 1160F RVW MEDS BY RX/DR IN RCRD: CPT | Mod: CPTII,S$GLB,, | Performed by: DERMATOLOGY

## 2023-06-27 NOTE — PROGRESS NOTES
Subjective:      Patient ID:  Fred Schwab Jr. is a 82 y.o. male who presents for   Chief Complaint   Patient presents with    Lesion     82M w h/o NMSC p/w spot on arm he is concerned about.  First noticed it a few months ago.  Since then, it grew in size and got more red then it got darker and peeled off   Does not hurt or itch.      Lesion      Review of Systems   Constitutional: Negative.    Skin:  Negative for itching and rash.     Objective:   Physical Exam   Constitutional: He appears well-developed and well-nourished. No distress.   Neurological: He is alert and oriented to person, place, and time.   Psychiatric: He has a normal mood and affect.   Skin:             Diagram Legend     Erythematous scaling macule/papule c/w actinic keratosis       Vascular papule c/w angioma      Pigmented verrucoid papule/plaque c/w seborrheic keratosis      Yellow umbilicated papule c/w sebaceous hyperplasia      Irregularly shaped tan macule c/w lentigo     1-2 mm smooth white papules consistent with Milia      Movable subcutaneous cyst with punctum c/w epidermal inclusion cyst      Subcutaneous movable cyst c/w pilar cyst      Firm pink to brown papule c/w dermatofibroma      Pedunculated fleshy papule(s) c/w skin tag(s)      Evenly pigmented macule c/w junctional nevus     Mildly variegated pigmented, slightly irregular-bordered macule c/w mildly atypical nevus      Flesh colored to evenly pigmented papule c/w intradermal nevus       Pink pearly papule/plaque c/w basal cell carcinoma      Erythematous hyperkeratotic cursted plaque c/w SCC      Surgical scar with no sign of skin cancer recurrence      Open and closed comedones      Inflammatory papules and pustules      Verrucoid papule consistent consistent with wart     Erythematous eczematous patches and plaques     Dystrophic onycholytic nail with subungual debris c/w onychomycosis     Umbilicated papule    Erythematous-base heme-crusted tan verrucoid plaque  consistent with inflamed seborrheic keratosis     Erythematous Silvery Scaling Plaque c/w Psoriasis     See annotation      Assessment / Plan:        Seborrheic keratosis  - Counseled patient about benign nature and expected course.    Neoplasm of uncertain behavior  Lesion on patient's left forearm incidentally noted on exam.  Patient states that it was not red before, but he just started picking at it.  Because of this, he would prefer to monitor the lesion until his follow-up with Dr. Cassidy in 3 months.   - Discussed possible etiologies and respective courses.   - Discussed risks and benefits of biopsying lesion today vs waiting until follow-up.        Follow up if symptoms worsen or fail to improve.

## 2023-08-14 ENCOUNTER — PATIENT MESSAGE (OUTPATIENT)
Dept: ADMINISTRATIVE | Facility: HOSPITAL | Age: 83
End: 2023-08-14
Payer: MEDICARE

## 2023-08-15 ENCOUNTER — NURSE TRIAGE (OUTPATIENT)
Dept: ADMINISTRATIVE | Facility: CLINIC | Age: 83
End: 2023-08-15
Payer: MEDICARE

## 2023-08-15 ENCOUNTER — TELEPHONE (OUTPATIENT)
Dept: INTERNAL MEDICINE | Facility: CLINIC | Age: 83
End: 2023-08-15
Payer: MEDICARE

## 2023-08-15 NOTE — TELEPHONE ENCOUNTER
----- Message from Jeaneth Nathan sent at 8/15/2023  3:07 PM CDT -----  Regarding: Symptom: Diarrhea  Symptom: Diarrhea  Outcome: Schedule an appointment to be seen within 24 hours.  Reason: Caller denied all higher acuity questions    The caller accepted this outcome

## 2023-08-15 NOTE — TELEPHONE ENCOUNTER
Spoke with patient states he has a cough which started a week ago.  Patient states he coughed up dark green mucous for two days.  Today he reports coughing up two chunks of blood.  Patient states his bowels were loose earlier today.  He has abdominal pain and nausea.   Advised ED per protocol. Patient verbalized understanding.   Reason for Disposition   Coughing up tashia-colored (reddish-brown) or blood-tinged sputum   SEVERE abdominal pain (e.g., excruciating)   Drinking very little and dehydration suspected (e.g., no urine > 12 hours, very dry mouth, very lightheaded)   SEVERE abdominal pain (e.g., excruciating) and present > 1 hour    Additional Information   Negative: Bluish (or gray) lips or face   Negative: SEVERE difficulty breathing (e.g., struggling for each breath, speaks in single words)   Negative: Rapid onset of cough and has hives   Negative: Coughing started suddenly after medicine, an allergic food or bee sting   Negative: Difficulty breathing after exposure to flames, smoke, or fumes   Negative: Sounds like a life-threatening emergency to the triager   Negative: MODERATE difficulty breathing (e.g., speaks in phrases, SOB even at rest, pulse 100-120) and still present when not coughing   Negative: Chest pain present when not coughing   Negative: Passed out (i.e., fainted, collapsed and was not responding)   Negative: Patient sounds very sick or weak to the triager   Negative: MILD difficulty breathing (e.g., minimal/no SOB at rest, SOB with walking, pulse <100) and still present when not coughing   Negative: Coughed up > 1 tablespoon (15 ml) blood (Exception: Blood-tinged sputum.)   Negative: Fever > 103 F (39.4 C)   Negative: Fever > 101 F (38.3 C) and over 60 years of age   Negative: Fever > 100.0 F (37.8 C) and has diabetes mellitus or a weak immune system (e.g., HIV positive, cancer chemotherapy, organ transplant, splenectomy, chronic steroids)   Negative: Fever > 100.0 F (37.8 C) and bedridden  (e.g., CVA, chronic illness, recovering from surgery)   Negative: Increasing ankle swelling   Negative: Wheezing is present   Negative: SEVERE coughing spells (e.g., whooping sound after coughing, vomiting after coughing)   Negative: Passed out (i.e., fainted, collapsed and was not responding)   Negative: Shock suspected (e.g., cold/pale/clammy skin, too weak to stand, low BP, rapid pulse)   Negative: Sounds like a life-threatening emergency to the triager   Negative: Shock suspected (e.g., cold/pale/clammy skin, too weak to stand, low BP, rapid pulse)   Negative: Sounds like a life-threatening emergency to the triager   Negative: Unable to walk, or can only walk with assistance (e.g., requires support)   Negative: Difficulty breathing   Negative: Insulin-dependent diabetes (Type I) and glucose > 400 mg/dL (22 mmol/L)   Negative: Shock suspected (e.g., cold/pale/clammy skin, too weak to stand, low BP, rapid pulse)   Negative: Difficult to awaken or acting confused (e.g., disoriented, slurred speech)   Negative: Sounds like a life-threatening emergency to the triager    Protocols used: Cough-A-OH, Abdominal Pain - Male-A-OH, Nausea-A-OH, Diarrhea-A-OH

## 2023-08-15 NOTE — TELEPHONE ENCOUNTER
Pt stated he is having loss stools and has coughed up blood a few mins ago. He states he is still feeling nausea and unwell.

## 2023-10-02 ENCOUNTER — OFFICE VISIT (OUTPATIENT)
Dept: DERMATOLOGY | Facility: CLINIC | Age: 83
End: 2023-10-02
Payer: MEDICARE

## 2023-10-02 DIAGNOSIS — D22.9 NEVUS: ICD-10-CM

## 2023-10-02 DIAGNOSIS — L81.4 LENTIGO: ICD-10-CM

## 2023-10-02 DIAGNOSIS — Z85.828 PERSONAL HISTORY OF SKIN CANCER: ICD-10-CM

## 2023-10-02 DIAGNOSIS — L30.9 DERMATITIS: ICD-10-CM

## 2023-10-02 DIAGNOSIS — B35.3 TINEA PEDIS OF BOTH FEET: Primary | ICD-10-CM

## 2023-10-02 DIAGNOSIS — L30.4 INTERTRIGO: ICD-10-CM

## 2023-10-02 DIAGNOSIS — L82.0 INFLAMED SEBORRHEIC KERATOSIS: ICD-10-CM

## 2023-10-02 DIAGNOSIS — D18.01 CHERRY ANGIOMA: ICD-10-CM

## 2023-10-02 DIAGNOSIS — L82.1 SK (SEBORRHEIC KERATOSIS): ICD-10-CM

## 2023-10-02 PROCEDURE — 1101F PT FALLS ASSESS-DOCD LE1/YR: CPT | Mod: CPTII,S$GLB,, | Performed by: DERMATOLOGY

## 2023-10-02 PROCEDURE — 99999 PR PBB SHADOW E&M-EST. PATIENT-LVL III: ICD-10-PCS | Mod: PBBFAC,,, | Performed by: DERMATOLOGY

## 2023-10-02 PROCEDURE — 99214 PR OFFICE/OUTPT VISIT, EST, LEVL IV, 30-39 MIN: ICD-10-PCS | Mod: 25,S$GLB,, | Performed by: DERMATOLOGY

## 2023-10-02 PROCEDURE — 1160F RVW MEDS BY RX/DR IN RCRD: CPT | Mod: CPTII,S$GLB,, | Performed by: DERMATOLOGY

## 2023-10-02 PROCEDURE — 1159F PR MEDICATION LIST DOCUMENTED IN MEDICAL RECORD: ICD-10-PCS | Mod: CPTII,S$GLB,, | Performed by: DERMATOLOGY

## 2023-10-02 PROCEDURE — 1159F MED LIST DOCD IN RCRD: CPT | Mod: CPTII,S$GLB,, | Performed by: DERMATOLOGY

## 2023-10-02 PROCEDURE — 1126F PR PAIN SEVERITY QUANTIFIED, NO PAIN PRESENT: ICD-10-PCS | Mod: CPTII,S$GLB,, | Performed by: DERMATOLOGY

## 2023-10-02 PROCEDURE — 17110 PR DESTRUCTION BENIGN LESIONS UP TO 14: ICD-10-PCS | Mod: S$GLB,,, | Performed by: DERMATOLOGY

## 2023-10-02 PROCEDURE — 99999 PR PBB SHADOW E&M-EST. PATIENT-LVL III: CPT | Mod: PBBFAC,,, | Performed by: DERMATOLOGY

## 2023-10-02 PROCEDURE — 99214 OFFICE O/P EST MOD 30 MIN: CPT | Mod: 25,S$GLB,, | Performed by: DERMATOLOGY

## 2023-10-02 PROCEDURE — 1101F PR PT FALLS ASSESS DOC 0-1 FALLS W/OUT INJ PAST YR: ICD-10-PCS | Mod: CPTII,S$GLB,, | Performed by: DERMATOLOGY

## 2023-10-02 PROCEDURE — 17110 DESTRUCTION B9 LES UP TO 14: CPT | Mod: S$GLB,,, | Performed by: DERMATOLOGY

## 2023-10-02 PROCEDURE — 1160F PR REVIEW ALL MEDS BY PRESCRIBER/CLIN PHARMACIST DOCUMENTED: ICD-10-PCS | Mod: CPTII,S$GLB,, | Performed by: DERMATOLOGY

## 2023-10-02 PROCEDURE — 1126F AMNT PAIN NOTED NONE PRSNT: CPT | Mod: CPTII,S$GLB,, | Performed by: DERMATOLOGY

## 2023-10-02 PROCEDURE — 3288F FALL RISK ASSESSMENT DOCD: CPT | Mod: CPTII,S$GLB,, | Performed by: DERMATOLOGY

## 2023-10-02 PROCEDURE — 3288F PR FALLS RISK ASSESSMENT DOCUMENTED: ICD-10-PCS | Mod: CPTII,S$GLB,, | Performed by: DERMATOLOGY

## 2023-10-02 RX ORDER — BETAMETHASONE DIPROPIONATE 0.5 MG/G
CREAM TOPICAL
Qty: 45 G | Refills: 1 | Status: SHIPPED | OUTPATIENT
Start: 2023-10-02

## 2023-10-02 RX ORDER — TRIAMCINOLONE ACETONIDE 0.25 MG/G
CREAM TOPICAL
Qty: 45 G | Refills: 1 | Status: SHIPPED | OUTPATIENT
Start: 2023-10-02

## 2023-10-02 RX ORDER — KETOCONAZOLE 20 MG/G
CREAM TOPICAL
Qty: 60 G | Refills: 3 | Status: SHIPPED | OUTPATIENT
Start: 2023-10-02 | End: 2024-02-26

## 2023-10-02 NOTE — PROGRESS NOTES
"  Subjective:      Patient ID:  Fred Schwab Jr. is a 82 y.o. male who presents for   Chief Complaint   Patient presents with    Skin Check     tbse    Rash     Neck  Chest      Patient is here today for a TBSE.     Patient is here today for a "mole" check.   Pt has a history of  moderate sun exposure in the past.   Pt recalls several blistering sunburns in the past- yes  Pt has history of tanning bed use- no  Pt has  had moles removed in the past- no  Pt has history of melanoma in first degree relatives-  no    Patient with new complaint of rash(s)  Location: neck, chest  Duration: 3-6mos  Symptoms: itches Relieving factors/Previous treatments: betamethasone, cerave; he stopped  his aftershave lotion and still recurs.     Pt is also concerned about "mole" to back. Itches.     This is a high risk patient here to check for the development of new lesions.          Rash      Review of Systems   Skin:  Positive for itching, rash, activity-related sunscreen use and wears hat. Negative for daily sunscreen use and recent sunburn.   Hematologic/Lymphatic: Does not bruise/bleed easily.       Objective:   Physical Exam   Constitutional: He appears well-developed and well-nourished. No distress.   Neurological: He is alert and oriented to person, place, and time. He is not disoriented.   Psychiatric: He has a normal mood and affect.   Skin:   Areas Examined (abnormalities noted in diagram):   Scalp / Hair Palpated and Inspected  Head / Face Inspection Performed  Neck Inspection Performed  Chest / Axilla Inspection Performed  Abdomen Inspection Performed  Genitals / Buttocks / Groin Inspection Performed  Back Inspection Performed  RUE Inspected  LUE Inspection Performed  RLE Inspected  LLE Inspection Performed  Nails and Digits Inspection Performed                         Diagram Legend     Erythematous scaling macule/papule c/w actinic keratosis       Vascular papule c/w angioma      Pigmented verrucoid papule/plaque c/w " seborrheic keratosis      Yellow umbilicated papule c/w sebaceous hyperplasia      Irregularly shaped tan macule c/w lentigo     1-2 mm smooth white papules consistent with Milia      Movable subcutaneous cyst with punctum c/w epidermal inclusion cyst      Subcutaneous movable cyst c/w pilar cyst      Firm pink to brown papule c/w dermatofibroma      Pedunculated fleshy papule(s) c/w skin tag(s)      Evenly pigmented macule c/w junctional nevus     Mildly variegated pigmented, slightly irregular-bordered macule c/w mildly atypical nevus      Flesh colored to evenly pigmented papule c/w intradermal nevus       Pink pearly papule/plaque c/w basal cell carcinoma      Erythematous hyperkeratotic cursted plaque c/w SCC      Surgical scar with no sign of skin cancer recurrence      Open and closed comedones      Inflammatory papules and pustules      Verrucoid papule consistent consistent with wart     Erythematous eczematous patches and plaques     Dystrophic onycholytic nail with subungual debris c/w onychomycosis     Umbilicated papule    Erythematous-base heme-crusted tan verrucoid plaque consistent with inflamed seborrheic keratosis     Erythematous Silvery Scaling Plaque c/w Psoriasis     See annotation      Assessment / Plan:        Tinea pedis of both feet  -     ketoconazole (NIZORAL) 2 % cream; aaa bid on  neck and feet x 2-3 weeks  Dispense: 60 g; Refill: 3    Dermatitis  -     betamethasone dipropionate 0.05 % cream; aaa qd prn rash. Not more than 2 weeks straight in same location. Avoid use on face and groin  Dispense: 45 g; Refill: 1  -     ketoconazole (NIZORAL) 2 % cream; aaa bid on  neck and feet x 2-3 weeks  Dispense: 60 g; Refill: 3  Dermeleve to neck bid prn itching     Intertrigo- groin   -     triamcinolone acetonide 0.025% (KENALOG) 0.025 % cream; aaa qhs prn flare.  Not more than 1-2 weeks straight in same location  Dispense: 45 g; Refill: 1  Pt educated that overuse of steroids can lead to skin  thinning/atrophy, hypopigmentation, striae.    Inflamed seborrheic keratosis  Cryosurgery procedure note:    Verbal consent from the patient is obtained including, but not limited to, risk of hypopigmentation/hyperpigmentation, scar, recurrence of lesion. Liquid nitrogen cryosurgery is applied to 1 lesions to produce a freeze injury. The patient is aware that blisters may form and is instructed on wound care with gentle cleansing and use of vaseline ointment to keep moist until healed. The patient is supplied a handout on cryosurgery and is instructed to call if lesions do not completely resolve.    SK (seborrheic keratosis)  These are benign inherited growths without a malignant potential. Reassurance given to patient. No treatment is necessary.     Cherry angioma  These are benign vascular lesions that are inherited.  Treatment is not necessary.    Nevus  Discussed ABCDE's of nevi.  Monitor for new mole or moles that are becoming bigger, darker, irritated, or developing irregular borders. Brochure provided. Instructed patient to observe lesion(s) for changes and follow up in clinic if changes are noted. Patient to monitor skin at home for new or changing lesions.     Lentigo  This is a benign hyperpigmented sun induced lesion. Recommend daily sun protection/avoidance and use of at least SPF 30, broad spectrum sunscreen (OTC drug) will reduce the number of new lesions. Treatment of these benign lesions are considered cosmetic.  The nature of sun-induced photo-aging and skin cancers is discussed.  Sun avoidance, protective clothing, and the use of 30-SPF sunscreens is advised. Observe closely for skin damage/changes, and call if such occurs.    Personal history of skin cancer  Pt with history of non melanoma skin cancer  Total body skin examination performed today including at least 12 points as noted in physical examination. No suspicious lesions noted.Monitor for new or changing lesions as discussed such as pink  scaly patches that are occasionally tender or not healing, 'pimples' that never go away, lesions that are not healing or bleeding.              Follow up in about 1 year (around 10/2/2024) for TBSE.

## 2023-10-12 ENCOUNTER — PATIENT MESSAGE (OUTPATIENT)
Dept: INTERNAL MEDICINE | Facility: CLINIC | Age: 83
End: 2023-10-12
Payer: MEDICARE

## 2023-10-14 DIAGNOSIS — G57.90 NEUROPATHY OF FOOT, UNSPECIFIED LATERALITY: Primary | ICD-10-CM

## 2023-10-14 RX ORDER — GABAPENTIN 100 MG/1
100 CAPSULE ORAL NIGHTLY
Qty: 30 CAPSULE | Refills: 11 | Status: SHIPPED | OUTPATIENT
Start: 2023-10-14 | End: 2024-02-26

## 2023-10-16 NOTE — PROGRESS NOTES
"  HPI     Glaucoma            Comments: HVF and OCT review today           Eye Problem            Comments: Pt feels that vision OD is "off": and not as good as OS when   reading at near. Pt also states that if he lifts up his RUL, he can read   netter          Comments    DLS: 6/1/23    1. POAG vs LTG   2. PCIOL OU  3. Ptosis Sx  (Dr. Mendoza)  4. PIERCE  5. Disc Heme OS  6. Astigmatism and Presbyopia    MEDS:  AT's PRN OU          Last edited by Betsy Barker MA on 10/20/2023 11:20 AM.            Assessment /Plan     For exam results, see Encounter Report.    Primary open-angle glaucoma, right eye, mild stage    Primary open angle glaucoma (POAG) of both eyes, mild stage    Ptosis of both eyelids    Dry eye syndrome of both eyes    Epiretinal membrane (ERM) of both eyes    Status post glaucoma surgery    Pseudophakia, both eyes        Lost to F/U 5/2017 to  5/2021 (4yrs)   Has been seeing his optometrist   Sent back in for OCT - RNFL changes - his optom told him to start gtts - so came back in     Pts wife is Carole Schwab - a glaucoma patient of Dr. Snyder    1.   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 Snyder)        Glaucoma meds   None presently -  IOP ok off gtts post SLT's // Tried xalatan and lumigan - mason ok - but no change in IOP // intol to cosopt         H/O adverse rxn to glaucoma drops    cosopt - caused stomach issues - improved off it         LASERS    SLT OD - 1/27/2022 good resp 22--> 16 // SLT os - 2/10/2022 20-->16        GLAUCOMA SURGERIES    [place] goniotomy os 1/25/2023 /  OMNI -OD -  goniotomy - 4/12/2023         OTHER EYE SURGERIES    PC IOL and [place] os - 1/25/2023 / PC IOL and omni goniotomy od 4/12/2023        CDR    0.85/0.8        Tbase    10-20 / 12-20          Tmax    19 - 20 /17- 20    (20 at outside optom per pt)         Ttarget   About 16-17  "              HVF    12 test 2000 to 2017 - full od / full os   4 test 2021 to 2023 (4yr gap) - hint early SAD/ sup paracentral defect // ? IAD od // full os         Gonio    +3 ou        CCT    [phone]        OCT    9 test 2005 to 2022 - RNFL  - dec G/TS  od// dec TI, bord G/N os (+prog ou)    3 test 2021 to 2023 - RNFL - dec G/TI od// dec TI, bord G/NS        HRT    7 test 2006 to 2017 - MR -  Dec. I, bord T od // Qagan [place]ungin off os /// CDR 0.73 od // Qagan Tay[place]in off  os        Disc photos    2003, 2005 - slides // 2010, 2015 - + disc heme ST os  - OIS    - Ttoday   14/16  - Test done today - IOP // HVF // DFE // OCT   post-op phaco/IOL OS // [place] goniotomy - OS - 1/25/2023    Post-op phaco/IOL OD with goniotomy / -  OMNI     2. ERM os - see OCT - 5/31/2021 - mild     3.   Ptosis / Dermatochalasis        S/P sx with Dr. Mendoza 4/5/2001    4.   PIERCE - AT's prn   (Needed steroid gtts from Dr. Urena for PIERCE) and AT's    5. Disc Heme OS   See photos 11/4/2015     6. PC IOL OU     7.   Skin CA face - S/P sx    Plan  POAG vs LTG -  Mild  to mod  Disease od and suspect to mild  os   OFF GLAUCOMA DROPS POST SLT's OU   OCT w/ inf thinning ou - first time 1/2017 - some prog from 2017 to 2021   -H/O  disc heme today (+H/O disc heme in past)   -HVF - hint SAD od // full os (?prog od)     No apparent effect with latanoprost  Or lumigan - but tolerated ok - used q am    cosopt bid ou -  Intolerant - had to stop GI / stomach issues      IOP  baseline  (LTG)  of 18-20    SLT OD 1/27/2022 - good initial reap 22--> 16   SLT OS -good initial resp 20--> 16     If needed can try timolol alone or brimonidine - likely it was the dorzolamide in the cosopt that upset his stomach     Phaco / IOL OS w/ [place] goniotomy  Date: 1/25/2023  POM  # 9  - phaco/IOL - DIB00 22.5  Doing well - 20/60 uncorrected // with MR - 20/30       OCT mac os - + ERM - (pre-existing ) - no cme     OS  ZCBOO 22.50   MTA4UO 19.50         03/31/2023     OD  DI MENDOZA 21.5    MTA4UO 18.5     Phaco Omni OS - 180 goniotomy / canaloplasty - no blood thinners // angle nice and open      Phaco / IOL and goniotomy with OMNI OD Date: 4/13/2023  POM 6 - phaco/IOL - DIB00 21.0  Doing well -cornea clear     Rx glasses given - 6/1/2023    F/U 4 months with IOP and gonio // repeat HVF in 6-8 months with DFE and disc photos - and consider another trial of PG's or timolol or brimonidine - ?? VF prog od vs central changes from mild ERM    Latonia Snyder MD    Addendum 1/22/2024   Pt is having a marked increase in the ocular migraines - now occurring daily   Discussed wih Dr Leigh (neuro-ophthalmology )   She recommends staring supplemental riboflavin  400mg daily (can be purchased on amazon)   And referral to one of the headache specialist Dr Star Patino // Faye Singh or [facility]

## 2023-10-20 ENCOUNTER — OFFICE VISIT (OUTPATIENT)
Dept: OPHTHALMOLOGY | Facility: CLINIC | Age: 83
End: 2023-10-20
Payer: MEDICARE

## 2023-10-20 ENCOUNTER — CLINICAL SUPPORT (OUTPATIENT)
Dept: OPHTHALMOLOGY | Facility: CLINIC | Age: 83
End: 2023-10-20
Payer: MEDICARE

## 2023-10-20 DIAGNOSIS — H40.1121 PRIMARY OPEN-ANGLE GLAUCOMA, LEFT EYE, MILD STAGE: ICD-10-CM

## 2023-10-20 DIAGNOSIS — H35.373 EPIRETINAL MEMBRANE (ERM) OF BOTH EYES: ICD-10-CM

## 2023-10-20 DIAGNOSIS — H04.123 DRY EYE SYNDROME OF BOTH EYES: ICD-10-CM

## 2023-10-20 DIAGNOSIS — Z98.83 STATUS POST GLAUCOMA SURGERY: ICD-10-CM

## 2023-10-20 DIAGNOSIS — H02.403 PTOSIS OF BOTH EYELIDS: ICD-10-CM

## 2023-10-20 DIAGNOSIS — Z96.1 PSEUDOPHAKIA, BOTH EYES: ICD-10-CM

## 2023-10-20 DIAGNOSIS — H40.1112 PRIMARY OPEN-ANGLE GLAUCOMA, RIGHT EYE, MODERATE STAGE: Primary | ICD-10-CM

## 2023-10-20 DIAGNOSIS — H40.1111 PRIMARY OPEN-ANGLE GLAUCOMA, RIGHT EYE, MILD STAGE: ICD-10-CM

## 2023-10-20 PROCEDURE — 99999 PR PBB SHADOW E&M-EST. PATIENT-LVL II: ICD-10-PCS | Mod: PBBFAC,,, | Performed by: OPHTHALMOLOGY

## 2023-10-20 PROCEDURE — 92083 HUMPHREY VISUAL FIELD - OU - BOTH EYES: ICD-10-PCS | Mod: S$GLB,,, | Performed by: OPHTHALMOLOGY

## 2023-10-20 PROCEDURE — 92014 PR EYE EXAM, EST PATIENT,COMPREHESV: ICD-10-PCS | Mod: S$GLB,,, | Performed by: OPHTHALMOLOGY

## 2023-10-20 PROCEDURE — 1101F PR PT FALLS ASSESS DOC 0-1 FALLS W/OUT INJ PAST YR: ICD-10-PCS | Mod: CPTII,S$GLB,, | Performed by: OPHTHALMOLOGY

## 2023-10-20 PROCEDURE — 3288F PR FALLS RISK ASSESSMENT DOCUMENTED: ICD-10-PCS | Mod: CPTII,S$GLB,, | Performed by: OPHTHALMOLOGY

## 2023-10-20 PROCEDURE — 1160F PR REVIEW ALL MEDS BY PRESCRIBER/CLIN PHARMACIST DOCUMENTED: ICD-10-PCS | Mod: CPTII,S$GLB,, | Performed by: OPHTHALMOLOGY

## 2023-10-20 PROCEDURE — 1101F PT FALLS ASSESS-DOCD LE1/YR: CPT | Mod: CPTII,S$GLB,, | Performed by: OPHTHALMOLOGY

## 2023-10-20 PROCEDURE — 92133 POSTERIOR SEGMENT OCT OPTIC NERVE(OCULAR COHERENCE TOMOGRAPHY) - OU - BOTH EYES: ICD-10-PCS | Mod: S$GLB,,, | Performed by: OPHTHALMOLOGY

## 2023-10-20 PROCEDURE — 1160F RVW MEDS BY RX/DR IN RCRD: CPT | Mod: CPTII,S$GLB,, | Performed by: OPHTHALMOLOGY

## 2023-10-20 PROCEDURE — 92083 EXTENDED VISUAL FIELD XM: CPT | Mod: S$GLB,,, | Performed by: OPHTHALMOLOGY

## 2023-10-20 PROCEDURE — 3288F FALL RISK ASSESSMENT DOCD: CPT | Mod: CPTII,S$GLB,, | Performed by: OPHTHALMOLOGY

## 2023-10-20 PROCEDURE — 99999 PR PBB SHADOW E&M-EST. PATIENT-LVL II: CPT | Mod: PBBFAC,,, | Performed by: OPHTHALMOLOGY

## 2023-10-20 PROCEDURE — 1159F MED LIST DOCD IN RCRD: CPT | Mod: CPTII,S$GLB,, | Performed by: OPHTHALMOLOGY

## 2023-10-20 PROCEDURE — 92133 CPTRZD OPH DX IMG PST SGM ON: CPT | Mod: S$GLB,,, | Performed by: OPHTHALMOLOGY

## 2023-10-20 PROCEDURE — 92014 COMPRE OPH EXAM EST PT 1/>: CPT | Mod: S$GLB,,, | Performed by: OPHTHALMOLOGY

## 2023-10-20 PROCEDURE — 1159F PR MEDICATION LIST DOCUMENTED IN MEDICAL RECORD: ICD-10-PCS | Mod: CPTII,S$GLB,, | Performed by: OPHTHALMOLOGY

## 2023-10-20 PROCEDURE — 1126F AMNT PAIN NOTED NONE PRSNT: CPT | Mod: CPTII,S$GLB,, | Performed by: OPHTHALMOLOGY

## 2023-10-20 PROCEDURE — 1126F PR PAIN SEVERITY QUANTIFIED, NO PAIN PRESENT: ICD-10-PCS | Mod: CPTII,S$GLB,, | Performed by: OPHTHALMOLOGY

## 2023-10-20 NOTE — TELEPHONE ENCOUNTER
Held slot 10/21/23 10:30 Dr. Vaughan, sent msg to advanced schedulers group and awaiting pt confirmation.

## 2023-10-20 NOTE — Clinical Note
This patient of mine has had a marked increase in the frequency or his ocular migraines. Use to be one every 2 months or so and now is occurring almost daily. Could you please arrange to see him or have one of your other headache specialist see him ?  Thanks Latonia Snyder - glaucoma specialist / ophthalmology

## 2023-10-20 NOTE — PROGRESS NOTES
VISUAL FIELD TEST 28-3CI-XS-DONE/AD  OD-TAPED TOP LID./AD  OU-REL-FIX-COOP-GOOD./AD    PT HAS NO KNOWN ALLERGIES TO LATEX OR ADHESIVES./AD    MRX: OD -0.50 +0.75 X 140            OS -0.75 +2.25 X 175

## 2023-10-21 ENCOUNTER — PATIENT MESSAGE (OUTPATIENT)
Dept: INTERNAL MEDICINE | Facility: CLINIC | Age: 83
End: 2023-10-21

## 2023-10-21 ENCOUNTER — LAB VISIT (OUTPATIENT)
Dept: LAB | Facility: HOSPITAL | Age: 83
End: 2023-10-21
Attending: INTERNAL MEDICINE
Payer: MEDICARE

## 2023-10-21 ENCOUNTER — OFFICE VISIT (OUTPATIENT)
Dept: INTERNAL MEDICINE | Facility: CLINIC | Age: 83
End: 2023-10-21
Payer: MEDICARE

## 2023-10-21 VITALS
SYSTOLIC BLOOD PRESSURE: 124 MMHG | WEIGHT: 180.13 LBS | HEART RATE: 62 BPM | HEIGHT: 72 IN | OXYGEN SATURATION: 98 % | BODY MASS INDEX: 24.4 KG/M2 | DIASTOLIC BLOOD PRESSURE: 78 MMHG

## 2023-10-21 DIAGNOSIS — Z01.818 PRE-OP EXAM: Primary | ICD-10-CM

## 2023-10-21 DIAGNOSIS — R60.9 EDEMA, UNSPECIFIED TYPE: ICD-10-CM

## 2023-10-21 DIAGNOSIS — E04.1 RIGHT THYROID NODULE: ICD-10-CM

## 2023-10-21 DIAGNOSIS — M85.88 OSTEOPENIA OF LUMBAR SPINE: ICD-10-CM

## 2023-10-21 DIAGNOSIS — R55 NEAR SYNCOPE: Primary | ICD-10-CM

## 2023-10-21 DIAGNOSIS — R55 NEAR SYNCOPE: ICD-10-CM

## 2023-10-21 LAB
ALBUMIN SERPL BCP-MCNC: 3.5 G/DL (ref 3.5–5.2)
ALP SERPL-CCNC: 80 U/L (ref 55–135)
ALT SERPL W/O P-5'-P-CCNC: 12 U/L (ref 10–44)
ANION GAP SERPL CALC-SCNC: 8 MMOL/L (ref 8–16)
AST SERPL-CCNC: 18 U/L (ref 10–40)
BASOPHILS # BLD AUTO: 0.06 K/UL (ref 0–0.2)
BASOPHILS NFR BLD: 1 % (ref 0–1.9)
BILIRUB SERPL-MCNC: 0.5 MG/DL (ref 0.1–1)
BUN SERPL-MCNC: 11 MG/DL (ref 8–23)
CALCIUM SERPL-MCNC: 9.5 MG/DL (ref 8.7–10.5)
CHLORIDE SERPL-SCNC: 103 MMOL/L (ref 95–110)
CO2 SERPL-SCNC: 29 MMOL/L (ref 23–29)
CREAT SERPL-MCNC: 1 MG/DL (ref 0.5–1.4)
DIFFERENTIAL METHOD: ABNORMAL
EOSINOPHIL # BLD AUTO: 0.1 K/UL (ref 0–0.5)
EOSINOPHIL NFR BLD: 1.2 % (ref 0–8)
ERYTHROCYTE [DISTWIDTH] IN BLOOD BY AUTOMATED COUNT: 13.6 % (ref 11.5–14.5)
EST. GFR  (NO RACE VARIABLE): >60 ML/MIN/1.73 M^2
GLUCOSE SERPL-MCNC: 77 MG/DL (ref 70–110)
HCT VFR BLD AUTO: 43.7 % (ref 40–54)
HGB BLD-MCNC: 13.8 G/DL (ref 14–18)
IMM GRANULOCYTES # BLD AUTO: 0.02 K/UL (ref 0–0.04)
IMM GRANULOCYTES NFR BLD AUTO: 0.3 % (ref 0–0.5)
LYMPHOCYTES # BLD AUTO: 1.8 K/UL (ref 1–4.8)
LYMPHOCYTES NFR BLD: 30.3 % (ref 18–48)
MCH RBC QN AUTO: 29.9 PG (ref 27–31)
MCHC RBC AUTO-ENTMCNC: 31.6 G/DL (ref 32–36)
MCV RBC AUTO: 95 FL (ref 82–98)
MONOCYTES # BLD AUTO: 0.5 K/UL (ref 0.3–1)
MONOCYTES NFR BLD: 8.3 % (ref 4–15)
NEUTROPHILS # BLD AUTO: 3.5 K/UL (ref 1.8–7.7)
NEUTROPHILS NFR BLD: 58.9 % (ref 38–73)
NRBC BLD-RTO: 0 /100 WBC
PLATELET # BLD AUTO: 177 K/UL (ref 150–450)
PMV BLD AUTO: 10.6 FL (ref 9.2–12.9)
POTASSIUM SERPL-SCNC: 3.6 MMOL/L (ref 3.5–5.1)
PROT SERPL-MCNC: 7 G/DL (ref 6–8.4)
RBC # BLD AUTO: 4.62 M/UL (ref 4.6–6.2)
SODIUM SERPL-SCNC: 140 MMOL/L (ref 136–145)
TSH SERPL DL<=0.005 MIU/L-ACNC: 2.03 UIU/ML (ref 0.4–4)
WBC # BLD AUTO: 6.01 K/UL (ref 3.9–12.7)

## 2023-10-21 PROCEDURE — 1159F MED LIST DOCD IN RCRD: CPT | Mod: CPTII,S$GLB,, | Performed by: INTERNAL MEDICINE

## 2023-10-21 PROCEDURE — 84443 ASSAY THYROID STIM HORMONE: CPT | Performed by: INTERNAL MEDICINE

## 2023-10-21 PROCEDURE — 1159F PR MEDICATION LIST DOCUMENTED IN MEDICAL RECORD: ICD-10-PCS | Mod: CPTII,S$GLB,, | Performed by: INTERNAL MEDICINE

## 2023-10-21 PROCEDURE — 93010 ELECTROCARDIOGRAM REPORT: CPT | Mod: S$GLB,,, | Performed by: INTERNAL MEDICINE

## 2023-10-21 PROCEDURE — 3078F PR MOST RECENT DIASTOLIC BLOOD PRESSURE < 80 MM HG: ICD-10-PCS | Mod: CPTII,S$GLB,, | Performed by: INTERNAL MEDICINE

## 2023-10-21 PROCEDURE — 3078F DIAST BP <80 MM HG: CPT | Mod: CPTII,S$GLB,, | Performed by: INTERNAL MEDICINE

## 2023-10-21 PROCEDURE — 3288F PR FALLS RISK ASSESSMENT DOCUMENTED: ICD-10-PCS | Mod: CPTII,S$GLB,, | Performed by: INTERNAL MEDICINE

## 2023-10-21 PROCEDURE — 99999 PR PBB SHADOW E&M-EST. PATIENT-LVL IV: CPT | Mod: PBBFAC,,, | Performed by: INTERNAL MEDICINE

## 2023-10-21 PROCEDURE — 85025 COMPLETE CBC W/AUTO DIFF WBC: CPT | Performed by: INTERNAL MEDICINE

## 2023-10-21 PROCEDURE — 3288F FALL RISK ASSESSMENT DOCD: CPT | Mod: CPTII,S$GLB,, | Performed by: INTERNAL MEDICINE

## 2023-10-21 PROCEDURE — 36415 COLL VENOUS BLD VENIPUNCTURE: CPT | Performed by: INTERNAL MEDICINE

## 2023-10-21 PROCEDURE — 99215 PR OFFICE/OUTPT VISIT, EST, LEVL V, 40-54 MIN: ICD-10-PCS | Mod: S$GLB,,, | Performed by: INTERNAL MEDICINE

## 2023-10-21 PROCEDURE — 1101F PT FALLS ASSESS-DOCD LE1/YR: CPT | Mod: CPTII,S$GLB,, | Performed by: INTERNAL MEDICINE

## 2023-10-21 PROCEDURE — 3074F PR MOST RECENT SYSTOLIC BLOOD PRESSURE < 130 MM HG: ICD-10-PCS | Mod: CPTII,S$GLB,, | Performed by: INTERNAL MEDICINE

## 2023-10-21 PROCEDURE — 99999 PR PBB SHADOW E&M-EST. PATIENT-LVL IV: ICD-10-PCS | Mod: PBBFAC,,, | Performed by: INTERNAL MEDICINE

## 2023-10-21 PROCEDURE — 1126F AMNT PAIN NOTED NONE PRSNT: CPT | Mod: CPTII,S$GLB,, | Performed by: INTERNAL MEDICINE

## 2023-10-21 PROCEDURE — 93005 EKG 12-LEAD: ICD-10-PCS | Mod: S$GLB,,, | Performed by: INTERNAL MEDICINE

## 2023-10-21 PROCEDURE — 1101F PR PT FALLS ASSESS DOC 0-1 FALLS W/OUT INJ PAST YR: ICD-10-PCS | Mod: CPTII,S$GLB,, | Performed by: INTERNAL MEDICINE

## 2023-10-21 PROCEDURE — 3074F SYST BP LT 130 MM HG: CPT | Mod: CPTII,S$GLB,, | Performed by: INTERNAL MEDICINE

## 2023-10-21 PROCEDURE — 80053 COMPREHEN METABOLIC PANEL: CPT | Performed by: INTERNAL MEDICINE

## 2023-10-21 PROCEDURE — 93010 EKG 12-LEAD: ICD-10-PCS | Mod: S$GLB,,, | Performed by: INTERNAL MEDICINE

## 2023-10-21 PROCEDURE — 99215 OFFICE O/P EST HI 40 MIN: CPT | Mod: S$GLB,,, | Performed by: INTERNAL MEDICINE

## 2023-10-21 PROCEDURE — 93005 ELECTROCARDIOGRAM TRACING: CPT | Mod: S$GLB,,, | Performed by: INTERNAL MEDICINE

## 2023-10-21 PROCEDURE — 1126F PR PAIN SEVERITY QUANTIFIED, NO PAIN PRESENT: ICD-10-PCS | Mod: CPTII,S$GLB,, | Performed by: INTERNAL MEDICINE

## 2023-10-21 NOTE — PROGRESS NOTES
CC:  Near syncope     HPI:  The patient is a 83-year-old male with BPH, elevated PSA, colon polyps, low tension glaucoma, multinodular thyroid, hyperlipidemia, peripheral neuropathy involving lower extremities in tremors who presents today for what sounds like near syncope.  He reports episodes where becomes weak, very relaxed like all the energy leaving his body.  It lasts about 15 seconds.  He is had 3 episodes over the past few weeks.  He had a mild episode while waiting in the reception area.  In this case, he just did not feel quite right.  He did feel like all the air was sucked out of him.  He recounts a story of a family member who was elderly.  This person went out with his wife to eat.  When they return, he turned to his wife and said,  I am dying, then dropped dead.  He is concerned this might represent fat.    ROS:  Patient reports no fever chills.  No visual changes.  He has had bilateral lens replacements over the past 6 months.  He does wear hearing aids.  No trouble swallowing.  No chest pain.  No shortness of breath except without was mentioned in the HPI.  No nausea vomiting.  No abdominal pain.  No bowel changes.  No bladder changes.  He does have numbness in his toes.  He did not take gabapentin as he was prescribed.  He was concerned about side effects.  He does report having 3 ocular migraines.  When they occur, he is trouble focusing when reading.  No weakness in the legs arms.  He stays active playing [employer]    Physical exam:   General appearance:  No acute distress  HEENT: Conjunctiva is clear.  He is bilateral lens replacements.  TMs are clear.  He does have some wax in the ear canals.  Nasal septum is midline without discharge.  Oropharynx is without erythema.  Trachea is midline without JVD or thyromegaly.    Pulmonary:  Good inspiratory, expiratory breath sounds are heard.  Lungs are clear auscultation.  Cardiovascular:  S1-S2, rhythm is regular.  2+ carotid pulse of  bruits.  Extremities with trace to 1+ ankle edema.    GI: Abdomen is nontender, nondistended without hepatosplenomegaly   Neuro:  Cranial nerves 2-12 are equal bilaterally.  , upper extremity, lower extremity motor strength is 5 x 5.  The patient has a negative Romberg and negative pronator drift.  Comments: EKG was done here in clinic which showed a sinus bradycardia.  Rate appear to be similar to last EKG year ago.    Assessment:  1. Near syncope   2. Lower extremity edema  3. Osteopenia  4. Elevated PSA  5. Multinodular thyroid  6.  Peripheral neuropathy    Plan:   1. Will schedule the patient for bone density test  2. Will schedule a CBC, CMP, TSH  3. The patient follow-up pending results.

## 2023-10-23 ENCOUNTER — HOSPITAL ENCOUNTER (OUTPATIENT)
Dept: RADIOLOGY | Facility: HOSPITAL | Age: 83
Discharge: HOME | End: 2023-10-23
Attending: INTERNAL MEDICINE
Payer: MEDICARE

## 2023-10-23 DIAGNOSIS — R55 NEAR SYNCOPE: ICD-10-CM

## 2023-10-23 PROCEDURE — 93880 US CAROTID BILATERAL: ICD-10-PCS | Mod: 26,,, | Performed by: RADIOLOGY

## 2023-10-23 PROCEDURE — 93880 EXTRACRANIAL BILAT STUDY: CPT | Mod: TC

## 2023-10-23 PROCEDURE — 93880 EXTRACRANIAL BILAT STUDY: CPT | Mod: 26,,, | Performed by: RADIOLOGY

## 2023-10-24 ENCOUNTER — PATIENT MESSAGE (OUTPATIENT)
Dept: INTERNAL MEDICINE | Facility: CLINIC | Age: 83
End: 2023-10-24
Payer: MEDICARE

## 2023-10-26 DIAGNOSIS — R55 NEAR SYNCOPE: Primary | ICD-10-CM

## 2023-11-01 ENCOUNTER — PATIENT MESSAGE (OUTPATIENT)
Dept: INTERNAL MEDICINE | Facility: CLINIC | Age: 83
End: 2023-11-01
Payer: MEDICARE

## 2023-11-07 ENCOUNTER — CLINICAL SUPPORT (OUTPATIENT)
Dept: CARDIOLOGY | Facility: HOSPITAL | Age: 83
End: 2023-11-07
Attending: INTERNAL MEDICINE
Payer: MEDICARE

## 2023-11-07 ENCOUNTER — PATIENT MESSAGE (OUTPATIENT)
Dept: INTERNAL MEDICINE | Facility: CLINIC | Age: 83
End: 2023-11-07
Payer: MEDICARE

## 2023-11-07 DIAGNOSIS — R55 NEAR SYNCOPE: ICD-10-CM

## 2023-11-07 PROCEDURE — 93270 REMOTE 30 DAY ECG REV/REPORT: CPT

## 2023-11-07 PROCEDURE — 93272 CARDIAC EVENT MONITOR (CUPID ONLY): ICD-10-PCS | Mod: ,,, | Performed by: STUDENT IN AN ORGANIZED HEALTH CARE EDUCATION/TRAINING PROGRAM

## 2023-11-07 PROCEDURE — 93271 ECG/MONITORING AND ANALYSIS: CPT

## 2023-11-07 PROCEDURE — 93272 ECG/REVIEW INTERPRET ONLY: CPT | Mod: ,,, | Performed by: STUDENT IN AN ORGANIZED HEALTH CARE EDUCATION/TRAINING PROGRAM

## 2023-11-28 ENCOUNTER — TELEPHONE (OUTPATIENT)
Dept: INTERNAL MEDICINE | Facility: CLINIC | Age: 83
End: 2023-11-28
Payer: MEDICARE

## 2023-11-28 ENCOUNTER — DOCUMENTATION ONLY (OUTPATIENT)
Dept: CARDIOLOGY | Facility: HOSPITAL | Age: 83
End: 2023-11-28
Payer: MEDICARE

## 2023-11-28 DIAGNOSIS — R55 NEAR SYNCOPE: Primary | ICD-10-CM

## 2023-11-28 NOTE — TELEPHONE ENCOUNTER
Jarvis Tejada [phone] Cardiology called back stating that the pt time will be extended past his 12/06/2023 return date to 12/20/2023. Sent msg to pt informing.

## 2023-11-28 NOTE — PROGRESS NOTES
Received a call from Reyna with Dr. Marco Antonio Vaughan's office stating that the doctor would like to extend pt's Event monitor for an additional 2 weeks.  Called [employer] customer Service and was instructed to send a message via the [employer] web site.  This was done.  Called Reyna back and informed her the pt does not need to come back into the office, he is to just continue with the monitor he currently has.  Understanding was verbalized.

## 2023-12-01 ENCOUNTER — HOSPITAL ENCOUNTER (OUTPATIENT)
Dept: RADIOLOGY | Facility: CLINIC | Age: 83
Discharge: HOME | End: 2023-12-01
Attending: INTERNAL MEDICINE
Payer: MEDICARE

## 2023-12-01 DIAGNOSIS — M85.88 OSTEOPENIA OF LUMBAR SPINE: ICD-10-CM

## 2023-12-01 PROCEDURE — 77080 DXA BONE DENSITY AXIAL: CPT | Mod: 26,,, | Performed by: INTERNAL MEDICINE

## 2023-12-01 PROCEDURE — 77080 DXA BONE DENSITY AXIAL SKELETON 1 OR MORE SITES: ICD-10-PCS | Mod: 26,,, | Performed by: INTERNAL MEDICINE

## 2023-12-01 PROCEDURE — 77080 DXA BONE DENSITY AXIAL: CPT | Mod: TC

## 2024-01-01 ENCOUNTER — PATIENT MESSAGE (OUTPATIENT)
Dept: INTERNAL MEDICINE | Facility: CLINIC | Age: 84
End: 2024-01-01
Payer: MEDICARE

## 2024-01-22 ENCOUNTER — PATIENT MESSAGE (OUTPATIENT)
Dept: OPHTHALMOLOGY | Facility: CLINIC | Age: 84
End: 2024-01-22
Payer: MEDICARE

## 2024-02-10 ENCOUNTER — PATIENT MESSAGE (OUTPATIENT)
Dept: OPHTHALMOLOGY | Facility: CLINIC | Age: 84
End: 2024-02-10
Payer: MEDICARE

## 2024-02-20 ENCOUNTER — OFFICE VISIT (OUTPATIENT)
Dept: NEUROLOGY | Facility: CLINIC | Age: 84
End: 2024-02-20
Payer: MEDICARE

## 2024-02-20 DIAGNOSIS — G43.109 OCULAR MIGRAINE: Primary | ICD-10-CM

## 2024-02-20 PROCEDURE — 99203 OFFICE O/P NEW LOW 30 MIN: CPT | Mod: 95,,, | Performed by: PSYCHIATRY & NEUROLOGY

## 2024-02-20 NOTE — PROGRESS NOTES
Neurology Telemedicine Note     Name: Fred Schwab Jr.  MRN: [id number]   CSN: [id number]      Date: 02/20/2024    The patient location is: Home  The chief complaint leading to consultation is: ocular migraines  Visit type: Virtual visit with synchronous audio and video    TOTAL TIME SPENT: 35 mins    Each patient to whom I provide medical services by telemedicine is:  (1) informed of the relationship between the physician and patient and the respective role of any other health care provider with respect to management of the patient; and (2) notified that they may decline to receive medical services by telemedicine and may withdraw from such care at any time.    History of Present Illness (HPI): Patient is a 83 yr old male with history as below including ocular migraines who presents to tele-neurology clinic due to ocular migraines. He has been taking Vitamin B2 daily for a few years. In the last month, he has been recording his ocular migraines and states that from Jan 12th-Jan21st and he had 7 episodes. He did not have another episodes until Jan 30th. He is usually on the computer reading when this happens. Had another episode feb 5th, 10, 12 and 19th. There is no headaches, n/v, light or sound sensitivity with any of these episodes. He usually had them once every couple of months. He describes visual distortion in his vision with trouble focusing with patterns of inverted check marks floating across his field of vision. These episodes last approx 15-20 mins and they end. Patient saw his eye doctor. He had cataract surgery in the fall last year and also to lessen the pressure (has mild glaucoma?). Symptoms are in both eyes. No other associated symptoms or brainstem auras. He used to have migraines with headache n/v when he was in his 20s. Has not had one in decades.  Patient states that he has been told dehydrated and he should drink more water.  Patient also states that he has had droopy eyes is entire life and had  surgical correction for them.  He denies any double vision or any weakness and was told that this has how he has been even in childhood photographs.    Nonmotor/Premotor ROS: as per HPI, and all other systems are negative    Past Medical History: The patient  has a past medical history of Actinic keratosis, Allergy, Arthritis, Basal cell carcinoma, BPH (benign prostatic hyperplasia), Colon polyp, Dermatochalasis, Elevated PSA, Headache(784.0), HEARING LOSS, Hypertension, Low tension glaucoma, Multiple thyroid nodules (11/27/2017), Neuropathy, Otitis media, Squamous cell carcinoma (12/10/2016), and Urinary tract infection.    Social History: The patient  reports that he has never smoked. He has never used smokeless tobacco. He reports current alcohol use. He reports that he does not use drugs.    Family History: Their family history includes Cancer in his father; Diabetes in his mother and sister; Glaucoma in his mother; Heart disease in his father; Hypertension in his mother; Pancreatic cancer (age of onset: 75) in his sister; Stroke in his father.    Allergies: Clindamycin, Doxycycline hyclate, Ciprofloxacin, Iodine and iodide containing products, Penicillins, and Tizanidine hcl     Meds:   Current Outpatient Medications on File Prior to Visit   Medication Sig Dispense Refill    ascorbic acid, vitamin C, (VITAMIN C) 500 MG tablet Take by mouth. 1 Tablet Oral Every day      betamethasone dipropionate 0.05 % cream aaa qd prn rash. Not more than 2 weeks straight in same location. Avoid use on face and groin 45 g 1    calcium carbonate (OS-DIAMOND) 600 mg (1,500 mg) Tab Take 600 mg by mouth once daily. 1 Tablet Oral Every day      cholecalciferol, vitamin D3, 10 mcg (400 unit) Cap Take 1 capsule by mouth once daily.      co-enzyme Q-10 30 mg capsule Take 30 mg by mouth 3 (three) times daily.      diclofenac sodium (VOLTAREN) 1 % Gel Apply topically as needed.      doxazosin (CARDURA) 4 MG tablet Take 1 tablet (4 mg total)  by mouth every evening. 90 tablet 3    fluocinolone acetonide oiL 0.01 % Drop Place 4 drops in ear(s) 2 (two) times daily. 20 mL 5    gabapentin (NEURONTIN) 100 MG capsule Take 1 capsule (100 mg total) by mouth every evening. 30 capsule 11    ketoconazole (NIZORAL) 2 % cream aaa bid on  neck and feet x 2-3 weeks 60 g 3    loratadine (CLARITIN) 10 mg tablet Take 10 mg by mouth daily as needed. Every day      OMEGA-3 FATTY ACIDS/FISH OIL (OMEGA 3 FISH OIL ORAL) Take by mouth. 1 Capsule Oral Every day      prednisoLONE acetate (PRED FORTE) 1 % DrpS One drop right eye 3 x day for 2 weeks, then 2 x day for 2 weeks, then 1 x day for 2 weeks, then stop (Patient taking differently: Place 1 drop into the right eye Daily. One drop right eye 3 x day for 2 weeks, then 2 x day for 2 weeks, then 1 x day for 2 weeks, then stop) 5 mL 1    propranoloL (INDERAL) 10 MG tablet TAKE ONE TABLET BY MOUTH TWICE DAILY 180 tablet 3    sildenafil (VIAGRA) 100 MG tablet TAKE ONE TABLET BY MOUTH AS DIRECTED 30 tablet 11    terbinafine HCL (LAMISIL) 1 % cream Apply topically nightly.      triamcinolone acetonide 0.025% (KENALOG) 0.025 % cream aaa qhs prn flare.  Not more than 1-2 weeks straight in same location 45 g 1    vit A palm,D3 in cod liver oil 1,250 unit-130 unit-530 mg Cap Take 1 capsule by mouth once daily.        No current facility-administered medications on file prior to visit.       Exam:  Vital Signs deferred with home visit    Constitutional  Well-developed, well-nourished, appears stated age   Eyes  No scleral icterus   ENT  Moist oral mucosa   Cardiovascular  No lower extremity edema    Respiratory  No labored breathing    Skin  No rashes   Hematologic  No bruising   Psychiatric  Normal mood and affect   Other  GI/ deferred    Neurological     * Mental status  Alert and oriented to person, place, time, and situation; no dysarthria; no aphasia; normal recent and remote memory; follows commands   * Cranial nerves     - CN II   Pupils midposition and symmetric   - CN III, IV, VI  Extraocular movements full, no nystagmus visualized   - CN V  Unable to test   - CN VII  Face strong and symmetric bilaterally    - CN VIII  Hearing grossly intact with conversation    - CN IX, X  Palate raises midline and symmetric    - CN XI  Strong shoulder shrug B    - CN XII  Tongue appears midline    * Motor  Normal bulk by appearance, no drift    * Sensory  Not tested objectively, no changes described by the patient   * Deep tendon reflexes  Not tested   * Coord/Movemt/Gait No hypophonic speech.  No facial masking.  No B bradykinesia.  No tremor with rest, posture, kinesis, or intention.   No chorea, athetosis, dystonia, myoclonus, or tics.   No motor impersistence.  Gait is deferred for safety.       Medical Record Review:  - Lab Results:  No visits with results within 3 Month(s) from this visit.   Latest known visit with results is:   Lab Visit on 10/23/2023   Component Date Value Ref Range Status    RBC, UA 10/23/2023 1  0 - 4 /hpf Final    WBC, UA 10/23/2023 1  0 - 5 /hpf Final    Squam Epithel, UA 10/23/2023 0  /hpf Final    Microscopic Comment 10/23/2023 SEE COMMENT   Final    Specimen UA 10/23/2023 Urine, Clean Catch   Final    Color, UA 10/23/2023 Vane  Yellow, Straw, Vane Final    Appearance, UA 10/23/2023 Cloudy (A)  Clear Final    pH, UA 10/23/2023 7.0  5.0 - 8.0 Final    Specific Gravity, UA 10/23/2023 1.010  1.005 - 1.030 Final    Protein, UA 10/23/2023 Negative  Negative Final    Glucose, UA 10/23/2023 Negative  Negative Final    Ketones, UA 10/23/2023 Negative  Negative Final    Bilirubin (UA) 10/23/2023 Negative  Negative Final    Occult Blood UA 10/23/2023 Negative  Negative Final    Nitrite, UA 10/23/2023 Negative  Negative Final    Leukocytes, UA 10/23/2023 Negative  Negative Final       Diagnoses:   1) Ocular migraines      Medical Decision Makin) continue riboflavin  2) start magnesium oxide 200-400 mg daily  3) avoid bright  screens and bright lights we discussed getting a dimming screen and sunglasses for bright lights  4) encouraged hydration and avoidance of triggers  5) follow-up with the eye doctor    Follow-up as needed      I spent 35 minutes with the patient with >50% of the time spent with counseling and education regarding:    This is a consult performed through audio-visual using [employer]. Pt and provider are in different locations. History and physical exam are limited due to the nature of this encounter.       Jacque Oakley MD        DISPLAY PLAN FREE TEXT

## 2024-02-25 NOTE — PROGRESS NOTES
HPI    DLS: 10/20/2023    Pt here for 4 Month Check;  Pt states when he is reading up close he feels like something is moving   around in his peripheral vision not flashes of light and he's been seeing   a neurologist for his migraines/ocular migraines and the neurologist   stated for him to get a screen for his computer since he believes that was   triggering his migraines.     -S/p Phaco IOL and OMNI Goniotomy OD 4/12/23   -S/p Phaco IOL and [place] Goniotomy OS 1/25/23     1. POAG vs LTG   2. PCIOL OU   3. Ptosis Sx  (Dr. Mendoza)   4. PIERCE   5. Disc Heme OS   6. Astigmatism and Presbyopia       Meds;  AT's PRN OU      Last edited by Latonia Snyder MD on 2/26/2024 10:56 AM.              Assessment /Plan     For exam results, see Encounter Report.    Primary open-angle glaucoma, right eye, moderate stage    Primary open-angle glaucoma, left eye, mild stage    Ptosis of both eyelids    Dry eye syndrome of both eyes    Epiretinal membrane (ERM) of both eyes    Status post glaucoma surgery    Pseudophakia, both eyes        Lost to F/U 5/2017 to  5/2021 (4yrs)   Has been seeing his optometrist   Sent back in for OCT - RNFL changes - his optom told him to start gtts - so came back in     Pts wife is Carole Schwab - a glaucoma patient of Dr. Snyder    1.   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 Snyder)        Glaucoma meds   None presently -  IOP ok off gtts post SLT's // Tried xalatan and lumigan - mason ok - but no change in IOP // intol to cosopt         H/O adverse rxn to glaucoma drops    cosopt - caused stomach issues - improved off it         LASERS    SLT OD - 1/27/2022 good resp 22--> 16 // SLT os - 2/10/2022 20-->16        GLAUCOMA SURGERIES    [place] goniotomy os 1/25/2023 /  OMNI -OD -  goniotomy - 4/12/2023         OTHER EYE SURGERIES    PC IOL and [place] os -  1/25/2023 / PC IOL and omni goniotomy od 4/12/2023        CDR    0.85/0.8        Tbase    10-20 / 12-20          Tmax    19 - 20 /17- 20    (20 at outside optom per pt)         Ttarget   About 16-17               HVF    12 test 2000 to 2017 - full od / full os   4 test 2021 to 2023 (4yr gap) - hint early SAD/ sup paracentral defect // ? IAD od // full os         Gonio    +3 ou        CCT    587/583        OCT    9 test 2005 to 2022 - RNFL  - dec G/TS  od// dec TI, bord G/N os (+prog ou)    3 test 2021 to 2023 - RNFL - dec G/TI od// dec TI, bord G/NS        HRT    7 test 2006 to 2017 - MR -  Dec. I, bord T od // [place] off os /// CDR 0.73 od // [place] off  os        Disc photos    2003, 2005 - slides // 2010, 2015 - + disc heme ST os  - OIS    - Ttoday   14/15  - Test done today - IOP //gonio   post-op phaco/IOL OS // [place] goniotomy - OS - 1/25/2023    Post-op phaco/IOL OD with goniotomy / -  OMNI     2. ERM os - see OCT - 5/31/2021 - mild     3.   Ptosis / Dermatochalasis        S/P sx with Dr. Mendoza 4/5/2001    4.   PIERCE - AT's prn   (Needed steroid gtts from Dr. Urena for PIERCE) and AT's    5. Disc Heme OS   See photos 11/4/2015     6. PC IOL OU     7.   Skin CA face - S/P sx    Plan  POAG vs LTG -  Mild  to mod  Disease od and suspect to mild  os   OFF GLAUCOMA DROPS POST SLT's OU   OCT w/ inf thinning ou - first time 1/2017 - some prog from 2017 to 2021   -H/O  disc heme today (+H/O disc heme in past)   -HVF - hint SAD od // full os (?prog od)     No apparent effect with latanoprost  Or lumigan - but tolerated ok - used q am    cosopt bid ou -  Intolerant - had to stop GI / stomach issues      IOP  baseline  (LTG)  of 18-20    SLT OD 1/27/2022 - good initial reap 22--> 16   SLT OS -good initial resp 20--> 16     If needed can try timolol alone or brimonidine - likely it was the dorzolamide in the cosopt that upset his stomach     Phaco / IOL OS w/ radha goniotomy  Date: 1/25/2023  POY > 1   - phaco/IOL - DIB00  22.5  Doing well - 20/60 uncorrected // with MR - 20/30       OCT mac os - + ERM - (pre-existing ) - no cme     OS  ZCBOO 22.50   MTA4UO 19.50         03/31/2023     OD  DI MENDOZA 21.5   MTA4UO 18.5     Phaco Omni OS - 180 goniotomy / canaloplasty - no blood thinners // angle nice and open      Phaco / IOL and goniotomy with OMNI OD Date: 4/13/2023  POM 10  - phaco/IOL - DIB00 21.0  Doing well -    Rx glasses given - 6/1/2023    F/U 4 months with IOP  and HVF  // DFE and disc photos - and consider another trial of PG's or timolol or brimonidine - ?? VF prog od vs central changes from mild ERM    Latonia Snyder MD    Addendum 1/22/2024   Pt is having a marked increase in the ocular migraines - now occurring daily   Discussed wih Dr Leigh (neuro-ophthalmology )   She recommends staring supplemental riboflavin  400mg daily (can be purchased on amazon)   And referral to one of the headache specialist Dr Star Patino // Faye Singh or [facility]     SAW a  Dr Jacque Oakley  (neurology ) 2/20/2024 (virtual)

## 2024-02-26 ENCOUNTER — OFFICE VISIT (OUTPATIENT)
Dept: OPHTHALMOLOGY | Facility: CLINIC | Age: 84
End: 2024-02-26
Payer: MEDICARE

## 2024-02-26 DIAGNOSIS — H02.403 PTOSIS OF BOTH EYELIDS: ICD-10-CM

## 2024-02-26 DIAGNOSIS — H04.123 DRY EYE SYNDROME OF BOTH EYES: ICD-10-CM

## 2024-02-26 DIAGNOSIS — H40.1121 PRIMARY OPEN-ANGLE GLAUCOMA, LEFT EYE, MILD STAGE: ICD-10-CM

## 2024-02-26 DIAGNOSIS — H40.1112 PRIMARY OPEN-ANGLE GLAUCOMA, RIGHT EYE, MODERATE STAGE: Primary | ICD-10-CM

## 2024-02-26 DIAGNOSIS — Z98.83 STATUS POST GLAUCOMA SURGERY: ICD-10-CM

## 2024-02-26 DIAGNOSIS — H35.373 EPIRETINAL MEMBRANE (ERM) OF BOTH EYES: ICD-10-CM

## 2024-02-26 DIAGNOSIS — Z96.1 PSEUDOPHAKIA, BOTH EYES: ICD-10-CM

## 2024-02-26 PROCEDURE — 99999 PR PBB SHADOW E&M-EST. PATIENT-LVL III: CPT | Mod: PBBFAC,,, | Performed by: OPHTHALMOLOGY

## 2024-02-26 PROCEDURE — 92020 GONIOSCOPY: CPT | Mod: S$GLB,,, | Performed by: OPHTHALMOLOGY

## 2024-02-26 PROCEDURE — 99214 OFFICE O/P EST MOD 30 MIN: CPT | Mod: S$GLB,,, | Performed by: OPHTHALMOLOGY

## 2024-03-13 DIAGNOSIS — I10 HYPERTENSION, UNSPECIFIED TYPE: ICD-10-CM

## 2024-03-13 RX ORDER — PROPRANOLOL HYDROCHLORIDE 10 MG/1
TABLET ORAL
Qty: 180 TABLET | Refills: 2 | Status: SHIPPED | OUTPATIENT
Start: 2024-03-13

## 2024-03-13 NOTE — TELEPHONE ENCOUNTER
Refill Decision Note   Fred Schwab  is requesting a refill authorization.  Brief Assessment and Rationale for Refill:  Approve     Medication Therapy Plan:         Comments:     Note composed:8:39 AM 03/13/2024

## 2024-03-13 NOTE — TELEPHONE ENCOUNTER
No care due was identified.  [facility] Embedded Care Due Messages. Reference number: [id number].   3/13/2024 8:11:24 AM CDT

## 2024-04-30 RX ORDER — SILDENAFIL 100 MG/1
100 TABLET, FILM COATED ORAL
Qty: 30 TABLET | Refills: 5 | Status: SHIPPED | OUTPATIENT
Start: 2024-04-30

## 2024-04-30 NOTE — TELEPHONE ENCOUNTER
No care due was identified.  [facility] Embedded Care Due Messages. Reference number: [id number].   4/29/2024 9:38:35 PM CDT

## 2024-04-30 NOTE — TELEPHONE ENCOUNTER
Refill Routing Note   Medication(s) are not appropriate for processing by [facility] for the following reason(s):        Other: script last ordered 1/3/20    [facility] action(s):  Defer               Appointments  past 12m or future 3m with PCP    Date Provider   Last Visit   10/21/2023 Marco Antonio Vaughan MD   Next Visit   Visit date not found Marco Antonio Vaughan MD   ED visits in past 90 days: 0        Note composed:6:01 PM 04/30/2024

## 2024-05-14 RX ORDER — DOXAZOSIN 4 MG/1
4 TABLET ORAL NIGHTLY
Qty: 90 TABLET | Refills: 1 | Status: SHIPPED | OUTPATIENT
Start: 2024-05-14

## 2024-05-14 NOTE — TELEPHONE ENCOUNTER
No care due was identified.  [facility] Embedded Care Due Messages. Reference number: [id number].   5/14/2024 7:42:07 AM CDT

## 2024-05-14 NOTE — TELEPHONE ENCOUNTER
Refill Decision Note   Fred Schwab  is requesting a refill authorization.  Brief Assessment and Rationale for Refill:  Approve     Medication Therapy Plan:         Comments:     Note composed:10:40 AM 05/14/2024

## 2024-05-29 NOTE — PROGRESS NOTES
HPI    DLS: 02/06/2024    Pt here for 4 Month Check;  Pt states when he is reading up close he feels like something is moving   around in his peripheral vision  Pt states reading vision has suffered a bit since PCIOL Sx           -S/p Phaco IOL and OMNI Goniotomy OD 4/12/23   -S/p Phaco IOL and [place] Goniotomy OS 1/25/23     1. POAG vs LTG   2. PCIOL OU   3. Ptosis Sx  (Dr. Mendoza)   4. PIERCE   5. Disc Heme OS   6. ERM OS           Meds;  AT's PRN OU      Last edited by Latonia Snyder MD on 6/3/2024  1:05 PM.            Assessment /Plan     For exam results, see Encounter Report.    Primary open-angle glaucoma, right eye, moderate stage  -     latanoprost 0.005 % ophthalmic solution; Place 1 drop into both eyes every evening.  Dispense: 7.5 mL; Refill: 3  -     Posterior Segment OCT Optic Nerve- Both eyes    Primary open-angle glaucoma, left eye, mild stage  -     latanoprost 0.005 % ophthalmic solution; Place 1 drop into both eyes every evening.  Dispense: 7.5 mL; Refill: 3  -     Posterior Segment OCT Optic Nerve- Both eyes    Ptosis of both eyelids    Dry eye syndrome of both eyes    Epiretinal membrane (ERM) of left eye    Status post glaucoma surgery    Pseudophakia, both eyes        Lost to F/U 5/2017 to  5/2021 (4yrs)   Has been seeing his optometrist   Sent back in for OCT - RNFL changes - his optom told him to start gtts - so came back in     Pts wife is Carole Schwab - a glaucoma patient of Dr. Snyder    1.   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 Snyder)        Glaucoma meds   None presently -  IOP ok off gtts post SLT's // Tried xalatan and lumigan - mason ok - but no change in IOP // intol to cosopt         H/O adverse rxn to glaucoma drops    cosopt - caused stomach issues - improved off it         LASERS    SLT OD - 1/27/2022 good resp 22--> 16 // SLT os  - 2/10/2022 20-->16        GLAUCOMA SURGERIES    [place] goniotomy os 1/25/2023 /  OMNI -OD -  goniotomy - 4/12/2023         OTHER EYE SURGERIES    PC IOL and [place] os - 1/25/2023 / PC IOL and omni goniotomy od 4/12/2023        CDR    0.85/0.8        Tbase    10-20 / 12-20          Tmax    19 - 20 /17- 20    (20 at outside optom per pt)         Ttarget   About 16-17               HVF    12 test 2000 to 2017 - full od / full os   4 test 2021 to 2024 (4yr gap) - hint early SAD/ sup paracentral defect // ? IAD od // full os         Gonio    +3 ou        CCT    587/583        OCT    9 test 2005 to 2022 - RNFL  - dec G/TS  od// dec TI, bord G/N os (+prog ou)    4 test 2021 to 2024 - RNFL - dec G/TI od// dec TI, bord G/NS        HRT    7 test 2006 to 2017 - MR -  Dec. I, bord T od // [place] off os /// CDR 0.73 od // [place] off  os        Disc photos    2003, 2005 - slides // 2010, 2015 - + disc heme ST os  - OIS    - Ttoday   14/15  - Test done today - IOP // HVF / OCT   post-op phaco/IOL OS // [place] goniotomy - OS - 1/25/2023    Post-op phaco/IOL OD with goniotomy / -  OMNI     2. ERM os - see OCT - 5/31/2021 - mild     3.   Ptosis / Dermatochalasis        S/P sx with Dr. Mendoza 4/5/2001    4.   PIERCE - AT's prn   (Needed steroid gtts from Dr. Urena for PIECRE) and AT's    5. Disc Heme OS   See photos 11/4/2015     6. PC IOL OU     7.   Skin CA face - S/P sx    Plan  POAG vs LTG -  Mild  to mod  Disease od and suspect to mild  os   OFF GLAUCOMA DROPS POST SLT's OU   OCT w/ inf thinning ou - first time 1/2017 - some prog from 2017 to 2021   -H/O  disc heme today (+H/O disc heme in past)   -HVF - hint SAD od // full os (?prog od)     No apparent effect with latanoprost  Or lumigan - but tolerated ok - used q am    cosopt bid ou -  Intolerant - had to stop GI / stomach issues      IOP  baseline  (LTG)  of 18-20    SLT OD 1/27/2022 - good initial resp 22--> 16   SLT OS -good initial resp 20--> 16     If needed can try timolol  alone or brimonidine - likely it was the dorzolamide in the cosopt that upset his stomach     Phaco / IOL OS w/ [place] goniotomy  Date: 1/25/2023  POY > 1   - phaco/IOL - DIB00 22.5  Doing well - 20/60 uncorrected // with MR - 20/30       OCT mac os - + ERM - (pre-existing ) - no cme     OS  ZCBOO 22.50   MTA4UO 19.50         03/31/2023     OD  DI MENDOZA 21.5   MTA4UO 18.5     Phaco Omni OS - 180 goniotomy / canaloplasty - no blood thinners // angle nice and open      Phaco / IOL and goniotomy with OMNI OD Date: 4/13/2023  POY > 1  - phaco/IOL - DIB00 21.0  Doing well -    Pt is / was having a marked increase in the ocular migraines - now occurring daily (1/22/2024 )   Discussed wih Dr Leigh (neuro-ophthalmology )   She recommended staring supplemental riboflavin  400mg daily (can be purchased on amazon)   SAW a  Dr Jacque Oakley  (neurology ) 2/20/2024 (virtual) // doing much better - 6/3/2024    6/3/2024   IOP seems ok // but + VF prog od (involves fixation) - and decrease in vision -    ?? If decrease vision od is 2/2 PCO / or VF loss near fixation //or some macular pathology   [place] of latanoprost ou     F/U 2-3  months // IOP check with addition of latanoprost ( minimal response in past)  DILATE and DISC PHOTOS - did NOT get done 6/3/2023

## 2024-06-03 ENCOUNTER — CLINICAL SUPPORT (OUTPATIENT)
Dept: OPHTHALMOLOGY | Facility: CLINIC | Age: 84
End: 2024-06-03
Payer: MEDICARE

## 2024-06-03 ENCOUNTER — OFFICE VISIT (OUTPATIENT)
Dept: OPHTHALMOLOGY | Facility: CLINIC | Age: 84
End: 2024-06-03
Payer: MEDICARE

## 2024-06-03 DIAGNOSIS — H40.1121 PRIMARY OPEN-ANGLE GLAUCOMA, LEFT EYE, MILD STAGE: ICD-10-CM

## 2024-06-03 DIAGNOSIS — Z98.83 STATUS POST GLAUCOMA SURGERY: ICD-10-CM

## 2024-06-03 DIAGNOSIS — H35.372 EPIRETINAL MEMBRANE (ERM) OF LEFT EYE: ICD-10-CM

## 2024-06-03 DIAGNOSIS — H02.403 PTOSIS OF BOTH EYELIDS: ICD-10-CM

## 2024-06-03 DIAGNOSIS — Z96.1 PSEUDOPHAKIA, BOTH EYES: ICD-10-CM

## 2024-06-03 DIAGNOSIS — H40.1112 PRIMARY OPEN-ANGLE GLAUCOMA, RIGHT EYE, MODERATE STAGE: ICD-10-CM

## 2024-06-03 DIAGNOSIS — H04.123 DRY EYE SYNDROME OF BOTH EYES: ICD-10-CM

## 2024-06-03 DIAGNOSIS — H40.1112 PRIMARY OPEN-ANGLE GLAUCOMA, RIGHT EYE, MODERATE STAGE: Primary | ICD-10-CM

## 2024-06-03 PROCEDURE — 92133 CPTRZD OPH DX IMG PST SGM ON: CPT | Mod: S$GLB,,, | Performed by: OPHTHALMOLOGY

## 2024-06-03 PROCEDURE — 1160F RVW MEDS BY RX/DR IN RCRD: CPT | Mod: CPTII,S$GLB,, | Performed by: OPHTHALMOLOGY

## 2024-06-03 PROCEDURE — 1101F PT FALLS ASSESS-DOCD LE1/YR: CPT | Mod: CPTII,S$GLB,, | Performed by: OPHTHALMOLOGY

## 2024-06-03 PROCEDURE — 99214 OFFICE O/P EST MOD 30 MIN: CPT | Mod: S$GLB,,, | Performed by: OPHTHALMOLOGY

## 2024-06-03 PROCEDURE — 3288F FALL RISK ASSESSMENT DOCD: CPT | Mod: CPTII,S$GLB,, | Performed by: OPHTHALMOLOGY

## 2024-06-03 PROCEDURE — 1159F MED LIST DOCD IN RCRD: CPT | Mod: CPTII,S$GLB,, | Performed by: OPHTHALMOLOGY

## 2024-06-03 PROCEDURE — 99999 PR PBB SHADOW E&M-EST. PATIENT-LVL III: CPT | Mod: PBBFAC,,, | Performed by: OPHTHALMOLOGY

## 2024-06-03 RX ORDER — LATANOPROST 50 UG/ML
1 SOLUTION/ DROPS OPHTHALMIC NIGHTLY
Qty: 7.5 ML | Refills: 3 | Status: SHIPPED | OUTPATIENT
Start: 2024-06-03 | End: 2025-06-03

## 2024-06-03 NOTE — PROGRESS NOTES
VISUAL FIELD TEST 24-2 SS-OU-DONE/AB  OD-TOP LID TAPED./AB  OU-REL-FIX-COOP-GOOD/AB    PT HAS NO KNOWN ALLERGIES TO LATEX OR ADHESIVES./AB    MRX: OD -0.50 +0.75 X 140            OS -0.75 +2.25 X 175

## 2024-06-10 ENCOUNTER — PATIENT MESSAGE (OUTPATIENT)
Dept: INTERNAL MEDICINE | Facility: CLINIC | Age: 84
End: 2024-06-10
Payer: MEDICARE

## 2024-06-27 ENCOUNTER — OFFICE VISIT (OUTPATIENT)
Dept: DERMATOLOGY | Facility: CLINIC | Age: 84
End: 2024-06-27
Payer: MEDICARE

## 2024-06-27 DIAGNOSIS — L30.9 DERMATITIS: Primary | ICD-10-CM

## 2024-06-27 PROCEDURE — 1159F MED LIST DOCD IN RCRD: CPT | Mod: CPTII,95,, | Performed by: DERMATOLOGY

## 2024-06-27 PROCEDURE — 99213 OFFICE O/P EST LOW 20 MIN: CPT | Mod: 95,,, | Performed by: DERMATOLOGY

## 2024-06-27 PROCEDURE — 1160F RVW MEDS BY RX/DR IN RCRD: CPT | Mod: CPTII,95,, | Performed by: DERMATOLOGY

## 2024-06-27 NOTE — PATIENT INSTRUCTIONS
Pt to use betamethasone bid prn flares  Ice to relieve itching  Can use dermeleve which can be purchased online  Opzelura samples to use 2x per day

## 2024-06-27 NOTE — PROGRESS NOTES
Subjective:      Patient ID:  Fred Schwab Jr. is a 83 y.o. male who presents for No chief complaint on file.    The patient location is: [place]   The chief complaint leading to consultation is: rash     Visit type: audiovisual    Face to Face time with patient: 6 minutes  8 minutes minutes of total time spent on the encounter, which includes face to face time and non-face to face time preparing to see the patient (eg, review of tests), Obtaining and/or reviewing separately obtained history, Documenting clinical information in the electronic or other health record, Independently interpreting results (not separately reported) and communicating results to the patient/family/caregiver, or Care coordination (not separately reported).         Each patient to whom he or she provides medical services by telemedicine is:  (1) informed of the relationship between the physician and patient and the respective role of any other health care provider with respect to management of the patient; and (2) notified that he or she may decline to receive medical services by telemedicine and may withdraw from such care at any time.    Notes:   Pt has rash on left forearm.  Has had coming and going for years.  Used cerave , mometasone, betamethasone, lamisil.  Rash comes and goes. Very itchy. Nothing makes it worse.  Betamethasone helps the most           Review of Systems   Skin:  Positive for itching and rash.       Objective:   Physical Exam   Skin:   Areas Examined (abnormalities noted in diagram):   LUE Inspection Performed                      Diagram Legend     Erythematous scaling macule/papule c/w actinic keratosis       Vascular papule c/w angioma      Pigmented verrucoid papule/plaque c/w seborrheic keratosis      Yellow umbilicated papule c/w sebaceous hyperplasia      Irregularly shaped tan macule c/w lentigo     1-2 mm smooth white papules consistent with Milia      Movable subcutaneous cyst with punctum c/w epidermal inclusion  cyst      Subcutaneous movable cyst c/w pilar cyst      Firm pink to brown papule c/w dermatofibroma      Pedunculated fleshy papule(s) c/w skin tag(s)      Evenly pigmented macule c/w junctional nevus     Mildly variegated pigmented, slightly irregular-bordered macule c/w mildly atypical nevus      Flesh colored to evenly pigmented papule c/w intradermal nevus       Pink pearly papule/plaque c/w basal cell carcinoma      Erythematous hyperkeratotic cursted plaque c/w SCC      Surgical scar with no sign of skin cancer recurrence      Open and closed comedones      Inflammatory papules and pustules      Verrucoid papule consistent consistent with wart     Erythematous eczematous patches and plaques     Dystrophic onycholytic nail with subungual debris c/w onychomycosis     Umbilicated papule    Erythematous-base heme-crusted tan verrucoid plaque consistent with inflamed seborrheic keratosis     Erythematous Silvery Scaling Plaque c/w Psoriasis     See annotation      Assessment / Plan:        Dermatitis  Use betamethasone bid for bad flares  Trial of Opzelura 2x per day   Cool compresses as needed                Follow up for 4-6 weeks rash f/u and UBSE.

## 2024-06-28 ENCOUNTER — TELEPHONE (OUTPATIENT)
Dept: DERMATOLOGY | Facility: CLINIC | Age: 84
End: 2024-06-28
Payer: MEDICARE

## 2024-06-28 NOTE — TELEPHONE ENCOUNTER
----- Message from Nieves Cassidy MD sent at 6/27/2024  8:15 AM CDT -----  UBSE 6-8 weeks and rash follow p

## 2024-07-01 ENCOUNTER — TELEPHONE (OUTPATIENT)
Dept: DERMATOLOGY | Facility: CLINIC | Age: 84
End: 2024-07-01
Payer: MEDICARE

## 2024-07-01 NOTE — TELEPHONE ENCOUNTER
Spoke to pt. Pt verbally agreed and confirmed date and time given. Pt thanked me.     ----- Message from Flora Lay MA sent at 6/28/2024  4:15 PM CDT -----  Regarding: FW: pt advice  Contact: [phone]    ----- Message -----  From: Aviva Jiang  Sent: 6/28/2024   4:14 PM CDT  To: [facility] Staff  Subject: pt advice                                        Pt returning miss call from nurse Calloway pls call

## 2024-07-02 ENCOUNTER — PATIENT MESSAGE (OUTPATIENT)
Dept: DERMATOLOGY | Facility: CLINIC | Age: 84
End: 2024-07-02
Payer: MEDICARE

## 2024-07-10 ENCOUNTER — PATIENT MESSAGE (OUTPATIENT)
Dept: INTERNAL MEDICINE | Facility: CLINIC | Age: 84
End: 2024-07-10
Payer: MEDICARE

## 2024-07-15 ENCOUNTER — PATIENT MESSAGE (OUTPATIENT)
Dept: INTERNAL MEDICINE | Facility: CLINIC | Age: 84
End: 2024-07-15
Payer: MEDICARE

## 2024-07-16 NOTE — TELEPHONE ENCOUNTER
Phoned patient and spoke with wife, Socorro. She thinks patient would like to keep appt with Dr. Vaughan as his annual visit. Last annual 6/5/23. Advised patient/wife to call back if they change their mind and would like to reschedule.

## 2024-07-17 ENCOUNTER — OFFICE VISIT (OUTPATIENT)
Dept: DERMATOLOGY | Facility: CLINIC | Age: 84
End: 2024-07-17
Payer: MEDICARE

## 2024-07-17 DIAGNOSIS — B35.1 ONYCHOMYCOSIS: Primary | ICD-10-CM

## 2024-07-17 DIAGNOSIS — B35.4 TINEA CORPORIS: ICD-10-CM

## 2024-07-17 PROCEDURE — 1159F MED LIST DOCD IN RCRD: CPT | Mod: CPTII,S$GLB,, | Performed by: DERMATOLOGY

## 2024-07-17 PROCEDURE — 1126F AMNT PAIN NOTED NONE PRSNT: CPT | Mod: CPTII,S$GLB,, | Performed by: DERMATOLOGY

## 2024-07-17 PROCEDURE — 3288F FALL RISK ASSESSMENT DOCD: CPT | Mod: CPTII,S$GLB,, | Performed by: DERMATOLOGY

## 2024-07-17 PROCEDURE — 1160F RVW MEDS BY RX/DR IN RCRD: CPT | Mod: CPTII,S$GLB,, | Performed by: DERMATOLOGY

## 2024-07-17 PROCEDURE — 1101F PT FALLS ASSESS-DOCD LE1/YR: CPT | Mod: CPTII,S$GLB,, | Performed by: DERMATOLOGY

## 2024-07-17 PROCEDURE — 99999 PR PBB SHADOW E&M-EST. PATIENT-LVL III: CPT | Mod: PBBFAC,,, | Performed by: DERMATOLOGY

## 2024-07-17 PROCEDURE — 99214 OFFICE O/P EST MOD 30 MIN: CPT | Mod: S$GLB,,, | Performed by: DERMATOLOGY

## 2024-07-17 RX ORDER — CICLOPIROX 80 MG/ML
SOLUTION TOPICAL
Qty: 6.6 ML | Refills: 11 | Status: SHIPPED | OUTPATIENT
Start: 2024-07-17

## 2024-07-17 RX ORDER — KETOCONAZOLE 20 MG/G
CREAM TOPICAL
Qty: 60 G | Refills: 3 | Status: SHIPPED | OUTPATIENT
Start: 2024-07-17

## 2024-07-17 NOTE — PROGRESS NOTES
Subjective:      Patient ID:  Fred Schwab Jr. is a 83 y.o. male who presents for   Chief Complaint   Patient presents with    Rash     F/U - better      Pt states rash has gotten better. No itching. Prev tx with lamisil.     Rash - Follow-up  Symptom course: improving  Affected locations: left arm  Signs / symptoms: asymptomatic      Review of Systems   Skin:  Positive for itching and rash.       Objective:   Physical Exam   Constitutional: He appears well-developed and well-nourished. No distress.   Neurological: He is alert and oriented to person, place, and time. He is not disoriented.   Psychiatric: He has a normal mood and affect.   Skin:   Areas Examined (abnormalities noted in diagram):   LUE Inspection Performed                Diagram Legend     Erythematous scaling macule/papule c/w actinic keratosis       Vascular papule c/w angioma      Pigmented verrucoid papule/plaque c/w seborrheic keratosis      Yellow umbilicated papule c/w sebaceous hyperplasia      Irregularly shaped tan macule c/w lentigo     1-2 mm smooth white papules consistent with Milia      Movable subcutaneous cyst with punctum c/w epidermal inclusion cyst      Subcutaneous movable cyst c/w pilar cyst      Firm pink to brown papule c/w dermatofibroma      Pedunculated fleshy papule(s) c/w skin tag(s)      Evenly pigmented macule c/w junctional nevus     Mildly variegated pigmented, slightly irregular-bordered macule c/w mildly atypical nevus      Flesh colored to evenly pigmented papule c/w intradermal nevus       Pink pearly papule/plaque c/w basal cell carcinoma      Erythematous hyperkeratotic cursted plaque c/w SCC      Surgical scar with no sign of skin cancer recurrence      Open and closed comedones      Inflammatory papules and pustules      Verrucoid papule consistent consistent with wart     Erythematous eczematous patches and plaques     Dystrophic onycholytic nail with subungual debris c/w onychomycosis     Umbilicated  papule    Erythematous-base heme-crusted tan verrucoid plaque consistent with inflamed seborrheic keratosis     Erythematous Silvery Scaling Plaque c/w Psoriasis     See annotation      Assessment / Plan:        Onychomycosis  -     ciclopirox (PENLAC) 8 % Soln; Apply to affected nails nightly; remove weekly;  Dispense: 6.6 mL; Refill: 11  Jublia ..    Tinea corporis  KOH positive   -     ketoconazole (NIZORAL) 2 % cream; aaa bid on  left forearm x 3 weeks  Dispense: 60 g; Refill: 3             Follow up in about 6 months (around 1/17/2025) for UBSE.

## 2024-07-26 ENCOUNTER — LAB VISIT (OUTPATIENT)
Dept: LAB | Facility: HOSPITAL | Age: 84
End: 2024-07-26
Attending: INTERNAL MEDICINE
Payer: MEDICARE

## 2024-07-26 ENCOUNTER — OFFICE VISIT (OUTPATIENT)
Dept: INTERNAL MEDICINE | Facility: CLINIC | Age: 84
End: 2024-07-26
Payer: MEDICARE

## 2024-07-26 VITALS
SYSTOLIC BLOOD PRESSURE: 132 MMHG | HEIGHT: 72 IN | BODY MASS INDEX: 23.47 KG/M2 | HEART RATE: 53 BPM | OXYGEN SATURATION: 96 % | DIASTOLIC BLOOD PRESSURE: 80 MMHG | WEIGHT: 173.31 LBS

## 2024-07-26 DIAGNOSIS — Z00.00 ANNUAL PHYSICAL EXAM: ICD-10-CM

## 2024-07-26 DIAGNOSIS — K40.90 DIRECT HERNIA: ICD-10-CM

## 2024-07-26 DIAGNOSIS — Z00.00 ANNUAL PHYSICAL EXAM: Primary | ICD-10-CM

## 2024-07-26 DIAGNOSIS — R97.20 ELEVATED PSA: ICD-10-CM

## 2024-07-26 DIAGNOSIS — E04.1 THYROID NODULE: ICD-10-CM

## 2024-07-26 DIAGNOSIS — R73.09 ELEVATED GLUCOSE: ICD-10-CM

## 2024-07-26 DIAGNOSIS — I10 HYPERTENSION, UNSPECIFIED TYPE: ICD-10-CM

## 2024-07-26 DIAGNOSIS — E78.00 ELEVATED CHOLESTEROL: ICD-10-CM

## 2024-07-26 DIAGNOSIS — G57.90 NEUROPATHY OF FOOT, UNSPECIFIED LATERALITY: ICD-10-CM

## 2024-07-26 LAB
ALBUMIN SERPL BCP-MCNC: 3.6 G/DL (ref 3.5–5.2)
ALP SERPL-CCNC: 66 U/L (ref 55–135)
ALT SERPL W/O P-5'-P-CCNC: 12 U/L (ref 10–44)
ANION GAP SERPL CALC-SCNC: 8 MMOL/L (ref 8–16)
AST SERPL-CCNC: 17 U/L (ref 10–40)
BACTERIA #/AREA URNS AUTO: NORMAL /HPF
BASOPHILS # BLD AUTO: 0.04 K/UL (ref 0–0.2)
BASOPHILS NFR BLD: 0.7 % (ref 0–1.9)
BILIRUB SERPL-MCNC: 0.8 MG/DL (ref 0.1–1)
BILIRUB UR QL STRIP: NEGATIVE
BUN SERPL-MCNC: 16 MG/DL (ref 8–23)
CALCIUM SERPL-MCNC: 9.2 MG/DL (ref 8.7–10.5)
CHLORIDE SERPL-SCNC: 106 MMOL/L (ref 95–110)
CHOLEST SERPL-MCNC: 176 MG/DL (ref 120–199)
CHOLEST/HDLC SERPL: 3.6 {RATIO} (ref 2–5)
CLARITY UR REFRACT.AUTO: CLEAR
CO2 SERPL-SCNC: 27 MMOL/L (ref 23–29)
COLOR UR AUTO: YELLOW
COMPLEXED PSA SERPL-MCNC: 5.8 NG/ML (ref 0–4)
CREAT SERPL-MCNC: 1.1 MG/DL (ref 0.5–1.4)
DIFFERENTIAL METHOD BLD: ABNORMAL
EOSINOPHIL # BLD AUTO: 0.1 K/UL (ref 0–0.5)
EOSINOPHIL NFR BLD: 1.8 % (ref 0–8)
ERYTHROCYTE [DISTWIDTH] IN BLOOD BY AUTOMATED COUNT: 13.9 % (ref 11.5–14.5)
EST. GFR  (NO RACE VARIABLE): >60 ML/MIN/1.73 M^2
ESTIMATED AVG GLUCOSE: 108 MG/DL (ref 68–131)
GLUCOSE SERPL-MCNC: 94 MG/DL (ref 70–110)
GLUCOSE UR QL STRIP: NEGATIVE
HBA1C MFR BLD: 5.4 % (ref 4–5.6)
HCT VFR BLD AUTO: 42.4 % (ref 40–54)
HDLC SERPL-MCNC: 49 MG/DL (ref 40–75)
HDLC SERPL: 27.8 % (ref 20–50)
HGB BLD-MCNC: 13.6 G/DL (ref 14–18)
HGB UR QL STRIP: NEGATIVE
IMM GRANULOCYTES # BLD AUTO: 0.02 K/UL (ref 0–0.04)
IMM GRANULOCYTES NFR BLD AUTO: 0.3 % (ref 0–0.5)
KETONES UR QL STRIP: NEGATIVE
LDLC SERPL CALC-MCNC: 110.2 MG/DL (ref 63–159)
LEUKOCYTE ESTERASE UR QL STRIP: NEGATIVE
LYMPHOCYTES # BLD AUTO: 1.7 K/UL (ref 1–4.8)
LYMPHOCYTES NFR BLD: 28 % (ref 18–48)
MCH RBC QN AUTO: 30.6 PG (ref 27–31)
MCHC RBC AUTO-ENTMCNC: 32.1 G/DL (ref 32–36)
MCV RBC AUTO: 95 FL (ref 82–98)
MICROSCOPIC COMMENT: NORMAL
MONOCYTES # BLD AUTO: 0.4 K/UL (ref 0.3–1)
MONOCYTES NFR BLD: 7.3 % (ref 4–15)
NEUTROPHILS # BLD AUTO: 3.7 K/UL (ref 1.8–7.7)
NEUTROPHILS NFR BLD: 61.9 % (ref 38–73)
NITRITE UR QL STRIP: NEGATIVE
NONHDLC SERPL-MCNC: 127 MG/DL
NRBC BLD-RTO: 0 /100 WBC
PH UR STRIP: 7 [PH] (ref 5–8)
PLATELET # BLD AUTO: 164 K/UL (ref 150–450)
PMV BLD AUTO: 10.9 FL (ref 9.2–12.9)
POTASSIUM SERPL-SCNC: 3.8 MMOL/L (ref 3.5–5.1)
PROT SERPL-MCNC: 7.1 G/DL (ref 6–8.4)
PROT UR QL STRIP: NEGATIVE
RBC # BLD AUTO: 4.45 M/UL (ref 4.6–6.2)
RBC #/AREA URNS AUTO: 0 /HPF (ref 0–4)
SODIUM SERPL-SCNC: 141 MMOL/L (ref 136–145)
SP GR UR STRIP: 1.01 (ref 1–1.03)
TRIGL SERPL-MCNC: 84 MG/DL (ref 30–150)
TSH SERPL DL<=0.005 MIU/L-ACNC: 3.1 UIU/ML (ref 0.4–4)
URN SPEC COLLECT METH UR: NORMAL
WBC # BLD AUTO: 6.04 K/UL (ref 3.9–12.7)
WBC #/AREA URNS AUTO: 0 /HPF (ref 0–5)

## 2024-07-26 PROCEDURE — 80061 LIPID PANEL: CPT | Performed by: INTERNAL MEDICINE

## 2024-07-26 PROCEDURE — 83036 HEMOGLOBIN GLYCOSYLATED A1C: CPT | Performed by: INTERNAL MEDICINE

## 2024-07-26 PROCEDURE — 36415 COLL VENOUS BLD VENIPUNCTURE: CPT | Performed by: INTERNAL MEDICINE

## 2024-07-26 PROCEDURE — 99999 PR PBB SHADOW E&M-EST. PATIENT-LVL IV: CPT | Mod: PBBFAC,,, | Performed by: INTERNAL MEDICINE

## 2024-07-26 PROCEDURE — 84153 ASSAY OF PSA TOTAL: CPT | Performed by: INTERNAL MEDICINE

## 2024-07-26 PROCEDURE — 84443 ASSAY THYROID STIM HORMONE: CPT | Performed by: INTERNAL MEDICINE

## 2024-07-26 PROCEDURE — 80053 COMPREHEN METABOLIC PANEL: CPT | Performed by: INTERNAL MEDICINE

## 2024-07-26 PROCEDURE — 85025 COMPLETE CBC W/AUTO DIFF WBC: CPT | Performed by: INTERNAL MEDICINE

## 2024-07-26 PROCEDURE — 81001 URINALYSIS AUTO W/SCOPE: CPT | Performed by: INTERNAL MEDICINE

## 2024-07-26 NOTE — PROGRESS NOTES
CC:  Annual exam     HPI: The patient is an 83-year-old male with BPH, elevated PSA, colon polyps, low tension glaucoma, multinodular thyroid, hyperlipidemia, peripheral neuropathy involving lower extremities and tremors who presents today for annual exam.  The patient has recently seen by Dermatology for rash on his wrist.  This has getting better.  He does complain of problems with constipation.  He has a bowel movement daily; but, 3 times a week the stools are hard.  He started taking a magnesium oxide tablet last night.    ROS: Patient reports positive weight loss.  He is cutting back on the amount of sugar and carbohydrates he is taking in.  His last eye exam was in June.  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  No auditory changes.  He does wear hearing aids.  No chest pain.  No shortness a breath.  He has been playing table tennis.  He was playing pickleball up to 3 months ago.  He is planning on resuming this activity.  His house has 3 floors.  He goes up and down the steps numerous times throughout the day.  No nausea vomiting.  No abdominal pain.  He normally gets up once a night to urinate; but, when constipated, he will get up 3-4 times.  No weakness in arms or legs.  No numbness or tingling arms or legs.  He does have neuropathy involving his toes.     Physical exam:   General appearance: No acute distress   HEENT: Conjunctiva is clear.  He has bilateral lens replacements.  He was hearing aids in place.  Nasal septum is midline without discharge.  Oropharynx without erythema.  Trachea is midline without JVD or thyromegaly.    Pulmonary: Good inspiratory, expiratory breath sounds are heard.  Lungs are clear auscultation.  Cardiovascular: S1-S2, rhythm is regular.  2+ carotid pulse of bruits.  Extremities with trace edema.    GI: Abdomen is nontender, nondistended without hepatosplenomegaly the patient does have a right direct hernia which is reducible.  : Digital rectal exam was  performed.  The rectum was normal.  He did have a resolving hemorrhoid at 6 and 12:00 p.m. in position.  Stools brown heme-negative.  Prostate was enlarged without any nodules.  Penis and testes were normal.    Lymphatics: No cervical, axillary inguinal adenopathy    Assessment:    Annual exam  2.  Hypertension   3.  Neuropathy of feet  4.  Direct hernia   5.  Elevated PSA   6.  History of thyroid nodule  7.  Hyperlipidemia   8.  Elevated glucose    Plan:    Will schedule a CBC, CMP, lipid panel, PSA, UA, A1c and TSH.

## 2024-08-08 ENCOUNTER — PATIENT MESSAGE (OUTPATIENT)
Dept: INTERNAL MEDICINE | Facility: CLINIC | Age: 84
End: 2024-08-08
Payer: MEDICARE

## 2024-08-09 NOTE — TELEPHONE ENCOUNTER
Pt reports sciatic pain  for which he has been receiving care from a chiropractor for 3 weeks without relief    Pt would like rx or referral to assist with pain

## 2024-08-12 ENCOUNTER — OFFICE VISIT (OUTPATIENT)
Dept: DERMATOLOGY | Facility: CLINIC | Age: 84
End: 2024-08-12
Payer: MEDICARE

## 2024-08-12 DIAGNOSIS — D18.01 CHERRY ANGIOMA: ICD-10-CM

## 2024-08-12 DIAGNOSIS — L81.4 LENTIGO: ICD-10-CM

## 2024-08-12 DIAGNOSIS — D22.9 NEVUS: ICD-10-CM

## 2024-08-12 DIAGNOSIS — B35.3 TINEA PEDIS OF BOTH FEET: ICD-10-CM

## 2024-08-12 DIAGNOSIS — L82.1 SK (SEBORRHEIC KERATOSIS): ICD-10-CM

## 2024-08-12 DIAGNOSIS — Z85.828 PERSONAL HISTORY OF SKIN CANCER: ICD-10-CM

## 2024-08-12 DIAGNOSIS — L57.0 AK (ACTINIC KERATOSIS): ICD-10-CM

## 2024-08-12 DIAGNOSIS — B35.1 ONYCHOMYCOSIS: ICD-10-CM

## 2024-08-12 DIAGNOSIS — L30.9 DERMATITIS: Primary | ICD-10-CM

## 2024-08-12 PROCEDURE — 3288F FALL RISK ASSESSMENT DOCD: CPT | Mod: CPTII,S$GLB,, | Performed by: DERMATOLOGY

## 2024-08-12 PROCEDURE — 99214 OFFICE O/P EST MOD 30 MIN: CPT | Mod: 25,S$GLB,, | Performed by: DERMATOLOGY

## 2024-08-12 PROCEDURE — 99999 PR PBB SHADOW E&M-EST. PATIENT-LVL III: CPT | Mod: PBBFAC,,, | Performed by: DERMATOLOGY

## 2024-08-12 PROCEDURE — 1159F MED LIST DOCD IN RCRD: CPT | Mod: CPTII,S$GLB,, | Performed by: DERMATOLOGY

## 2024-08-12 PROCEDURE — 1126F AMNT PAIN NOTED NONE PRSNT: CPT | Mod: CPTII,S$GLB,, | Performed by: DERMATOLOGY

## 2024-08-12 PROCEDURE — 17000 DESTRUCT PREMALG LESION: CPT | Mod: S$GLB,,, | Performed by: DERMATOLOGY

## 2024-08-12 PROCEDURE — 1160F RVW MEDS BY RX/DR IN RCRD: CPT | Mod: CPTII,S$GLB,, | Performed by: DERMATOLOGY

## 2024-08-12 PROCEDURE — 1101F PT FALLS ASSESS-DOCD LE1/YR: CPT | Mod: CPTII,S$GLB,, | Performed by: DERMATOLOGY

## 2024-08-12 RX ORDER — MOMETASONE FUROATE 1 MG/ML
SOLUTION TOPICAL
Qty: 60 ML | Refills: 3 | Status: SHIPPED | OUTPATIENT
Start: 2024-08-12

## 2024-08-12 NOTE — PROGRESS NOTES
"  Subjective:      Patient ID:  Fred Schwab Jr. is a 83 y.o. male who presents for   Chief Complaint   Patient presents with    Skin Check     TBSE      Patient is here today for a TBSE.     Patient is here today for a "mole" check.   Pt has a history of  moderate sun exposure in the past.   Pt recalls several blistering sunburns in the past- yes  Pt has history of tanning bed use- no  Pt has  had moles removed in the past- no  Pt has history of melanoma in first degree relatives-  no    Patient with new complaint of rash(s)  Location: neck, chest, back, arms  Duration: 1 weeks   Nonew medications. No one at home itching.      Rash on left forearm is improved with ketoconazoel cream. Still has rash on feet. Using ciclopirox on finger nails.            Rash      Review of Systems   Skin:  Positive for itching, rash and dry skin.   Hematologic/Lymphatic: Does not bruise/bleed easily.       Objective:   Physical Exam   Constitutional: He appears well-developed and well-nourished. No distress.   Neurological: He is alert and oriented to person, place, and time. He is not disoriented.   Psychiatric: He has a normal mood and affect.   Skin:   Areas Examined (abnormalities noted in diagram):   Scalp / Hair Palpated and Inspected  Head / Face Inspection Performed  Neck Inspection Performed  Chest / Axilla Inspection Performed  Abdomen Inspection Performed  Genitals / Buttocks / Groin Inspection Performed  Back Inspection Performed  RUE Inspected  LUE Inspection Performed  RLE Inspected  LLE Inspection Performed  Nails and Digits Inspection Performed                         Diagram Legend     Erythematous scaling macule/papule c/w actinic keratosis       Vascular papule c/w angioma      Pigmented verrucoid papule/plaque c/w seborrheic keratosis      Yellow umbilicated papule c/w sebaceous hyperplasia      Irregularly shaped tan macule c/w lentigo     1-2 mm smooth white papules consistent with Milia      Movable subcutaneous " cyst with punctum c/w epidermal inclusion cyst      Subcutaneous movable cyst c/w pilar cyst      Firm pink to brown papule c/w dermatofibroma      Pedunculated fleshy papule(s) c/w skin tag(s)      Evenly pigmented macule c/w junctional nevus     Mildly variegated pigmented, slightly irregular-bordered macule c/w mildly atypical nevus      Flesh colored to evenly pigmented papule c/w intradermal nevus       Pink pearly papule/plaque c/w basal cell carcinoma      Erythematous hyperkeratotic cursted plaque c/w SCC      Surgical scar with no sign of skin cancer recurrence      Open and closed comedones      Inflammatory papules and pustules      Verrucoid papule consistent consistent with wart     Erythematous eczematous patches and plaques     Dystrophic onycholytic nail with subungual debris c/w onychomycosis     Umbilicated papule    Erythematous-base heme-crusted tan verrucoid plaque consistent with inflamed seborrheic keratosis     Erythematous Silvery Scaling Plaque c/w Psoriasis     See annotation      Assessment / Plan:        Dermatitis  Both Feet and left wrist:      Back and arms :  Mix mometasone solution - the whole bottle- into a jar of cerave cream and apply to itchy areas 2x per day . Can use betamethasone to areas that are are still itching   Can try dermeleve in these areas too.       Tinea pedis of both feet  Ketoconazole cream 2x per day for 3 weeks     Cherry angioma  These are benign vascular lesions that are inherited.  Treatment is not necessary.    SK (seborrheic keratosis)  These are benign inherited growths without a malignant potential. Reassurance given to patient. No treatment is necessary.     Nevus  Discussed ABCDE's of nevi.  Monitor for new mole or moles that are becoming bigger, darker, irritated, or developing irregular borders. Brochure provided. Instructed patient to observe lesion(s) for changes and follow up in clinic if changes are noted. Patient to monitor skin at home for new  or changing lesions.     Lentigo  This is a benign hyperpigmented sun induced lesion. Recommend daily sun protection/avoidance and use of at least SPF 30, broad spectrum sunscreen (OTC drug) will reduce the number of new lesions. Treatment of these benign lesions are considered cosmetic.  The nature of sun-induced photo-aging and skin cancers is discussed.  Sun avoidance, protective clothing, and the use of 30-SPF sunscreens is advised. Observe closely for skin damage/changes, and call if such occurs.    Personal history of skin cancer  Pt with history of non melanoma skin cancer  Total body skin examination performed today including at least 12 points as noted in physical examination. No suspicious lesions noted.Monitor for new or changing lesions as discussed such as pink scaly patches that are occasionally tender or not healing, 'pimples' that never go away, lesions that are not healing or bleeding.     Onychomycosis  Continue ciclopirox to left hand fingernails and to all toenails  AK (actinic keratosis)             Follow up in about 6 weeks (around 9/23/2024).

## 2024-08-12 NOTE — PATIENT INSTRUCTIONS
Both Feet and left wrist:  Ketoconazole cream 2x per day for 3 weeks       Back and arms :  Mix mometasone solution - the whole bottle- into a jar of cerave cream and apply to itchy areas 2x per day . Can use betamethasone to areas that are are still itching   Can try dermeleve in these areas too.     Continue ciclopirox to left hand fingernails and to all toenails

## 2024-08-13 DIAGNOSIS — M54.32 CHRONIC SCIATICA, LEFT: Primary | ICD-10-CM

## 2024-08-16 ENCOUNTER — PATIENT MESSAGE (OUTPATIENT)
Dept: DERMATOLOGY | Facility: CLINIC | Age: 84
End: 2024-08-16
Payer: MEDICARE

## 2024-08-16 ENCOUNTER — TELEPHONE (OUTPATIENT)
Dept: DERMATOLOGY | Facility: CLINIC | Age: 84
End: 2024-08-16
Payer: MEDICARE

## 2024-08-16 NOTE — TELEPHONE ENCOUNTER
"Lvm ----- Message from Elli Brand LPN sent at 8/12/2024  4:36 PM CDT -----  Regarding: FW: pt advice  Contact: [phone]    ----- Message -----  From: Aviva Jiang  Sent: 8/12/2024   4:25 PM CDT  To: [facility] Staff  Subject: pt advice                                        .Name Of Caller: Self     Contact Preference?:[phone]     What is the nature of the call?: in regards to confirming has prescription been sent over. Pls call      Additional Notes:  "Thank you for all that you do for our patients"  "

## 2024-10-03 ENCOUNTER — OFFICE VISIT (OUTPATIENT)
Dept: DERMATOLOGY | Facility: CLINIC | Age: 84
End: 2024-10-03
Payer: MEDICARE

## 2024-10-03 DIAGNOSIS — L30.9 DERMATITIS: ICD-10-CM

## 2024-10-03 DIAGNOSIS — L82.1 SK (SEBORRHEIC KERATOSIS): ICD-10-CM

## 2024-10-03 DIAGNOSIS — B35.1 ONYCHOMYCOSIS: ICD-10-CM

## 2024-10-03 DIAGNOSIS — B35.3 TINEA PEDIS OF BOTH FEET: Primary | ICD-10-CM

## 2024-10-03 PROCEDURE — 3288F FALL RISK ASSESSMENT DOCD: CPT | Mod: CPTII,S$GLB,, | Performed by: DERMATOLOGY

## 2024-10-03 PROCEDURE — 99999 PR PBB SHADOW E&M-EST. PATIENT-LVL IV: CPT | Mod: PBBFAC,,, | Performed by: DERMATOLOGY

## 2024-10-03 PROCEDURE — 1126F AMNT PAIN NOTED NONE PRSNT: CPT | Mod: CPTII,S$GLB,, | Performed by: DERMATOLOGY

## 2024-10-03 PROCEDURE — 1159F MED LIST DOCD IN RCRD: CPT | Mod: CPTII,S$GLB,, | Performed by: DERMATOLOGY

## 2024-10-03 PROCEDURE — 1160F RVW MEDS BY RX/DR IN RCRD: CPT | Mod: CPTII,S$GLB,, | Performed by: DERMATOLOGY

## 2024-10-03 PROCEDURE — 99213 OFFICE O/P EST LOW 20 MIN: CPT | Mod: S$GLB,,, | Performed by: DERMATOLOGY

## 2024-10-03 PROCEDURE — 1101F PT FALLS ASSESS-DOCD LE1/YR: CPT | Mod: CPTII,S$GLB,, | Performed by: DERMATOLOGY

## 2024-10-03 NOTE — PROGRESS NOTES
Subjective:      Patient ID:  Fred Schwab Jr. is a 83 y.o. male who presents for   Chief Complaint   Patient presents with    Rash     F/u      History of Present Illness: The patient presents for follow up of Rash to bilateral feet and left wrist.     The patient was last seen on: 8/12/24 for TBSE and dermatitis and tinea pedis.      Other skin complaints:  Patient with new area of concern:   Location: neck +itchy   Previous treatments: dermaleve      Rash - Follow-up  Symptom course: improving  Currently using: dermalevee 2-3 week, cerave qday , prior tx keto cream 2x day prn flares.  Affected locations: currently clear  Signs / symptoms: asymptomatic  Severity: mild      Review of Systems   Skin:  Positive for itching. Negative for rash and dry skin.   Hematologic/Lymphatic: Does not bruise/bleed easily.       Objective:   Physical Exam   Constitutional: He appears well-developed and well-nourished. No distress.   Neurological: He is alert and oriented to person, place, and time. He is not disoriented.   Psychiatric: He has a normal mood and affect.   Skin:   Areas Examined (abnormalities noted in diagram):   Head / Face Inspection Performed  Neck Inspection Performed  Chest / Axilla Inspection Performed  Abdomen Inspection Performed  Back Inspection Performed  RUE Inspected  LUE Inspection Performed  RLE Inspected  LLE Inspection Performed  Nails and Digits Inspection Performed                         Diagram Legend     Erythematous scaling macule/papule c/w actinic keratosis       Vascular papule c/w angioma      Pigmented verrucoid papule/plaque c/w seborrheic keratosis      Yellow umbilicated papule c/w sebaceous hyperplasia      Irregularly shaped tan macule c/w lentigo     1-2 mm smooth white papules consistent with Milia      Movable subcutaneous cyst with punctum c/w epidermal inclusion cyst      Subcutaneous movable cyst c/w pilar cyst      Firm pink to brown papule c/w dermatofibroma      Pedunculated  fleshy papule(s) c/w skin tag(s)      Evenly pigmented macule c/w junctional nevus     Mildly variegated pigmented, slightly irregular-bordered macule c/w mildly atypical nevus      Flesh colored to evenly pigmented papule c/w intradermal nevus       Pink pearly papule/plaque c/w basal cell carcinoma      Erythematous hyperkeratotic cursted plaque c/w SCC      Surgical scar with no sign of skin cancer recurrence      Open and closed comedones      Inflammatory papules and pustules      Verrucoid papule consistent consistent with wart     Erythematous eczematous patches and plaques     Dystrophic onycholytic nail with subungual debris c/w onychomycosis     Umbilicated papule    Erythematous-base heme-crusted tan verrucoid plaque consistent with inflamed seborrheic keratosis     Erythematous Silvery Scaling Plaque c/w Psoriasis     See annotation      Assessment / Plan:        Tinea pedis of both feet  Both sides of feet- ketoconazole cream 2x per day     Onychomycosis  Continue polish to finger nails and toenails     Dermatitis  Can use mixture cerave/mometasone to neck, left arm and back if flares  If forearm flares, use ketoconazole cream \  Can use dermeleve as well for itching     SK (seborrheic keratosis)  These are benign inherited growths without a malignant potential. Reassurance given to patient. No treatment is necessary.              Follow up in about 10 months (around 8/3/2025) for TBSE.

## 2024-10-03 NOTE — PATIENT INSTRUCTIONS
Both sides of feet- ketoconazole cream 2x per day   Continue polish to finger nails and toenails   Can use mixture cerave/mometasone to neck, left arm and back if flares  If forearm flares, use ketoconazole cream \  Can use dermeleve as well for itching     We would like to see you back in the clinic in 10 months.  You will be able to schedule this appointment by calling or by using your [facility] portal 3 months before this time. Because our schedule fills so quickly, please set a reminder in your phone or on your calendar to schedule 3 months before you are due to come in so that we can see you in a timely fashion.  You should also receive a reminder from us in the mail. This will help us ensure we can continue to provide excellent healthcare for you. Thank you.

## 2024-10-07 ENCOUNTER — OFFICE VISIT (OUTPATIENT)
Dept: OPHTHALMOLOGY | Facility: CLINIC | Age: 84
End: 2024-10-07
Payer: MEDICARE

## 2024-10-07 DIAGNOSIS — Z96.1 PSEUDOPHAKIA, BOTH EYES: ICD-10-CM

## 2024-10-07 DIAGNOSIS — Z98.83 STATUS POST GLAUCOMA SURGERY: ICD-10-CM

## 2024-10-07 DIAGNOSIS — H35.372 EPIRETINAL MEMBRANE (ERM) OF LEFT EYE: ICD-10-CM

## 2024-10-07 DIAGNOSIS — H02.403 PTOSIS OF BOTH EYELIDS: ICD-10-CM

## 2024-10-07 DIAGNOSIS — H40.1112 PRIMARY OPEN-ANGLE GLAUCOMA, RIGHT EYE, MODERATE STAGE: Primary | ICD-10-CM

## 2024-10-07 DIAGNOSIS — H04.123 DRY EYE SYNDROME OF BOTH EYES: ICD-10-CM

## 2024-10-07 DIAGNOSIS — H40.1121 PRIMARY OPEN-ANGLE GLAUCOMA, LEFT EYE, MILD STAGE: ICD-10-CM

## 2024-10-07 PROCEDURE — 3288F FALL RISK ASSESSMENT DOCD: CPT | Mod: CPTII,S$GLB,, | Performed by: OPHTHALMOLOGY

## 2024-10-07 PROCEDURE — 99999 PR PBB SHADOW E&M-EST. PATIENT-LVL III: CPT | Mod: PBBFAC,,, | Performed by: OPHTHALMOLOGY

## 2024-10-07 PROCEDURE — 1101F PT FALLS ASSESS-DOCD LE1/YR: CPT | Mod: CPTII,S$GLB,, | Performed by: OPHTHALMOLOGY

## 2024-10-07 PROCEDURE — 1126F AMNT PAIN NOTED NONE PRSNT: CPT | Mod: CPTII,S$GLB,, | Performed by: OPHTHALMOLOGY

## 2024-10-07 PROCEDURE — 1160F RVW MEDS BY RX/DR IN RCRD: CPT | Mod: CPTII,S$GLB,, | Performed by: OPHTHALMOLOGY

## 2024-10-07 PROCEDURE — 99214 OFFICE O/P EST MOD 30 MIN: CPT | Mod: S$GLB,,, | Performed by: OPHTHALMOLOGY

## 2024-10-07 PROCEDURE — 1159F MED LIST DOCD IN RCRD: CPT | Mod: CPTII,S$GLB,, | Performed by: OPHTHALMOLOGY

## 2024-10-07 PROCEDURE — 92250 FUNDUS PHOTOGRAPHY W/I&R: CPT | Mod: S$GLB,,, | Performed by: OPHTHALMOLOGY

## 2024-10-07 RX ORDER — LATANOPROST 50 UG/ML
1 SOLUTION/ DROPS OPHTHALMIC NIGHTLY
Qty: 7.5 ML | Refills: 3 | Status: SHIPPED | OUTPATIENT
Start: 2024-10-07 | End: 2025-10-07

## 2024-10-29 DIAGNOSIS — Z00.00 ENCOUNTER FOR MEDICARE ANNUAL WELLNESS EXAM: ICD-10-CM

## 2024-11-11 RX ORDER — DOXAZOSIN 4 MG/1
4 TABLET ORAL NIGHTLY
Qty: 90 TABLET | Refills: 2 | Status: SHIPPED | OUTPATIENT
Start: 2024-11-11

## 2024-11-11 NOTE — TELEPHONE ENCOUNTER
No care due was identified.  [facility] Embedded Care Due Messages. Reference number: [id number].   11/11/2024 7:48:08 AM CST

## 2024-11-11 NOTE — TELEPHONE ENCOUNTER
Refill Decision Note   Fred Schwab  is requesting a refill authorization.  Brief Assessment and Rationale for Refill:  Approve     Medication Therapy Plan:        Comments:     Note composed:1:42 PM 11/11/2024

## 2024-12-24 DIAGNOSIS — I10 HYPERTENSION, UNSPECIFIED TYPE: ICD-10-CM

## 2024-12-24 RX ORDER — PROPRANOLOL HYDROCHLORIDE 10 MG/1
TABLET ORAL
Qty: 180 TABLET | Refills: 2 | Status: SHIPPED | OUTPATIENT
Start: 2024-12-24

## 2024-12-24 NOTE — TELEPHONE ENCOUNTER
Refill Decision Note   Fred Schwab  is requesting a refill authorization.  Brief Assessment and Rationale for Refill:  Approve     Medication Therapy Plan:        Comments:     Note composed:7:56 AM 12/24/2024

## 2024-12-24 NOTE — TELEPHONE ENCOUNTER
No care due was identified.  [facility] Embedded Care Due Messages. Reference number: [id number].   12/24/2024 7:27:59 AM CST

## 2025-01-17 ENCOUNTER — TELEPHONE (OUTPATIENT)
Dept: INTERNAL MEDICINE | Facility: CLINIC | Age: 85
End: 2025-01-17
Payer: MEDICARE

## 2025-01-17 NOTE — TELEPHONE ENCOUNTER
----- Message from Barbra sent at 1/17/2025  8:21 AM CST -----  Contact: Patient  [phone]  [email or web address]     Patient is calling for Medical Advice regarding:patient reports 2 months of acute knee pain - right knee & is asking for a referral or appt    How long has patient had these symptoms:    Pharmacy name and phone#:    Patient wants a call back or thru [facility]:    Comments:Patient  [phone]    Please advise patient replies from provider may take up to 48 hours.

## 2025-01-25 ENCOUNTER — OFFICE VISIT (OUTPATIENT)
Dept: ORTHOPEDICS | Facility: CLINIC | Age: 85
End: 2025-01-25
Payer: MEDICARE

## 2025-01-25 ENCOUNTER — HOSPITAL ENCOUNTER (OUTPATIENT)
Dept: RADIOLOGY | Facility: HOSPITAL | Age: 85
Discharge: HOME | End: 2025-01-25
Payer: MEDICARE

## 2025-01-25 ENCOUNTER — OFFICE VISIT (OUTPATIENT)
Dept: INTERNAL MEDICINE | Facility: CLINIC | Age: 85
End: 2025-01-25
Payer: MEDICARE

## 2025-01-25 VITALS
BODY MASS INDEX: 23.28 KG/M2 | OXYGEN SATURATION: 97 % | BODY MASS INDEX: 23.62 KG/M2 | HEIGHT: 72 IN | SYSTOLIC BLOOD PRESSURE: 130 MMHG | HEART RATE: 59 BPM | DIASTOLIC BLOOD PRESSURE: 80 MMHG | WEIGHT: 174.38 LBS | HEIGHT: 72 IN | WEIGHT: 171.88 LBS

## 2025-01-25 DIAGNOSIS — R42 EPISODIC LIGHTHEADEDNESS: Primary | ICD-10-CM

## 2025-01-25 DIAGNOSIS — E04.1 THYROID NODULE: ICD-10-CM

## 2025-01-25 DIAGNOSIS — R52 PAIN: ICD-10-CM

## 2025-01-25 DIAGNOSIS — M17.12 PRIMARY OSTEOARTHRITIS OF LEFT KNEE: Primary | ICD-10-CM

## 2025-01-25 PROCEDURE — 1101F PT FALLS ASSESS-DOCD LE1/YR: CPT | Mod: CPTII,S$GLB,,

## 2025-01-25 PROCEDURE — 73564 X-RAY EXAM KNEE 4 OR MORE: CPT | Mod: 26,LT,, | Performed by: RADIOLOGY

## 2025-01-25 PROCEDURE — 73564 X-RAY EXAM KNEE 4 OR MORE: CPT | Mod: TC,LT

## 2025-01-25 PROCEDURE — 1159F MED LIST DOCD IN RCRD: CPT | Mod: CPTII,S$GLB,,

## 2025-01-25 PROCEDURE — 1126F AMNT PAIN NOTED NONE PRSNT: CPT | Mod: CPTII,S$GLB,,

## 2025-01-25 PROCEDURE — 3288F FALL RISK ASSESSMENT DOCD: CPT | Mod: CPTII,S$GLB,,

## 2025-01-25 PROCEDURE — 99999 PR PBB SHADOW E&M-EST. PATIENT-LVL III: CPT | Mod: PBBFAC,,,

## 2025-01-25 PROCEDURE — 3079F DIAST BP 80-89 MM HG: CPT | Mod: CPTII,S$GLB,,

## 2025-01-25 PROCEDURE — 3075F SYST BP GE 130 - 139MM HG: CPT | Mod: CPTII,S$GLB,,

## 2025-01-25 PROCEDURE — 99213 OFFICE O/P EST LOW 20 MIN: CPT | Mod: S$GLB,,,

## 2025-01-25 PROCEDURE — 73562 X-RAY EXAM OF KNEE 3: CPT | Mod: 26,59,RT, | Performed by: RADIOLOGY

## 2025-01-25 PROCEDURE — 99999 PR PBB SHADOW E&M-EST. PATIENT-LVL IV: CPT | Mod: PBBFAC,,,

## 2025-01-25 PROCEDURE — 1160F RVW MEDS BY RX/DR IN RCRD: CPT | Mod: CPTII,S$GLB,,

## 2025-01-25 NOTE — PROGRESS NOTES
"  SUBJECTIVE:     Chief Complaint & History of Present Illness:  History of Present Illness    CHIEF COMPLAINT:  - Left knee pain    HPI:  Mr. Schwab presents with left knee pain ongoing for a few months. Pain is primarily located on the inside of the left knee and described as bothersome, especially after prolonged sitting. Mr. Schwab reports that pain typically occurs when getting up after sitting for an extended period and requires some time to "warm up." Mr. Schwab denies any specific accidents or injuries related to the knee pain.    Mr. Schwab is quite active and participates in pickleball with a program for people over 50. Knee pain does not bother patient during physical activity, including pickleball. Right knee occasionally experiences minor discomfort, but it is significantly less bothersome compared to the left knee.    Mr. Schwab has not been taking any medication for knee pain and has not found any specific measures that alleviate the discomfort besides exercise. Mr. Schwab indicates that the current level of discomfort is manageable.    Mr. Schwab denies any swelling or redness around the knee.  He denies mechanical symptoms or feelings of instability.    WORK STATUS:  - Retired  - Active and plays pickleball with a program for people over 50    ROS:  Musculoskeletal: +joint pain, -joint swelling, -joint stiffness          Past Medical History:   Diagnosis Date    Actinic keratosis     Allergy     Arthritis     Basal cell carcinoma     left nasofacial sulcus    BPH (benign prostatic hyperplasia)     Colon polyp     benign    Dermatochalasis     Elevated PSA     Headache(784.0)     HEARING LOSS     Hypertension     Low tension glaucoma     Multiple thyroid nodules 11/27/2017    Need repeat thyroid ultrasound in November 2018.    Neuropathy     Otitis media     Squamous cell carcinoma 12/10/2016    left upper neck- in situ    Urinary tract infection        Past Surgical History:   Procedure Laterality " Date    BELPHAROPTOSIS REPAIR      CATARACT EXTRACTION W/  INTRAOCULAR LENS IMPLANT Right 04/12/2023    CE AND OMNI ()    CATARACT EXTRACTION W/  INTRAOCULAR LENS IMPLANT Left 01/25/2023    CE AND [facility] ()    CATARACT EXTRACTION W/ INTRAOCULAR LENS IMPLANT Left 01/25/2023    Dr. Snyder    COLON SURGERY      COLONOSCOPY      COLONOSCOPY N/A 10/26/2015    Procedure: COLONOSCOPY;  Surgeon: Diony Kennedy MD;  Location: [facility] ENDO (4TH FLR);  Service: Endoscopy;  Laterality: N/A;    COLONOSCOPY N/A 02/10/2020    Procedure: COLONOSCOPY;  Surgeon: Gunnar Avalos MD;  Location: [facility] ENDO (4TH FLR);  Service: Endoscopy;  Laterality: N/A;    ESOPHAGOGASTRODUODENOSCOPY N/A 05/29/2020    Procedure: EGD (ESOPHAGOGASTRODUODENOSCOPY);  Surgeon: Gunnar Avalos MD;  Location: [facility] ENDO (4TH FLR);  Service: Endoscopy;  Laterality: N/A;  COVID screening scheduled on 5/28/20 at Primary care-  pt updated on drop off location and no visitor policy-    GONIOTOMY Left 01/25/2023    Procedure: GONIOTOMY;  Surgeon: Latonia Snyder MD;  Location: [facility] OR [address] [place] [address];  Service: Ophthalmology;  Laterality: Left;    GONIOTOMY Right 4/12/2023    Procedure: GONIOTOMY;  Surgeon: Latonia Snyder MD;  Location: [facility] OR [address] [place] [address];  Service: Ophthalmology;  Laterality: Right;  OMNI    INTRAOCULAR PROSTHESES INSERTION Left 01/25/2023    Procedure: INSERTION, IOL PROSTHESIS;  Surgeon: Latonia Snyder MD;  [facility];  Service: Ophthalmology;  Laterality: Left;    INTRAOCULAR PROSTHESES INSERTION Right 4/12/2023    Procedure: INSERTION, IOL PROSTHESIS;  Surgeon: Latonia Snyder MD;  Location: [facility] OR [address] [place] [address];  Service: Ophthalmology;  Laterality: Right;    [facility] Surgery      PHACOEMULSIFICATION OF CATARACT Right 4/12/2023    Procedure: PHACOEMULSIFICATION, CATARACT;  Surgeon: Latonia Snyder MD;  Location: [facility] OR [address] [place] [address];  Service: Ophthalmology;  Laterality: Right;    PROSTATE  BIOPSY      SELECTIVE LASER TRABECULOPLASTY Bilateral 01/27/2022 AND 2/10/2022           Family History   Problem Relation Name Age of Onset    Cancer Father          prostate    Heart disease Father      Stroke Father      Hypertension Mother      Diabetes Mother      Glaucoma Mother      Diabetes Sister      Pancreatic cancer Sister  75    Skin cancer Neg Hx      Melanoma Neg Hx      Amblyopia Neg Hx      Blindness Neg Hx      Cataracts Neg Hx      Macular degeneration Neg Hx      Retinal detachment Neg Hx      Strabismus Neg Hx         Review of patient's allergies indicates:   Allergen Reactions    Clindamycin Rash    Doxycycline hyclate Rash    Ciprofloxacin      Tendon tear.    Iodine and iodide containing products Rash     Got red but no itching    Penicillins     Tizanidine hcl      Unknown           Current Outpatient Medications:     ascorbic acid, vitamin C, (VITAMIN C) 500 MG tablet, Take by mouth. 1 Tablet Oral Every day, Disp: , Rfl:     betamethasone dipropionate 0.05 % cream, aaa qd prn rash. Not more than 2 weeks straight in same location. Avoid use on face and groin, Disp: 45 g, Rfl: 1    calcium carbonate (OS-DIAMOND) 600 mg (1,500 mg) Tab, Take 600 mg by mouth once daily. 1 Tablet Oral Every day, Disp: , Rfl:     cholecalciferol, vitamin D3, 10 mcg (400 unit) Cap, Take 1 capsule by mouth once daily., Disp: , Rfl:     ciclopirox (PENLAC) 8 % Soln, Apply to affected nails nightly; remove weekly;, Disp: 6.6 mL, Rfl: 11    co-enzyme Q-10 30 mg capsule, Take 30 mg by mouth 3 (three) times daily., Disp: , Rfl:     diclofenac sodium (VOLTAREN) 1 % Gel, Apply topically as needed., Disp: , Rfl:     doxazosin (CARDURA) 4 MG tablet, TAKE ONE TABLET BY MOUTH EVERY EVENING, Disp: 90 tablet, Rfl: 2    fluocinolone acetonide oiL 0.01 % Drop, Place 4 drops in ear(s) 2 (two) times daily., Disp: 20 mL, Rfl: 5    ketoconazole (NIZORAL) 2 % cream, aaa bid on  left forearm x 3 weeks, Disp: 60 g, Rfl: 3     latanoprost 0.005 % ophthalmic solution, Place 1 drop into both eyes every evening., Disp: 7.5 mL, Rfl: 3    loratadine (CLARITIN) 10 mg tablet, Take 10 mg by mouth daily as needed. Every day, Disp: , Rfl:     mometasone (ELOCON) 0.1 % solution, Mix entire bottle in jar of cerave cream and aaa qd- bid prn itching, Disp: 60 mL, Rfl: 3    OMEGA-3 FATTY ACIDS/FISH OIL (OMEGA 3 FISH OIL ORAL), Take by mouth. 1 Capsule Oral Every day, Disp: , Rfl:     propranoloL (INDERAL) 10 MG tablet, TAKE ONE TABLET BY MOUTH TWICE DAILY, Disp: 180 tablet, Rfl: 2    sildenafiL (VIAGRA) 100 MG tablet, Take 1 tablet (100 mg total) by mouth as needed for Erectile Dysfunction. as directed., Disp: 30 tablet, Rfl: 5    terbinafine HCL (LAMISIL) 1 % cream, Apply topically nightly., Disp: , Rfl:     triamcinolone acetonide 0.025% (KENALOG) 0.025 % cream, aaa qhs prn flare.  Not more than 1-2 weeks straight in same location, Disp: 45 g, Rfl: 1    vit A palm,D3 in cod liver oil 1,250 unit-130 unit-530 mg Cap, Take 1 capsule by mouth once daily. , Disp: , Rfl:       OBJECTIVE:     PHYSICAL EXAM:  Ht 6' (1.829 m)   Wt 78 kg (171 lb 13.6 oz)   BMI 23.31 kg/m²   General: Pleasant, cooperative, NAD.  HEENT: NCAT, sclera nonicteric.  Lungs: Respirations are equal and unlabored.   Abdomen: Soft and non-tender.  CV: 2+ bilateral upper and lower extremity pulses.  Neuro: Sensation intact to light touch.  Skin: Intact throughout LE with no rashes, erythema, or lesions.  Extremities: No LE edema, NVI lower extremities. normal gait.    left Knee Exam:  Knee Range of Motion: 2-120   Effusion: none  Condition of skin: intact  Location of tenderness: Medial joint line   Strength: 5/5 quadriceps strength, 5/5 gastroc-soleus strength, 5/5 hamstring strength, and 5/5 tibialis anterior strength  Stability: stable to testing  Knee Alignment: normal  Gisella: negative    right Knee Exam:  Knee Range of Motion: 2-120   Effusion: none  Condition of skin:  intact  Location of tenderness: None   Strength: 5/5 quadriceps strength, 5/5 gastroc-soleus strength, 5/5 hamstring strength, and 5/5 tibialis anterior strength  Knee alignment: normal  Stability: stable to testing  Gisella: negative    RADIOGRAPHS:  X-rays of the left knee taken today personally reviewed. Imaging reveals no acute fractures or dislocations.  Mild tricompartmental osteoarthritic changes with satisfactory preservation of joint spacing bilaterally.      ASSESSMENT:       ICD-10-CM ICD-9-CM   1. Primary osteoarthritis of left knee  M17.12 715.16   2. Pain  R52 780.96       PLAN:     We discussed with the patient at length all the different treatment options available including anti-inflammatories, acetaminophen, rest, ice, knee strengthening exercise, occasional cortisone injections for temporary relief, Viscosupplimentation injections, arthroscopic debridement, osteotomy, and finally knee arthroplasty.     Assessment & Plan    - Discussed potential future steroid injections in the knee as an option if pain worsens.  - Continue exercise as tolerated.   - Ice and elevation as needed.   - Take oral anti-inflammatory medication as needed for pain.  - Follow up as needed for new or worsening symptoms.          This note was generated with the assistance of ambient listening technology. Verbal consent was obtained by the patient and accompanying visitor(s) for the recording of patient appointment to facilitate this note. I attest to having reviewed and edited the generated note for accuracy, though some syntax or spelling errors may persist. Please contact the author of this note for any clarification.         Jeovanny Dasilva PA-C

## 2025-01-25 NOTE — PROGRESS NOTES
INTERNAL MEDICINE URGENT CARE NOTE    CHIEF COMPLAINT     Tavares presents today for episodes of lightheadedness and weakness.    HPI     Fred Schwab Jr. is a 84 y.o. male with BPH, elevated PSA, colon polyps, low tension glaucoma, multinodular thyroid, hypertension, hyperlipidemia, peripheral neuropathy involving lower extremities, basal and squamous cell carcinoma, and tremors who presents for an urgent care visit today.    HISTORY OF PRESENT ILLNESS:  He has experienced 4-6 episodes of lightheadedness and weakness over the past month, each lasting approximately 10-15 seconds. Symptoms worsen with physical exertion and are accompanied by mild shortness of breath. He denies room-spinning sensations but reports feeling anxious during these episodes. He has a history of more severe similar episodes in the past year, which were determined to be related to alcohol consumption, specifically occurring the day after drinking. These episodes were particularly notable during a vacation when his alcohol consumption increased from his usual habit of one beer per week.Tavares reports feeling anxious and uneasy during episodes Denies CP, palpitations, or radiating pain.  No weakness in the legs or arms. He stays active playing [id number]     DIET AND FLUID INTAKE:  He maintains a healthy diet with nutritious meals prepared by his wife. He admits decreased water consumption and that his wife is always encouraging him to drink. Reports often feeling thirsty.     Review of Systems:  General: -fever, -chills, -fatigue, -weight gain, -weight loss  Eyes: -vision changes, -redness, -discharge  ENT: -ear pain, -nasal congestion, -sore throat  Cardiovascular: -chest pain, -palpitations, -lower extremity edema  Respiratory: -cough, +shortness of breath  Gastrointestinal: -abdominal pain, -nausea, -vomiting, -diarrhea, -constipation, -blood in stool  Genitourinary: -dysuria, -hematuria, -frequency  Musculoskeletal: -joint pain, -muscle  pain  Skin: +rash, -lesion  Neurological: -headache, -dizziness, -numbness, -tingling, +lightheadedness, +weakness  Psychiatric: -anxiety, -depression, -sleep difficulty        Past Medical History:  Past Medical History:   Diagnosis Date    Actinic keratosis     Allergy     Arthritis     Basal cell carcinoma     left nasofacial sulcus    BPH (benign prostatic hyperplasia)     Colon polyp     benign    Dermatochalasis     Elevated PSA     Headache(784.0)     HEARING LOSS     Hypertension     Low tension glaucoma     Multiple thyroid nodules 11/27/2017    Need repeat thyroid ultrasound in November 2018.    Neuropathy     Otitis media     Squamous cell carcinoma 12/10/2016    left upper neck- in situ    Urinary tract infection      Home Medications:  Prior to Admission medications    Medication Sig Start Date End Date Taking? Authorizing Provider   ascorbic acid, vitamin C, (VITAMIN C) 500 MG tablet Take by mouth. 1 Tablet Oral Every day    Provider, Historical   betamethasone dipropionate 0.05 % cream aaa qd prn rash. Not more than 2 weeks straight in same location. Avoid use on face and groin 10/2/23   Nieves Cassidy MD   calcium carbonate (OS-DIAMOND) 600 mg (1,500 mg) Tab Take 600 mg by mouth once daily. 1 Tablet Oral Every day    Provider, Historical   cholecalciferol, vitamin D3, 10 mcg (400 unit) Cap Take 1 capsule by mouth once daily. 1/15/21   Provider, Historical   ciclopirox (PENLAC) 8 % Soln Apply to affected nails nightly; remove weekly; 7/17/24   Nieves Cassidy MD   co-enzyme Q-10 30 mg capsule Take 30 mg by mouth 3 (three) times daily.    Provider, Historical   diclofenac sodium (VOLTAREN) 1 % Gel Apply topically as needed. 7/18/22   Provider, Historical   doxazosin (CARDURA) 4 MG tablet TAKE ONE TABLET BY MOUTH EVERY EVENING 11/11/24   Marco Antonio Vaughan MD   fluocinolone acetonide oiL 0.01 % Drop Place 4 drops in ear(s) 2 (two) times daily. 3/31/20   PARK Zambrano MD   ketoconazole (NIZORAL) 2 %  cream aaa bid on  left forearm x 3 weeks 7/17/24   Nieves Cassidy MD   latanoprost 0.005 % ophthalmic solution Place 1 drop into both eyes every evening. 10/7/24 10/7/25  Latonia Snyder MD   loratadine (CLARITIN) 10 mg tablet Take 10 mg by mouth daily as needed. Every day 12/5/11   Provider, Historical   mometasone (ELOCON) 0.1 % solution Mix entire bottle in jar of cerave cream and aaa qd- bid prn itching 8/12/24   Nieves Cassidy MD   OMEGA-3 FATTY ACIDS/FISH OIL (OMEGA 3 FISH OIL ORAL) Take by mouth. 1 Capsule Oral Every day    Provider, Historical   propranoloL (INDERAL) 10 MG tablet TAKE ONE TABLET BY MOUTH TWICE DAILY 12/24/24   Marco Antonio Vaughan MD   sildenafiL (VIAGRA) 100 MG tablet Take 1 tablet (100 mg total) by mouth as needed for Erectile Dysfunction. as directed. 4/30/24   Marco Antonio Vaughan MD   terbinafine HCL (LAMISIL) 1 % cream Apply topically nightly. 1/1/1970   Provider, Historical   triamcinolone acetonide 0.025% (KENALOG) 0.025 % cream aaa qhs prn flare.  Not more than 1-2 weeks straight in same location 10/2/23   Nieves Cassidy MD   vit A palm,D3 in cod liver oil 1,250 unit-130 unit-530 mg Cap Take 1 capsule by mouth once daily.  1/1/15   Provider, Historical     PHYSICAL EXAM     Vitals: Blood pressure: 138/80.  General: No acute distress. Well-developed. Well-nourished.  Eyes: EOMI. Sclerae anicteric.  HENT: Normocephalic. Atraumatic. Nares patent. Moist oral mucosa.  Ears: Bilateral TMs clear. Bilateral EACs clear.  Cardiovascular: Regular rate. Regular rhythm. No murmurs. No rubs. No gallops. Normal S1, S2.  Respiratory: Normal respiratory effort. Clear to auscultation bilaterally. No rales. No rhonchi. No wheezing.  Abdomen: Soft. Non-tender. Non-distended. Normoactive bowel sounds.  Musculoskeletal: No  obvious deformity.  Extremities: No lower extremity edema.  Neurological: Alert & oriented x3. No slurred speech. Normal gait.  Psychiatric: Normal mood. Normal affect. Good  insight. Good judgment.  Skin: Warm. Dry. No rash.        /80 (BP Location: Left arm, Patient Position: Sitting)   Pulse (!) 59   Ht 6' (1.829 m)   Wt 79.1 kg (174 lb 6.1 oz)   SpO2 97%   BMI 23.65 kg/m²     ASSESSMENT/PLAN     IMPRESSION:  - Considered multiple etiologies for reported episodes of weakness and lightheadedness, including medication reaction, electrolyte imbalance, dehydration, ACS, AAA, orthostatic hypotension, and anemia  - Reviewed patient's chart. Similar episodes but with near syncope in Oct 2023.  10/2023- Normal findings on cardiac event monitor. Carotid US findings 10/2023 with no evidence of a hemodynamically significant carotid bifurcation stenosis. EKG 10/2023 -   Sinus bradycardia, Left axis deviation, Abnormal ECG, When compared with ECG of 03-JUN-2022 09:39,, No significant change was found   - Ruled out orthostatic hypotension with BP measurements  - Determined most probable cause to be electrolyte imbalance and/or dehydration based on patient's reported fluid intake habits and symptoms  - Ordered labs to further evaluate potential causes and rule out other conditions  - Consider cardiology consult and stress test (last done 2018) if symptoms persist after adequate hydration     WEAKNESS AND LIGHTHEADEDNESS:  - Orthostatic blood pressure readings.obtained- no orthostatic hypotension  - Ordered CBC, CMP, lipid panel, thyroid stimulating hormone (TSH), and urinalysis.  - Increase fluid intake to 6-8 glasses of water daily  - Advised the patient to contact the office if episodes become more frequent or more intense.    Patient education provided from [facility]. Patient was counseled on when and how to seek emergent care.   This note was generated with the assistance of ambient listening technology. Verbal consent was obtained by the patient and accompanying visitor(s) for the recording of patient appointment to facilitate this note. I attest to having reviewed and edited the  generated note for accuracy, though some syntax or spelling errors may persist. Please contact the author of this note for any clarification.       Rin LLOYD, DINAH, FNP-c   Department of Internal Medicine - [facility]  7:22 AM

## 2025-01-27 ENCOUNTER — PATIENT MESSAGE (OUTPATIENT)
Dept: INTERNAL MEDICINE | Facility: CLINIC | Age: 85
End: 2025-01-27
Payer: MEDICARE

## 2025-01-27 DIAGNOSIS — R42 DIZZINESS: Primary | ICD-10-CM

## 2025-01-29 ENCOUNTER — PATIENT MESSAGE (OUTPATIENT)
Dept: INTERNAL MEDICINE | Facility: CLINIC | Age: 85
End: 2025-01-29
Payer: MEDICARE

## 2025-01-29 ENCOUNTER — TELEPHONE (OUTPATIENT)
Dept: INTERNAL MEDICINE | Facility: CLINIC | Age: 85
End: 2025-01-29
Payer: MEDICARE

## 2025-01-29 NOTE — TELEPHONE ENCOUNTER
----- Message from TOÑO Morales sent at 1/27/2025  1:11 PM CST -----  Please call him to let him know I've put in a referral to cardiology for his dizziness. Labs were normal.

## 2025-01-30 ENCOUNTER — TELEPHONE (OUTPATIENT)
Dept: CARDIOLOGY | Facility: CLINIC | Age: 85
End: 2025-01-30
Payer: MEDICARE

## 2025-02-03 ENCOUNTER — OFFICE VISIT (OUTPATIENT)
Dept: CARDIOLOGY | Facility: CLINIC | Age: 85
End: 2025-02-03
Payer: MEDICARE

## 2025-02-03 VITALS
DIASTOLIC BLOOD PRESSURE: 80 MMHG | OXYGEN SATURATION: 96 % | BODY MASS INDEX: 25.53 KG/M2 | SYSTOLIC BLOOD PRESSURE: 150 MMHG | HEART RATE: 50 BPM | HEIGHT: 69 IN | WEIGHT: 172.38 LBS

## 2025-02-03 DIAGNOSIS — I95.1 ORTHOSTASIS: ICD-10-CM

## 2025-02-03 DIAGNOSIS — R00.1 BRADYCARDIA: Primary | ICD-10-CM

## 2025-02-03 DIAGNOSIS — R01.1 UNDIAGNOSED CARDIAC MURMURS: ICD-10-CM

## 2025-02-03 DIAGNOSIS — R42 DIZZINESS: ICD-10-CM

## 2025-02-03 DIAGNOSIS — I10 HYPERTENSION, UNSPECIFIED TYPE: ICD-10-CM

## 2025-02-03 PROCEDURE — 93000 ELECTROCARDIOGRAM COMPLETE: CPT | Mod: S$GLB,,, | Performed by: INTERNAL MEDICINE

## 2025-02-03 PROCEDURE — 1160F RVW MEDS BY RX/DR IN RCRD: CPT | Mod: CPTII,GC,S$GLB, | Performed by: STUDENT IN AN ORGANIZED HEALTH CARE EDUCATION/TRAINING PROGRAM

## 2025-02-03 PROCEDURE — 3077F SYST BP >= 140 MM HG: CPT | Mod: CPTII,GC,S$GLB, | Performed by: STUDENT IN AN ORGANIZED HEALTH CARE EDUCATION/TRAINING PROGRAM

## 2025-02-03 PROCEDURE — 1101F PT FALLS ASSESS-DOCD LE1/YR: CPT | Mod: CPTII,GC,S$GLB, | Performed by: STUDENT IN AN ORGANIZED HEALTH CARE EDUCATION/TRAINING PROGRAM

## 2025-02-03 PROCEDURE — 1126F AMNT PAIN NOTED NONE PRSNT: CPT | Mod: CPTII,GC,S$GLB, | Performed by: STUDENT IN AN ORGANIZED HEALTH CARE EDUCATION/TRAINING PROGRAM

## 2025-02-03 PROCEDURE — 1159F MED LIST DOCD IN RCRD: CPT | Mod: CPTII,GC,S$GLB, | Performed by: STUDENT IN AN ORGANIZED HEALTH CARE EDUCATION/TRAINING PROGRAM

## 2025-02-03 PROCEDURE — 99999 PR PBB SHADOW E&M-EST. PATIENT-LVL V: CPT | Mod: PBBFAC,GC,, | Performed by: STUDENT IN AN ORGANIZED HEALTH CARE EDUCATION/TRAINING PROGRAM

## 2025-02-03 PROCEDURE — 99203 OFFICE O/P NEW LOW 30 MIN: CPT | Mod: GC,S$GLB,, | Performed by: STUDENT IN AN ORGANIZED HEALTH CARE EDUCATION/TRAINING PROGRAM

## 2025-02-03 PROCEDURE — 3288F FALL RISK ASSESSMENT DOCD: CPT | Mod: CPTII,GC,S$GLB, | Performed by: STUDENT IN AN ORGANIZED HEALTH CARE EDUCATION/TRAINING PROGRAM

## 2025-02-03 PROCEDURE — 3079F DIAST BP 80-89 MM HG: CPT | Mod: CPTII,GC,S$GLB, | Performed by: STUDENT IN AN ORGANIZED HEALTH CARE EDUCATION/TRAINING PROGRAM

## 2025-02-03 RX ORDER — AMLODIPINE BESYLATE 2.5 MG/1
2.5 TABLET ORAL DAILY
Qty: 90 TABLET | Refills: 3 | Status: SHIPPED | OUTPATIENT
Start: 2025-02-03 | End: 2026-02-03

## 2025-02-03 NOTE — PROGRESS NOTES
Cardiology Clinic Note  Reason for Visit: dizziness    HPI:   Referral made on 1/27/25 by Rin Perea NP for dizziness.    84M pmhx BPH, multinodular thyroid, htn, hld, peripheral neuropathy of lower extremities. Patient reports 2 recent events that concerned him. In December he had played in a pickleball match and while waiting for his next game, he felt a wave of fatigue and lightheadedness that lasted a minute. He didn't lose consciousness, but had to take a seat. He had a similar episode in January when he was visiting his daughter and was standing in the house. However, he does not have any anginal nor heart failure symptoms at baseline. He typically plays pickleball weekly without issue, and even walked 2 miles yesterday. Denies orthopnea or leg swelling. He does report that he doesn't drink much fluid, only having drank 5oz of orange juice and 8oz of water this morning+afternoon. He is also on propranolol for blood pressure (didn't help his tremors much), and has sinus nella at 49bpm in the clinic today. LAFB present, but no evidence of high grade block. Orthostatics were taken today and were negative, however reported dizziness during the test. No recent echo    In 11/7/23 he were a cardiac event monitor for 44 days. He triggered 3 events which correlated with NSR,  PAC/PVC, x1 sinoatrial exit block vs blocked PAC.     V: ly150/74, sit 141/67, st 150/80    1/25/25 CMP normal, CBC hgb 13.3, TSH normal    ROS:    Constitution: Negative for fever, chills, weight loss or gain.   HENT: Negative for sore throat, rhinorrhea, or headache.  Eyes: Negative for blurred or double vision.   Cardiovascular: See above  Pulmonary: Negative for SOB   Gastrointestinal: Negative for abdominal pain, nausea, vomiting, or diarrhea.   : Negative for dysuria.   Neurological: Negative for focal weakness or sensory changes.  PMH:     Past Medical History:   Diagnosis Date    Actinic keratosis     Allergy     Arthritis     Basal  cell carcinoma     left nasofacial sulcus    BPH (benign prostatic hyperplasia)     Colon polyp     benign    Dermatochalasis     Elevated PSA     Headache(784.0)     HEARING LOSS     Hypertension     Low tension glaucoma     Multiple thyroid nodules 11/27/2017    Need repeat thyroid ultrasound in November 2018.    Neuropathy     Otitis media     Squamous cell carcinoma 12/10/2016    left upper neck- in situ    Urinary tract infection      Past Surgical History:   Procedure Laterality Date    BELPHAROPTOSIS REPAIR      CATARACT EXTRACTION W/  INTRAOCULAR LENS IMPLANT Right 04/12/2023    CE AND OMNI ()    CATARACT EXTRACTION W/  INTRAOCULAR LENS IMPLANT Left 01/25/2023    CE AND [facility]OOK ()    CATARACT EXTRACTION W/ INTRAOCULAR LENS IMPLANT Left 01/25/2023    Dr. Snyder    COLON SURGERY      COLONOSCOPY      COLONOSCOPY N/A 10/26/2015    Procedure: COLONOSCOPY;  Surgeon: Diony Kennedy MD;  Location: [facility] (4TH FLR);  Service: Endoscopy;  Laterality: N/A;    COLONOSCOPY N/A 02/10/2020    Procedure: COLONOSCOPY;  Surgeon: Gunnar Avalos MD;  Location: [facility] (4TH FLR);  Service: Endoscopy;  Laterality: N/A;    ESOPHAGOGASTRODUODENOSCOPY N/A 05/29/2020    Procedure: EGD (ESOPHAGOGASTRODUODENOSCOPY);  Surgeon: Gunnar Avalos MD;  Location: [facility] (4TH FLR);  Service: Endoscopy;  Laterality: N/A;  COVID screening scheduled on 5/28/20 at Primary care-  pt updated on drop off location and no visitor policy-rb    GONIOTOMY Left 01/25/2023    Procedure: GONIOTOMY;  Surgeon: Latonia Snyder MD;  Location: [facility] OR [address] [place] [address];  Service: Ophthalmology;  Laterality: Left;    GONIOTOMY Right 4/12/2023    Procedure: GONIOTOMY;  Surgeon: Latonia Snyder MD;  [facility];  Service: Ophthalmology;  Laterality: Right;  OMNI    INTRAOCULAR PROSTHESES INSERTION Left 01/25/2023    Procedure: INSERTION, IOL PROSTHESIS;  Surgeon: Latonia Snyder MD;  Location: [facility] OR  1ST FLR;  Service: Ophthalmology;  Laterality: Left;    INTRAOCULAR PROSTHESES INSERTION Right 4/12/2023    Procedure: INSERTION, IOL PROSTHESIS;  Surgeon: Latonia Snyder MD;  [facility];  Service: Ophthalmology;  Laterality: Right;    [facility] Surgery      PHACOEMULSIFICATION OF CATARACT Right 4/12/2023    Procedure: PHACOEMULSIFICATION, CATARACT;  Surgeon: Latonia Snyder MD;  Location: [facility] OR [address] [place] [address];  Service: Ophthalmology;  Laterality: Right;    PROSTATE BIOPSY      SELECTIVE LASER TRABECULOPLASTY Bilateral 01/27/2022 AND 2/10/2022         Allergies:     Review of patient's allergies indicates:   Allergen Reactions    Clindamycin Rash    Doxycycline hyclate Rash    Ciprofloxacin      Tendon tear.    Iodine and iodide containing products Rash     Got red but no itching    Penicillins     Tizanidine hcl      Unknown     Medications:     Current Outpatient Medications on File Prior to Visit   Medication Sig Dispense Refill    ascorbic acid, vitamin C, (VITAMIN C) 500 MG tablet Take by mouth. 1 Tablet Oral Every day      betamethasone dipropionate 0.05 % cream aaa qd prn rash. Not more than 2 weeks straight in same location. Avoid use on face and groin 45 g 1    calcium carbonate (OS-DIAMOND) 600 mg (1,500 mg) Tab Take 600 mg by mouth once daily. 1 Tablet Oral Every day      cholecalciferol, vitamin D3, 10 mcg (400 unit) Cap Take 1 capsule by mouth once daily.      ciclopirox (PENLAC) 8 % Soln Apply to affected nails nightly; remove weekly; 6.6 mL 11    co-enzyme Q-10 30 mg capsule Take 30 mg by mouth 3 (three) times daily.      diclofenac sodium (VOLTAREN) 1 % Gel Apply topically as needed.      doxazosin (CARDURA) 4 MG tablet TAKE ONE TABLET BY MOUTH EVERY EVENING 90 tablet 2    fluocinolone acetonide oiL 0.01 % Drop Place 4 drops in ear(s) 2 (two) times daily. 20 mL 5    ketoconazole (NIZORAL) 2 % cream aaa bid on  left forearm x 3 weeks 60 g 3    latanoprost 0.005 % ophthalmic  "solution Place 1 drop into both eyes every evening. 7.5 mL 3    loratadine (CLARITIN) 10 mg tablet Take 10 mg by mouth daily as needed. Every day      OMEGA-3 FATTY ACIDS/FISH OIL (OMEGA 3 FISH OIL ORAL) Take by mouth. 1 Capsule Oral Every day      sildenafiL (VIAGRA) 100 MG tablet Take 1 tablet (100 mg total) by mouth as needed for Erectile Dysfunction. as directed. 30 tablet 5    terbinafine HCL (LAMISIL) 1 % cream Apply topically nightly.      triamcinolone acetonide 0.025% (KENALOG) 0.025 % cream aaa qhs prn flare.  Not more than 1-2 weeks straight in same location 45 g 1    vit A palm,D3 in cod liver oil 1,250 unit-130 unit-530 mg Cap Take 1 capsule by mouth once daily.       [DISCONTINUED] propranoloL (INDERAL) 10 MG tablet TAKE ONE TABLET BY MOUTH TWICE DAILY 180 tablet 2    [DISCONTINUED] mometasone (ELOCON) 0.1 % solution Mix entire bottle in jar of cerave cream and aaa qd- bid prn itching 60 mL 3     No current facility-administered medications on file prior to visit.     Social History:     Social History     Tobacco Use    Smoking status: Never    Smokeless tobacco: Never   Substance Use Topics    Alcohol use: Yes     Comment: every once in a while     Family History:     Family History   Problem Relation Name Age of Onset    Cancer Father          prostate    Heart disease Father      Stroke Father      Hypertension Mother      Diabetes Mother      Glaucoma Mother      Diabetes Sister      Pancreatic cancer Sister  75    Skin cancer Neg Hx      Melanoma Neg Hx      Amblyopia Neg Hx      Blindness Neg Hx      Cataracts Neg Hx      Macular degeneration Neg Hx      Retinal detachment Neg Hx      Strabismus Neg Hx       Physical Exam:   BP (!) 150/80   Pulse (!) 50   Ht 5' 9" (1.753 m)   Wt 78.2 kg (172 lb 6.4 oz)   SpO2 96%   BMI 25.46 kg/m²    Wt Readings from Last 4 Encounters:   02/03/25 78.2 kg (172 lb 6.4 oz)   01/25/25 79.1 kg (174 lb 6.1 oz)   01/25/25 78 kg (171 lb 13.6 oz)   07/26/24 78.6 kg " "(173 lb 4.5 oz)         Constitutional: No distress, conversant  HEENT: Sclera anicteric, PERRLA, EOMI  Neck: No JVD, no masses, good movement  CV: III/VI systolic murmur appreciate in the precordium, audible A2, does not radiate to the carotids nor axilla. There is a laterally displaced PMI, but no S3/S4. No biphasic JVD. There is no edema. Bradycardic and regular.  Pulm: Clear to auscultation bilaterally with symmetrical expansion.   GI: Abdomen soft, non-tender, good bowel sounds  Extremities: Both extremities intact and grossly normal, skin is warm, no edema noted  Skin: No ecchymosis, erythema, or ulcers  Psych: AOx3, appropriate affect  Neuro: CNII-XII intact, no focal deficits      Labs:     Lab Results   Component Value Date     01/25/2025    K 3.8 01/25/2025     01/25/2025    CO2 28 01/25/2025    BUN 16 01/25/2025    CREATININE 1.0 01/25/2025    ANIONGAP 8 01/25/2025     Lab Results   Component Value Date    HGBA1C 5.4 07/26/2024     Lab Results   Component Value Date    BNP 49 08/11/2020    Lab Results   Component Value Date    WBC 5.41 01/25/2025    HGB 13.3 (L) 01/25/2025    HCT 41.9 01/25/2025    HCT 44 03/20/2022     01/25/2025    GRAN 3.4 01/25/2025    GRAN 61.9 01/25/2025     Lab Results   Component Value Date    CHOL 175 01/25/2025    HDL 46 01/25/2025    LDLCALC 114.6 01/25/2025    TRIG 72 01/25/2025          Imaging:    Echo  No results found for: "EF"    Assessment:   84M pmhx BPH, multinodular thyroid, htn, hld, peripheral neuropathy of lower extremities who is here for assessment of dizziness. Likely multifactorial from poor hydration and bradycardia from propranolol    Plan:     1. Bradycardia    2. Dizziness    3. Orthostasis    4. Undiagnosed cardiac murmurs    5. Hypertension, unspecified type      A&P  84M pmhx BPH, multinodular thyroid, htn, hld, peripheral neuropathy of lower extremities who is here for assessment of dizziness. Likely multifactorial from poor hydration " and bradycardia from propranolol.  Reported dizziness during orthostatic vitals. Vitals were negative  HR 49 with NSR and LAFB on EKG. Takes propranolol  No recent Echo  -30 day cardiac monitor  -Repeat Echo  -Stop propranolol. Start low dose amlodipine 2.5mg. [place] blood pressure control in older patient with dizziness.    Will follow up studies and RTC PRN.    Signed:  Fernando Gallego M.D.  Cardiology Fellow  [facility]  2/3/2025 3:57 PM

## 2025-02-04 ENCOUNTER — PATIENT MESSAGE (OUTPATIENT)
Dept: INTERNAL MEDICINE | Facility: CLINIC | Age: 85
End: 2025-02-04
Payer: MEDICARE

## 2025-02-04 LAB
OHS QRS DURATION: 96 MS
OHS QTC CALCULATION: 422 MS

## 2025-02-05 NOTE — TELEPHONE ENCOUNTER
LOV with Marco Antonio Vaughan MD , 7/26/2024    Patient taken off of Propranolol by Cardiology and placed on Amlodipine. Patient requesting your review of this change. Also, patient states tremors have increased after discontinuing Propranolol.    Attempted to schedule an appointment no availability until 3/2025

## 2025-02-07 ENCOUNTER — HOSPITAL ENCOUNTER (OUTPATIENT)
Dept: CARDIOLOGY | Facility: HOSPITAL | Age: 85
Discharge: HOME | End: 2025-02-07
Attending: STUDENT IN AN ORGANIZED HEALTH CARE EDUCATION/TRAINING PROGRAM
Payer: MEDICARE

## 2025-02-07 VITALS
HEIGHT: 69 IN | WEIGHT: 172 LBS | SYSTOLIC BLOOD PRESSURE: 150 MMHG | HEART RATE: 60 BPM | DIASTOLIC BLOOD PRESSURE: 80 MMHG | BODY MASS INDEX: 25.48 KG/M2

## 2025-02-07 DIAGNOSIS — R00.1 BRADYCARDIA: ICD-10-CM

## 2025-02-07 DIAGNOSIS — R42 DIZZINESS: ICD-10-CM

## 2025-02-07 DIAGNOSIS — R01.1 UNDIAGNOSED CARDIAC MURMURS: ICD-10-CM

## 2025-02-07 LAB
ASCENDING AORTA: 3.51 CM
AV AREA BY CONTINUOUS VTI: 3.5 CM2
AV INDEX (PROSTH): 0.95
AV LVOT MEAN GRADIENT: 3 MMHG
AV LVOT PEAK GRADIENT: 6 MMHG
AV MEAN GRADIENT: 4 MMHG
AV PEAK GRADIENT: 7 MMHG
AV VALVE AREA BY VELOCITY RATIO: 3.5 CM²
AV VALVE AREA: 3.6 CM2
AV VELOCITY RATIO: 0.92
BSA FOR ECHO PROCEDURE: 1.95 M2
CV ECHO LV RWT: 0.53 CM
DOP CALC AO PEAK VEL: 1.3 M/S
DOP CALC AO VTI: 29.4 CM
DOP CALC LVOT AREA: 3.8 CM2
DOP CALC LVOT DIAMETER: 2.2 CM
DOP CALC LVOT PEAK VEL: 1.2 M/S
DOP CALC LVOT STROKE VOLUME: 105.6 CM3
DOP CALCLVOT PEAK VEL VTI: 27.8 CM
E WAVE DECELERATION TIME: 199 MS
E/A RATIO: 1.82
E/E' RATIO: 11 M/S
ECHO EF ESTIMATED: 63 %
ECHO LV POSTERIOR WALL: 1.2 CM (ref 0.6–1.1)
EJECTION FRACTION: 63 %
FRACTIONAL SHORTENING: 33.3 % (ref 28–44)
INTERVENTRICULAR SEPTUM: 1 CM (ref 0.6–1.1)
IVC DIAMETER: 3 CM
IVRT: 69 MS
LA MAJOR: 5.9 CM
LA MINOR: 5.9 CM
LA WIDTH: 4.5 CM
LEFT ATRIUM SIZE: 4.2 CM
LEFT ATRIUM VOLUME INDEX MOD: 55 ML/M2
LEFT ATRIUM VOLUME INDEX: 49 ML/M2
LEFT ATRIUM VOLUME MOD: 107 ML
LEFT ATRIUM VOLUME: 95 CM3
LEFT INTERNAL DIMENSION IN SYSTOLE: 3 CM (ref 2.1–4)
LEFT VENTRICLE DIASTOLIC VOLUME INDEX: 48.04 ML/M2
LEFT VENTRICLE DIASTOLIC VOLUME: 93.2 ML
LEFT VENTRICLE MASS INDEX: 90.2 G/M2
LEFT VENTRICLE SYSTOLIC VOLUME INDEX: 17.6 ML/M2
LEFT VENTRICLE SYSTOLIC VOLUME: 34.08 ML
LEFT VENTRICULAR INTERNAL DIMENSION IN DIASTOLE: 4.5 CM (ref 3.5–6)
LEFT VENTRICULAR MASS: 175 G
LV LATERAL E/E' RATIO: 10
LV SEPTAL E/E' RATIO: 12.5
MV A" WAVE DURATION": 202.66 MS
MV PEAK A VEL: 0.55 M/S
MV PEAK E VEL: 1 M/S
OHS CV RV/LV RATIO: 0.8 CM
PISA TR MAX VEL: 2.8 M/S
PULM VEIN A" WAVE DURATION": 202.66 MS
PULM VEIN S/D RATIO: 1.16
PULMONIC VEIN PEAK A VELOCITY: 0.2 M/S
PV PEAK D VEL: 0.51 M/S
PV PEAK S VEL: 0.59 M/S
RA MAJOR: 5.28 CM
RA PRESSURE ESTIMATED: 15 MMHG
RA WIDTH: 4 CM
RIGHT ATRIAL AREA: 19.9 CM2
RIGHT VENTRICLE DIASTOLIC BASEL DIMENSION: 3.6 CM
RV TB RVSP: 18 MMHG
RV TISSUE DOPPLER FREE WALL SYSTOLIC VELOCITY 1 (APICAL 4 CHAMBER VIEW): 15.82 CM/S
SINUS: 3.81 CM
STJ: 3.17 CM
TDI LATERAL: 0.1 M/S
TDI SEPTAL: 0.08 M/S
TDI: 0.09 M/S
TR MAX PG: 31 MMHG
TRICUSPID ANNULAR PLANE SYSTOLIC EXCURSION: 2.95 CM
TV PEAK GRADIENT: 32 MMHG
TV REST PULMONARY ARTERY PRESSURE: 46 MMHG
Z-SCORE OF LEFT VENTRICULAR DIMENSION IN END DIASTOLE: -2.03
Z-SCORE OF LEFT VENTRICULAR DIMENSION IN END SYSTOLE: -0.97

## 2025-02-07 PROCEDURE — 93306 TTE W/DOPPLER COMPLETE: CPT

## 2025-02-07 PROCEDURE — 93306 TTE W/DOPPLER COMPLETE: CPT | Mod: 26,,, | Performed by: INTERNAL MEDICINE

## 2025-02-10 ENCOUNTER — TELEPHONE (OUTPATIENT)
Dept: INTERNAL MEDICINE | Facility: CLINIC | Age: 85
End: 2025-02-10
Payer: MEDICARE

## 2025-02-10 DIAGNOSIS — R25.1 TREMORS OF NERVOUS SYSTEM: Primary | ICD-10-CM

## 2025-02-10 NOTE — TELEPHONE ENCOUNTER
LOV with Marco Antonio Vaughan MD , 7/26/2024  Contacted the patient. Instructed them to stop taking the Propanolol per Dr. Vaughan, because it can slow the heart rate. Relayed to the patient Dr. Vaughan would like for him to see neurology. Will contact scheduling to assist with the appointment. Patient verbalized understanding.

## 2025-02-11 ENCOUNTER — PATIENT MESSAGE (OUTPATIENT)
Dept: CARDIOLOGY | Facility: CLINIC | Age: 85
End: 2025-02-11
Payer: MEDICARE

## 2025-02-14 ENCOUNTER — TELEPHONE (OUTPATIENT)
Dept: CARDIOLOGY | Facility: HOSPITAL | Age: 85
End: 2025-02-14
Payer: MEDICARE

## 2025-02-14 NOTE — PROGRESS NOTES
Mark Mr. Schwab, I will recount what we discussed on our telephone call. Your echocardiogram is within normal limits, but the finding of concern to me is your elevated pulmonary artery pressures (lung blood vessel pressures). Given that you did not have any shortness of breath or signs of fluid overload during your visit, I would not pursue further work up of this for now. If anything, you appeared dehydrated at your last visit. I am going to wait to review you cardiac monitor before making any further determination on your primary complaint of dizziness. I will likely have you follow back up with me in the next 3-6 months after reviewing the monitor; please await for a call from our office.    As for your worsening tremors since stopping propranolol, please discuss with your PCP or neurologist if there are alternatives that you may take. Your heart rate was in the 40-50s while on propranolol twice a day. If you MUST take it, I would recommend cutting the dosage or frequency in half. Please contact our office if any other questions.

## 2025-02-19 ENCOUNTER — OFFICE VISIT (OUTPATIENT)
Dept: INTERNAL MEDICINE | Facility: CLINIC | Age: 85
End: 2025-02-19
Payer: MEDICARE

## 2025-02-19 VITALS
HEART RATE: 88 BPM | OXYGEN SATURATION: 99 % | DIASTOLIC BLOOD PRESSURE: 64 MMHG | BODY MASS INDEX: 23.92 KG/M2 | SYSTOLIC BLOOD PRESSURE: 120 MMHG | HEIGHT: 72 IN | WEIGHT: 176.56 LBS

## 2025-02-19 DIAGNOSIS — J01.90 ACUTE NON-RECURRENT SINUSITIS, UNSPECIFIED LOCATION: Primary | ICD-10-CM

## 2025-02-19 DIAGNOSIS — R05.8 ALLERGIC COUGH: ICD-10-CM

## 2025-02-19 PROCEDURE — 3078F DIAST BP <80 MM HG: CPT | Mod: CPTII,S$GLB,, | Performed by: INTERNAL MEDICINE

## 2025-02-19 PROCEDURE — 1126F AMNT PAIN NOTED NONE PRSNT: CPT | Mod: CPTII,S$GLB,, | Performed by: INTERNAL MEDICINE

## 2025-02-19 PROCEDURE — 1101F PT FALLS ASSESS-DOCD LE1/YR: CPT | Mod: CPTII,S$GLB,, | Performed by: INTERNAL MEDICINE

## 2025-02-19 PROCEDURE — 3288F FALL RISK ASSESSMENT DOCD: CPT | Mod: CPTII,S$GLB,, | Performed by: INTERNAL MEDICINE

## 2025-02-19 PROCEDURE — 1159F MED LIST DOCD IN RCRD: CPT | Mod: CPTII,S$GLB,, | Performed by: INTERNAL MEDICINE

## 2025-02-19 PROCEDURE — 3074F SYST BP LT 130 MM HG: CPT | Mod: CPTII,S$GLB,, | Performed by: INTERNAL MEDICINE

## 2025-02-19 PROCEDURE — 1160F RVW MEDS BY RX/DR IN RCRD: CPT | Mod: CPTII,S$GLB,, | Performed by: INTERNAL MEDICINE

## 2025-02-19 RX ORDER — AZELASTINE 1 MG/ML
2 SPRAY, METERED NASAL 2 TIMES DAILY
Qty: 30 ML | Refills: 0 | Status: SHIPPED | OUTPATIENT
Start: 2025-02-19

## 2025-02-19 RX ORDER — AZITHROMYCIN 250 MG/1
TABLET, FILM COATED ORAL
Qty: 6 TABLET | Refills: 0 | Status: SHIPPED | OUTPATIENT
Start: 2025-02-19

## 2025-02-19 NOTE — PROGRESS NOTES
84-year-old male  For 1 week's been having nasal congestion with postnasal drip.  Associated with a cough that has still been persistent.  He feels congested in his chest.  That has been some degree of dyspnea on exertion.  During this time that has been no fever chills.  That has not aware of wheezing.  That has blowing out coughing up green mucus.    Medical history   Hypertension  BPH   Essential tremor    Medications   Amlodipine 2.5 mg daily   Doxazosin 4 mg q.h.s.    Recently that has evaluated by Cardiology regarding lightheadedness.  He was previously on propranolol 10 mg b.i.d. but the medication was discontinued because of bradycardia.  Amlodipine was initiated.  He is getting ready to have a cardiac event monitor.  Recent 2D echo showed normal ejection fraction mi mitral regurgitation.    Since the change he has not had any further episodes of lightheadedness dizziness.  That has noticing heart rate to be little bit up in his 70s or 80s.  Also bit more episodes of tremor    Examination   Weight 176 lb   BMI 23.95   Pulse 76 by me  Blood pressure 120/62 by me   Pulse ox 99%  Tympanic membranes normal   Nasal mucosa is slightly edematous  Oropharynx slightly hyperemic   Neck no palpable adenopathy   Chest clear breath sounds no wheezes or rales good effort  Heart regular rate and rhythm    Impression   Acute sinusitis   Reactive/allergic cough    Plan Zithromax Z-Alessandro  Use of Astelin nasal spray 1-2 puffs b.i.d.    Use of Mucinex DM.    List night i took NyQuil which was helpful as for his sleep in his

## 2025-02-22 DIAGNOSIS — Z00.00 ENCOUNTER FOR MEDICARE ANNUAL WELLNESS EXAM: ICD-10-CM

## 2025-03-07 ENCOUNTER — CLINICAL SUPPORT (OUTPATIENT)
Dept: CARDIOLOGY | Facility: HOSPITAL | Age: 85
End: 2025-03-07
Attending: STUDENT IN AN ORGANIZED HEALTH CARE EDUCATION/TRAINING PROGRAM
Payer: MEDICARE

## 2025-03-07 DIAGNOSIS — R42 DIZZINESS: ICD-10-CM

## 2025-03-07 DIAGNOSIS — R00.1 BRADYCARDIA: ICD-10-CM

## 2025-03-07 PROCEDURE — 93271 ECG/MONITORING AND ANALYSIS: CPT

## 2025-03-10 ENCOUNTER — OFFICE VISIT (OUTPATIENT)
Dept: INTERNAL MEDICINE | Facility: CLINIC | Age: 85
End: 2025-03-10
Payer: MEDICARE

## 2025-03-10 ENCOUNTER — RESULTS FOLLOW-UP (OUTPATIENT)
Dept: CARDIOLOGY | Facility: CLINIC | Age: 85
End: 2025-03-10

## 2025-03-10 VITALS
HEIGHT: 72 IN | DIASTOLIC BLOOD PRESSURE: 64 MMHG | OXYGEN SATURATION: 98 % | HEART RATE: 66 BPM | WEIGHT: 177 LBS | BODY MASS INDEX: 23.98 KG/M2 | SYSTOLIC BLOOD PRESSURE: 118 MMHG

## 2025-03-10 DIAGNOSIS — G62.9 SMALL FIBER NEUROPATHY: ICD-10-CM

## 2025-03-10 DIAGNOSIS — R25.1 TREMORS OF NERVOUS SYSTEM: Primary | ICD-10-CM

## 2025-03-10 DIAGNOSIS — I10 HYPERTENSION, UNSPECIFIED TYPE: ICD-10-CM

## 2025-03-10 PROBLEM — R42 DIZZINESS: Status: RESOLVED | Noted: 2025-01-25 | Resolved: 2025-03-10

## 2025-03-10 PROCEDURE — 99999 PR PBB SHADOW E&M-EST. PATIENT-LVL IV: CPT | Mod: PBBFAC,,,

## 2025-03-10 NOTE — PROGRESS NOTES
INTERNAL MEDICINE URGENT CARE NOTE    CHIEF COMPLAINT     Tavares presents today for evaluation of worsening tremors after medication change    HPI     Fred Schwab Jr. is a 84 y.o. male with HTN, BPH, essential tremor, multinodular thyroid,, hld, peripheral neuropathy of lower extremities who presents for an urgent/follow up visit today.    TREMORS:  He reports worsening of tremors since discontinuing propranolol. Tremors are present all day and exacerbated by stress. He experiences difficulty with fine motor tasks, including using utensils and writing. The tremor becomes more pronounced when holding objects, particularly at the end of an object, pincer grasp. The right hand is more affected than the left hand. He denies tremors in other parts of the body, such as the head.    CARDIOVASCULAR:  He is currently on day 3 of a planned 30-day heart monitoring period. Home blood pressure readings show morning measurements ranging from 102-120s/60-70s, with evening readings ranging from 110-140s systolic. Most recent blood pressure was 116/70 with heart rate of 64. He reports previous episodes of low heart rate, with recordings as low as 48-49 BPM. His cardiologist changed his blood pressure medication from propranolol to amlodipine due to concerns about low heart rate and dizziness. Since starting amlodipine, he denies the previous spells and generally feels better.    NEUROLOGICAL:  He reports episodic severe pain in the fourth and third toes of the right foot, rated 8-9/10. Pain occurs between 4-6 AM and improves with dependent positioning of the foot for less than one minute. 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    SOCIAL HISTORY:  He discontinued alcohol use approximately 1.5 years ago due to episodes of near-syncope with consumption. He reports limited caffeine intake and denies tobacco use. Sleep duration ranges from 6-8 hours  per night.    Review of Systems:  General: -fever, -chills, -fatigue, -weight gain, -weight loss  Eyes: -vision changes, -redness, -discharge  ENT: -ear pain, -nasal congestion, -sore throat  Cardiovascular: -chest pain, -palpitations, -lower extremity edema  Respiratory: -cough, -shortness of breath  Gastrointestinal: -abdominal pain, -nausea, -vomiting, -diarrhea, -constipation, -blood in stool  Genitourinary: -dysuria, -hematuria, -frequency  Musculoskeletal: +joint pain, -muscle pain  Skin: -rash, -lesion  Neurological: -headache, -dizziness, -numbness, -tingling, +tremors  Psychiatric: -anxiety, -depression, -sleep difficulty        Past Medical History:  Past Medical History:   Diagnosis Date    Actinic keratosis     Allergy     Arthritis     Basal cell carcinoma     left nasofacial sulcus    BPH (benign prostatic hyperplasia)     Colon polyp     benign    Dermatochalasis     Elevated PSA     Headache(784.0)     HEARING LOSS     Hypertension     Low tension glaucoma     Multiple thyroid nodules 11/27/2017    Need repeat thyroid ultrasound in November 2018.    Neuropathy     Otitis media     Squamous cell carcinoma 12/10/2016    left upper neck- in situ    Urinary tract infection      Home Medications:  Prior to Admission medications    Medication Sig Start Date End Date Taking? Authorizing Provider   amLODIPine (NORVASC) 2.5 MG tablet Take 1 tablet (2.5 mg total) by mouth once daily. 2/3/25 2/3/26  Fernando Gallego MD   ascorbic acid, vitamin C, (VITAMIN C) 500 MG tablet Take by mouth. 1 Tablet Oral Every day    Provider, [place]   azelastine (ASTELIN) 137 mcg (0.1 %) nasal spray 2 sprays (274 mcg total) by Nasal route 2 (two) times daily. 2/19/25   Chris Camarillo MD   azithromycin (Z-EMORY) 250 MG tablet As directed 2/19/25   Chris Camarillo MD   betamethasone dipropionate 0.05 % cream aaa qd prn rash. Not more than 2 weeks straight in same location. Avoid use on face and groin 10/2/23   Nieves Cassidy,  MD   calcium carbonate (OS-DIAMOND) 600 mg (1,500 mg) Tab Take 600 mg by mouth once daily. 1 Tablet Oral Every day    Provider, Historical   cholecalciferol, vitamin D3, 10 mcg (400 unit) Cap Take 1 capsule by mouth once daily. 1/15/21   Provider, Historical   ciclopirox (PENLAC) 8 % Soln Apply to affected nails nightly; remove weekly; 7/17/24   Nieves Cassidy MD   co-enzyme Q-10 30 mg capsule Take 30 mg by mouth 3 (three) times daily.    Provider, Historical   diclofenac sodium (VOLTAREN) 1 % Gel Apply topically as needed. 7/18/22   Provider, Historical   doxazosin (CARDURA) 4 MG tablet TAKE ONE TABLET BY MOUTH EVERY EVENING 11/11/24   Marco Antonio Vaughan MD   fluocinolone acetonide oiL 0.01 % Drop Place 4 drops in ear(s) 2 (two) times daily. 3/31/20   PARK Zambrano MD   ketoconazole (NIZORAL) 2 % cream aaa bid on  left forearm x 3 weeks 7/17/24   Nieves Cassidy MD   latanoprost 0.005 % ophthalmic solution Place 1 drop into both eyes every evening. 10/7/24 10/7/25  Latonia Snyder MD   loratadine (CLARITIN) 10 mg tablet Take 10 mg by mouth daily as needed. Every day 12/5/11   Provider, Historical   OMEGA-3 FATTY ACIDS/FISH OIL (OMEGA 3 FISH OIL ORAL) Take by mouth. 1 Capsule Oral Every day    Provider, Historical   sildenafiL (VIAGRA) 100 MG tablet Take 1 tablet (100 mg total) by mouth as needed for Erectile Dysfunction. as directed. 4/30/24   Marco Antonio Vaughan MD   terbinafine HCL (LAMISIL) 1 % cream Apply topically nightly. 1/1/1970   Provider, Historical   triamcinolone acetonide 0.025% (KENALOG) 0.025 % cream aaa qhs prn flare.  Not more than 1-2 weeks straight in same location 10/2/23   Nieves Cassidy MD   vit A palm,D3 in cod liver oil 1,250 unit-130 unit-530 mg Cap Take 1 capsule by mouth once daily.  1/1/15   Provider, Historical     PHYSICAL EXAM     General: No acute distress. Well-developed. Well-nourished.  Eyes: EOMI. Sclerae anicteric.  HENT: Normocephalic. Atraumatic. Nares patent. Moist oral  mucosa.  Cardiovascular: Regular rate. Regular rhythm. Audible systolic murmur. No rubs. No gallops. Normal S1, S2. Great pulse.  Respiratory: Normal respiratory effort. Clear to auscultation bilaterally. No rales. No rhonchi. No wheezing.  Abdomen: Soft. Non-tender. Non-distended. Normoactive bowel sounds.  Musculoskeletal: No  obvious deformity.  Extremities: No lower extremity edema.2+ DP and PD pulses  Neurological: Alert & oriented x3. No slurred speech. Normal gait.     GCS: GCS eye subscore is 4. GCS verbal subscore is 5. GCS motor subscore is 6.      Cranial Nerves: Cranial nerves 2-12 are intact.      Sensory: Sensation is intact.      Motor: Tremor present. No weakness or pronator drift.      Coordination: Coordination is intact. Finger-Nose-Finger Test normal.   Psychiatric: Normal mood. Normal affect. Good insight. Good judgment.  Skin: Warm. Dry. No rash. Foot color looks good.        /64 (BP Location: Left arm, Patient Position: Sitting)   Pulse 66   Ht 6' (1.829 m)   Wt 80.3 kg (177 lb 0.5 oz)   SpO2 98%   BMI 24.01 kg/m²     ASSESSMENT/PLAN     IMPRESSION:  - Assessed blood pressure and heart rate, noting improvement since changing from propranolol to amlodipine  - Evaluated tremors, likely essential tremor as previously diagnosed by Dr. Amos  - Considered peripheral neuropathy as cause of toe pain, referencing previous EMG results from Dr. Metzger in 2013 showing mild axonal polyneuropathy  - Decided against restarting propranolol due to risk of lowering heart rate excessively  - Deferred management of tremors to upcoming neurology appointment in 2 weeks    ESSENTIAL TREMOR:  - Diagnosis of essential tremor, consistent with patient's symtoms.  - Observed tremors in both hands, worse in the right hand, increasing when holding objects, especially at the end of utensils.  - Recommend stress management and relaxation techniques, including meditation, to help manage tremors.  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  - Noted ongoing numbness in foot area and extreme pain (severity 8 or 9 out of 10) in the second and third toes of the right foot, occurring between 4 and 6 AM  - Performed physical exam showing good pulse and color in the foot.  - Recommend wearing shoes with arch support at home   - Recommended EMG for further evaluation- patient declined    HYPERTENSION:  - Reviewed the patient's blood pressure and heart rate records, noting improvement since medication change.  - Evaluated recent blood pressure measurements: 116/70, 110s/60s-70s in the morning, 110s-120s in the evening, with one reading of 139.  - Noted that the cardiologist changed blood pressure medication from propranolol to amlodipine due to low heart rate.  - Continue amlodipine for BP management    I spent a total of 35 minutes on the day of the visit.This includes face to face time and non-face to face time preparing to see the patient (eg, review of tests), obtaining and/or reviewing separately obtained history, documenting clinical information in the electronic or other health record, independently interpreting results and communicating results to the patient/family/caregiver, or care coordinator.          Patient education provided from Radha. Patient was counseled on when and how to seek emergent care.   This note was generated with the assistance of ambient listening technology. Verbal consent was obtained by the patient and accompanying visitor(s) for the recording of patient appointment to facilitate this note. I attest to having reviewed and edited the generated note for accuracy, though some syntax or spelling errors may persist. Please contact the author of this note for any  clarification.       Rin LLOYD, APRN, FNP-c   Department of Internal Medicine - [facility]  8:03 AM

## 2025-03-14 ENCOUNTER — TELEPHONE (OUTPATIENT)
Facility: CLINIC | Age: 85
End: 2025-03-14
Payer: MEDICARE

## 2025-03-14 NOTE — TELEPHONE ENCOUNTER
Called PT today to offer them a new appt date and time. Due to someone putting them in a memory disorder slot. Pt picks up the soonest I can offer is in June 24th. Pt has declined the appointment. I offered to put him on a wait list just incase something comes up. PT has accepted being put on a wait list.

## 2025-04-23 ENCOUNTER — TELEPHONE (OUTPATIENT)
Dept: INTERNAL MEDICINE | Facility: CLINIC | Age: 85
End: 2025-04-23
Payer: MEDICARE

## 2025-04-23 NOTE — TELEPHONE ENCOUNTER
----- Message from Emilia sent at 4/23/2025  1:06 PM CDT -----  Contact: Pts mobile  [phone] Pts wife Ms. Vergara  Would you like a call back, or a response through your [facility] portal?:   CallComments: Patients wife Mrs. Cedeño would like for you to give the patient a call in regards to the patient wanting to be put back on propranolol to help him with the shakes.  Schwab said that the Neurology department those not want anything to do with the patient.

## 2025-04-25 ENCOUNTER — OFFICE VISIT (OUTPATIENT)
Dept: INTERNAL MEDICINE | Facility: CLINIC | Age: 85
End: 2025-04-25
Payer: MEDICARE

## 2025-04-25 VITALS
SYSTOLIC BLOOD PRESSURE: 128 MMHG | OXYGEN SATURATION: 96 % | HEART RATE: 60 BPM | HEIGHT: 72 IN | DIASTOLIC BLOOD PRESSURE: 62 MMHG | BODY MASS INDEX: 24.04 KG/M2 | WEIGHT: 177.5 LBS

## 2025-04-25 DIAGNOSIS — R25.1 TREMORS OF NERVOUS SYSTEM: Primary | ICD-10-CM

## 2025-04-25 DIAGNOSIS — I10 HYPERTENSION, UNSPECIFIED TYPE: ICD-10-CM

## 2025-04-25 DIAGNOSIS — R42 EPISODIC LIGHTHEADEDNESS: ICD-10-CM

## 2025-04-25 PROCEDURE — 99999 PR PBB SHADOW E&M-EST. PATIENT-LVL V: CPT | Mod: PBBFAC,,, | Performed by: INTERNAL MEDICINE

## 2025-04-25 RX ORDER — PRIMIDONE 50 MG/1
50 TABLET ORAL NIGHTLY
Qty: 30 TABLET | Refills: 11 | Status: SHIPPED | OUTPATIENT
Start: 2025-04-25 | End: 2026-04-25

## 2025-04-25 NOTE — PROGRESS NOTES
CC:  Tremors     HPI: The patient is an 84-year-old male with BPH, elevated PSA, colon polyps, low tension glaucoma, multinodular thyroid, hyperlipidemia , peripheral neuropathy involving lower extremities and tremors who presents today for follow up of tremors.  The patient was taken off propanolol secondary to decreased heart rate and lightheadedness.  The tremors have since gradually worsened off of the propanolol.  He can not ride his name unless things are perfectly calm.  He has no tremor at rest.  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    ROS: Patient reports some swelling in the ankles status post starting amlodipine.  The patient is playing pickleball as well as pink polyp without problems.  The patient has no trouble concentrating.  He still teaches chest.    Physical exam:   General appearance: No acute distress  Pulmonary: Good inspiratory, expiratory breath sounds are heard.  Lungs are clear auscultation.    Cardiovascular:  S1-S2, rhythm is regular.  The patient does have trace to 1+ ankle edema    GI:  Bowel sounds are normal.  No hepatosplenomegaly   Neuro: The patient has no resting tremor.  He does have intention tremor.  He has no cogwheel rigidity.  His gait is normal.    Assessment:    Essential tremors   2.  Hypertension  3.  Episodes of lightheadedness  Plan: 1.  The patient has had no problems with lightheadedness and stopping propranolol and starting amlodipine   2.  Will start the patient on Mysoline 50 mg once a day.  He is to try half a tablet 1st  3.  Refer the patient to Neurology  4.  The patient has follow up with us in 1 month.

## 2025-05-01 ENCOUNTER — PATIENT MESSAGE (OUTPATIENT)
Dept: INTERNAL MEDICINE | Facility: CLINIC | Age: 85
End: 2025-05-01
Payer: MEDICARE

## 2025-05-01 DIAGNOSIS — R25.1 TREMORS OF NERVOUS SYSTEM: ICD-10-CM

## 2025-05-01 NOTE — TELEPHONE ENCOUNTER
LOV with Marco Antonio Vaughan MD , 4/25/2025    Please see patient message in regard to Mysoline    Note dated 4/25/25:  2. Will start the patient on Mysoline 50 mg once a day. He is to try half a tablet 1st

## 2025-05-09 RX ORDER — PRIMIDONE 50 MG/1
50 TABLET ORAL NIGHTLY
Qty: 90 TABLET | Refills: 3 | Status: SHIPPED | OUTPATIENT
Start: 2025-05-09 | End: 2026-05-09

## 2025-05-09 NOTE — TELEPHONE ENCOUNTER
Pt requesting a 90 days supply of primidone; states that one tab daily is working well    Pended in encounter for review

## 2025-06-04 ENCOUNTER — TELEPHONE (OUTPATIENT)
Dept: INTERNAL MEDICINE | Facility: CLINIC | Age: 85
End: 2025-06-04
Payer: MEDICARE

## 2025-06-05 ENCOUNTER — OFFICE VISIT (OUTPATIENT)
Dept: INTERNAL MEDICINE | Facility: CLINIC | Age: 85
End: 2025-06-05
Payer: MEDICARE

## 2025-06-05 VITALS
OXYGEN SATURATION: 97 % | DIASTOLIC BLOOD PRESSURE: 60 MMHG | HEIGHT: 72 IN | SYSTOLIC BLOOD PRESSURE: 126 MMHG | HEART RATE: 58 BPM | WEIGHT: 167.31 LBS | BODY MASS INDEX: 22.66 KG/M2

## 2025-06-05 DIAGNOSIS — E04.1 THYROID NODULE: ICD-10-CM

## 2025-06-05 DIAGNOSIS — R97.20 ELEVATED PSA: ICD-10-CM

## 2025-06-05 DIAGNOSIS — R25.1 TREMORS OF NERVOUS SYSTEM: Primary | ICD-10-CM

## 2025-06-05 DIAGNOSIS — R42 EPISODIC LIGHTHEADEDNESS: ICD-10-CM

## 2025-06-05 DIAGNOSIS — I10 HYPERTENSION, UNSPECIFIED TYPE: ICD-10-CM

## 2025-06-05 DIAGNOSIS — R73.09 ELEVATED GLUCOSE: ICD-10-CM

## 2025-06-05 PROCEDURE — 99999 PR PBB SHADOW E&M-EST. PATIENT-LVL IV: CPT | Mod: PBBFAC,,, | Performed by: INTERNAL MEDICINE

## 2025-06-06 ENCOUNTER — PATIENT MESSAGE (OUTPATIENT)
Dept: DERMATOLOGY | Facility: CLINIC | Age: 85
End: 2025-06-06
Payer: MEDICARE

## 2025-06-06 DIAGNOSIS — L30.9 DERMATITIS: Primary | ICD-10-CM

## 2025-06-09 RX ORDER — BETAMETHASONE DIPROPIONATE 0.5 MG/G
CREAM TOPICAL
Qty: 45 G | Refills: 1 | Status: SHIPPED | OUTPATIENT
Start: 2025-06-09

## 2025-07-09 ENCOUNTER — LAB VISIT (OUTPATIENT)
Dept: LAB | Facility: HOSPITAL | Age: 85
End: 2025-07-09
Attending: INTERNAL MEDICINE
Payer: MEDICARE

## 2025-07-09 DIAGNOSIS — R97.20 ELEVATED PSA: ICD-10-CM

## 2025-07-09 DIAGNOSIS — R73.09 ELEVATED GLUCOSE: ICD-10-CM

## 2025-07-09 DIAGNOSIS — I10 HYPERTENSION, UNSPECIFIED TYPE: ICD-10-CM

## 2025-07-09 DIAGNOSIS — E04.1 THYROID NODULE: ICD-10-CM

## 2025-07-09 LAB
ABSOLUTE EOSINOPHIL (OHS): 0.11 K/UL
ABSOLUTE MONOCYTE (OHS): 0.43 K/UL (ref 0.3–1)
ABSOLUTE NEUTROPHIL COUNT (OHS): 2.99 K/UL (ref 1.8–7.7)
ALBUMIN SERPL BCP-MCNC: 3.7 G/DL (ref 3.5–5.2)
ALP SERPL-CCNC: 72 UNIT/L (ref 40–150)
ALT SERPL W/O P-5'-P-CCNC: 16 UNIT/L (ref 10–44)
ANION GAP (OHS): 8 MMOL/L (ref 8–16)
AST SERPL-CCNC: 21 UNIT/L (ref 11–45)
BASOPHILS # BLD AUTO: 0.06 K/UL
BASOPHILS NFR BLD AUTO: 1 %
BILIRUB SERPL-MCNC: 0.9 MG/DL (ref 0.1–1)
BUN SERPL-MCNC: 12 MG/DL (ref 8–23)
CALCIUM SERPL-MCNC: 9 MG/DL (ref 8.7–10.5)
CHLORIDE SERPL-SCNC: 106 MMOL/L (ref 95–110)
CHOLEST SERPL-MCNC: 179 MG/DL (ref 120–199)
CHOLEST/HDLC SERPL: 3.3 {RATIO} (ref 2–5)
CO2 SERPL-SCNC: 27 MMOL/L (ref 23–29)
CREAT SERPL-MCNC: 1.1 MG/DL (ref 0.5–1.4)
EAG (OHS): 103 MG/DL (ref 68–131)
ERYTHROCYTE [DISTWIDTH] IN BLOOD BY AUTOMATED COUNT: 14 % (ref 11.5–14.5)
GFR SERPLBLD CREATININE-BSD FMLA CKD-EPI: >60 ML/MIN/1.73/M2
GLUCOSE SERPL-MCNC: 93 MG/DL (ref 70–110)
HBA1C MFR BLD: 5.2 % (ref 4–5.6)
HCT VFR BLD AUTO: 41.5 % (ref 40–54)
HDLC SERPL-MCNC: 54 MG/DL (ref 40–75)
HDLC SERPL: 30.2 % (ref 20–50)
HGB BLD-MCNC: 13.5 GM/DL (ref 14–18)
IMM GRANULOCYTES # BLD AUTO: 0.01 K/UL (ref 0–0.04)
IMM GRANULOCYTES NFR BLD AUTO: 0.2 % (ref 0–0.5)
LDLC SERPL CALC-MCNC: 114.4 MG/DL (ref 63–159)
LYMPHOCYTES # BLD AUTO: 2.12 K/UL (ref 1–4.8)
MCH RBC QN AUTO: 30.1 PG (ref 27–31)
MCHC RBC AUTO-ENTMCNC: 32.5 G/DL (ref 32–36)
MCV RBC AUTO: 92 FL (ref 82–98)
NONHDLC SERPL-MCNC: 125 MG/DL
NUCLEATED RBC (/100WBC) (OHS): 0 /100 WBC
PLATELET # BLD AUTO: 157 K/UL (ref 150–450)
PMV BLD AUTO: 10.9 FL (ref 9.2–12.9)
POTASSIUM SERPL-SCNC: 3.9 MMOL/L (ref 3.5–5.1)
PROT SERPL-MCNC: 6.9 GM/DL (ref 6–8.4)
PSA SERPL-MCNC: 6.29 NG/ML
RBC # BLD AUTO: 4.49 M/UL (ref 4.6–6.2)
RELATIVE EOSINOPHIL (OHS): 1.9 %
RELATIVE LYMPHOCYTE (OHS): 37.1 % (ref 18–48)
RELATIVE MONOCYTE (OHS): 7.5 % (ref 4–15)
RELATIVE NEUTROPHIL (OHS): 52.3 % (ref 38–73)
SODIUM SERPL-SCNC: 141 MMOL/L (ref 136–145)
T4 SERPL-MCNC: 5.3 UG/DL (ref 4.5–11.5)
TRIGL SERPL-MCNC: 53 MG/DL (ref 30–150)
TSH SERPL-ACNC: 2.64 UIU/ML (ref 0.4–4)
WBC # BLD AUTO: 5.72 K/UL (ref 3.9–12.7)

## 2025-07-09 PROCEDURE — 36415 COLL VENOUS BLD VENIPUNCTURE: CPT

## 2025-07-09 PROCEDURE — 84153 ASSAY OF PSA TOTAL: CPT

## 2025-07-09 PROCEDURE — 83036 HEMOGLOBIN GLYCOSYLATED A1C: CPT

## 2025-07-09 PROCEDURE — 85025 COMPLETE CBC W/AUTO DIFF WBC: CPT

## 2025-07-09 PROCEDURE — 80053 COMPREHEN METABOLIC PANEL: CPT

## 2025-07-09 PROCEDURE — 80061 LIPID PANEL: CPT

## 2025-07-09 PROCEDURE — 84443 ASSAY THYROID STIM HORMONE: CPT

## 2025-07-09 PROCEDURE — 84436 ASSAY OF TOTAL THYROXINE: CPT

## 2025-07-10 ENCOUNTER — PATIENT MESSAGE (OUTPATIENT)
Dept: DERMATOLOGY | Facility: CLINIC | Age: 85
End: 2025-07-10
Payer: MEDICARE

## 2025-07-10 ENCOUNTER — TELEPHONE (OUTPATIENT)
Dept: INTERNAL MEDICINE | Facility: CLINIC | Age: 85
End: 2025-07-10
Payer: MEDICARE

## 2025-07-11 ENCOUNTER — OFFICE VISIT (OUTPATIENT)
Dept: INTERNAL MEDICINE | Facility: CLINIC | Age: 85
End: 2025-07-11
Payer: MEDICARE

## 2025-07-11 VITALS
HEART RATE: 58 BPM | WEIGHT: 173.31 LBS | HEIGHT: 72 IN | OXYGEN SATURATION: 98 % | BODY MASS INDEX: 23.47 KG/M2 | SYSTOLIC BLOOD PRESSURE: 116 MMHG | DIASTOLIC BLOOD PRESSURE: 70 MMHG

## 2025-07-11 DIAGNOSIS — I10 HYPERTENSION, UNSPECIFIED TYPE: ICD-10-CM

## 2025-07-11 DIAGNOSIS — R97.20 ELEVATED PSA: ICD-10-CM

## 2025-07-11 DIAGNOSIS — Z00.00 ANNUAL PHYSICAL EXAM: Primary | ICD-10-CM

## 2025-07-11 DIAGNOSIS — R25.1 TREMORS OF NERVOUS SYSTEM: ICD-10-CM

## 2025-07-11 DIAGNOSIS — E04.1 THYROID NODULE: ICD-10-CM

## 2025-07-11 DIAGNOSIS — G62.9 SMALL FIBER NEUROPATHY: ICD-10-CM

## 2025-07-11 LAB
BILIRUB UR QL STRIP.AUTO: NEGATIVE
CLARITY UR: CLEAR
COLOR UR AUTO: YELLOW
GLUCOSE UR QL STRIP: NEGATIVE
HGB UR QL STRIP: NEGATIVE
KETONES UR QL STRIP: NEGATIVE
LEUKOCYTE ESTERASE UR QL STRIP: NEGATIVE
NITRITE UR QL STRIP: NEGATIVE
PH UR STRIP: 6 [PH]
PROT UR QL STRIP: NEGATIVE
SP GR UR STRIP: 1.02
UROBILINOGEN UR STRIP-ACNC: NEGATIVE EU/DL

## 2025-07-11 PROCEDURE — 1159F MED LIST DOCD IN RCRD: CPT | Mod: CPTII,S$GLB,, | Performed by: INTERNAL MEDICINE

## 2025-07-11 PROCEDURE — 3074F SYST BP LT 130 MM HG: CPT | Mod: CPTII,S$GLB,, | Performed by: INTERNAL MEDICINE

## 2025-07-11 PROCEDURE — 99999 PR PBB SHADOW E&M-EST. PATIENT-LVL IV: CPT | Mod: PBBFAC,,, | Performed by: INTERNAL MEDICINE

## 2025-07-11 PROCEDURE — 81003 URINALYSIS AUTO W/O SCOPE: CPT | Performed by: INTERNAL MEDICINE

## 2025-07-11 PROCEDURE — 1101F PT FALLS ASSESS-DOCD LE1/YR: CPT | Mod: CPTII,S$GLB,, | Performed by: INTERNAL MEDICINE

## 2025-07-11 PROCEDURE — 3288F FALL RISK ASSESSMENT DOCD: CPT | Mod: CPTII,S$GLB,, | Performed by: INTERNAL MEDICINE

## 2025-07-11 PROCEDURE — 3078F DIAST BP <80 MM HG: CPT | Mod: CPTII,S$GLB,, | Performed by: INTERNAL MEDICINE

## 2025-07-11 PROCEDURE — 1126F AMNT PAIN NOTED NONE PRSNT: CPT | Mod: CPTII,S$GLB,, | Performed by: INTERNAL MEDICINE

## 2025-07-11 PROCEDURE — 99397 PER PM REEVAL EST PAT 65+ YR: CPT | Mod: S$GLB,,, | Performed by: INTERNAL MEDICINE

## 2025-07-11 NOTE — PROGRESS NOTES
CC: Annual exam    HPI:  The patient is an 84-year-old male with BPH, elevated PSA, colon polyps, low tension glaucoma, multinodular thyroid, hyperlipidemia, peripheral neuropathy involving the lower extremities, in his essential tremors who presents today for annual exam.  The patient reports he misses neurology appointment secondary to delayed air flights.  He had his wife had travel to [place].  They had a good trip; unfortunately, they had two lays on the trip back home resulting in the patient missing his neurology appointment.    ROS: Weight is stable.  He does have a history of ocular migraines and reports a decreased in frequency since February.  No auditory changes.  He does wear hearing aids.  No chest pain.  No shortness a breath.  Does play pickleball once a week.  For his recent vacation he did a lot of walking without problems.  No nausea vomiting.  No abdominal pain.  Has a bowel movement daily.  Last colonoscopy was 2020.  He does state that now when he urinates, he feels like he needs to have a bowel movement and will pass a small amount of feces.  The rash that he was seen for in the past is better.  His PSA is elevated.  His most recent PSA was 6.29, up from 5.8. in regards to the patient has tremors , he has to write slowly.  If he is stressed, he has trouble writing.  This has frustrates him because he has always had good penmanship.  He does not have any trouble eating currently.    Physical exam:   General appearance: No acute distress  HEENT: Conjunctiva is clear.  Pupils equal.  He has bilateral lens replacements.  TMs show small amount of wax in the ear canal.  TMs are clear.  Nasal septum is midline without discharge.  Trachea is midline without JVD.    Pulmonary:  Good inspiratory, expiratory breath sounds are heard.  Lungs are clear to auscultation.  Cardiovascular: S1-S2, rhythm is regular.  2+ carotid pulse bruits.  Extremities with 1+ ankle edema   GI: Abdomen was nontender,  nondistended without hepatosplenomegaly  : Digital rectal exam was performed.  The prostate was enlarged.  No masses were felt.  Lymphatics: No cervical or axillary adenopathy    Assessment:    Annual exam   2.  Essential tremors currently stable  3.  Multinodular thyroid   4.  Neuropathy involving the lower extremities   5.  Elevated PSA currently stable   6.  Hypertension  Plan:    The patient's blood tests from July 9th were reviewed with him.  This has included a CBC, CMP, lipid panel, A1c, PSA, TSH  2.  Will order a urinalysis  3.  The patient will reschedule his neurology appointment.

## 2025-07-12 ENCOUNTER — PATIENT MESSAGE (OUTPATIENT)
Dept: INTERNAL MEDICINE | Facility: CLINIC | Age: 85
End: 2025-07-12
Payer: MEDICARE

## 2025-07-12 LAB — HOLD SPECIMEN: NORMAL

## 2025-07-14 NOTE — TELEPHONE ENCOUNTER
LOV with Marco Antonio Vaughan MD , 7/11/2025 (Annual)  Pt asking if dry mouth overnight or in the morning could be a side effect of primodone.

## 2025-07-28 ENCOUNTER — OFFICE VISIT (OUTPATIENT)
Dept: DERMATOLOGY | Facility: CLINIC | Age: 85
End: 2025-07-28
Payer: MEDICARE

## 2025-07-28 DIAGNOSIS — L30.8 ASTEATOTIC ECZEMA: ICD-10-CM

## 2025-07-28 DIAGNOSIS — L29.9 PRURITUS: ICD-10-CM

## 2025-07-28 DIAGNOSIS — Z85.828 PERSONAL HISTORY OF SKIN CANCER: ICD-10-CM

## 2025-07-28 DIAGNOSIS — D22.9 NEVUS: ICD-10-CM

## 2025-07-28 DIAGNOSIS — L82.1 SK (SEBORRHEIC KERATOSIS): ICD-10-CM

## 2025-07-28 DIAGNOSIS — D18.01 CHERRY ANGIOMA: ICD-10-CM

## 2025-07-28 DIAGNOSIS — L21.9 SEBORRHEIC DERMATITIS, UNSPECIFIED: ICD-10-CM

## 2025-07-28 DIAGNOSIS — L57.0 AK (ACTINIC KERATOSIS): ICD-10-CM

## 2025-07-28 DIAGNOSIS — B35.3 TINEA PEDIS OF BOTH FEET: Primary | ICD-10-CM

## 2025-07-28 DIAGNOSIS — B35.1 ONYCHOMYCOSIS: ICD-10-CM

## 2025-07-28 PROCEDURE — 1160F RVW MEDS BY RX/DR IN RCRD: CPT | Mod: CPTII,S$GLB,, | Performed by: DERMATOLOGY

## 2025-07-28 PROCEDURE — 1159F MED LIST DOCD IN RCRD: CPT | Mod: CPTII,S$GLB,, | Performed by: DERMATOLOGY

## 2025-07-28 PROCEDURE — 3288F FALL RISK ASSESSMENT DOCD: CPT | Mod: CPTII,S$GLB,, | Performed by: DERMATOLOGY

## 2025-07-28 PROCEDURE — 1101F PT FALLS ASSESS-DOCD LE1/YR: CPT | Mod: CPTII,S$GLB,, | Performed by: DERMATOLOGY

## 2025-07-28 PROCEDURE — 99214 OFFICE O/P EST MOD 30 MIN: CPT | Mod: 25,S$GLB,, | Performed by: DERMATOLOGY

## 2025-07-28 PROCEDURE — 17003 DESTRUCT PREMALG LES 2-14: CPT | Mod: S$GLB,,, | Performed by: DERMATOLOGY

## 2025-07-28 PROCEDURE — 17000 DESTRUCT PREMALG LESION: CPT | Mod: S$GLB,,, | Performed by: DERMATOLOGY

## 2025-07-28 PROCEDURE — 99999 PR PBB SHADOW E&M-EST. PATIENT-LVL III: CPT | Mod: PBBFAC,,, | Performed by: DERMATOLOGY

## 2025-07-28 PROCEDURE — 1126F AMNT PAIN NOTED NONE PRSNT: CPT | Mod: CPTII,S$GLB,, | Performed by: DERMATOLOGY

## 2025-07-28 RX ORDER — FLUOCINOLONE ACETONIDE 0.11 MG/ML
4 OIL AURICULAR (OTIC) 2 TIMES DAILY
Qty: 20 ML | Refills: 5 | Status: SHIPPED | OUTPATIENT
Start: 2025-07-28

## 2025-07-28 RX ORDER — CICLOPIROX 80 MG/ML
SOLUTION TOPICAL
Qty: 6.6 ML | Refills: 11 | Status: SHIPPED | OUTPATIENT
Start: 2025-07-28

## 2025-07-28 NOTE — PATIENT INSTRUCTIONS
Neck- cerave/mometasone mixture at night   Feet- lamisil or can use am lactin moisturizer or cetaphil cream in a jar     We would like to see you back in the clinic in 6 months.  You will be able to schedule this appointment by calling or by using your [facility] portal 3 months before this time. Because our schedule fills so quickly, please set a reminder in your phone or on your calendar to schedule 3 months before you are due to come in so that we can see you in a timely fashion.  You should also receive a reminder from us in the mail. This will help us ensure we can continue to provide excellent healthcare for you. Thank you.

## 2025-07-28 NOTE — PROGRESS NOTES
Subjective:      Patient ID:  Fred Schwab Jr. is a 84 y.o. male who presents for   Chief Complaint   Patient presents with    Rash     Diffuse      Patient with new area of concern: rash  Location: diffuse  Present x > 1yr  Comes and goes  itching  Previous treatments: rx topicals     Pt has itching on neck at night as well   He gets itching on his feet.  Ketoconazole cream does not help   C/o itching and rash in his ears. Comes and goes. Would like rx for dermotic oil         Review of Systems   Cardiovascular:  Positive for pedal edema.   Skin:  Positive for itching, rash and dry skin.       Objective:   Physical Exam   Constitutional: He appears well-developed and well-nourished. No distress.   Neurological: He is alert and oriented to person, place, and time. He is not disoriented.   Psychiatric: He has a normal mood and affect.   Skin:   Areas Examined (abnormalities noted in diagram):   Scalp / Hair Palpated and Inspected  Head / Face Inspection Performed  Neck Inspection Performed  Chest / Axilla Inspection Performed  Abdomen Inspection Performed  Genitals / Buttocks / Groin Inspection Performed  Back Inspection Performed  RUE Inspected  LUE Inspection Performed  RLE Inspected  LLE Inspection Performed  Nails and Digits Inspection Performed                         Diagram Legend     Erythematous scaling macule/papule c/w actinic keratosis       Vascular papule c/w angioma      Pigmented verrucoid papule/plaque c/w seborrheic keratosis      Yellow umbilicated papule c/w sebaceous hyperplasia      Irregularly shaped tan macule c/w lentigo     1-2 mm smooth white papules consistent with Milia      Movable subcutaneous cyst with punctum c/w epidermal inclusion cyst      Subcutaneous movable cyst c/w pilar cyst      Firm pink to brown papule c/w dermatofibroma      Pedunculated fleshy papule(s) c/w skin tag(s)      Evenly pigmented macule c/w junctional nevus     Mildly variegated pigmented, slightly  irregular-bordered macule c/w mildly atypical nevus      Flesh colored to evenly pigmented papule c/w intradermal nevus       Pink pearly papule/plaque c/w basal cell carcinoma      Erythematous hyperkeratotic cursted plaque c/w SCC      Surgical scar with no sign of skin cancer recurrence      Open and closed comedones      Inflammatory papules and pustules      Verrucoid papule consistent consistent with wart     Erythematous eczematous patches and plaques     Dystrophic onycholytic nail with subungual debris c/w onychomycosis     Umbilicated papule    Erythematous-base heme-crusted tan verrucoid plaque consistent with inflamed seborrheic keratosis     Erythematous Silvery Scaling Plaque c/w Psoriasis     See annotation      Assessment / Plan:        Tinea pedis of both feet  -     terbinafine HCL (LAMISIL AT) 1 % cream; Aaa in groin or on feet qd- bid  Dispense: 30 g; Refill: 12  Pt feels this is better than ketoconazole  He does not want to take oral lamisil bc he took in the past and said cleared but recurred    Asteatotic eczema/Pruritus  Discussed with patient good skin care regimen including avoiding fragranced products and very hot showers.  Recommended dove sensitive skin bar soap or cerave hydrating cleanser or bar.  Recommend Cerave cream  For moisturization daily -2x daily.     Neck- cerave/mometasone mixture at night   Feet- lamisil or can use am lactin moisturizer or cetaphil cream in a jar     Seborrheic dermatitis, unspecified  -     fluocinolone acetonide oiL 0.01 % Drop; Place 4 drops in ear(s) 2 (two) times daily.  Dispense: 20 mL; Refill: 5    SK (seborrheic keratosis)  These are benign inherited growths without a malignant potential. Reassurance given to patient. No treatment is necessary.     Nevus  Discussed ABCDE's of nevi.  Monitor for new mole or moles that are becoming bigger, darker, irritated, or developing irregular borders. Brochure provided. Instructed patient to observe lesion(s)  for changes and follow up in clinic if changes are noted. Patient to monitor skin at home for new or changing lesions.     Cherry angioma  These are benign vascular lesions that are inherited.  Treatment is not necessary.    Onychomycosis  -     ciclopirox (PENLAC) 8 % Soln; Apply to affected nails nightly; remove weekly;  Dispense: 6.6 mL; Refill: 11    AK (actinic keratosis)  Cryosurgery Procedure Note    Verbal consent from the patient is obtained including, but not limited to, risk of hypopigmentation/hyperpigmentation, scar, recurrence of lesion. The patient is aware of the precancerous quality and need for treatment of these lesions. Liquid nitrogen cryosurgery is applied to the 3 actinic keratoses, as detailed in the physical exam, to produce a freeze injury. The patient is aware that blisters may form and is instructed on wound care with gentle cleansing and use of vaseline ointment to keep moist until healed. The patient is supplied a handout on cryosurgery and is instructed to call if lesions do not completely resolve.    Personal history of skin cancer  Pt with history of non melanoma skin cancer  Total body skin examination performed today including at least 12 points as noted in physical examination. No suspicious lesions noted.Monitor for new or changing lesions as discussed such as pink scaly patches that are occasionally tender or not healing, 'pimples' that never go away, lesions that are not healing or bleeding.                Follow up in about 6 months (around 1/28/2026) for UBSE.

## 2025-07-30 ENCOUNTER — PATIENT MESSAGE (OUTPATIENT)
Dept: INTERNAL MEDICINE | Facility: CLINIC | Age: 85
End: 2025-07-30
Payer: MEDICARE

## 2025-07-30 DIAGNOSIS — R25.1 TREMORS OF NERVOUS SYSTEM: ICD-10-CM

## 2025-07-30 RX ORDER — PRIMIDONE 50 MG/1
50 TABLET ORAL NIGHTLY
Qty: 135 TABLET | Refills: 3 | Status: SHIPPED | OUTPATIENT
Start: 2025-07-30 | End: 2027-01-21

## 2025-07-30 NOTE — TELEPHONE ENCOUNTER
" LOV with Marco Antonio Vaughan MD , 7/11/2025 (Annual)  Pt reports he is taking 1.5 tablets (75 mg) primidone per day. Requesting refill.  6/5/25 visit note - "Will increase Mysoline to 75 mg q.h.s. "    Multiple warnings observed when attempting to order 75 mg from primidone with 50 mg tablet and 100 mg tablet selected. Addended sig to state "Take 1.5 tablets for a total of 75 mg every evening" and amount dispensed changed to 135 tablets; primidone  pended if appropriate.  "

## 2025-08-15 RX ORDER — DOXAZOSIN 4 MG/1
4 TABLET ORAL NIGHTLY
Qty: 90 TABLET | Refills: 3 | Status: SHIPPED | OUTPATIENT
Start: 2025-08-15

## 2025-08-19 ENCOUNTER — OFFICE VISIT (OUTPATIENT)
Dept: OPHTHALMOLOGY | Facility: CLINIC | Age: 85
End: 2025-08-19
Payer: MEDICARE

## 2025-08-19 DIAGNOSIS — H40.1121 PRIMARY OPEN-ANGLE GLAUCOMA, LEFT EYE, MILD STAGE: ICD-10-CM

## 2025-08-19 DIAGNOSIS — H02.403 PTOSIS OF BOTH EYELIDS: ICD-10-CM

## 2025-08-19 DIAGNOSIS — H35.372 EPIRETINAL MEMBRANE (ERM) OF LEFT EYE: ICD-10-CM

## 2025-08-19 DIAGNOSIS — H04.123 DRY EYE SYNDROME OF BOTH EYES: ICD-10-CM

## 2025-08-19 DIAGNOSIS — Z96.1 PSEUDOPHAKIA, BOTH EYES: ICD-10-CM

## 2025-08-19 DIAGNOSIS — H40.1112 PRIMARY OPEN-ANGLE GLAUCOMA, RIGHT EYE, MODERATE STAGE: Primary | ICD-10-CM

## 2025-08-19 DIAGNOSIS — Z98.83 STATUS POST GLAUCOMA SURGERY: ICD-10-CM

## 2025-08-19 PROCEDURE — 1101F PT FALLS ASSESS-DOCD LE1/YR: CPT | Mod: CPTII,S$GLB,, | Performed by: OPHTHALMOLOGY

## 2025-08-19 PROCEDURE — 1159F MED LIST DOCD IN RCRD: CPT | Mod: CPTII,S$GLB,, | Performed by: OPHTHALMOLOGY

## 2025-08-19 PROCEDURE — 3288F FALL RISK ASSESSMENT DOCD: CPT | Mod: CPTII,S$GLB,, | Performed by: OPHTHALMOLOGY

## 2025-08-19 PROCEDURE — 1126F AMNT PAIN NOTED NONE PRSNT: CPT | Mod: CPTII,S$GLB,, | Performed by: OPHTHALMOLOGY

## 2025-08-19 PROCEDURE — 1160F RVW MEDS BY RX/DR IN RCRD: CPT | Mod: CPTII,S$GLB,, | Performed by: OPHTHALMOLOGY

## 2025-08-19 PROCEDURE — 99214 OFFICE O/P EST MOD 30 MIN: CPT | Mod: S$GLB,,, | Performed by: OPHTHALMOLOGY

## 2025-08-19 PROCEDURE — 99999 PR PBB SHADOW E&M-EST. PATIENT-LVL III: CPT | Mod: PBBFAC,,, | Performed by: OPHTHALMOLOGY

## 2025-08-19 RX ORDER — LATANOPROST 50 UG/ML
1 SOLUTION/ DROPS OPHTHALMIC NIGHTLY
Qty: 7.5 ML | Refills: 3 | Status: SHIPPED | OUTPATIENT
Start: 2025-08-19 | End: 2026-08-19

## 2025-08-25 ENCOUNTER — OFFICE VISIT (OUTPATIENT)
Dept: DERMATOLOGY | Facility: CLINIC | Age: 85
End: 2025-08-25
Payer: MEDICARE

## 2025-08-25 DIAGNOSIS — D48.5 NEOPLASM OF UNCERTAIN BEHAVIOR OF SKIN: Primary | ICD-10-CM

## 2025-08-25 PROCEDURE — 3288F FALL RISK ASSESSMENT DOCD: CPT | Mod: CPTII,S$GLB,, | Performed by: DERMATOLOGY

## 2025-08-25 PROCEDURE — 99212 OFFICE O/P EST SF 10 MIN: CPT | Mod: 25,S$GLB,, | Performed by: DERMATOLOGY

## 2025-08-25 PROCEDURE — 1126F AMNT PAIN NOTED NONE PRSNT: CPT | Mod: CPTII,S$GLB,, | Performed by: DERMATOLOGY

## 2025-08-25 PROCEDURE — 88305 TISSUE EXAM BY PATHOLOGIST: CPT | Mod: TC | Performed by: DERMATOLOGY

## 2025-08-25 PROCEDURE — 1159F MED LIST DOCD IN RCRD: CPT | Mod: CPTII,S$GLB,, | Performed by: DERMATOLOGY

## 2025-08-25 PROCEDURE — 11102 TANGNTL BX SKIN SINGLE LES: CPT | Mod: S$GLB,,, | Performed by: DERMATOLOGY

## 2025-08-25 PROCEDURE — 1160F RVW MEDS BY RX/DR IN RCRD: CPT | Mod: CPTII,S$GLB,, | Performed by: DERMATOLOGY

## 2025-08-25 PROCEDURE — 1101F PT FALLS ASSESS-DOCD LE1/YR: CPT | Mod: CPTII,S$GLB,, | Performed by: DERMATOLOGY

## 2025-08-25 PROCEDURE — 99999 PR PBB SHADOW E&M-EST. PATIENT-LVL III: CPT | Mod: PBBFAC,,, | Performed by: DERMATOLOGY

## 2025-08-27 ENCOUNTER — PATIENT MESSAGE (OUTPATIENT)
Dept: DERMATOLOGY | Facility: CLINIC | Age: 85
End: 2025-08-27
Payer: MEDICARE

## 2025-08-27 LAB
ESTROGEN SERPL-MCNC: NORMAL PG/ML
INSULIN SERPL-ACNC: NORMAL U[IU]/ML
LAB AP CLINICAL INFORMATION: NORMAL
LAB AP GROSS DESCRIPTION: NORMAL
LAB AP PERFORMING LOCATION(S): NORMAL
LAB AP REPORT FOOTNOTES: NORMAL
T3RU NFR SERPL: NORMAL %

## 2025-09-02 ENCOUNTER — PATIENT MESSAGE (OUTPATIENT)
Dept: INTERNAL MEDICINE | Facility: CLINIC | Age: 85
End: 2025-09-02
Payer: MEDICARE

## (undated) DEVICE — GLOVE BIOGEL ECLIPSE SZ 6.5

## (undated) DEVICE — DRAPE THREE-QTR REINF 53X77IN

## (undated) DEVICE — Device

## (undated) DEVICE — SYR 3CC 21 X 1 LUER LOCK

## (undated) DEVICE — LENS IPRISM GONIOSCOPIC AMBI

## (undated) DEVICE — SOL BETADINE 5%

## (undated) DEVICE — DRESSING TRANS 4X4 TEGADERM

## (undated) DEVICE — SHIELD COLLAGEN 12HR CORNEAL

## (undated) DEVICE — SYS OMNI GLAUCOMA TREATMENT

## (undated) DEVICE — KIT GREY EYE

## (undated) DEVICE — GOWN SURGICAL X-LARGE

## (undated) DEVICE — SHIELD EYE PLASTIC CLEAR ADLT

## (undated) DEVICE — PAD EYE OVAL CNTOUR 1.62X2.62

## (undated) DEVICE — SHIELD EYE CLEAR ACRYLIC UNIV

## (undated) DEVICE — SOL IRR BSS OPHTH 500ML STRL

## (undated) DEVICE — DRAPE EYE POUCH FEN INCISE

## (undated) DEVICE — SOL WATER STRL IRR 1000ML